# Patient Record
Sex: FEMALE | Race: WHITE | NOT HISPANIC OR LATINO | Employment: FULL TIME | URBAN - METROPOLITAN AREA
[De-identification: names, ages, dates, MRNs, and addresses within clinical notes are randomized per-mention and may not be internally consistent; named-entity substitution may affect disease eponyms.]

---

## 2017-02-01 ENCOUNTER — DOCUMENTATION ONLY (OUTPATIENT)
Dept: LAB | Facility: CLINIC | Age: 42
End: 2017-02-01

## 2017-02-01 ENCOUNTER — OFFICE VISIT (OUTPATIENT)
Dept: FAMILY MEDICINE | Facility: CLINIC | Age: 42
End: 2017-02-01
Payer: COMMERCIAL

## 2017-02-01 VITALS
OXYGEN SATURATION: 99 % | TEMPERATURE: 97.6 F | DIASTOLIC BLOOD PRESSURE: 71 MMHG | SYSTOLIC BLOOD PRESSURE: 113 MMHG | HEIGHT: 67 IN | HEART RATE: 60 BPM | WEIGHT: 147.3 LBS | BODY MASS INDEX: 23.12 KG/M2

## 2017-02-01 DIAGNOSIS — E10.9 TYPE 1 DIABETES MELLITUS WITHOUT COMPLICATION (H): ICD-10-CM

## 2017-02-01 DIAGNOSIS — Z12.39 BREAST CANCER SCREENING: Primary | ICD-10-CM

## 2017-02-01 DIAGNOSIS — E10.9 TYPE 1 DIABETES MELLITUS WITHOUT COMPLICATION (H): Primary | ICD-10-CM

## 2017-02-01 DIAGNOSIS — E78.5 HYPERLIPIDEMIA WITH TARGET LDL LESS THAN 100: ICD-10-CM

## 2017-02-01 LAB
BASOPHILS # BLD AUTO: 0.1 10E9/L (ref 0–0.2)
BASOPHILS NFR BLD AUTO: 2.3 %
DIFFERENTIAL METHOD BLD: ABNORMAL
EOSINOPHIL # BLD AUTO: 0.1 10E9/L (ref 0–0.7)
EOSINOPHIL NFR BLD AUTO: 3.1 %
ERYTHROCYTE [DISTWIDTH] IN BLOOD BY AUTOMATED COUNT: 12.4 % (ref 10–15)
HBA1C MFR BLD: 6.6 % (ref 4.3–6)
HCT VFR BLD AUTO: 39.4 % (ref 35–47)
HGB BLD-MCNC: 13 G/DL (ref 11.7–15.7)
LYMPHOCYTES # BLD AUTO: 1.4 10E9/L (ref 0.8–5.3)
LYMPHOCYTES NFR BLD AUTO: 35.1 %
MCH RBC QN AUTO: 31.4 PG (ref 26.5–33)
MCHC RBC AUTO-ENTMCNC: 33 G/DL (ref 31.5–36.5)
MCV RBC AUTO: 95 FL (ref 78–100)
MONOCYTES # BLD AUTO: 0.5 10E9/L (ref 0–1.3)
MONOCYTES NFR BLD AUTO: 11.5 %
NEUTROPHILS # BLD AUTO: 1.9 10E9/L (ref 1.6–8.3)
NEUTROPHILS NFR BLD AUTO: 48 %
PLATELET # BLD AUTO: 245 10E9/L (ref 150–450)
RBC # BLD AUTO: 4.14 10E12/L (ref 3.8–5.2)
WBC # BLD AUTO: 3.9 10E9/L (ref 4–11)

## 2017-02-01 PROCEDURE — 83516 IMMUNOASSAY NONANTIBODY: CPT | Mod: 91 | Performed by: PREVENTIVE MEDICINE

## 2017-02-01 PROCEDURE — 83036 HEMOGLOBIN GLYCOSYLATED A1C: CPT | Performed by: PREVENTIVE MEDICINE

## 2017-02-01 PROCEDURE — 80061 LIPID PANEL: CPT | Performed by: PREVENTIVE MEDICINE

## 2017-02-01 PROCEDURE — 36415 COLL VENOUS BLD VENIPUNCTURE: CPT | Performed by: PREVENTIVE MEDICINE

## 2017-02-01 PROCEDURE — 80050 GENERAL HEALTH PANEL: CPT | Performed by: PREVENTIVE MEDICINE

## 2017-02-01 PROCEDURE — 99395 PREV VISIT EST AGE 18-39: CPT | Performed by: PREVENTIVE MEDICINE

## 2017-02-01 NOTE — MR AVS SNAPSHOT
After Visit Summary   2/1/2017    Jenise Smith    MRN: [id number]           Patient Information     Date Of Birth          1975        Visit Information        Provider Department      2/1/2017 12:00 PM Hai Yeboah MD [facility]        Today's Diagnoses     Breast cancer screening    -  1        Follow-ups after your visit        Future tests that were ordered for you today     Open Future Orders        Priority Expected Expires Ordered    *MA Screening Digital Bilateral Routine  2/1/2018 2/1/2017    TSH with free T4 reflex Routine 2/10/2017 2/10/2017 2/1/2017    Lipid Profile with reflex to direct LDL Routine 2/10/2017 2/10/2017 2/1/2017    Comprehensive metabolic panel (BMP + Alb, Alk Phos, ALT, AST, Total. Bili, TP) Routine 2/10/2017 2/10/2017 2/1/2017    Albumin Random Urine Quantitative Routine 2/10/2017 2/10/2017 2/1/2017    CBC with platelets and differential Routine 2/10/2017 2/10/2017 2/1/2017    Hemoglobin A1c Routine 2/10/2017 2/10/2017 2/1/2017    Tissue transglutaminase clemente IgA and IgG Routine 2/10/2017 2/10/2017 2/1/2017            Who to contact     If you have questions or need follow up information about today's clinic visit or your schedule please contact [facility] directly at [phone].  Normal or non-critical lab and imaging results will be communicated to you by MyChart, letter or phone within 4 business days after the clinic has received the results. If you do not hear from us within 7 days, please contact the clinic through Interwisehart or phone. If you have a critical or abnormal lab result, we will notify you by phone as soon as possible.  Submit refill requests through [employer] or call your pharmacy and they will forward the refill request to us. Please allow 3 business days for your refill to be completed.          Additional Information About Your Visit        Interwisehart Information     [employer] gives you secure access to  "your electronic health record. If you see a primary care provider, you can also send messages to your care team and make appointments. If you have questions, please call your primary care clinic.  If you do not have a primary care provider, please call [phone] and they will assist you.        Care EveryWhere ID     This is your Care EveryWhere ID. This could be used by other organizations to access your [facility] medical records  [id number]        Your Vitals Were     Pulse Temperature Height BMI (Body Mass Index) Pulse Oximetry Last Period    60 97.6  F (36.4  C) (Oral) 5' 7\" (1.702 m) 23.07 kg/m2 99% 01/23/2017    Breastfeeding?                   No            Blood Pressure from Last 3 Encounters:   02/01/17 113/71   01/25/16 113/71   07/21/14 106/68    Weight from Last 3 Encounters:   02/01/17 147 lb 4.8 oz (66.815 kg)   01/25/16 141 lb (63.957 kg)   07/21/14 138 lb (62.596 kg)                 Today's Medication Changes          These changes are accurate as of: 2/1/17 12:01 PM.  If you have any questions, ask your nurse or doctor.               Stop taking these medicines if you haven't already. Please contact your care team if you have questions.     aluminum chloride 20 % external solution   Commonly known as:  DRYSOL   Stopped by:  Hai Yeboah MD           BIOTIN PO   Stopped by:  Hai Yeboah MD           CITRACAL OR   Stopped by:  Hai Yeboah MD           PRO-BIOTIC BLEND PO   Stopped by:  Hai Yeboah MD                    Primary Care Provider Office Phone # Fax #    Hai Yeboah [phone]       [facility] [address]  [facility] [address]        Thank you!     Thank you for choosing [facility]  for your care. Our goal is always to provide you with excellent care. Hearing back from our patients is one way we can continue to improve our " services. Please take a few minutes to complete the written survey that you may receive in the mail after your visit with us. Thank you!             Your Updated Medication List - Protect others around you: Learn how to safely use, store and throw away your medicines at www.disposemymeds.org.          This list is accurate as of: 2/1/17 12:01 PM.  Always use your most recent med list.                   Brand Name Dispense Instructions for use    ACE NOT PRESCRIBED (INTENTIONAL)     0 each    1 each daily ACE Inhibitor not prescribed due to Other: not needed       insulin lispro 100 UNIT/ML injection    HumaLOG     Inject Subcutaneous 3 times daily (before meals)       INSULIN PUMP - OUTPATIENT          MULTI FOR HER PO          sertraline 50 MG tablet    ZOLOFT    30 tablet    Take 1 tablet (50 mg) by mouth daily       STATIN NOT PRESCRIBED (INTENTIONAL)     0 each    Statin not prescribed intentionally due to does not meet [place] criteria       SYNTHROID PO      Take 25 mcg by mouth daily       VITAMIN D (CHOLECALCIFEROL) PO      Take 1,000 Units by mouth

## 2017-02-01 NOTE — PROGRESS NOTES
SUBJECTIVE:     CC: Jenise Smith is an 41 year old woman who presents for preventive health visit.     Physical  Annual:     Getting at least 3 servings of Calcium per day::  Yes    Bi-annual eye exam::  Yes    Dental care twice a year::  Yes    Sleep apnea or symptoms of sleep apnea::  None    Diet::  Diabetic    Frequency of exercise::  2-3 days/week    Duration of exercise::  45-60 minutes    Taking medications regularly::  No    Barriers to taking medications::  None    Additional concerns today::  No            Today's PHQ-2 Score:   PHQ-2 ( 1999 [employer]) 2/1/2017   Q1: Little interest or pleasure in doing things 0   Q2: Feeling down, depressed or hopeless 0   PHQ-2 Score 0   Little interest or pleasure in doing things -   Feeling down, depressed or hopeless -   PHQ-2 Score -       Abuse: Current or Past(Physical, Sexual or Emotional)- No  Do you feel safe in your environment - Yes    Social History   Substance Use Topics     Smoking status: Never Smoker      Smokeless tobacco: Never Used     Alcohol Use: 0.0 oz/week     0 Standard drinks or equivalent per week      Comment: 3 per week     The patient does not drink >3 drinks per day nor >7 drinks per week.    Recent Labs   Lab Test  01/21/16   0749  09/04/14   0921 07/01/13   CHOL  173  165  178   HDL  88  98  84   LDL  68  50  78   TRIG  86  85  80   CHOLHDLRATIO   --   1.7   --    NHDL  85   --    --        Reviewed orders with patient.  Reviewed health maintenance and updated orders accordingly - Yes    Mammo Decision Support:  Patient under age 50, mutual decision reflected in health maintenance.      Pertinent mammograms are reviewed under the imaging tab.  History of abnormal Pap smear: NO - age 30- 65 PAP every 3 years recommended  All Histories reviewed and updated in Epic.      ROS:  C: NEGATIVE for fever, chills, change in weight  I: NEGATIVE for worrisome rashes, moles or lesions  E: NEGATIVE for vision changes or irritation  ENT: NEGATIVE  "for ear, mouth and throat problems  R: NEGATIVE for significant cough or SOB  B: NEGATIVE for masses, tenderness or discharge  CV: NEGATIVE for chest pain, palpitations or peripheral edema  GI: NEGATIVE for nausea, abdominal pain, heartburn, or change in bowel habits  : NEGATIVE for unusual urinary or vaginal symptoms. Periods are regular.  M: NEGATIVE for significant arthralgias or myalgia  N: NEGATIVE for weakness, dizziness or paresthesias  P: NEGATIVE for changes in mood or affect    Problem list, Medication list, Allergies, and Medical/Social/Surgical histories reviewed in [facility] and updated as appropriate.  OBJECTIVE:     /71 mmHg  Pulse 60  Temp(Src) 97.6  F (36.4  C) (Oral)  Ht 5' 7\" (1.702 m)  Wt 147 lb 4.8 oz (66.815 kg)  BMI 23.07 kg/m2  SpO2 99%  LMP 01/23/2017  Breastfeeding? No  EXAM:  GENERAL: healthy, alert and no distress  EYES: Eyes grossly normal to inspection, PERRL and conjunctivae and sclerae normal  HENT: ear canals and TM's normal, nose and mouth without ulcers or lesions  NECK: no adenopathy, no asymmetry, masses, or scars and thyroid normal to palpation  RESP: lungs clear to auscultation - no rales, rhonchi or wheezes  BREAST: normal without masses, tenderness or nipple discharge and no palpable axillary masses or adenopathy  CV: regular rate and rhythm, normal S1 S2, no S3 or S4, no murmur, click or rub, no peripheral edema and peripheral pulses strong  ABDOMEN: soft, nontender, no hepatosplenomegaly, no masses and bowel sounds normal  MS: no gross musculoskeletal defects noted, no edema  SKIN: no suspicious lesions or rashes  NEURO: Normal strength and tone, mentation intact and speech normal  PSYCH: mentation appears normal, affect normal/bright    ASSESSMENT/PLAN:     1. Breast cancer screening  - Vitamin D Deficiency; Future    COUNSELING:  Reviewed preventive health counseling, as reflected in patient instructions       Regular exercise       Healthy diet/nutrition       " "Vision screening       Hearing screening       Contraception       Family planning       Folic Acid Counseling       Osteoporosis Prevention/Bone Health       Safe sex practices/STD prevention       Colon cancer screening       Consider Hep C screening for patients born between 1945 and        HIV screeninx in teen years, 1x in adult years, and at intervals if high risk         reports that she has never smoked. She has never used smokeless tobacco.    Estimated body mass index is 23.07 kg/(m^2) as calculated from the following:    Height as of this encounter: 5' 7\" (1.702 m).    Weight as of this encounter: 147 lb 4.8 oz (66.815 kg).       Counseling Resources:  ATP IV Guidelines  Pooled Cohorts Equation Calculator  Breast Cancer Risk Calculator  FRAX Risk Assessment  ICSI Preventive Guidelines  Dietary Guidelines for Americans, 2010  USDA's MyPlate  ASA Prophylaxis  Lung CA Screening    Hai Yeboah MD, MD   [facility]    "

## 2017-02-01 NOTE — NURSING NOTE
"Chief Complaint   Patient presents with     Physical       Initial /71 mmHg  Pulse 60  Temp(Src) 97.6  F (36.4  C) (Oral)  Ht 5' 7\" (1.702 m)  Wt 147 lb 4.8 oz (66.815 kg)  BMI 23.07 kg/m2  SpO2 99%  LMP 01/23/2017  Breastfeeding? No Estimated body mass index is 23.07 kg/(m^2) as calculated from the following:    Height as of this encounter: 5' 7\" (1.702 m).    Weight as of this encounter: 147 lb 4.8 oz (66.815 kg).  BP completed using cuff size: regular, right arm  Shantel Samaniego CMA    "

## 2017-02-01 NOTE — PROGRESS NOTES
This patient is coming in for pre physical labs THIS MORNING. Please review, associate diagnosis and sign the orders. Thanks!  -Lab Staff

## 2017-02-01 NOTE — PROGRESS NOTES
Patient was seen for lab only for pre physical labs. Blood was drawn and will be held, please place future orders for testing needed at this time. Thank you.

## 2017-02-02 ENCOUNTER — MYC MEDICAL ADVICE (OUTPATIENT)
Dept: FAMILY MEDICINE | Facility: CLINIC | Age: 42
End: 2017-02-02

## 2017-02-02 LAB
ALBUMIN SERPL-MCNC: 4.2 G/DL (ref 3.4–5)
ALP SERPL-CCNC: 48 U/L (ref 40–150)
ALT SERPL W P-5'-P-CCNC: 30 U/L (ref 0–50)
ANION GAP SERPL CALCULATED.3IONS-SCNC: 10 MMOL/L (ref 3–14)
AST SERPL W P-5'-P-CCNC: 18 U/L (ref 0–45)
BILIRUB SERPL-MCNC: 1.2 MG/DL (ref 0.2–1.3)
BUN SERPL-MCNC: 16 MG/DL (ref 7–30)
CALCIUM SERPL-MCNC: 8.9 MG/DL (ref 8.5–10.1)
CHLORIDE SERPL-SCNC: 103 MMOL/L (ref 94–109)
CHOLEST SERPL-MCNC: 172 MG/DL
CO2 SERPL-SCNC: 27 MMOL/L (ref 20–32)
CREAT SERPL-MCNC: 0.84 MG/DL (ref 0.52–1.04)
GFR SERPL CREATININE-BSD FRML MDRD: 76 ML/MIN/1.7M2
GLUCOSE SERPL-MCNC: 107 MG/DL (ref 70–99)
HDLC SERPL-MCNC: 84 MG/DL
LDLC SERPL CALC-MCNC: 75 MG/DL
NONHDLC SERPL-MCNC: 88 MG/DL
POTASSIUM SERPL-SCNC: 3.8 MMOL/L (ref 3.4–5.3)
PROT SERPL-MCNC: 7.4 G/DL (ref 6.8–8.8)
SODIUM SERPL-SCNC: 140 MMOL/L (ref 133–144)
TRIGL SERPL-MCNC: 63 MG/DL
TSH SERPL DL<=0.005 MIU/L-ACNC: 3.17 MU/L (ref 0.4–4)

## 2017-02-03 LAB
TTG IGA SER-ACNC: 1 U/ML
TTG IGG SER-ACNC: NORMAL U/ML

## 2017-02-22 ENCOUNTER — MYC MEDICAL ADVICE (OUTPATIENT)
Dept: FAMILY MEDICINE | Facility: CLINIC | Age: 42
End: 2017-02-22

## 2017-02-22 DIAGNOSIS — E55.9 VITAMIN D DEFICIENCY: Primary | ICD-10-CM

## 2017-02-24 ENCOUNTER — MYC MEDICAL ADVICE (OUTPATIENT)
Dept: FAMILY MEDICINE | Facility: CLINIC | Age: 42
End: 2017-02-24

## 2017-03-02 ENCOUNTER — TRANSFERRED RECORDS (OUTPATIENT)
Dept: HEALTH INFORMATION MANAGEMENT | Facility: CLINIC | Age: 42
End: 2017-03-02

## 2017-03-06 ENCOUNTER — OFFICE VISIT (OUTPATIENT)
Dept: SURGERY | Facility: CLINIC | Age: 42
End: 2017-03-06
Payer: COMMERCIAL

## 2017-03-06 VITALS
WEIGHT: 145 LBS | SYSTOLIC BLOOD PRESSURE: 130 MMHG | HEART RATE: 69 BPM | DIASTOLIC BLOOD PRESSURE: 82 MMHG | HEIGHT: 67 IN | BODY MASS INDEX: 22.76 KG/M2

## 2017-03-06 DIAGNOSIS — N63.20 LEFT BREAST LUMP: Primary | ICD-10-CM

## 2017-03-06 DIAGNOSIS — E55.9 VITAMIN D DEFICIENCY: ICD-10-CM

## 2017-03-06 LAB — DEPRECATED CALCIDIOL+CALCIFEROL SERPL-MC: 65 UG/L (ref 20–75)

## 2017-03-06 PROCEDURE — 82306 VITAMIN D 25 HYDROXY: CPT | Performed by: PREVENTIVE MEDICINE

## 2017-03-06 PROCEDURE — 36415 COLL VENOUS BLD VENIPUNCTURE: CPT | Performed by: PREVENTIVE MEDICINE

## 2017-03-06 PROCEDURE — 99243 OFF/OP CNSLTJ NEW/EST LOW 30: CPT | Performed by: SURGERY

## 2017-03-06 NOTE — PROGRESS NOTES
CHIEF COMPLAINT:  Chief Complaint   Patient presents with     Consult     Mass in left breast       HISTORY OF PRESENT ILLNESS:  Jenise Smith is a 42 year old female who is seen in consultation at the request of  Dr. Catrachito Yeboah for evaluation of a left breast lump and family history of breast cancer.  Jenise has been followed for a lump in her left breast for a couple of years.  She initially had mammograms and left breast ultrasound in  which showed only a couple of benign appearing cysts in the left upper outer breast.  Mammograms and ultrasound from 2016 were negative as well.  Jenise can still feel the lump but has noted no change.  She denies nipple discharge, skin change or other breast changes.  She has noted an occasional warm sensation in the left breast in the last few months.  Jenise has never had a breast biopsy or cyst aspiration.  Family history is positive for breast cancer in her paternal GM at age 70.  Her paternal uncle was diagnosed with colon cancer at age 55, but there is otherwise no FH for ovarian, uterine, prostate or pancreatic or other CA.    Jenise reached menarche at age 13.  She is  having had her pregnancy at age 29.  She nursed for about 1 month.  Jenise had a uterine ablation for very heavy periods, but still has regular menses.  She is on no BCP or other hormones.  She is a Type 1 diabetic and wears an insulin pump.    REVIEW OF SYSTEMS:  Constitutional:  Negative for chills, fatigue, fever and weight change.  Eyes:  Negative for blurred vision, eye pain and photophobia.  ENT:  Negative for hearing problems, ENT pain, congestion, rhinorrhea, epistaxis, hoarseness and dental problems.  Cardiovascular:  Negative for chest pain, palpitations, tachycardia, orthopnea and edema.  Respiratory:  Negative for cough, dyspnea and hemoptysis.  Gastrointestinal:  Negative for abdominal pain, heartburn, constipation, diarrhea and stool changes.  Musculoskeletal:   Negative for arthralgias, back pain and myalgias.  Integumentary/Breast:  See HPI.    Past Medical History   Diagnosis Date     Diabetes mellitus type 1 (H)      Dysmenorrhea      Excessive or frequent menstruation      Hypothyroid      Insulin pump in place      Lumbar back pain      Other and unspecified hyperlipidemia      PONV (postoperative nausea and vomiting)        Past Surgical History   Procedure Laterality Date     Laparoscopy       [place] teeth[       Leep tx, cervical       Dilation and curettage, hysteroscopy, ablate endometrium novasure, combined  1/23/2014     Procedure: COMBINED DILATION AND CURETTAGE, HYSTEROSCOPY, ABLATE ENDOMETRIUM NOVASURE;  HYSTEROSCOPY, DILATION, CURETTAGE, WITH NOVASURE ABLATION, MYOSURE MORCELLATOR;  Surgeon: Swetha Queen MD;  Location: [facility]     Operative hysteroscopy with morcellator  1/23/2014     Procedure: OPERATIVE HYSTEROSCOPY WITH MORCELLATOR;;  Surgeon: Swetha Queen MD;  Location: [facility]       Family History   Problem Relation Age of Onset     DIABETES Father      type 1     DIABETES Sister      type 1     Thyroid Disease Sister      DIABETES Mother      type 2     Breast Cancer Paternal Grandmother      Cancer - colorectal Paternal Uncle 60       Social History   Substance Use Topics     Smoking status: Never Smoker     Smokeless tobacco: Never Used     Alcohol use 0.0 oz/week     0 Standard drinks or equivalent per week      Comment: 3 per week       Patient Active Problem List   Diagnosis     Menorrhagia     Endometrial polyp     Hyperlipidemia with target LDL less than 100     Type 1 diabetes mellitus without complication (H)     Other specified hypothyroidism     Axillary hyperhidrosis     Allergies   Allergen Reactions     Latex      Facial redness     Current Outpatient Prescriptions   Medication Sig Dispense Refill     sertraline (ZOLOFT) 50 MG tablet Take 1 tablet (50 mg) by mouth daily 30 tablet 1     ACE/ARB NOT  "PRESCRIBED, INTENTIONAL, Please choose reason not prescribed, below       insulin lispro (HUMALOG) 100 UNIT/ML VIAL Inject Subcutaneous 3 times daily (before meals)       VITAMIN D, CHOLECALCIFEROL, PO Take 1,000 Units by mouth       Multiple Vitamins-Minerals (MULTI FOR HER PO)        STATIN NOT PRESCRIBED, INTENTIONAL, Statin not prescribed intentionally due to does not meet [place] criteria 0 each 0     ACE NOT PRESCRIBED, INTENTIONAL, 1 each daily ACE Inhibitor not prescribed due to Other: not needed 0 each 0     Levothyroxine Sodium (SYNTHROID PO) Take 25 mcg by mouth daily       INSULIN PUMP - OUTPATIENT        Vitals: /82  Pulse 69  Ht 5' 7\" (1.702 m)  Wt 145 lb (65.8 kg)  BMI 22.71 kg/m2  BMI= Body mass index is 22.71 kg/(m^2).    EXAM:  GENERAL: healthy, alert and no distress   BREAST:  The breasts appear symmetric with no overlying skin changes.  The nipples are normal bilaterally.  There is no dimpling or thickening of the skin.  No mass is appreciated in either breast.  The breast tissue is generally fairly soft with moderate increased density in the upper outer quadrants.  The upper outer quadrant on the left is a little more prominent and there are several smooth oval lumps within the dense breast tissue that feel like cysts.  The tissue in the upper outer quadrants is a little tender to palpation.  There is no axillary or supraclavicular lymphadenopathy.  A single small lymph node is palpated in each axilla.  CARDIOVASCULAR:  No edema or JVD.  RESPIRATORY: negative findings: no chest deformities noted, no chest wall tenderness, breathing is unlabored.  NECK: Neck supple. No adenopathy.   SKIN: No suspicious lesions or rashes  LYMPH: Normal cervical lymph nodes    ASSESSMENT:  Jenise Smith has no findings of concern on exam.  I did not appreciate a discrete lump in the left upper outer breast.  The \"lump\" is likely the prominent tail of the left breast which is very dense and " fibrocystic.  I see/feel no indication for surgical or needle biopsy at this time.  We reviewed risk and there is no evidence of increased risk in Jenise's personal or FH.  Her pat GM benign diagnosed with breast cancer at age 70 is likely random as there is no other pertinent FH.  Jenise is doing a good job with her lifestyle to improve her risk, exercising regularly, maintaining a normal weight, eating a low fat diet, minimizing her alcohol intake and not smoking.  I reviewed the data regarding exercise and breast cancer risk and encouraged her to stay active.  We also talked about screening.  I advised Jenise to continue with annual screening mammograms, preferably with tomosynthesis (3D) and annual clinical breast exams as part of her well woman exams.      PLAN:  Jenise will continue with routine annual screening including annual mammograms (preferably with tomosynthesis) and annual clinical breast exams.  She will follow up with me as needed.    Total time with patient visit: 45 minutes including discussions about the plan of care and care coordination with the patient.    Neli Egan MD    Please route or send letter to:  Primary Care Provider (PCP)

## 2017-03-06 NOTE — LETTER
2017      RE:  Jenise Smith-:  [birth year]    HISTORY OF PRESENT ILLNESS: Jenise Smith is a 42 year old female who is seen in consultation at the request of Dr. Catrachito Yeboah for evaluation of a left breast lump and family history of breast cancer.  Jenise has been followed for a lump in her left breast for a couple of years. She initially had mammograms and left breast ultrasound in  which showed only a couple of benign appearing cysts in the left upper outer breast. Mammograms and ultrasound from 2016 were negative as well. Jenise can still feel the lump but has noted no change. She denies nipple discharge, skin change or other breast changes. She has noted an occasional warm sensation in the left breast in the last few months. Jenise has never had a breast biopsy or cyst aspiration. Family history is positive for breast cancer in her paternal GM at age 70. Her paternal uncle was diagnosed with colon cancer at age 55, but there is otherwise no FH for ovarian, uterine, prostate or pancreatic or other CA.     Jenise reached menarche at age 13. She is  having had her pregnancy at age 29. She nursed for about 1 month. Jenise had a uterine ablation for very heavy periods, but still has regular menses. She is on no BCP or other hormones. She is a Type 1 diabetic and wears an insulin pump.     REVIEW OF SYSTEMS:  Constitutional: Negative for chills, fatigue, fever and weight change.  Eyes: Negative for blurred vision, eye pain and photophobia.  ENT: Negative for hearing problems, ENT pain, congestion, rhinorrhea, epistaxis, hoarseness and dental problems.  Cardiovascular: Negative for chest pain, palpitations, tachycardia, orthopnea and edema.  Respiratory: Negative for cough, dyspnea and hemoptysis.  Gastrointestinal: Negative for abdominal pain, heartburn, constipation, diarrhea and stool changes.  Musculoskeletal: Negative for arthralgias, back pain and  "myalgias.  Integumentary/Breast: See HPI.    Vitals: /82 Pulse 69 Ht 5' 7\" (1.702 m) Wt 145 lb (65.8 kg) BMI 22.71 kg/m2  BMI= Body mass index is 22.71 kg/(m^2).     EXAM:  GENERAL: healthy, alert and no distress   BREAST:  The breasts appear symmetric with no overlying skin changes. The nipples are normal bilaterally. There is no dimpling or thickening of the skin.  No mass is appreciated in either breast. The breast tissue is generally fairly soft with moderate increased density in the upper outer quadrants. The upper outer quadrant on the left is a little more prominent and there are several smooth oval lumps within the dense breast tissue that feel like cysts. The tissue in the upper outer quadrants is a little tender to palpation.  There is no axillary or supraclavicular lymphadenopathy. A single small lymph node is palpated in each axilla.  CARDIOVASCULAR: No edema or JVD.  RESPIRATORY: negative findings: no chest deformities noted, no chest wall tenderness, breathing is unlabored.  NECK: Neck supple. No adenopathy.   SKIN: No suspicious lesions or rashes  LYMPH: Normal cervical lymph nodes     ASSESSMENT:  Jenise Smiht has no findings of concern on exam. I did not appreciate a discrete lump in the left upper outer breast. The \"lump\" is likely the prominent tail of the left breast which is very dense and fibrocystic. I see/feel no indication for surgical or needle biopsy at this time. We reviewed risk and there is no evidence of increased risk in Jenise's personal or FH. Her pat GM benign diagnosed with breast cancer at age 72 is likely random as there is no other pertinent FH. Jenise is doing a good job with her lifestyle to improve her risk, exercising regularly, maintaining a normal weight, eating a low fat diet, minimizing her alcohol intake and not smoking. I reviewed the data regarding exercise and breast cancer risk and encouraged her to stay active.  We also talked about screening. I " advised Jenise to continue with annual screening mammograms, preferably with tomosynthesis (3D) and annual clinical breast exams as part of her well woman exams.      PLAN:  Jenise will continue with routine annual screening including annual mammograms (preferably with tomosynthesis) and annual clinical breast exams. She will follow up with me as needed.        Neli Egan MD

## 2017-03-06 NOTE — NURSING NOTE
Breast Patients    BREAST PATIENTS (ALL)    1-Do you have any of the following symptoms? Breast Pain and Lump(s) or Mass(es)  2-In which breast are you having the symptoms? left   3-Do you use hormones?  No  4-Have you had a Mammogram? Yes    5-Have you ever had a breast cyst drained? No  6-Have you ever had a breast biopsy? No  7-Have you ever had a Breast Cancer? No   8-Is there a history of Breast Cancer in your family? Yes   Relationship to you:    Grandmother  9-Have you ever had Ovarian Cancer? No  10-Is there a history of Ovarian Cancer in your family? No  11-Summarize your daily caffeine intake (i.e. coffee, tea, chocolate, soda etc.): coffee/chocolate    BREAST PATIENTS (FEMALE)    12-What age did your periods begin? 13  13-Date your last menstrual period began? 2/22/2017  14-Number of full-term pregnancies: 1  15-How many children do you have? 1  Ages 14  15-Your age when your first child was born? 29  16-Did you nurse your children? Yes  17-Are you pregnant now? No  18-Have you begun menopause? No  19-Have you had either ovary removed?No  20-Do you have breast implants? No   22-Are you on birth control? No  23-What is your marital status?   24-Do you exercise? 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

## 2017-03-06 NOTE — MR AVS SNAPSHOT
After Visit Summary   3/6/2017    Jenise Smith    MRN: [id number]           Patient Information     Date Of Birth          1975        Visit Information        Provider Department      3/6/2017 9:00 AM Neli Egan MD [facility]       Follow-ups after your visit        Your next 10 appointments already scheduled     Mar 06, 2017 10:45 AM CST   LAB with [facility]   [facility]    [address] [place] [address] [phone]           Patient must bring picture ID.  Patient should be prepared to give a urine specimen  Please do not eat 10-12 hours before your appointment if you are coming in fasting for labs on lipids, cholesterol, or glucose (sugar).  Pregnant women should follow their Care Team instructions. Water with medications is okay. Do not drink coffee or other fluids.   If you have concerns about taking  your medications, please ask at office or if scheduling via ScanCafe, send a message by clicking on Secure Messaging, Message Your Care Team.              Who to contact     If you have questions or need follow up information about today's clinic visit or your schedule please contact [facility] directly at [phone].  Normal or non-critical lab and imaging results will be communicated to you by MyChart, letter or phone within 4 business days after the clinic has received the results. If you do not hear from us within 7 days, please contact the clinic through [email or web address] or phone. If you have a critical or abnormal lab result, we will notify you by phone as soon as possible.  Submit refill requests through ScanCafe or call your pharmacy and they will forward the refill request to us. Please allow 3 business days for your refill to be completed.          Additional Information About Your Visit        MyChart Information   "   Go Vocab gives you secure access to your electronic health record. If you see a primary care provider, you can also send messages to your care team and make appointments. If you have questions, please call your primary care clinic.  If you do not have a primary care provider, please call [phone] and they will assist you.        Care EveryWhere ID     This is your Care EveryWhere ID. This could be used by other organizations to access your [place] medical records  [id number]        Your Vitals Were     Pulse Height BMI (Body Mass Index)             69 5' 7\" (1.702 m) 22.71 kg/m2          Blood Pressure from Last 3 Encounters:   03/06/17 130/82   02/01/17 113/71   01/25/16 113/71    Weight from Last 3 Encounters:   03/06/17 145 lb (65.8 kg)   02/01/17 147 lb 4.8 oz (66.8 kg)   01/25/16 141 lb (64 kg)              Today, you had the following     No orders found for display       Primary Care Provider Office Phone # Fax #    Valles Luis E Yeboah [phone]       [facility] [address]  [place] [address]        Thank you!     Thank you for choosing [facility]  for your care. Our goal is always to provide you with excellent care. Hearing back from our patients is one way we can continue to improve our services. Please take a few minutes to complete the written survey that you may receive in the mail after your visit with us. Thank you!             Your Updated Medication List - Protect others around you: Learn how to safely use, store and throw away your medicines at www.disposemymeds.org.          This list is accurate as of: 3/6/17  9:41 AM.  Always use your most recent med list.                   Brand Name Dispense Instructions for use    ACE NOT PRESCRIBED (INTENTIONAL)     0 each    1 each daily ACE Inhibitor not prescribed due to Other: not needed       ACE/ARB NOT PRESCRIBED (INTENTIONAL)      Please choose reason not " prescribed, below       insulin lispro 100 UNIT/ML injection    HumaLOG     Inject Subcutaneous 3 times daily (before meals)       INSULIN PUMP - OUTPATIENT          MULTI FOR HER PO          sertraline 50 MG tablet    ZOLOFT    30 tablet    Take 1 tablet (50 mg) by mouth daily       STATIN NOT PRESCRIBED (INTENTIONAL)     0 each    Statin not prescribed intentionally due to does not meet [place] criteria       SYNTHROID PO      Take 25 mcg by mouth daily       VITAMIN D (CHOLECALCIFEROL) PO      Take 1,000 Units by mouth

## 2017-03-07 ENCOUNTER — TRANSFERRED RECORDS (OUTPATIENT)
Dept: HEALTH INFORMATION MANAGEMENT | Facility: CLINIC | Age: 42
End: 2017-03-07

## 2017-03-15 ENCOUNTER — MYC MEDICAL ADVICE (OUTPATIENT)
Dept: FAMILY MEDICINE | Facility: CLINIC | Age: 42
End: 2017-03-15

## 2017-03-15 NOTE — TELEPHONE ENCOUNTER
Per second [employer] message, her most recent result 2016 was normal.  See other MyChart.  Virginia Wilkinson RN

## 2017-03-20 ENCOUNTER — TRANSFERRED RECORDS (OUTPATIENT)
Dept: HEALTH INFORMATION MANAGEMENT | Facility: CLINIC | Age: 42
End: 2017-03-20

## 2017-09-18 RX ORDER — CLINDAMYCIN PHOSPHATE 10 UG/ML
LOTION TOPICAL
Qty: 120 ML | OUTPATIENT
Start: 2017-09-18

## 2017-09-22 ENCOUNTER — TRANSFERRED RECORDS (OUTPATIENT)
Dept: HEALTH INFORMATION MANAGEMENT | Facility: CLINIC | Age: 42
End: 2017-09-22

## 2017-09-28 ENCOUNTER — TRANSFERRED RECORDS (OUTPATIENT)
Dept: HEALTH INFORMATION MANAGEMENT | Facility: CLINIC | Age: 42
End: 2017-09-28

## 2017-09-28 LAB — HBA1C MFR BLD: 8.9 % (ref 0–5.7)

## 2017-12-05 ENCOUNTER — TRANSFERRED RECORDS (OUTPATIENT)
Dept: HEALTH INFORMATION MANAGEMENT | Facility: CLINIC | Age: 42
End: 2017-12-05

## 2017-12-05 LAB
ALT SERPL-CCNC: 14 U/L (ref 6–29)
CHOLEST SERPL-MCNC: 190 MG/DL
CREAT SERPL-MCNC: 0.84 MG/DL (ref 0.5–1.1)
GFR SERPL CREATININE-BSD FRML MDRD: 87 ML/MIN/1.73M2
GLUCOSE SERPL-MCNC: 184 MG/DL (ref 65–99)
HBA1C MFR BLD: 6.5 % (ref 4–6)
HDLC SERPL-MCNC: 101 MG/DL
LDLC SERPL CALC-MCNC: 74 MG/DL
NONHDLC SERPL-MCNC: 89 MG/DL
POTASSIUM SERPL-SCNC: 4.3 MMOL/L (ref 3.5–5.3)
TRIGL SERPL-MCNC: 74 MG/DL
TSH SERPL-ACNC: 3.64 UIU/ML (ref 0.3–5)

## 2017-12-09 ENCOUNTER — TRANSFERRED RECORDS (OUTPATIENT)
Dept: HEALTH INFORMATION MANAGEMENT | Facility: CLINIC | Age: 42
End: 2017-12-09

## 2018-01-08 ENCOUNTER — TRANSFERRED RECORDS (OUTPATIENT)
Dept: HEALTH INFORMATION MANAGEMENT | Facility: CLINIC | Age: 43
End: 2018-01-08

## 2018-01-08 LAB — PAP SMEAR - HIM PATIENT REPORTED: NEGATIVE

## 2018-01-20 ENCOUNTER — HOSPITAL ENCOUNTER (EMERGENCY)
Facility: CLINIC | Age: 43
Discharge: HOME | End: 2018-01-20
Attending: NURSE PRACTITIONER | Admitting: NURSE PRACTITIONER
Payer: COMMERCIAL

## 2018-01-20 VITALS
WEIGHT: 144 LBS | BODY MASS INDEX: 22.6 KG/M2 | SYSTOLIC BLOOD PRESSURE: 128 MMHG | HEIGHT: 67 IN | DIASTOLIC BLOOD PRESSURE: 88 MMHG | OXYGEN SATURATION: 98 % | RESPIRATION RATE: 12 BRPM | TEMPERATURE: 98.7 F

## 2018-01-20 DIAGNOSIS — S61.214A LACERATION OF RIGHT RING FINGER WITHOUT FOREIGN BODY WITHOUT DAMAGE TO NAIL, INITIAL ENCOUNTER: ICD-10-CM

## 2018-01-20 DIAGNOSIS — M79.644 PAIN OF FINGER OF RIGHT HAND: ICD-10-CM

## 2018-01-20 PROCEDURE — 90715 TDAP VACCINE 7 YRS/> IM: CPT | Performed by: NURSE PRACTITIONER

## 2018-01-20 PROCEDURE — 12001 RPR S/N/AX/GEN/TRNK 2.5CM/<: CPT

## 2018-01-20 PROCEDURE — 90471 IMMUNIZATION ADMIN: CPT

## 2018-01-20 PROCEDURE — 99283 EMERGENCY DEPT VISIT LOW MDM: CPT | Mod: 25

## 2018-01-20 PROCEDURE — 25000128 H RX IP 250 OP 636: Performed by: NURSE PRACTITIONER

## 2018-01-20 RX ADMIN — CLOSTRIDIUM TETANI TOXOID ANTIGEN (FORMALDEHYDE INACTIVATED), CORYNEBACTERIUM DIPHTHERIAE TOXOID ANTIGEN (FORMALDEHYDE INACTIVATED), BORDETELLA PERTUSSIS TOXOID ANTIGEN (GLUTARALDEHYDE INACTIVATED), BORDETELLA PERTUSSIS FILAMENTOUS HEMAGGLUTININ ANTIGEN (FORMALDEHYDE INACTIVATED), BORDETELLA PERTUSSIS PERTACTIN ANTIGEN, AND BORDETELLA PERTUSSIS FIMBRIAE 2/3 ANTIGEN 0.5 ML: 5; 2; 2.5; 5; 3; 5 INJECTION, SUSPENSION INTRAMUSCULAR at 17:55

## 2018-01-20 ASSESSMENT — ENCOUNTER SYMPTOMS: WOUND: 1

## 2018-01-20 NOTE — ED AVS SNAPSHOT
Emergency Department    [address] [facility] [address]    Phone:  [phone]    Fax:  [phone]                                       Jenise Smith   MRN: [id number]    Department:   Emergency Department   Date of Visit:  1/20/2018           After Visit Summary Signature Page     I have received my discharge instructions, and my questions have been answered. I have discussed any challenges I see with this plan with the nurse or doctor.    ..........................................................................................................................................  Patient/Patient Representative Signature      ..........................................................................................................................................  Patient Representative Print Name and Relationship to Patient    ..................................................               ................................................  Date                                            Time    ..........................................................................................................................................  Reviewed by Signature/Title    ...................................................              ..............................................  Date                                                            Time

## 2018-01-20 NOTE — ED PROVIDER NOTES
"  History     Chief Complaint:  Finger laceration    HPI   Jenise Smith is a 42 year old female with a history of type I diabetes with an indwelling insulin pump who presents to the emergency department for evaluation of a finger laceration. The patient cut her finger with a mandolin, shaving the tip off. She states that the injury is \"quite painful.\" She denies an allergy to numbing medications.    Tdap: Unknown    Allergies:  Latex      Medications:    Zoloft  Insulin lispro  Levothyroxine     Past Medical History:    Diabetes mellitus type 1  Dysmenorrhea  Excessive or frequent menstruation  Hypothyroid   Insulin pump in place  Lumbar back pain  Other and unspecified hyperlipidemia    Postoperative nausea and vomiting   Axillary hyperhidrosis  Endometrial polyp     Past Surgical History:    Dilation and curettage, hysteroscopy, ablate endometrium novasure, combined  Laparoscopy   LEEP TX, cervical   Operative hysteroscopy with morcellator   [place] teeth removal     Family History:    Type 1 diabetes   Thyroid disease  Breast cancer     Social History:  The patient was accompanied to the ED by her spouse.  Smoking Status: Never  Smokeless Tobacco: Never  Alcohol Use: Positive   Marital Status:      Review of Systems   Skin: Positive for wound.     Physical Exam   Patient Vitals for the past 24 hrs:   BP Temp Temp src Heart Rate Resp SpO2 Height Weight   01/20/18 1726 128/88 98.7  F (37.1  C) Oral 80 12 98 % 1.702 m (5' 7\") 65.3 kg (144 lb)      Physical Exam  Physical Exam   Constitutional:  Sitting in bed comfortably, bandage over right ring finger.   Head: Atraumatic.  Head moves freely with normal range of motion.   Eyes: Conjunctivae pink. EOMs intact.   Neck: Normal range of motion.   Cardiovascular: Intact distal pulses: radial pulse 2+ on the right.  Pulmonary/Chest: No respiratory distress.   Musculoskeletal: Distal capillary refill and sensation intact. Tendon function intact.  Neurological: " Oriented to person, place, and time. No focal deficits.   Skin: There is a 1cm avulsion laceration noted to the distal tip of the right ring finger .  Surrounding skin is warm with no erythema or heat to touch.      Emergency Department Course     Procedures:    Narrative: Procedure: Laceration Repair        LACERATION:  A clean 1cm  avulsion laceration.      LOCATION:  Distal tip of right ring finger.       FUNCTION:  Distally sensation, circulation, motor and tendon function are intact.      ANESTHESIA:  Digital block using 1% plain lidocaine total of 6 mLs      PREPARATION:  Scrubbing with Normal Saline and Shur Clens      DEBRIDEMENT:  no debridement      CLOSURE:  Surgifoam was placed over the wound, and held on with a dressing.                                              Because of the nature of the wound, it could not be sutured shut.      Interventions:  1755 - Tdap 0.5mL IM    Emergency Department Course:  Nursing notes and vitals reviewed.    1740: I performed an exam of the patient as documented above.   1815: I performed the above repair.   1826: Patient rechecked and updated.      Findings and plan explained to the Patient and spouse. Patient discharged home with instructions regarding supportive care, medications, and reasons to return. The importance of close follow-up was reviewed.     Impression & Plan      Medical Decision Making:  Jenise Smith is a 42 year old female who presents for evaluation of a laceration to the distal tip of the right first finger. The wound was carefully evaluated and explored.  The laceration was closed with a dressing of surgifoam as noted above.  There is no evidence of muscular, tendon, or bony damage with this laceration.  No signs of foreign body.  Possible complications (infection, scarring) were reviewed with the patient. We discussed that she is at higher risk of infection given Type I DM, but given this fairly superficial wound, I feel that the risk of the  antibiotics are greater than any benefit. We discussed signs and symptoms of infection to return here for. Wound care discussed. Follow up with primary care as needed.     Diagnosis:    ICD-10-CM    1. Laceration of right ring finger without foreign body without damage to nail, initial encounter S61.214A    2. Pain of finger of right hand M79.644        Disposition:  Discharged to home.    Scribe Disclosure:  I, Caitlin Montoya, am serving as a scribe at 5:40 PM on 1/20/2018 to document services personally performed by Mari Vee NP based on my observations and the provider's statements to me.   1/20/2018    EMERGENCY DEPARTMENT       Mari Vee APRN CNP  01/20/18 2056

## 2018-01-20 NOTE — ED AVS SNAPSHOT
Emergency Department    [address] [facility] [address]    Phone:  [phone]    Fax:  [phone]                                       Jenise Smith   MRN: [id number]    Department:   Emergency Department   Date of Visit:  1/20/2018           Patient Information     Date Of Birth          1975        Your diagnoses for this visit were:     Laceration of right ring finger without foreign body without damage to nail, initial encounter     Pain of finger of right hand        You were seen by Mari Vee APRN CNP.      Follow-up Information     Follow up with Hai Yeboah MD In 3 days.    Specialty:  Internal Medicine    Why:  As needed for wound check    Contact information:    [address] [facility] [address]  [facility] [address]  [phone]          Discharge Instructions       Keep dressing on for 24 hours. Then remove, sometimes you have to moisten the surgi-foam to remove it. Then keep clean and covered with a bandaid. I would also keep a thin layer of vaseline to the area to prevent scabbing.     Return for signs or symptoms of infection such as: fever, increased redness, heat to touch, increased pain or pus-like drainage.         Future Appointments        Provider Department Dept Phone Center    3/9/2018 8:00 AM Lyndon Gaming MD [facility] [phone]       24 Hour Appointment Hotline       To make an appointment at any [facility], call [phone] ([phone]). If you don't have a family doctor or clinic, we will help you find one. [facility] are conveniently located to serve the needs of you and your family.             Review of your medicines      Our records show that you are taking the medicines listed below. If these are incorrect, please call your family doctor or clinic.        Dose / Directions Last dose taken    ACE NOT PRESCRIBED (INTENTIONAL)   Dose:  1 each   Quantity:  0 each        1 each daily  ACE Inhibitor not prescribed due to Other: not needed   Refills:  0        ACE/ARB/ARNI NOT PRESCRIBED (INTENTIONAL)        Please choose reason not prescribed, below   Refills:  0        insulin lispro 100 UNIT/ML injection   Commonly known as:  HumaLOG        Inject Subcutaneous 3 times daily (before meals)   Refills:  0        INSULIN PUMP - OUTPATIENT        Refills:  0        MULTI FOR HER PO        Refills:  0        sertraline 50 MG tablet   Commonly known as:  ZOLOFT   Dose:  50 mg   Quantity:  30 tablet        Take 1 tablet (50 mg) by mouth daily   Refills:  1        STATIN NOT PRESCRIBED (INTENTIONAL)   Quantity:  0 each        Statin not prescribed intentionally due to does not meet [place] criteria   Refills:  0        SYNTHROID PO   Dose:  25 mcg        Take 25 mcg by mouth daily   Refills:  0        VITAMIN D (CHOLECALCIFEROL) PO   Dose:  1000 Units        Take 1,000 Units by mouth   Refills:  0                Orders Needing Specimen Collection     None      Pending Results     No orders found from 1/18/2018 to 1/21/2018.            Pending Culture Results     No orders found from 1/18/2018 to 1/21/2018.            Pending Results Instructions     If you had any lab results that were not finalized at the time of your Discharge, you can call the ED Lab Result RN at [phone]. You will be contacted by this team for any positive Lab results or changes in treatment. The nurses are available 7 days a week from 10A to 6:30P.  You can leave a message 24 hours per day and they will return your call.        Test Results From Your Hospital Stay               Clinical Quality Measure: Blood Pressure Screening     Your blood pressure was checked while you were in the emergency department today. The last reading we obtained was  BP: 128/88 . Please read the guidelines below about what these numbers mean and what you should do about them.  If your systolic blood pressure (the top number) is less than 120 and  your diastolic blood pressure (the bottom number) is less than 80, then your blood pressure is normal. There is nothing more that you need to do about it.  If your systolic blood pressure (the top number) is 120-139 or your diastolic blood pressure (the bottom number) is 80-89, your blood pressure may be higher than it should be. You should have your blood pressure rechecked within a year by a primary care provider.  If your systolic blood pressure (the top number) is 140 or greater or your diastolic blood pressure (the bottom number) is 90 or greater, you may have high blood pressure. High blood pressure is treatable, but if left untreated over time it can put you at risk for heart attack, stroke, or kidney failure. You should have your blood pressure rechecked by a primary care provider within the next 4 weeks.  If your provider in the emergency department today gave you specific instructions to follow-up with your doctor or provider even sooner than that, you should follow that instruction and not wait for up to 4 weeks for your follow-up visit.        Thank you for choosing [facility]       Thank you for choosing [facility] for your care. Our goal is always to provide you with excellent care. Hearing back from our patients is one way we can continue to improve our services. Please take a few minutes to complete the written survey that you may receive in the mail after you visit with us. Thank you!        [employer] Information     [employer] gives you secure access to your electronic health record. If you see a primary care provider, you can also send messages to your care team and make appointments. If you have questions, please call your primary care clinic.  If you do not have a primary care provider, please call [phone] and they will assist you.        Care EveryWhere ID     This is your Care EveryWhere ID. This could be used by other organizations to access your [facility] medical records  [id number]         Equal Access to Services     ESTUARDO KASPER : Manjeet Persaud, andrew morris, ortega ellis. So [facility] [phone].    ATENCIÓN: Si habla español, tiene a anna disposición servicios gratuitos de asistencia lingüística. Llame al [phone].    We comply with applicable federal civil rights laws and [place] laws. We do not discriminate on the basis of race, color, national origin, age, disability, sex, sexual orientation, or gender identity.            After Visit Summary       This is your record. Keep this with you and show to your community pharmacist(s) and doctor(s) at your next visit.

## 2018-01-21 NOTE — DISCHARGE INSTRUCTIONS
Keep dressing on for 24 hours. Then remove, sometimes you have to moisten the surgi-foam to remove it. Then keep clean and covered with a bandaid. I would also keep a thin layer of vaseline to the area to prevent scabbing.     Return for signs or symptoms of infection such as: fever, increased redness, heat to touch, increased pain or pus-like drainage.

## 2018-03-09 ENCOUNTER — OFFICE VISIT (OUTPATIENT)
Dept: ENDOCRINOLOGY | Facility: CLINIC | Age: 43
End: 2018-03-09
Payer: COMMERCIAL

## 2018-03-09 ENCOUNTER — TELEPHONE (OUTPATIENT)
Dept: ENDOCRINOLOGY | Facility: CLINIC | Age: 43
End: 2018-03-09

## 2018-03-09 VITALS
DIASTOLIC BLOOD PRESSURE: 65 MMHG | WEIGHT: 156 LBS | BODY MASS INDEX: 23.64 KG/M2 | HEIGHT: 68 IN | SYSTOLIC BLOOD PRESSURE: 114 MMHG | HEART RATE: 79 BPM

## 2018-03-09 DIAGNOSIS — K30 INDIGESTION: ICD-10-CM

## 2018-03-09 DIAGNOSIS — E10.9 TYPE 1 DIABETES MELLITUS WITHOUT COMPLICATION (H): ICD-10-CM

## 2018-03-09 DIAGNOSIS — Z96.41 INSULIN PUMP STATUS: ICD-10-CM

## 2018-03-09 DIAGNOSIS — E06.3 HYPOTHYROIDISM DUE TO HASHIMOTO'S THYROIDITIS: ICD-10-CM

## 2018-03-09 DIAGNOSIS — E10.9 TYPE 1 DIABETES MELLITUS WITHOUT COMPLICATION (H): Primary | ICD-10-CM

## 2018-03-09 PROBLEM — F41.9 ANXIETY: Status: ACTIVE | Noted: 2017-09-28

## 2018-03-09 LAB
CREAT UR-MCNC: 124 MG/DL
HBA1C MFR BLD: 6.3 % (ref 4.3–6)
MICROALBUMIN UR-MCNC: 6 MG/L
MICROALBUMIN/CREAT UR: 4.51 MG/G CR (ref 0–25)
TSH SERPL DL<=0.005 MIU/L-ACNC: 1.75 MU/L (ref 0.4–4)

## 2018-03-09 PROCEDURE — 83516 IMMUNOASSAY NONANTIBODY: CPT | Mod: 59 | Performed by: INTERNAL MEDICINE

## 2018-03-09 PROCEDURE — 99214 OFFICE O/P EST MOD 30 MIN: CPT | Performed by: INTERNAL MEDICINE

## 2018-03-09 PROCEDURE — 84443 ASSAY THYROID STIM HORMONE: CPT | Performed by: INTERNAL MEDICINE

## 2018-03-09 PROCEDURE — 36415 COLL VENOUS BLD VENIPUNCTURE: CPT | Performed by: INTERNAL MEDICINE

## 2018-03-09 PROCEDURE — 83516 IMMUNOASSAY NONANTIBODY: CPT | Performed by: INTERNAL MEDICINE

## 2018-03-09 PROCEDURE — 82043 UR ALBUMIN QUANTITATIVE: CPT | Performed by: INTERNAL MEDICINE

## 2018-03-09 PROCEDURE — 83036 HEMOGLOBIN GLYCOSYLATED A1C: CPT | Performed by: INTERNAL MEDICINE

## 2018-03-09 RX ORDER — LANCING DEVICE/LANCETS
KIT MISCELLANEOUS
COMMUNITY
Start: 2017-09-18

## 2018-03-09 NOTE — MR AVS SNAPSHOT
After Visit Summary   3/9/2018    Jenise Smith    MRN: [id number]           Patient Information     Date Of Birth          1975        Visit Information        Provider Department      3/9/2018 8:00 AM Lyndon Gaming MD [facility]        Today's Diagnoses     Type 1 diabetes mellitus without complication (H)    -  1    Insulin pump status        Hypothyroidism due to Hashimoto's thyroiditis        Indigestion          Care Instructions    Try Fiasp insulin (1-mo, off label) in the insulin pump with discount card, new vial Rx to [facility]- Satish[facility]onda  Also needs Rx's to [facility] (3-mo amount) for levothyroxine, Contour Next TS's soon  Check lab tests including Celiac antibody panel  Use Terrell ROBAUTO for easier messaging           Follow-ups after your visit        Who to contact     If you have questions or need follow up information about today's clinic visit or your schedule please contact [facility] directly at [phone].  Normal or non-critical lab and imaging results will be communicated to you by x[employer] Technologyhart, letter or phone within 4 business days after the clinic has received the results. If you do not hear from us within 7 days, please contact the clinic through ROBAUTO or phone. If you have a critical or abnormal lab result, we will notify you by phone as soon as possible.  Submit refill requests through ROBAUTO or call your pharmacy and they will forward the refill request to us. Please allow 3 business days for your refill to be completed.          Additional Information About Your Visit        xG Technologyhart Information     ROBAUTO gives you secure access to your electronic health record. If you see a primary care provider, you can also send messages to your care team and make appointments. If you have questions, please call your primary care clinic.  If you do not have a primary care provider, please call [phone] and they will assist you.       "  Care EveryWhere ID     This is your Care EveryWhere ID. This could be used by other organizations to access your [place] medical records  [id number]        Your Vitals Were     Pulse Height BMI (Body Mass Index)             79 1.727 m (5' 8\") 23.72 kg/m2          Blood Pressure from Last 3 Encounters:   03/09/18 114/65   01/20/18 128/88   03/06/17 130/82    Weight from Last 3 Encounters:   03/09/18 70.8 kg (156 lb)   01/20/18 65.3 kg (144 lb)   03/06/17 65.8 kg (145 lb)              We Performed the Following     Albumin Random Urine Quantitative with Creat Ratio     Hemoglobin A1c     Tissue transglutaminase clemente IgA and IgG     TSH        Primary Care Provider Office Phone # Fax #    Amanda Yeboah [phone] [address] [address]        Equal Access to Services     [facility] : Hadii aad ku hadasho Soomaali, waaxda luqadaha, qaybta kaalmada adeegyada, waxay karlenein haytenan hernan salvador . So [facility] [phone].    ATENCIÓN: Si habla español, tiene a anna disposición servicios gratuitos de asistencia lingüística. Jackie al [phone].    We comply with applicable federal civil rights laws and [place] laws. We do not discriminate on the basis of race, color, national origin, age, disability, sex, sexual orientation, or gender identity.            Thank you!     Thank you for choosing [facility]  for your care. Our goal is always to provide you with excellent care. Hearing back from our patients is one way we can continue to improve our services. Please take a few minutes to complete the written survey that you may receive in the mail after your visit with us. Thank you!             Your Updated Medication List - Protect others around you: Learn how to safely use, store and throw away your medicines at www.disposemymeds.org.          This list is accurate as of 3/9/18  8:35 AM.  Always use your most recent med list.                   Brand Name " Dispense Instructions for use Diagnosis    FABRICIO CONTOUR NEXT test strip   Generic drug:  blood glucose monitoring      1 Dose 6 times daily. Test 6-7 times daily        blood glucose lancing device      1 Dose once daily.        insulin lispro 100 UNIT/ML injection    HumaLOG     Inject Subcutaneous 3 times daily (before meals)        INSULIN PUMP - OUTPATIENT           MULTI FOR HER PO           PROZAC PO      Take 20 mg by mouth daily        SYNTHROID PO      Take 25 mcg by mouth daily        VITAMIN D (CHOLECALCIFEROL) PO      Take 1,000 Units by mouth

## 2018-03-09 NOTE — TELEPHONE ENCOUNTER
I contacted [facility], the pharmacy benefit manager for the patient's insurance, they tell me that the drug is excluded from coverage. I attempted PA and gave justification for the Fiasp that it can provide much better blood glucose control and with more efficient insulin delivery than any of the formulary alternatives and also listed several of the formulary agents that the patient has tried and failed.    PA came back immediately denied. Request Reference Number: [id number]. FIASP /ML is denied due to Plan Exclusion.    I already got the fax on this denial and it does not give an option to appeal this decision.  I will route to Dr Gaming to see what he would like to do.    Bart Canela, [facility]

## 2018-03-09 NOTE — PATIENT INSTRUCTIONS
Try Fiasp insulin (1-mo, off label) in the insulin pump with discount card, new vial Rx to [facility]- Vonda Covarrubias  Also needs Rx's to [facility] (3-mo amount) for levothyroxine, Contour Next TS's soon  Check lab tests including Celiac antibody panel  Use [employer] for easier messaging

## 2018-03-09 NOTE — NURSING NOTE
"Chief Complaint   Patient presents with     Diabetes       Initial /65 (BP Location: Right arm, Cuff Size: Adult Regular)  Pulse 79  Ht 5' 8\" (1.727 m)  Wt 156 lb (70.8 kg)  BMI 23.72 kg/m2 Estimated body mass index is 23.72 kg/(m^2) as calculated from the following:    Height as of this encounter: 5' 8\" (1.727 m).    Weight as of this encounter: 156 lb (70.8 kg).  Medication Reconciliation: complete         Jody Philippe      "

## 2018-03-09 NOTE — LETTER
3/9/2018         RE: Jenise Smith  [address] [place] [address]        Dear Colleague,    Thank you for referring your patient, Jenise Smith, to the [facility]. Please see a copy of my visit note below.    Name: Jenise Smith is a 43 year old woman, self-referred for evaluation of diabetes mellitus.  Previously seen by me at my former clinic ([facility]), here to establish care with [facility].    HPI:  Recent issues:  Here for f/u evaluation  Using Medtronic 670G system, likes it but more alarms noted        Diabetes:  Previous diagnosis of IFG with high GAD65 Ab level  6/2011. Developed acute hyperglycemia and diagnosis of T1DM  Treatment with insulin medication, MDI plan  2/2013. Changed to OmniPod pump with Novolog insulin  Used DexcomG5 CGMS system  5/2017. Changed to Medtronic 630G pump  9/2017. Upgraded to Medtronic 670G pump and Guardian3 sensor   Humalog in pump  Has reduced hypoglycemia awareness   Has [facility]izabeth lab ([facility]), trained to recognize hypoglycemia smell/symptoms  Using Contour Next Link BG meter, tests..  Previous [facility] labs include:  Lab Results   Component Value Date    A1C 6.3 (H) 03/09/2018     02/01/2017    POTASSIUM 4.3 12/05/2017    CHLORIDE 103 02/01/2017    CO2 27 02/01/2017    ANIONGAP 10 02/01/2017     (A) 12/05/2017    BUN 16 02/01/2017    CR 0.84 12/05/2017    GFRESTIMATED 87 12/05/2017    GFRESTBLACK 101 12/05/2017    VIJI 8.9 02/01/2017    CHOL 190 12/05/2017    TRIG 74 12/05/2017     12/05/2017    LDL 74 12/05/2017    NHDL 89 12/05/2017    UCRR 124 03/09/2018    MICROL 6 03/09/2018    UMALCR 4.51 03/09/2018     Exercises with classes at health club- yoga, strength   Tried Temp Target with exercise, noticed higher glucose levels so not used  DM Complications: none known  Previous Lasik surgery at [facility] 2015  Last eye exam ~2016, no   reported    Thyroid:  2011. History of hypothyroidism  Takes levothyroxine medication  Current dose:  Levothyroxine 0.025 mg QAM    , lives in [place],  Conrad, daughter Myra and stepchildren iTsha Davalos  Works for [employer] as   Sees Dr. Amanda Yeboah/[facility] for general medicine evaluations.    PMH/PSH:  Past Medical History:   Diagnosis Date     Diabetes mellitus type 1 (H)      Dysmenorrhea      Excessive or frequent menstruation      Hypothyroid      Insulin pump in place      Lumbar back pain      Other and unspecified hyperlipidemia      PONV (postoperative nausea and vomiting)      Past Surgical History:   Procedure Laterality Date     DILATION AND CURETTAGE, HYSTEROSCOPY, ABLATE ENDOMETRIUM NOVASURE, COMBINED  1/23/2014    Procedure: COMBINED DILATION AND CURETTAGE, HYSTEROSCOPY, ABLATE ENDOMETRIUM NOVASURE;  HYSTEROSCOPY, DILATION, CURETTAGE, WITH NOVASURE ABLATION, MYOSURE MORCELLATOR;  Surgeon: Swetha Queen MD;  Location: [facility]     LAPAROSCOPY       LEEP TX, CERVICAL       OPERATIVE HYSTEROSCOPY WITH MORCELLATOR  1/23/2014    Procedure: OPERATIVE HYSTEROSCOPY WITH MORCELLATOR;;  Surgeon: Swetha Queen MD;  Location: [facility]     wisdom teeth[         Family Hx:  Family History   Problem Relation Age of Onset     DIABETES Father      type 1     DIABETES Sister      type 1     Thyroid Disease Sister      DIABETES Mother      type 2     Breast Cancer Paternal Grandmother      Cancer - colorectal Paternal Uncle 60         Social Hx:  Social History     Social History     Marital status: Single     Spouse name: N/A     Number of children: N/A     Years of education: N/A     Occupational History     Not on file.     Social History Main Topics     Smoking status: Never Smoker     Smokeless tobacco: Never Used     Alcohol use 0.0 oz/week     0 Standard drinks or equivalent per week      Comment: 3 per week     Drug use: No     Sexual activity:  "Not Currently     Other Topics Concern     Not on file     Social History Narrative     in May 2014    2 step children and one biol dtr age 10.    Works for [employer]--works from home          MEDICATIONS:  has a current medication list which includes the following prescription(s): blood glucose monitoring, blood glucose, fluoxetine hcl, insulin lispro, cholecalciferol, multiple vitamins-minerals, levothyroxine sodium, insulin aspart, and insulin aspart.    ROS:     ROS: 10 point ROS neg other than the symptoms noted above in the HPI.    GENERAL: no weight changes, fatigue, fevers, chills, night sweats.   HEENT: no dysphagia, odonophagia, diplopia, neck pain  THYROID:  no apparent hyper or hypothyroid symptoms  CV: no chest pain, pressure, palpitations  LUNGS: no SOB, CLIFFORD, cough, wheezing   ABDOMEN: some postmeal indigestion and mild nausea; denies diarrhea, constipation, abdominal pain  EXTREMITIES: no rashes, ulcers, edema  NEUROLOGY: no headaches, denies changes in vision, tingling, extremitiy numbness   MSK: no muscle aches or pains, weakness  SKIN: no rashes or lesions  : regular menstrual cycles?  PSYCH:  stable mood, no significant anxiety or depression  ENDOCRINE: no heat or cold intolerance    Physical Exam   VS: /65 (BP Location: Right arm, Cuff Size: Adult Regular)  Pulse 79  Ht 1.727 m (5' 8\")  Wt 70.8 kg (156 lb)  BMI 23.72 kg/m2  GENERAL: AXOX3, NAD, well dressed, answering questions appropriately, appears stated age.  THYROID:  normal gland, no apparent nodules or goiter  HEENT: neck non-tender, no exopthalmous, no proptosis, EOMI  CV: RRR, no rubs, gallops, no murmurs  LUNGS: CTAB, no wheezes, rales, or ronchi  ABDOMEN: soft, nontender, nondistended, no organomegaly  EXTREMITIES: no edema, +pedal pulses, no lesions  NEUROLOGY: CN grossly intact, no tremors  MSK: grossly intact  SKIN: no rashes, no lesions    LABS:    All pertinent notes, labs, and images personally " reviewed by me.     A/P:  Encounter Diagnoses   Name Primary?     Type 1 diabetes mellitus without complication (H) Yes     Insulin pump status      Hypothyroidism due to Hashimoto's thyroiditis      Indigestion      Comments:  I am pleased patient feeling well overall.  Recent glucose control excellent.    Plan:  Reviewed the overall diabetes management and insulin pump use.  Discussed optimal BG testing to assess glucose trends.  We reviewed insulin pump settings, basal rate and bolus dosing  Use of automated pump bolus dosing for meal/snack carb & correction dosing  Reviewed the [employer] Carelink report data, in detail    Recommend:  Continue use of the Medtronic 670G pump and Guardian3 CGMS sensor.    Patient Instructions   Try Fiasp insulin (1-mo, off label) in the insulin pump with discount card, [facility]  Also needs Rx's to [facility] (3-mo amount) for levothyroxine, Contour Next TS's soon  Check lab tests including Celiac antibody panel  Use wripl for easier messaging     Use Temp Target pump setting for aerobic exercise.  Arrange annual dilated eye exam, fasting lipid panel testing.    Addressed patient's questions today.      Labs ordered today:   Orders Placed This Encounter   Procedures     Hemoglobin A1c     Albumin Random Urine Quantitative with Creat Ratio     TSH     Tissue transglutaminase clemente IgA and IgG     Radiology/Consults ordered today: None    More than 50% of the time spent with Ms. Smith on counseling / coordinating her care.  Total appointment time was 30 minutes.    Follow-up:  3 mo    Lyndon Gaming MD  Endocrinology  [facility]        Again, thank you for allowing me to participate in the care of your patient.        Sincerely,        Lyndon Gaming MD

## 2018-03-09 NOTE — PROGRESS NOTES
Name: Jenise Smith is a 43 year old woman, self-referred for evaluation of diabetes mellitus.  Previously seen by me at my former clinic ([facility]), here to establish care with [facility].    HPI:  Recent issues:  Here for f/u evaluation  Using Medtronic 670G system, likes it but more alarms noted        Diabetes:  Previous diagnosis of IFG with high GAD65 Ab level  6/2011. Developed acute hyperglycemia and diagnosis of T1DM  Treatment with insulin medication, MDI plan  2/2013. Changed to OmniPod pump with Novolog insulin  Used DexcomG5 CGMS system  5/2017. Changed to Medtronic 630G pump  9/2017. Upgraded to Medtronic 670G pump and Guardian3 sensor   Humalog in pump  Has reduced hypoglycemia awareness   Has elizabeth Escudero), trained to recognize hypoglycemia smell/symptoms  Using Contour Next Link BG meter, variable testing, tests 6x/day  Previous [facility] labs include:  Lab Results   Component Value Date    A1C 6.3 (H) 03/09/2018     02/01/2017    POTASSIUM 4.3 12/05/2017    CHLORIDE 103 02/01/2017    CO2 27 02/01/2017    ANIONGAP 10 02/01/2017     (A) 12/05/2017    BUN 16 02/01/2017    CR 0.84 12/05/2017    GFRESTIMATED 87 12/05/2017    GFRESTBLACK 101 12/05/2017    VIJI 8.9 02/01/2017    CHOL 190 12/05/2017    TRIG 74 12/05/2017     12/05/2017    LDL 74 12/05/2017    NHDL 89 12/05/2017    UCRR 124 03/09/2018    MICROL 6 03/09/2018    UMALCR 4.51 03/09/2018     Exercises with classes at health club- yoga, strength   Tried Temp Target with exercise, noticed higher glucose levels so not used  DM Complications: none known  Previous Lasik surgery at [facility] 2015  Last eye exam ~2016, no DR reported    Thyroid:  2011. History of hypothyroidism  Takes levothyroxine medication  Current dose:  Levothyroxine 0.025 mg QAM    , lives in [place],  Conrad, daughter Myra and stepchildren Tisha Davalos  Works for [employer] as   Sees Dr. Amanda Yeboah/[facility] for general medicine evaluations.    PMH/PSH:  Past Medical History:   Diagnosis Date     Diabetes mellitus type 1 (H)      Dysmenorrhea      Excessive or frequent menstruation      Hypothyroid      Insulin pump in place      Lumbar back pain      Other and unspecified hyperlipidemia      PONV (postoperative nausea and vomiting)      Past Surgical History:   Procedure Laterality Date     DILATION AND CURETTAGE, HYSTEROSCOPY, ABLATE ENDOMETRIUM NOVASURE, COMBINED  1/23/2014    Procedure: COMBINED DILATION AND CURETTAGE, HYSTEROSCOPY, ABLATE ENDOMETRIUM NOVASURE;  HYSTEROSCOPY, DILATION, CURETTAGE, WITH NOVASURE ABLATION, MYOSURE MORCELLATOR;  Surgeon: Swetha Queen MD;  Location: [facility]     LAPAROSCOPY       LEEP TX, CERVICAL       OPERATIVE HYSTEROSCOPY WITH MORCELLATOR  1/23/2014    Procedure: OPERATIVE HYSTEROSCOPY WITH MORCELLATOR;;  Surgeon: Swetha Queen MD;  Location: [facility]     wisdom teeth[         Family Hx:  Family History   Problem Relation Age of Onset     DIABETES Father      type 1     DIABETES Sister      type 1     Thyroid Disease Sister      DIABETES Mother      type 2     Breast Cancer Paternal Grandmother      Cancer - colorectal Paternal Uncle 60         Social Hx:  Social History     Social History     Marital status: Single     Spouse name: N/A     Number of children: N/A     Years of education: N/A     Occupational History     Not on file.     Social History Main Topics     Smoking status: Never Smoker     Smokeless tobacco: Never Used     Alcohol use 0.0 oz/week     0 Standard drinks or equivalent per week      Comment: 3 per week     Drug use: No     Sexual activity: Not Currently     Other Topics Concern     Not on file     Social History Narrative     in May 2014    2 step children and one biol dtr age 10.    Works for [employer]--works from home          MEDICATIONS:  has a  "current medication list which includes the following prescription(s): blood glucose monitoring, blood glucose, fluoxetine hcl, insulin lispro, cholecalciferol, multiple vitamins-minerals, levothyroxine sodium, insulin aspart, and insulin aspart.    ROS:     ROS: 10 point ROS neg other than the symptoms noted above in the HPI.    GENERAL: no weight changes, fatigue, fevers, chills, night sweats.   HEENT: no dysphagia, odonophagia, diplopia, neck pain  THYROID:  no apparent hyper or hypothyroid symptoms  CV: no chest pain, pressure, palpitations  LUNGS: no SOB, CLIFFORD, cough, wheezing   ABDOMEN: some postmeal indigestion and mild nausea; denies diarrhea, constipation, abdominal pain  EXTREMITIES: no rashes, ulcers, edema  NEUROLOGY: no headaches, denies changes in vision, tingling, extremitiy numbness   MSK: no muscle aches or pains, weakness  SKIN: no rashes or lesions  : regular menstrual cycles?  PSYCH:  stable mood, no significant anxiety or depression  ENDOCRINE: no heat or cold intolerance    Physical Exam   VS: /65 (BP Location: Right arm, Cuff Size: Adult Regular)  Pulse 79  Ht 1.727 m (5' 8\")  Wt 70.8 kg (156 lb)  BMI 23.72 kg/m2  GENERAL: AXOX3, NAD, well dressed, answering questions appropriately, appears stated age.  THYROID:  normal gland, no apparent nodules or goiter  HEENT: neck non-tender, no exopthalmous, no proptosis, EOMI  CV: RRR, no rubs, gallops, no murmurs  LUNGS: CTAB, no wheezes, rales, or ronchi  ABDOMEN: soft, nontender, nondistended, no organomegaly  EXTREMITIES: no edema, +pedal pulses, no lesions  NEUROLOGY: CN grossly intact, no tremors  MSK: grossly intact  SKIN: no rashes, no lesions    LABS:    All pertinent notes, labs, and images personally reviewed by me.     A/P:  Encounter Diagnoses   Name Primary?     Type 1 diabetes mellitus without complication (H) Yes     Insulin pump status      Hypothyroidism due to Hashimoto's thyroiditis      Indigestion      Comments:  I am " pleased patient feeling well overall.  Recent glucose control excellent.    Plan:  Reviewed the overall diabetes management and insulin pump use.  Discussed optimal BG testing to assess glucose trends.  We reviewed insulin pump settings, basal rate and bolus dosing  Use of automated pump bolus dosing for meal/snack carb & correction dosing  Reviewed the Medtronic Carelink report data, in detail    Recommend:  Continue use of the Medtronic 670G pump and Guardian3 CGMS sensor.    Patient Instructions   Try Fiasp insulin (1-mo, off label) in the insulin pump with discount card, [facility]  Also needs Rx's to [facility] (3-mo amount) for levothyroxine, Contour Next TS's soon  Check lab tests including Celiac antibody panel  Use [employer] for easier messaging     Use Temp Target pump setting for aerobic exercise.  Arrange annual dilated eye exam, fasting lipid panel testing.    Addressed patient's questions today.      Labs ordered today:   Orders Placed This Encounter   Procedures     Hemoglobin A1c     Albumin Random Urine Quantitative with Creat Ratio     TSH     Tissue transglutaminase clemente IgA and IgG     Radiology/Consults ordered today: None    More than 50% of the time spent with Ms. Smith on counseling / coordinating her care.  Total appointment time was 30 minutes.    Follow-up:  3 mo    Lyndon Gaming MD  Endocrinology  [facility]

## 2018-03-09 NOTE — TELEPHONE ENCOUNTER
Reason for Call: prescription    Detailed comments: pt states she was prescribed FIASP Insulin and had a coupon for it but the pharmacy won't accept the coupon so she was wondering if Dr. Gaming could fill out a Prior Authorization for this.     Phone Number Patient can be reached at: Home number on file [phone] (home)    Best Time: any    Can we leave a detailed message on this number? YES    Call taken on 3/9/2018 at 2:07 PM by Osman Cramer

## 2018-03-10 LAB
TTG IGA SER-ACNC: <1 U/ML
TTG IGG SER-ACNC: <1 U/ML

## 2018-03-11 NOTE — TELEPHONE ENCOUNTER
Message noted, appreciated.  As with the case of many of the new diabetes medications, Fiasp has a (significant) discount card that reduces the cost of the medication for the patient.  I had given her one of these cards at her appt.  I recommend calling/messaging this nice patient, ask her to try the discount card at her pharmacy ([facility]) where the Fiasp Rx was sent, and have her message us if further problems (... Then we could somehow try an appeal letter).  Thanks.  TL

## 2018-03-12 NOTE — TELEPHONE ENCOUNTER
Messages noted.  Her conclusions are not correct... The discount card works for most commercial insurances (including [employer]) even if the (Fiasp) medication not on formulary... This is the purpose of using the discount card.  It should lower the copay from Tier 3 to Tier 2.  She must activate the card, and then should show it to the pharmacist.  I do not believe an appeal is needed (or appropriate, when we use this generous discount card).  Thanks.  TL

## 2018-03-12 NOTE — TELEPHONE ENCOUNTER
"Ok so Jenise responded quickly through vzaart:    \"Hi Bart,     The discount card only works if my insurance covers Fiasp so I am unable to use the discount card.  I requested that a Prior Auth be submitted for me.  Do you know if the PA was submitted on Friday?     Thanks!   Jenise \"    So it sounds like we will need to do an appeal then. I checked with her pharmacy benefit manager, [employer]. They tell me that for her particular plan, [facility] will handle the appeal directly.    1) If you write up a letter of medical necessity for why she would need the Fiasp over any other short-acting insulin, I can then fax this to [facility].    2) Would you consider this a standard appeal or an urgent appeal? I ask b/c there are two different fax numbers for appeals, one for urgent requests and one for standard requests.    Thanks,    Bart Canela, CMA   "

## 2018-03-12 NOTE — TELEPHONE ENCOUNTER
Ok, I sent a [employer] message to the patient.    Bart Canela, [facility]

## 2018-03-13 NOTE — TELEPHONE ENCOUNTER
Sent patient another [employer] message with Dr. Gaming's message below re: activating the discount card

## 2018-03-13 NOTE — TELEPHONE ENCOUNTER
Dr. Gaming. Patient replied to [employer] message. See pasted below      Jenise Smith   to Raad Canela, [facility]           2:45 PM   Hello,     I did receive a discount card and activated it but I can't use it as it only works if my insurance covers Fiasp so I am unable to use the discount card and was denied the savings.     I requested that a Prior Auth be submitted for me but heard it was denied.  My insurance said that I would need a Prior Auth that stated the new insulin will be better treatment than the formulary that is covered.  Is that possible?

## 2018-03-21 ENCOUNTER — TELEPHONE (OUTPATIENT)
Dept: ENDOCRINOLOGY | Facility: CLINIC | Age: 43
End: 2018-03-21

## 2018-03-21 ENCOUNTER — MYC MEDICAL ADVICE (OUTPATIENT)
Dept: ENDOCRINOLOGY | Facility: CLINIC | Age: 43
End: 2018-03-21

## 2018-03-21 DIAGNOSIS — E10.9 TYPE 1 DIABETES MELLITUS WITHOUT COMPLICATION (H): Primary | ICD-10-CM

## 2018-03-21 RX ORDER — INSULIN GLARGINE 100 [IU]/ML
INJECTION, SOLUTION SUBCUTANEOUS
Qty: 15 ML | Refills: 1 | Status: SHIPPED | OUTPATIENT
Start: 2018-03-21 | End: 2018-08-22

## 2018-03-21 NOTE — TELEPHONE ENCOUNTER
Reason for Call:  Other     Detailed comments: PT has an insulin pump that needs to be replaced and she needs to get insulin pens as a back up    Phone Number Patient can be reached at: Home number on file [phone] (home)    Best Time: any    Can we leave a detailed message on this number? YES    Call taken on 3/21/2018 at 7:35 AM by Martina Navarro

## 2018-03-22 NOTE — TELEPHONE ENCOUNTER
Messages noted, appreciate assistance.  I have also spoken with patient today and explained the unusual non-formulary/not covered situation with this insulin Rx.  I will also see if the Fiasp  rep has any insight on the PA process or appeal process.      TAMIKO Gaming MD  [facility]

## 2018-03-22 NOTE — TELEPHONE ENCOUNTER
Message noted.  See separate patient message reply, done 1-2 hrs ago.    TAMIKO Gaming MD  [facility]

## 2018-03-29 ENCOUNTER — TRANSFERRED RECORDS (OUTPATIENT)
Dept: HEALTH INFORMATION MANAGEMENT | Facility: CLINIC | Age: 43
End: 2018-03-29

## 2018-04-03 ENCOUNTER — MYC MEDICAL ADVICE (OUTPATIENT)
Dept: ENDOCRINOLOGY | Facility: CLINIC | Age: 43
End: 2018-04-03

## 2018-04-09 ENCOUNTER — TELEPHONE (OUTPATIENT)
Dept: FAMILY MEDICINE | Facility: CLINIC | Age: 43
End: 2018-04-09

## 2018-04-09 ENCOUNTER — MYC MEDICAL ADVICE (OUTPATIENT)
Dept: ENDOCRINOLOGY | Facility: CLINIC | Age: 43
End: 2018-04-09

## 2018-04-09 DIAGNOSIS — E10.9 TYPE 1 DIABETES MELLITUS WITHOUT COMPLICATION (H): ICD-10-CM

## 2018-04-09 NOTE — LETTER
2018      Appeals Department  [phone]    To whom it may concern:    I am writing on behalf of Mrs Jenise Smith ( 79).  She has type 1 diabetes mellitus and uses insulin medication for treatment.    She has tried Humalog and Novolog insulin medications previously.  I have advised changing to the new Fiasp U100 insulin.  Fiasp has a more rapid onset of action when compared to aspart (Novolog) insulin, and would therefore be expected to have a more rapid onset of action compared to Humalog insulin.  In clinical studies, Fiasp was more effective to improve [id number] levels (Jovan et al, Diabetes Care 2017, Mar, [id number]).    Since Fiasp insulin has a significant clinical rationale suggesting improved glycemic control and outcomes, I feel it is medically necessary and should be covered by her insurance plan.  Please contact me if questions.    Sincerely,        Lyndon Gaming MD, MS, FACE  Endocrinology  [facility]/[place]

## 2018-04-09 NOTE — TELEPHONE ENCOUNTER
Prior Authorization Retail Medication Request    Medication/Dose: blood glucose monitoring (FABRICIO CONTOUR NEXT) test strip  ICD code (if different than what is on RX):    Previously Tried and Failed:    Rationale:  RENEWAL    Insurance Name:  [employer]  Insurance ID:  [id number]       Pharmacy Information (if different than what is on RX)  Name:    Phone:

## 2018-04-11 NOTE — TELEPHONE ENCOUNTER
Central Prior Authorization Team   Phone: [phone]    Patient is on 670g medtronic pump and needs these specific test strips.

## 2018-04-11 NOTE — TELEPHONE ENCOUNTER
Pt calling back stated she requested the insulin aspart (FIASP) 100 UNITS/ML injection  Go through the appeal process and pt has not heard anything. Pt did speak with PA department  But they stated the clinic is handeling the prior Auth   Pt would like update   Ph. [phone] VM is okay

## 2018-04-12 NOTE — TELEPHONE ENCOUNTER
"Please create a current script in patients chart. Only script states \"patient reported\". PA team will need a current script in order to do PA. Thank you!  "

## 2018-04-12 NOTE — TELEPHONE ENCOUNTER
This message also noted.  This fast-acting insulin aspart (FIASP) has faster onset- of action than Novolog or Humalog.  Let me know if I need to write an Appeal letter.  Routed to SHEILA edge, thanks.      TAMIKO Gaming MD  [facility]/[place]

## 2018-04-12 NOTE — TELEPHONE ENCOUNTER
Message noted, but confusing.  I have submitted a Fiasp (new rapid acting Novolog insulin) Rx through [facility] on 3/9/18.  This is a new non-formulary insulin that should be cheaper for patient when using a NovoNodisk discount card.  Let me know if you need more information/assistance from me, thanks.    TAMIKO Gaming MD  [facility]

## 2018-04-13 ENCOUNTER — MYC MEDICAL ADVICE (OUTPATIENT)
Dept: ENDOCRINOLOGY | Facility: CLINIC | Age: 43
End: 2018-04-13

## 2018-04-16 ENCOUNTER — MYC MEDICAL ADVICE (OUTPATIENT)
Dept: SURGERY | Facility: CLINIC | Age: 43
End: 2018-04-16

## 2018-04-16 NOTE — TELEPHONE ENCOUNTER
I spoke with patient who is calling on behalf of her daughter and asking if our surgeons do removal of breast tissue for a sexual identity change.  I checked with Ju at [facility], who informed me that they do not remove breast tissue for a sexual identity change.  I informed patient she might want to check a plastic surgeons office for this.  Patient verbalized understanding.  Chary Mayo, ALEN, RN

## 2018-04-16 NOTE — TELEPHONE ENCOUNTER
From: Jenise Smith  To: Neli Egan MD  Sent: 4/16/2018 11:51 AM CDT  Subject: Do I need an appointment?    Hello, I have a question for Dr. Egan's nurse. Could someone call me at [phone]?    Thank you!  Jenise

## 2018-04-17 NOTE — TELEPHONE ENCOUNTER
Spoke with patient and gave update on PA process      Dr Gaming. Patient still needing letter of medical necessity for PA appeal for Fiasp.

## 2018-04-17 NOTE — TELEPHONE ENCOUNTER
Messages noted.  Sorry for my delay... I have been unusually busy.    1) Marivel Contour Next test strips.  She uses the Medtronic 670G pump and needs these test strips which work with her Marivel Contour Next meter.  This meter send the glucose signal to the pump by radiofrequency, and the strips are medically-necessary for her (and others using the Medtronic pump).  Please send the PA request, if necessary.    2) Fiasp vial insulin.  I have completed the PA appeal letter (in EPIC), paper copy left for the medical assistant (NB).    Let me know if anything else needed.    TAMIKO Gaming MD  [facility]

## 2018-04-17 NOTE — TELEPHONE ENCOUNTER
Central Prior Authorization Team   Phone: [phone]      Patient called concerned about the test strips being covered and wanted to know the status of all this.Usually in situations where the patient is using a pump these strips are covered. I was going to try and call [employer] for her and see if I could do anything to help. I couldn't find the information needed for a PA because there is no prescription on file in the patients chart. I also called the pharmacy to see if I could get the information that way and they do not have a prescription for the test strips either. Can someone please follow up with the patient or update the chart and send us the encounter?

## 2018-04-18 NOTE — TELEPHONE ENCOUNTER
Central PA team. Please refer to Appeal Letter under Letter's tab. Please submit Appeal ASAP    Thank you

## 2018-04-18 NOTE — TELEPHONE ENCOUNTER
PA Initiation    Medication: FABRICIO CONTOUR NEXT test strip - INITIATED  Insurance Company: [employer] ([facility]) - Phone [phone] Fax [phone]  Pharmacy Filling the Rx: [facility] [id number] [place] [facility] [place] - [address]  Filling Pharmacy Phone: [phone]  Filling Pharmacy Fax:    Start Date: 4/18/2018

## 2018-04-21 NOTE — TELEPHONE ENCOUNTER
Prior Authorization Approval    Authorization Effective Date: 04/21/2018    Authorization Expiration Date: 04/21/2019   Medication: FABRICIO CONTOUR NEXT test strip - APPROVED  Approved Dose/Quantity: 200 for 28 days - (PA approval for up to 600 strips per 30 days)  Reference #: [id number]   Insurance Company: [employer] ([facility]) - Phone [phone] Fax [phone]  Expected CoPay:       CoPay Card Available:      Foundation Assistance Needed:    Which Pharmacy is filling the prescription (Not needed for infusion/clinic administered): [facility] [id number] [place] [facility] [address] [place] [address]  Pharmacy Notified: Yes  Patient Notified: Yes

## 2018-04-25 ENCOUNTER — MYC MEDICAL ADVICE (OUTPATIENT)
Dept: ENDOCRINOLOGY | Facility: CLINIC | Age: 43
End: 2018-04-25

## 2018-04-26 NOTE — TELEPHONE ENCOUNTER
"Message noted.  I was under the impression that the Fiasp appeal letter was faxed last week.  A copy can be found in the \"letters\" section of her EPIC chart.  Will relay this important message to the PA-Pool to investigate and resend this appeal letter ASAP.  Thanks!    TAMIKO Gaming MD  [facility]      "

## 2018-04-26 NOTE — TELEPHONE ENCOUNTER
I faxed the appeal letter in Epic to the appeals fax [phone], because the number that she gave us below is not an active fax number.    Bart Canela, CMA

## 2018-06-13 ENCOUNTER — MYC MEDICAL ADVICE (OUTPATIENT)
Dept: ENDOCRINOLOGY | Facility: CLINIC | Age: 43
End: 2018-06-13

## 2018-06-14 NOTE — TELEPHONE ENCOUNTER
Message noted.  Will ask MA to download her Medtronic pump information, print report and place on my desk for my review... And let patient know I am away from office but will look at data ASAP.  Thanks.    TAMIKO Gaming MD  [facility]

## 2018-06-15 NOTE — TELEPHONE ENCOUNTER
Called [employer], I will refax the form we have faxed over on 5/3/2018. Sensors are need. Got the ok from [employer]  to check sensor box and refax.      Jody Philippe

## 2018-06-15 NOTE — TELEPHONE ENCOUNTER
Bartolome from [facility] is calling to ask about an Rx sent over for request.  It it for pump supplies and diabetes supplies.  Please return call to Bartolome [phone] opt 2.  He is refaxing order request today.

## 2018-06-18 ENCOUNTER — MYC MEDICAL ADVICE (OUTPATIENT)
Dept: FAMILY MEDICINE | Facility: CLINIC | Age: 43
End: 2018-06-18

## 2018-06-18 DIAGNOSIS — E10.9 TYPE 1 DIABETES MELLITUS WITHOUT COMPLICATION (H): Primary | ICD-10-CM

## 2018-06-18 DIAGNOSIS — Z96.41 INSULIN PUMP STATUS: ICD-10-CM

## 2018-06-27 ENCOUNTER — ALLIED HEALTH/NURSE VISIT (OUTPATIENT)
Dept: EDUCATION SERVICES | Facility: CLINIC | Age: 43
End: 2018-06-27
Payer: COMMERCIAL

## 2018-06-27 DIAGNOSIS — E10.9 TYPE 1 DIABETES MELLITUS WITHOUT COMPLICATION (H): Primary | ICD-10-CM

## 2018-06-27 PROCEDURE — G0108 DIAB MANAGE TRN  PER INDIV: HCPCS

## 2018-06-27 NOTE — Clinical Note
FYI, no pump changes made today (she is in target range 85% of the time!!). Focused on use of temp target and exercise guidelines as well as weight loss/healthy eating.   Chapis Posada RD LD CDE

## 2018-06-27 NOTE — MR AVS SNAPSHOT
After Visit Summary   6/27/2018    Jenise Smith    MRN: [id number]           Patient Information     Date Of Birth          1975        Visit Information        Provider Department      6/27/2018 9:00 AM [facility] DIABETES ED RESOURCE [facility] [place]        Today's Diagnoses     Type 1 diabetes mellitus without complication (H)    -  1      Care Instructions    My Diabetes Care Goals:    If exercising later in the day, run temp target overnight.     Could try exercising earlier in the day (in the morning) to avoid needing a snack before exercising.     Try to aim for 3 servings of calcium per day.     Follow up:  Call ([phone]), e-mail [email or web address]), or send [email or web address] message with questions, concerns or if follow-up is needed.     Bring blood glucose meter and logbook with you to all doctor and follow-up appointments.     [facility] for the [facility]:  For Your Diabetes Education and Nutrition Appointments Call:  [phone]   For Diabetes Education or Nutrition Related Questions:   Phone: [phone]  E-mail: [email or web address]  Fax: [phone]   If you need a medication refill please contact your pharmacy. Please allow 3 business days for your refills to be completed.    Instructions for emailing the Diabetes Educators    If you need to communicate a non-urgent message to a Diabetes Educator via email, please send to [email or web address].    Please follow the following email guidelines:    Subject line: Secure: your clinic name (example: Secure: [facility])  In the email please include: First name, middle initial, last name and date of birth.    We will be in touch with you within one (1) business day.             Follow-ups after your visit        Your next 10 appointments already scheduled     Aug 17, 2018  3:30 PM CDT   Return Visit with Lyndon Gaming MD   [facility] ([facility])    [address]  [place] [address] [phone]              Who to contact     If you have questions or need follow up information about today's clinic visit or your schedule please contact [facility] directly at [phone].  Normal or non-critical lab and imaging results will be communicated to you by MyChart, letter or phone within 4 business days after the clinic has received the results. If you do not hear from us within 7 days, please contact the clinic through MyChart or phone. If you have a critical or abnormal lab result, we will notify you by phone as soon as possible.  Submit refill requests through [facility] or call your pharmacy and they will forward the refill request to us. Please allow 3 business days for your refill to be completed.          Additional Information About Your Visit        MyChart Information     Virtual [facility] gives you secure access to your electronic health record. If you see a primary care provider, you can also send messages to your care team and make appointments. If you have questions, please call your primary care clinic.  If you do not have a primary care provider, please call [phone] and they will assist you.        Care EveryWhere ID     This is your Care EveryWhere ID. This could be used by other organizations to access your [facility] medical records  [id number]         Blood Pressure from Last 3 Encounters:   03/09/18 114/65   01/20/18 128/88   03/06/17 130/82    Weight from Last 3 Encounters:   03/09/18 70.8 kg (156 lb)   01/20/18 65.3 kg (144 lb)   03/06/17 65.8 kg (145 lb)              Today, you had the following     No orders found for display       Primary Care Provider Office Phone # Fax #    Amanda Yeboah [phone] [address]  [facility] [address]        Equal Access to Services     ESTUARDO KASPER AH: Manjeet Persaud, wafuadda arturo, qaybta dexter bucio,  ortega ruiz caitlin mcleod'aan ah. So [facility] [phone].    ATENCIÓN: Si habla saumya, tiene a anna disposición servicios gratuitos de asistencia lingüística. Jackie al [phone].    We comply with applicable federal civil rights laws and [place] laws. We do not discriminate on the basis of race, color, national origin, age, disability, sex, sexual orientation, or gender identity.            Thank you!     Thank you for choosing [facility] [place]  for your care. Our goal is always to provide you with excellent care. Hearing back from our patients is one way we can continue to improve our services. Please take a few minutes to complete the written survey that you may receive in the mail after your visit with us. Thank you!             Your Updated Medication List - Protect others around you: Learn how to safely use, store and throw away your medicines at www.disposemymeds.org.          This list is accurate as of 6/27/18 10:09 AM.  Always use your most recent med list.                   Brand Name Dispense Instructions for use Diagnosis    BASAGLAR 100 UNIT/ML injection     15 mL    Inject 10-14 units sq daily, as directed    Type 1 diabetes mellitus without complication (H)       FABRICIO CONTOUR NEXT test strip   Generic drug:  blood glucose monitoring      1 Dose 6 times daily. Test 6-7 times daily        blood glucose lancing device      1 Dose once daily.        * insulin lispro 100 UNIT/ML injection    HumaLOG     Inject Subcutaneous 3 times daily (before meals)        * insulin lispro 100 UNIT/ML injection    HumaLOG KWIKpen    15 mL    Inject 3-6 units sq at mealtime, as directed    Type 1 diabetes mellitus without complication (H)       * INSULIN PUMP - OUTPATIENT           * insulin aspart 100 UNITS/ML injection    FIASP    10 mL    Dose 3-7 units sq at mealtime as directed, E10.9    Type 1 diabetes mellitus without complication (H)       MULTI FOR HER PO           PROZAC PO       Take 20 mg by mouth daily        SYNTHROID PO      Take 25 mcg by mouth daily        VITAMIN D (CHOLECALCIFEROL) PO      Take 1,000 Units by mouth        * Notice:  This list has 4 medication(s) that are the same as other medications prescribed for you. Read the directions carefully, and ask your doctor or other care provider to review them with you.

## 2018-06-27 NOTE — PATIENT INSTRUCTIONS
My Diabetes Care Goals:    If exercising later in the day, run temp target overnight.     Could try exercising earlier in the day (in the morning) to avoid needing a snack before exercising.     Try to aim for 3 servings of calcium per day.     Follow up:  Call ([phone]), e-mail [email or web address]), or send [email or web address] message with questions, concerns or if follow-up is needed.     Bring blood glucose meter and logbook with you to all doctor and follow-up appointments.     [facility] for the [facility]:  For Your Diabetes Education and Nutrition Appointments Call:  [phone]   For Diabetes Education or Nutrition Related Questions:   Phone: [phone]  E-mail: [email or web address]  Fax: [phone]   If you need a medication refill please contact your pharmacy. Please allow 3 business days for your refills to be completed.    Instructions for emailing the Diabetes Educators    If you need to communicate a non-urgent message to a Diabetes Educator via email, please send to [email or web address].    Please follow the following email guidelines:    Subject line: Secure: your clinic name (example: Secure: [facility])  In the email please include: First name, middle initial, last name and date of birth.    We will be in touch with you within one (1) business day.

## 2018-06-27 NOTE — PROGRESS NOTES
"Diabetes Self Management Training: Individual Review Visit    Jenise Smith presents today for insulin pump review related to Type 1 diabetes.    She is accompanied by self    Patient's diabetes management related comments/concerns: \"I feel like it's a little high.\" Concerned about weight gain.    Patient's emotional response to diabetes: expresses readiness to learn and concern for health and well-being    Patient would like this visit to be focused around the following diabetes-related behaviors and goals: Healthy Eating    ASSESSMENT:  Patient Problem List and Family Medical History reviewed for relevant medical history, current medical status, and diabetes risk factors.    Uses temp target when exercising.    Current Diabetes Management per Patient:  Insulin Pump Type: Medtronic Minimed 670G System    BG meter: Contour Next USB Link (with Medtronic Pump) meter    Taking other diabetes medications? no      Blood Glucose Results and Insulin Use:           Current Pump Settings: See Insulin Pump - Outpatient in Medication List for complete list of settings.       BG values are: in goal    Patient's most recent   Lab Results   Component Value Date    A1C 6.3 03/09/2018    is meeting goal of <7.0    Nutrition:  Patient eats 3 meals per day    Breakfast - eggs, black coffee, cheese, 1 oz almonds  Lunch - salad with tomatoes, olive oil, vinegar, goat cheese   Dinner - glass of red wine, burger with bun, handful of chips   Snacks - cupcake on occasion (grad party recently)    Beverages: water, latte with almond milk, red wine on occasion, black coffee    Cultural/Yazdanism diet restrictions: No     Biggest Challenge to Healthy Eating: none    Physical Activity:    Either walks or goes to the gym daily.     Diabetes Risk Factors:  hyperlipidemia    Relevant co-morbidities and related health problems:  Significant for:  dyslipidemia    Diabetes Complications:  Not discussed today.    Recent health service and " "resource utilization related to diabetes (hyperglycemia, hypoglycemia, etc):   None    Vitals:  There were no vitals taken for this visit.  Estimated body mass index is 23.72 kg/(m^2) as calculated from the following:    Height as of 3/9/18: 1.727 m (5' 8\").    Weight as of 3/9/18: 70.8 kg (156 lb).     Last 3 BP:   BP Readings from Last 3 Encounters:   03/09/18 114/65   01/20/18 128/88   03/06/17 130/82       History   Smoking Status     Never Smoker   Smokeless Tobacco     Never Used       Labs:  Lab Results   Component Value Date    A1C 6.3 03/09/2018     Lab Results   Component Value Date     12/05/2017     Lab Results   Component Value Date    LDL 74 12/05/2017     HDL Cholesterol   Date Value Ref Range Status   12/05/2017 101 >50 mg/dL Final   ]  GFR Estimate   Date Value Ref Range Status   12/05/2017 87 >or=60 mL/min/1.73m2 Final     GFR Estimate If Black   Date Value Ref Range Status   12/05/2017 101 >or=60 mL/min/1.73m2 Final     Lab Results   Component Value Date    CR 0.84 12/05/2017     No results found for: MICROALBUMIN    Socio/Economic/Cultural Considerations:    Support system: family and spouse/significant other    Cultural Influences/Ethnic Background:  American    Health Literacy/Numeracy:  \"With diabetes, it's helpful to use forms and log books to write down blood sugars and what you're eating at times to help understand how foods affect your blood sugars. With this in mind how confident are you at filling out medical forms, such as these, by yourself?  Not Assessed    Health Beliefs and Attitudes:   Patient Activation Measure Survey Score:  JOJO Score (Last Two) 7/21/2014   JOJO Raw Score 52   Activation Score 100   JOJO Level 4       Stage of Change: ACTION (Actively working towards change)      Diabetes knowledge and skills assessment:   Patient is knowledgeable in diabetes management concepts related to: Healthy Eating, Being Active, Monitoring, Taking Medication, Problem Solving, " Reducing Risks and Healthy Coping  Patient needs further education on the following diabetes management concepts: Healthy Eating  Barriers to Learning Assessment: No Barriers identified  Based on learning assessment above, most appropriate setting for further diabetes education would be: Individual setting.    INTERVENTION:   Time in Auto Mode: 93% (goal of >80%)  Time in Manual Mode: 7%  Sensor worn: 93%  calibrating when glucose stable with 1 or no arrows? Yes    Calibrating 3.8 times per day.  Evaluate Auto mode exits?  yes    Post meal Excursions:  Is patient having post meal excursions? Occasionally, usually related to eating out or grazing at a grad party recently  - if high then decrease carbohydrate ratio  - if low then increase carbohydrate ratio  Any additional concerns related to post meal? no    Correction:  Is patient taking a correction when prompted by the pump?Yes    Is post correction achieving goal at 2-3 hours? Yes   - if high then decrease the active insulin time by 15 minutes  - if low then increase the active insulin by 15 minutes  Any additional concerns related to correction? no    Temp Target:  Using temp Target of 150mg/dl for activity/exercise? Yes    - using 1-2 hours pre exercise? No   - using 1-2 hours post exercise? No    - consuming carbohydrates before or during activity? Yes   - suspending pump during exercise? No   Any additional concerns related to activity? Is often needing a snack before exercise when done later in the day and then dips down overnight with her glucose    Education provided today on:  AADE Self-Care Behaviors:  Healthy Eating: weight reduction and portion control. Discussed different erinn options for tracking intakes/calorie consumption. Discussed that given how many calories she is eating as a snack before exercise and possibly to recover from lows, she is likely not burning many extra calories during the day. She was wondering how many calories to aim for.  Educated on aiming for about 4883-7782 kcal per day to help facilitate weight loss for her (REE with mifflin is 1412 kcal/d + activity factor of 1.375 or 1.55 = 0532-7535 kcal (- 500 for weight loss) = 7740-9361 kcal/d for weight loss).     Education specific to insulin pump provided today on:   importance of bolusing before meals (10-20 min beforehand), importance of counting carbohydrates accurately, treating hypoglycemia with less carbs in auto mode, exercise recommendations, using temp target with exercise and starting 1-2 hours before hand and/or letting run 1-2 hours afterward, and weight management  Praised her on changing her infusion set q3d. Reviewed her pump report and glucose trends with her. Praised her on her time in range with auto mode (>70%).  She feels she is often high in the morning and appears to have some brittani phenomenon happening. Educated that best practice is checking her BG in the am and taking a correction if offered. Also, as she often eats a snack before her exercise later in the day, discussed option of working out in the morning when her glucose is already higher to avoid needing extra calories hopefully since she would like to lose a little weight. Educated that if she exercises in the evening that she could run a temp target overnight to help prevent overnight lows.     Opportunities for ongoing education and support in diabetes-self management were discussed.    Pt verbalized understanding of concepts discussed and recommendations provided today.       Education Materials Provided:  No new materials provided today    PLAN:  See Patient Instructions for co-developed, patient-stated behavior change goals.  No pump changes made today.   Run temp target overnight if exercising later in the day.   Try exercising earlier in the day when BG higher to avoid needing extra calories/snack prior to exercise.   Work on getting 3 servings of calcium per day.   Consume protein with meals/snacks.    AVS printed and provided to patient today.  Written instructions for pump settings given to patient.     FOLLOW-UP:  Follow-up as needed.   Chart routed to referring provider.    Ongoing plan for education and support: Follow-up with primary care provider    IJEOMA Larson CDE    Time Spent: 60 minutes  Encounter Type: Individual    Any diabetes medication dose changes were made via the CDE Protocol and Collaborative Practice Agreement with the patient's endocrinology provider. A copy of this encounter was shared with the provider.

## 2018-07-03 ENCOUNTER — MYC MEDICAL ADVICE (OUTPATIENT)
Dept: ENDOCRINOLOGY | Facility: CLINIC | Age: 43
End: 2018-07-03

## 2018-07-03 NOTE — TELEPHONE ENCOUNTER
Message noted.  I called patient and discussed the Fiasp insulin medication options.  She says her insurance will not cover this medication despite our Rx and appeal.  We reviewed options for trying this insulin medication.    TAMIKO Gaming MD  Endocrinology   [facility]

## 2018-07-24 ENCOUNTER — MEDICAL CORRESPONDENCE (OUTPATIENT)
Dept: HEALTH INFORMATION MANAGEMENT | Facility: CLINIC | Age: 43
End: 2018-07-24

## 2018-07-26 ENCOUNTER — MYC MEDICAL ADVICE (OUTPATIENT)
Dept: ENDOCRINOLOGY | Facility: CLINIC | Age: 43
End: 2018-07-26

## 2018-08-03 ENCOUNTER — TRANSFERRED RECORDS (OUTPATIENT)
Dept: HEALTH INFORMATION MANAGEMENT | Facility: CLINIC | Age: 43
End: 2018-08-03

## 2018-08-03 LAB — TSH SERPL-ACNC: 2.56 UIU/ML (ref 0.45–4.5)

## 2018-08-09 ENCOUNTER — MYC MEDICAL ADVICE (OUTPATIENT)
Dept: ENDOCRINOLOGY | Facility: CLINIC | Age: 43
End: 2018-08-09

## 2018-08-16 ENCOUNTER — OFFICE VISIT (OUTPATIENT)
Dept: ENDOCRINOLOGY | Facility: CLINIC | Age: 43
End: 2018-08-16
Payer: COMMERCIAL

## 2018-08-16 ENCOUNTER — MYC MEDICAL ADVICE (OUTPATIENT)
Dept: ENDOCRINOLOGY | Facility: CLINIC | Age: 43
End: 2018-08-16

## 2018-08-16 VITALS
DIASTOLIC BLOOD PRESSURE: 64 MMHG | HEIGHT: 68 IN | HEART RATE: 74 BPM | BODY MASS INDEX: 23.04 KG/M2 | SYSTOLIC BLOOD PRESSURE: 103 MMHG | WEIGHT: 152 LBS

## 2018-08-16 DIAGNOSIS — E10.9 TYPE 1 DIABETES MELLITUS WITHOUT COMPLICATION (H): Primary | ICD-10-CM

## 2018-08-16 PROCEDURE — 99214 OFFICE O/P EST MOD 30 MIN: CPT | Performed by: INTERNAL MEDICINE

## 2018-08-16 NOTE — PROGRESS NOTES
Name: Jenise Smith      HPI:  Recent issues:  Patient concerns for frequent pump alarms, especially at night  Had noticed higher morning glucose trends, decided to stop pump use and switch to MDI 8/6/18  Feeling well overall.          Diabetes:  Previous diagnosis of IFG with high GAD65 Ab level  6/2011. Developed acute hyperglycemia and diagnosis of T1DM  Treatment with insulin medication, MDI plan  2/2013. Changed to OmniPod pump with Novolog insulin  Used DexcomG5 CGMS system  5/2017. Changed to Medtronic 630G pump  9/2017. Upgraded to Medtronic 670G pump and Guardian3 sensor, Humalog insulin  8/2018. Discontinued Medtronic pump and sensor, changed to MDI plan   Had taken Basaglar 17U at bedtime and mealtime Humalog  Current DM meds:   Basaglar 16U subcutaneous at bedtime   Humalog pen 1:7 at mealtime, ISF 60, goal target  mg/dl    Has reduced hypoglycemia awareness   Has chocolate lab ([facility]), trained to recognize hypoglycemia smell/symptoms  Using Contour Next Link BG meter, variable testing, tests 6x/day  Previous [facility] labs include:  Lab Results   Component Value Date    A1C 6.3 (H) 03/09/2018     02/01/2017    POTASSIUM 4.3 12/05/2017    CHLORIDE 103 02/01/2017    CO2 27 02/01/2017    ANIONGAP 10 02/01/2017     (A) 12/05/2017    BUN 16 02/01/2017    CR 0.84 12/05/2017    GFRESTIMATED 87 12/05/2017    GFRESTBLACK 101 12/05/2017    VIJI 8.9 02/01/2017    CHOL 190 12/05/2017    TRIG 74 12/05/2017     12/05/2017    LDL 74 12/05/2017    NHDL 89 12/05/2017    UCRR 124 03/09/2018    MICROL 6 03/09/2018    UMALCR 4.51 03/09/2018     Exercises with classes at health club- yoga, strength   Tried Temp Target with exercise, noticed higher glucose levels so not used  DM Complications: none known  Previous Lasik surgery at [facility] 2015  Last eye exam ~2016, no DR reported        Thyroid:  2011. History of hypothyroidism  Takes levothyroxine medication  Current dose:  Levothyroxine  0.025 mg QAM    , lives in [place],  [place], daughter Myra and stepchildren Tisha Davalos  Works for [employer] as   Sees Dr. Amanda Yeboah/[facility] for general medicine evaluations.    PMH/PSH:  Past Medical History:   Diagnosis Date     Diabetes mellitus type 1 (H)      Dysmenorrhea      Excessive or frequent menstruation      Hypothyroid      Insulin pump in place      Lumbar back pain      Other and unspecified hyperlipidemia      PONV (postoperative nausea and vomiting)      Past Surgical History:   Procedure Laterality Date     DILATION AND CURETTAGE, HYSTEROSCOPY, ABLATE ENDOMETRIUM NOVASURE, COMBINED  1/23/2014    Procedure: COMBINED DILATION AND CURETTAGE, HYSTEROSCOPY, ABLATE ENDOMETRIUM NOVASURE;  HYSTEROSCOPY, DILATION, CURETTAGE, WITH NOVASURE ABLATION, MYOSURE MORCELLATOR;  Surgeon: Swetha Queen MD;  Location: [facility]     LAPAROSCOPY       LEEP TX, CERVICAL       OPERATIVE HYSTEROSCOPY WITH MORCELLATOR  1/23/2014    Procedure: OPERATIVE HYSTEROSCOPY WITH MORCELLATOR;;  Surgeon: Swetha Queen MD;  Location: [facility]     wisdom teeth[         Family Hx:  Family History   Problem Relation Age of Onset     Diabetes Father      type 1     Diabetes Sister      type 1     Thyroid Disease Sister      Diabetes Mother      type 2     Breast Cancer Paternal Grandmother      Cancer - colorectal Paternal Uncle 60         Social Hx:  Social History     Social History     Marital status: Single     Spouse name: N/A     Number of children: N/A     Years of education: N/A     Occupational History     Not on file.     Social History Main Topics     Smoking status: Never Smoker     Smokeless tobacco: Never Used     Alcohol use 0.0 oz/week     0 Standard drinks or equivalent per week      Comment: 3 per week     Drug use: No     Sexual activity: Not Currently     Other Topics Concern     Not on file     Social History Narrative     in May 2014    2 step  "children and one biol dtr age 10.    Works for [employer]--works from home          MEDICATIONS:  has a current medication list which includes the following prescription(s): basaglar, blood glucose, blood glucose monitoring, fluoxetine hcl, insulin lispro, levothyroxine sodium, multiple vitamins-minerals, and cholecalciferol.    ROS:     ROS: 10 point ROS neg other than the symptoms noted above in the HPI.    GENERAL: energy good, no weight changes, denies fevers, chills, night sweats.   HEENT: no dysphagia, odonophagia, diplopia, neck pain  THYROID:  no apparent hyper or hypothyroid symptoms  CV: no chest pain, pressure, palpitations  LUNGS: no SOB, CLIFFORD, cough, wheezing   ABDOMEN: some postmeal indigestion and mild nausea; denies diarrhea, constipation, abdominal pain  EXTREMITIES: no rashes, ulcers, edema  NEUROLOGY: no headaches, denies changes in vision, tingling, extremitiy numbness   MSK: no muscle aches or pains, weakness  SKIN: no rashes or lesions  : regular menstrual cycles?  PSYCH:  stable mood, no significant anxiety or depression  ENDOCRINE: no heat or cold intolerance    Physical Exam   VS: /64  Pulse 74  Ht 5' 8\" (1.727 m)  Wt 152 lb (68.9 kg)  BMI 23.11 kg/m2  GENERAL: AXOX3, NAD, well dressed, answering questions appropriately, appears stated age.  THYROID:  normal gland, no apparent nodules or goiter  ABDOMEN: normal size  EXTREMITIES: no edema, no lesions  NEUROLOGY: CN grossly intact, no tremors  MSK: grossly intact  SKIN: no rashes, no lesions    LABS:    All pertinent notes, labs, and images personally reviewed by me.     A/P:  Encounter Diagnosis   Name Primary?     Type 1 diabetes mellitus without complication (H) Yes     Comments:  Reviewed health history and complicated diabetes issues.  Recent BG trends good overall.    Plan:  Discussed general issues with the diabetes diagnosis and management  We discussed the hgbA1c test which reflects previous overall glucose levels " or control  Discussed the importance of blood glucose (BG) testing to assess glucose trends  Provided general overview of the multiple daily injection (MDI) plan using rapid acting mealtime and longacting insulin medications    Recommend:  Agree with switch to MDI plan  Continue the current Novolog and Basaglar med use, insulin doses  Check on formulary option with Tresiba, change from Basaglar to Tresiba 16U every day  Check BG levels premeal and bedtime  Agree with plan for DexcomG6 purchase, use  Try Accucheck Guide BG meter with the insulin dose calculator   Gave sample meter today   Contact our office if needing Accucheck test strips Rx  Request copy of recent labs from [facility] sent here  Advise having fasting lipid panel testing and dilated eye examination, at least annually    Addressed patient questions today    Labs ordered today:   No orders of the defined types were placed in this encounter.    Radiology/Consults ordered today: None    More than 50% of the time spent with Ms. Smith on counseling / coordinating her care.  Total appointment time was 30 minutes.    Follow-up:  4-6 weeks    Lyndon Gaming MD  Endocrinology  [facility]

## 2018-08-16 NOTE — MR AVS SNAPSHOT
After Visit Summary   8/16/2018    Jenise Smith    MRN: [id number]           Patient Information     Date Of Birth          1975        Visit Information        Provider Department      8/16/2018 3:30 PM Lyndon Gaming MD [facility]        Today's Diagnoses     Type 1 diabetes mellitus without complication (H)    -  1       Follow-ups after your visit        Follow-up notes from your care team     Return in about 5 weeks (around 9/20/2018).      Your next 10 appointments already scheduled     Sep 10, 2018  4:00 PM CDT   [facility] Visit with Lyndon Gaming MD   [facility]    [address] [place] [id number] [phone]              Who to contact     If you have questions or need follow up information about today's clinic visit or your schedule please contact [facility] directly at [phone].  Normal or non-critical lab and imaging results will be communicated to you by MyChart, letter or phone within 4 business days after the clinic has received the results. If you do not hear from us within 7 days, please contact the clinic through M/A-COMhart or phone. If you have a critical or abnormal lab result, we will notify you by phone as soon as possible.  Submit refill requests through Altobeam or call your pharmacy and they will forward the refill request to us. Please allow 3 business days for your refill to be completed.          Additional Information About Your Visit        MyChart Information     Altobeam gives you secure access to your electronic health record. If you see a primary care provider, you can also send messages to your care team and make appointments. If you have questions, please call your primary care clinic.  If you do not have a primary care provider, please call [phone] and they will assist you.        Care EveryWhere ID     This is your Care EveryWhere ID. This could be used by  "other organizations to access your [place] medical records  [id number]        Your Vitals Were     Pulse Height BMI (Body Mass Index)             74 5' 8\" (1.727 m) 23.11 kg/m2          Blood Pressure from Last 3 Encounters:   08/16/18 103/64   03/09/18 114/65   01/20/18 128/88    Weight from Last 3 Encounters:   08/16/18 152 lb (68.9 kg)   03/09/18 156 lb (70.8 kg)   01/20/18 144 lb (65.3 kg)              Today, you had the following     No orders found for display         Today's Medication Changes          These changes are accurate as of 8/16/18 11:59 PM.  If you have any questions, ask your nurse or doctor.               Start taking these medicines.        Dose/Directions    insulin degludec 100 UNIT/ML pen   Commonly known as:  TRESIBA   Used for:  Type 1 diabetes mellitus without complication (H)   Started by:  Lyndon Gaming MD        Dose:  16 Units   Inject 16 Units Subcutaneous daily   Quantity:  15 mL   Refills:  3       insulin pen needle 32G X 4 MM   Commonly known as:  BD RYAN U/F   Used for:  Type 1 diabetes mellitus without complication (H)   Started by:  Lyndon Gaming MD        Use 4 pen needles daily or as directed.   Quantity:  400 each   Refills:  3         These medicines have changed or have updated prescriptions.        Dose/Directions    BASAGLAR 100 UNIT/ML injection   This may have changed:    - how much to take  - when to take this  - additional instructions   Used for:  Type 1 diabetes mellitus without complication (H)        Inject 10-14 units sq daily, as directed   Quantity:  15 mL   Refills:  1            Where to get your medicines      These medications were sent to [facility] - [address], [facility] [address]     Phone:  [phone]     insulin degludec 100 UNIT/ML pen    insulin pen needle 32G X 4 MM                Primary Care Provider Office Phone # Fax #    Amanda Yeboah [phone] " [phone]       [address] [address]        Equal Access to Services     ESTUARDO KASPER : Hadii aad ku hadjeffo Soroseali, waaxda luqadaha, qaybta kaalmada adeneda, ortega karlenein hayaaroxy millereileen beltran madeleine little. So [facility] [phone].    ATENCIÓN: Si habla español, tiene a anna disposición servicios gratuitos de asistencia lingüística. Llame al [phone].    We comply with applicable federal civil rights laws and [place] laws. We do not discriminate on the basis of race, color, national origin, age, disability, sex, sexual orientation, or gender identity.            Thank you!     Thank you for choosing [facility]  for your care. Our goal is always to provide you with excellent care. Hearing back from our patients is one way we can continue to improve our services. Please take a few minutes to complete the written survey that you may receive in the mail after your visit with us. Thank you!             Your Updated Medication List - Protect others around you: Learn how to safely use, store and throw away your medicines at www.disposemymeds.org.          This list is accurate as of 8/16/18 11:59 PM.  Always use your most recent med list.                   Brand Name Dispense Instructions for use Diagnosis    BASAGLAR 100 UNIT/ML injection     15 mL    Inject 10-14 units sq daily, as directed    Type 1 diabetes mellitus without complication (H)       FABRICIO CONTOUR NEXT test strip   Generic drug:  blood glucose monitoring      1 Dose 6 times daily. Test 6-7 times daily        blood glucose lancing device      1 Dose once daily.        insulin degludec 100 UNIT/ML pen    TRESIBA    15 mL    Inject 16 Units Subcutaneous daily    Type 1 diabetes mellitus without complication (H)       insulin lispro 100 UNIT/ML injection    HumaLOG KWIKpen    15 mL    Inject 3-6 units sq at mealtime, as directed    Type 1 diabetes mellitus without complication (H)       insulin pen needle 32G X 4 MM    BD  RYAN U/F    400 each    Use 4 pen needles daily or as directed.    Type 1 diabetes mellitus without complication (H)       MULTI FOR HER PO           PROZAC PO      Take 20 mg by mouth daily        SYNTHROID PO      Take 25 mcg by mouth daily        VITAMIN D (CHOLECALCIFEROL) PO      Take 1,000 Units by mouth

## 2018-08-17 ENCOUNTER — MYC MEDICAL ADVICE (OUTPATIENT)
Dept: ENDOCRINOLOGY | Facility: CLINIC | Age: 43
End: 2018-08-17

## 2018-08-20 NOTE — TELEPHONE ENCOUNTER
Dr. Gaming,    Please see [employer] message below and advise.    Thank you,  Brett JAMES RN

## 2018-08-20 NOTE — TELEPHONE ENCOUNTER
Dr. Gaming,    It doesn't look like Luxura was ordered.   Please see [employer] message below and advise.    Thank you,  Brett JAMES RN

## 2018-08-23 DIAGNOSIS — E10.9 TYPE 1 DIABETES MELLITUS WITHOUT COMPLICATION (H): Primary | ICD-10-CM

## 2018-09-10 ENCOUNTER — OFFICE VISIT (OUTPATIENT)
Dept: ENDOCRINOLOGY | Facility: CLINIC | Age: 43
End: 2018-09-10
Payer: COMMERCIAL

## 2018-09-10 VITALS — DIASTOLIC BLOOD PRESSURE: 65 MMHG | SYSTOLIC BLOOD PRESSURE: 109 MMHG | HEART RATE: 63 BPM

## 2018-09-10 DIAGNOSIS — E10.9 TYPE 1 DIABETES MELLITUS WITHOUT COMPLICATION (H): Primary | ICD-10-CM

## 2018-09-10 DIAGNOSIS — E03.8 OTHER SPECIFIED HYPOTHYROIDISM: ICD-10-CM

## 2018-09-10 PROCEDURE — 99214 OFFICE O/P EST MOD 30 MIN: CPT | Performed by: INTERNAL MEDICINE

## 2018-09-10 NOTE — MR AVS SNAPSHOT
After Visit Summary   9/10/2018    Jenise Smith    MRN: [id number]           Patient Information     Date Of Birth          1975        Visit Information        Provider Department      9/10/2018 4:00 PM Lyndon Gaming MD [facility]        Today's Diagnoses     Type 1 diabetes mellitus without complication (H)    -  1    Other specified hypothyroidism           Follow-ups after your visit        Follow-up notes from your care team     Return in about 3 months (around 12/10/2018).      Who to contact     If you have questions or need follow up information about today's clinic visit or your schedule please contact [facility] directly at [phone].  Normal or non-critical lab and imaging results will be communicated to you by MyChart, letter or phone within 4 business days after the clinic has received the results. If you do not hear from us within 7 days, please contact the clinic through Optimenga777hart or phone. If you have a critical or abnormal lab result, we will notify you by phone as soon as possible.  Submit refill requests through Droplr or call your pharmacy and they will forward the refill request to us. Please allow 3 business days for your refill to be completed.          Additional Information About Your Visit        MyChart Information     Droplr gives you secure access to your electronic health record. If you see a primary care provider, you can also send messages to your care team and make appointments. If you have questions, please call your primary care clinic.  If you do not have a primary care provider, please call [phone] and they will assist you.        Care EveryWhere ID     This is your Care EveryWhere ID. This could be used by other organizations to access your [place] medical records  [id number]        Your Vitals Were     Pulse Breastfeeding?                63 No           Blood Pressure from Last 3 Encounters:   09/10/18 109/65    08/16/18 103/64   03/09/18 114/65    Weight from Last 3 Encounters:   08/16/18 68.9 kg (152 lb)   03/09/18 70.8 kg (156 lb)   01/20/18 65.3 kg (144 lb)              Today, you had the following     No orders found for display         Today's Medication Changes          These changes are accurate as of 9/10/18 10:52 PM.  If you have any questions, ask your nurse or doctor.               Stop taking these medicines if you haven't already. Please contact your care team if you have questions.     insulin degludec 100 UNIT/ML pen   Commonly known as:  TRESIBA   Stopped by:  Lyndon Gaming MD                    Primary Care Provider Office Phone # Fax #    Amanda Yeboah [phone] [address] [address]        Equal Access to Services     [facility]: Hadii aad finn hadasho Soomaali, waaxda luqadaha, qaybta kaalmada adeegyada, ortega ruiz haycj salvador . So [facility] [phone].    ATENCIÓN: Si habla español, tiene a anna disposición servicios gratuitos de asistencia lingüística. Llame al [phone].    We comply with applicable federal civil rights laws and [place] laws. We do not discriminate on the basis of race, color, national origin, age, disability, sex, sexual orientation, or gender identity.            Thank you!     Thank you for choosing [facility]  for your care. Our goal is always to provide you with excellent care. Hearing back from our patients is one way we can continue to improve our services. Please take a few minutes to complete the written survey that you may receive in the mail after your visit with us. Thank you!             Your Updated Medication List - Protect others around you: Learn how to safely use, store and throw away your medicines at www.disposemymeds.org.          This list is accurate as of 9/10/18 10:52 PM.  Always use your most recent med list.                   Brand Name Dispense Instructions for use  Diagnosis    BASAGLAR 100 UNIT/ML injection     15 mL    Inject 16 Units Subcutaneous At Bedtime    Type 1 diabetes mellitus without complication (H)       * FABRICIO CONTOUR NEXT test strip   Generic drug:  blood glucose monitoring      1 Dose 6 times daily. Test 6-7 times daily        * blood glucose monitoring test strip    no brand specified    550 strip    Use to test blood sugar 6 times daily or as directed.    Type 1 diabetes mellitus without complication (H)       blood glucose lancing device      1 Dose once daily.        * insulin lispro 100 UNIT/ML injection    HumaLOG KWIKpen    15 mL    Inject 3-6 units sq at mealtime, as directed    Type 1 diabetes mellitus without complication (H)       * insulin lispro 100 UNIT/ML Cartridge    HUMALOG    30 mL    Inject 4-8 units subcutaneous at mealtime, total daily dose approx 25 units, E10.9    Type 1 diabetes mellitus without complication (H)       insulin pen needle 32G X 4 MM    BD RYAN U/F    400 each    Use 4 pen needles daily or as directed.    Type 1 diabetes mellitus without complication (H)       MULTI FOR HER PO           SYNTHROID 25 MCG tablet   Generic drug:  levothyroxine     90 tablet    Take 1 tablet (25 mcg) by mouth daily    Hypothyroidism due to Hashimoto's thyroiditis       VITAMIN D (CHOLECALCIFEROL) PO      Take 1,000 Units by mouth        * Notice:  This list has 4 medication(s) that are the same as other medications prescribed for you. Read the directions carefully, and ask your doctor or other care provider to review them with you.

## 2018-09-10 NOTE — PROGRESS NOTES
Name: Jenise Smith    HPI:  Recent issues:  Here for f/u diabetes evaluation.  Feeling well overall, no new health issues reported.  Vacationing to [facility] in early- mid 10/2018          Diabetes:  Previous diagnosis of IFG with high GAD65 Ab level  6/2011. Developed acute hyperglycemia and diagnosis of T1DM  Treatment with insulin medication, MDI plan  2/2013. Changed to OmniPod pump with Novolog insulin  Used DexcomG5 CGMS system  5/2017. Changed to Medtronic 630G pump  9/2017. Upgraded to Medtronic 670G pump and Guardian3 sensor, Humalog insulin  8/2018. Discontinued Medtronic pump and sensor, changed to MDI plan   Had taken Basaglar 17U at bedtime and mealtime Humalog  Current DM meds:   Basaglar 13U subcutaneous at bedtime   Humalog Luxura pen 1:7 at mealtime, ISF 60, goal target  mg/dl    Has reduced hypoglycemia awareness   Has chocolate lab ([facility]), trained to recognize hypoglycemia smell/symptoms  Using Contour Next Link BG meter, variable testing, tests 6x/day  Previous [facility] labs include:  Lab Results   Component Value Date    A1C 6.3 (H) 03/09/2018     02/01/2017    POTASSIUM 4.3 12/05/2017    CHLORIDE 103 02/01/2017    CO2 27 02/01/2017    ANIONGAP 10 02/01/2017     (A) 12/05/2017    BUN 16 02/01/2017    CR 0.84 12/05/2017    GFRESTIMATED 87 12/05/2017    GFRESTBLACK 101 12/05/2017    VIJI 8.9 02/01/2017    CHOL 190 12/05/2017    TRIG 74 12/05/2017     12/05/2017    LDL 74 12/05/2017    NHDL 89 12/05/2017    UCRR 124 03/09/2018    MICROL 6 03/09/2018    UMALCR 4.51 03/09/2018     Exercises with classes at health club- yoga, strength   Tried Temp Target with exercise, noticed higher glucose levels so not used  DM Complications: none known  Previous Lasik surgery at [facility] 2015  Last eye exam ~2016, no DR reported        Thyroid:  2011. History of hypothyroidism  Takes levothyroxine medication  Lab Results   Component Value Date    TSH 1.75 03/09/2018      Current dose:  Levothyroxine 0.025 mg QAM      , lives in [place],  [place], daughter Myra and stepchildren Tisha Davalos  Works for [employer] as   Sees Dr. Amanda Yeboah/[facility] for general medicine evaluations.    PMH/PSH:  Past Medical History:   Diagnosis Date     Diabetes mellitus type 1 (H)      Dysmenorrhea      Excessive or frequent menstruation      Hypothyroid      Insulin pump in place      Lumbar back pain      Other and unspecified hyperlipidemia      PONV (postoperative nausea and vomiting)      Past Surgical History:   Procedure Laterality Date     DILATION AND CURETTAGE, HYSTEROSCOPY, ABLATE ENDOMETRIUM NOVASURE, COMBINED  1/23/2014    Procedure: COMBINED DILATION AND CURETTAGE, HYSTEROSCOPY, ABLATE ENDOMETRIUM NOVASURE;  HYSTEROSCOPY, DILATION, CURETTAGE, WITH NOVASURE ABLATION, MYOSURE MORCELLATOR;  Surgeon: Swetha Queen MD;  Location: [facility]     LAPAROSCOPY       LEEP TX, CERVICAL       OPERATIVE HYSTEROSCOPY WITH MORCELLATOR  1/23/2014    Procedure: OPERATIVE HYSTEROSCOPY WITH MORCELLATOR;;  Surgeon: Swetha Queen MD;  Location: [facility]     wisdom teeth[         Family Hx:  Family History   Problem Relation Age of Onset     Diabetes Father      type 1     Diabetes Sister      type 1     Thyroid Disease Sister      Diabetes Mother      type 2     Breast Cancer Paternal Grandmother      Cancer - colorectal Paternal Uncle 60         Social Hx:  Social History     Social History     Marital status:      Spouse name: N/A     Number of children: N/A     Years of education: N/A     Occupational History     Not on file.     Social History Main Topics     Smoking status: Never Smoker     Smokeless tobacco: Never Used     Alcohol use 0.0 oz/week     0 Standard drinks or equivalent per week      Comment: 3 per week     Drug use: No     Sexual activity: Not Currently     Other Topics Concern     Not on file     Social History  Narrative     in May 2014    2 step children and one biol dtr age 10.    Works for [employer]--works from home          MEDICATIONS:  has a current medication list which includes the following prescription(s): basaglar, blood glucose, blood glucose monitoring, blood glucose monitoring, insulin lispro, insulin lispro, insulin pen needle, multiple vitamins-minerals, synthroid, and cholecalciferol.    ROS:     ROS: 10 point ROS neg other than the symptoms noted above in the HPI.    GENERAL: energy good, no weight changes, denies fevers, chills, night sweats.   HEENT: no dysphagia, odonophagia, diplopia, neck pain  THYROID:  no apparent hyper or hypothyroid symptoms  CV: no chest pain, pressure, palpitations  LUNGS: no SOB, CLIFFORD, cough, wheezing   ABDOMEN: some postmeal indigestion and mild nausea; denies diarrhea, constipation, abdominal pain  EXTREMITIES: no rashes, ulcers, edema  NEUROLOGY: no headaches, denies changes in vision, tingling, extremitiy numbness   MSK: no muscle aches or pains, weakness  SKIN: no rashes or lesions  : regular menstrual cycles?  PSYCH:  stable mood, no significant anxiety or depression  ENDOCRINE: no heat or cold intolerance    Physical Exam   VS: /65 (BP Location: Right arm, Cuff Size: Adult Regular)  Pulse 63  Breastfeeding? No  GENERAL: AXOX3, NAD, well dressed, answering questions appropriately, appears stated age.  THYROID:  normal gland, no apparent nodules or goiter  ABDOMEN: slender, normal size  EXTREMITIES: no edema, no lesions  NEUROLOGY: CN grossly intact, no tremors  MSK: grossly intact  SKIN: no rashes, no lesions    LABS:    All pertinent notes, labs, and images personally reviewed by me.     A/P:  Encounter Diagnoses   Name Primary?     Type 1 diabetes mellitus without complication (H) Yes     Other specified hypothyroidism      Comments:  Reviewed health history and complicated diabetes issues.  Recent BG trends good overall.    Plan:  Discussed  "general issues with the diabetes diagnosis and management  We discussed the hgbA1c test which reflects previous overall glucose levels or control  Discussed the importance of blood glucose (BG) testing to assess glucose trends  Provided general overview of the multiple daily injection (MDI) plan using rapid acting mealtime and longacting insulin medications  Reviewed ideal plans for basal insulin dose adjustments    Recommend:  Continue the current Novolog and (lower dose) Basaglar med use  Check BG levels premeal and bedtime  No lab tests ordered today  Monitor for symptom changes  Agree with plan for DexcomG6 use  Try Accucheck Guide BG meter with the insulin dose calculator vs current BG meter and smartphone insulin dose calculator erinn use  Would be helpful to review labs done at [facility], if available  Needs diabetes \"travel letter\" for her [place] trip, will send to her  Advise having fasting lipid panel testing and dilated eye examination, at least annually    Continue current low dose levothyroxine medication use  Addressed patient questions today    Labs ordered today:   No orders of the defined types were placed in this encounter.    Radiology/Consults ordered today: None    More than 50% of the time spent with Ms. Smith on counseling / coordinating her care.  Total appointment time was 30 minutes.    Follow-up:  3 mo.    Lyndon Gaming MD  Endocrinology  [facility]      "

## 2018-09-24 ENCOUNTER — TRANSFERRED RECORDS (OUTPATIENT)
Dept: HEALTH INFORMATION MANAGEMENT | Facility: CLINIC | Age: 43
End: 2018-09-24

## 2018-09-25 ENCOUNTER — TRANSFERRED RECORDS (OUTPATIENT)
Dept: HEALTH INFORMATION MANAGEMENT | Facility: CLINIC | Age: 43
End: 2018-09-25

## 2018-09-27 ENCOUNTER — TRANSFERRED RECORDS (OUTPATIENT)
Dept: HEALTH INFORMATION MANAGEMENT | Facility: CLINIC | Age: 43
End: 2018-09-27

## 2018-10-04 ENCOUNTER — OFFICE VISIT (OUTPATIENT)
Dept: SURGERY | Facility: CLINIC | Age: 43
End: 2018-10-04
Payer: COMMERCIAL

## 2018-10-04 VITALS
SYSTOLIC BLOOD PRESSURE: 104 MMHG | HEART RATE: 68 BPM | HEIGHT: 68 IN | WEIGHT: 150 LBS | BODY MASS INDEX: 22.73 KG/M2 | DIASTOLIC BLOOD PRESSURE: 60 MMHG

## 2018-10-04 DIAGNOSIS — N63.0 LUMP OR MASS IN BREAST: ICD-10-CM

## 2018-10-04 DIAGNOSIS — N64.4 MASTALGIA IN FEMALE: ICD-10-CM

## 2018-10-04 PROCEDURE — 99243 OFF/OP CNSLTJ NEW/EST LOW 30: CPT | Performed by: SURGERY

## 2018-10-04 NOTE — NURSING NOTE
Breast Patients    BREAST PATIENTS (ALL)    1-Do you have any of the following symptoms? Breast Pain and Lump(s) or Mass(es)  2-In which breast are you having the symptoms? left  3-Do you use hormones?  No  4-Have you had a Mammogram? Yes  Where: [facility]  Date: 9/24/18  5-Have you ever had a breast cyst drained? No  6-Have you ever had a breast biopsy? No  7-Have you ever had a Breast Cancer? No   8-Is there a history of Breast Cancer in your family? Yes   Relationship to you:    Grandmother  9-Have you ever had Ovarian Cancer? No  10-Is there a history of Ovarian Cancer in your family? No  11-Summarize your caffeine intake (i.e. coffee, tea, chocolate, soda etc.): Coffee    BREAST PATIENTS (FEMALE)    12-What age did your periods begin? 14  13-Date your last menstrual period began? 9/11/18  14-Number of full-term pregnancies: 1  15-Your age when your first child was born? 89  16-Did you nurse your children? No  17-Are you pregnant now? No  18-Have you begun menopause? No  19-Have you had either ovary removed?No  20-Do you have breast implants? No     Brittany Howard MA

## 2018-10-04 NOTE — MR AVS SNAPSHOT
After Visit Summary   10/4/2018    Jenise Smith    MRN: [id number]           Patient Information     Date Of Birth          1975        Visit Information        Provider Department      10/4/2018 2:00 PM Salvatore Rock MD [facility]       Follow-ups after your visit        Your next 10 appointments already scheduled     Dec 10, 2018  2:30 PM CST   Return Visit with Lyndon Gaming MD   [facility]    [address] [place] [id number] [phone]              Who to contact     If you have questions or need follow up information about today's clinic visit or your schedule please contact [facility] directly at [phone].  Normal or non-critical lab and imaging results will be communicated to you by MyChart, letter or phone within 4 business days after the clinic has received the results. If you do not hear from us within 7 days, please contact the clinic through [employer] or phone. If you have a critical or abnormal lab result, we will notify you by phone as soon as possible.  Submit refill requests through [employer] or call your pharmacy and they will forward the refill request to us. Please allow 3 business days for your refill to be completed.          Additional Information About Your Visit        MyChart Information     [employer] gives you secure access to your electronic health record. If you see a primary care provider, you can also send messages to your care team and make appointments. If you have questions, please call your primary care clinic.  If you do not have a primary care provider, please call [phone] and they will assist you.        Care EveryWhere ID     This is your Care EveryWhere ID. This could be used by other organizations to access your [place] medical records  [id number]        Your Vitals Were     Pulse Height BMI  "(Body Mass Index)             68 5' 8\" (1.727 m) 22.81 kg/m2          Blood Pressure from Last 3 Encounters:   10/04/18 104/60   09/10/18 109/65   08/16/18 103/64    Weight from Last 3 Encounters:   10/04/18 150 lb (68 kg)   08/16/18 152 lb (68.9 kg)   03/09/18 156 lb (70.8 kg)              Today, you had the following     No orders found for display       Primary Care Provider Office Phone # Fax #    Amanda Yeboah [phone] [address] [address]        Equal Access to Services     [facility] : Hadii zina salazar Solinus, wafuadda arturo, qaybta kaalmada adeneda, ortega salvador . So [facility] [phone].    ATENCIÓN: Si habla español, tiene a anna disposición servicios gratuitos de asistencia lingüística. Llame al [phone].    We comply with applicable federal civil rights laws and [place] laws. We do not discriminate on the basis of race, color, national origin, age, disability, sex, sexual orientation, or gender identity.            Thank you!     Thank you for choosing [facility]  for your care. Our goal is always to provide you with excellent care. Hearing back from our patients is one way we can continue to improve our services. Please take a few minutes to complete the written survey that you may receive in the mail after your visit with us. Thank you!             Your Updated Medication List - Protect others around you: Learn how to safely use, store and throw away your medicines at www.disposemymeds.org.          This list is accurate as of 10/4/18  2:21 PM.  Always use your most recent med list.                   Brand Name Dispense Instructions for use Diagnosis    BASAGLAR 100 UNIT/ML injection     15 mL    Inject 16 Units Subcutaneous At Bedtime    Type 1 diabetes mellitus without complication (H)       * FABRICIO CONTOUR NEXT test strip   Generic drug:  blood glucose monitoring      1 Dose 6 times daily. Test " 6-7 times daily        * blood glucose monitoring test strip    no brand specified    550 strip    Use to test blood sugar 6 times daily or as directed.    Type 1 diabetes mellitus without complication (H)       blood glucose lancing device      1 Dose once daily.        * insulin lispro 100 UNIT/ML injection    HumaLOG KWIKpen    15 mL    Inject 3-6 units sq at mealtime, as directed    Type 1 diabetes mellitus without complication (H)       * insulin lispro 100 UNIT/ML Cartridge    HUMALOG    30 mL    Inject 4-8 units subcutaneous at mealtime, total daily dose approx 25 units, E10.9    Type 1 diabetes mellitus without complication (H)       insulin pen needle 32G X 4 MM    BD RYAN U/F    400 each    Use 4 pen needles daily or as directed.    Type 1 diabetes mellitus without complication (H)       MULTI FOR HER PO           SYNTHROID 25 MCG tablet   Generic drug:  levothyroxine     90 tablet    Take 1 tablet (25 mcg) by mouth daily    Hypothyroidism due to Hashimoto's thyroiditis       VITAMIN D (CHOLECALCIFEROL) PO      Take 1,000 Units by mouth        * Notice:  This list has 4 medication(s) that are the same as other medications prescribed for you. Read the directions carefully, and ask your doctor or other care provider to review them with you.

## 2018-10-04 NOTE — LETTER
2018    Re: Jenise Smith - 1975    Chief Complaint: Bilateral breast pain, left upper outer breast mass     Pt was seen in consultation from Amanda Yeboah     History of Present Illness:  Jenise Smith is a 43 year old female who presented with a tender mass in the axillary tail of her left breast.  Patient is a healthy 43-year-old female who has been noted to have very dense breasts on screening imaging.  She has been having pain in both breasts but worse in the upper outer quadrant of the left breast, for some time.  Pain gets worse with her menstrual cycle.  Imaging was recently done including 3D mammography and diagnostic ultrasound of the left breast.  Ultrasound revealed dense breast tissue with multiple small cysts, no discrete mass.  She has no personal history of breast issues.  Family history of breast cancer in her grandmother.  She has type 1 diabetes and hypothyroidism.  She is here to discuss her breast findings.     PMH:  Jenise Smith  has a past medical history of Diabetes mellitus type 1 (H); Dysmenorrhea; Excessive or frequent menstruation; Hypothyroid; Insulin pump in place; Lumbar back pain; Other and unspecified hyperlipidemia; and PONV (postoperative nausea and vomiting).  PSH:  Jenise Smith  has a past surgical history that includes laparoscopy; wisdom teeth[; leep tx, cervical; Dilation and curettage, hysteroscopy, ablate endometrium novasure, combined (2014); and Operative hysteroscopy with morcellator (2014).     Home medications and allergies reviewed.     Social History:  Jenise Smith  reports that she has never smoked. She has never used smokeless tobacco. She reports that she drinks alcohol. She reports that she does not use illicit drugs.  Family History:  Jenise Smith family history includes Breast Cancer in her paternal grandmother; Cancer - colorectal (age of onset: 60) in her paternal uncle; Diabetes in her father,  "mother, and sister; Thyroid Disease in her sister.     ROS:  The 10 point Review of Systems is negative other than noted in the HPI.  No fever or chills.  No recent weight loss or weight gain.  No nipple discharge.  No skin changes in either breast.     Physical Exam:  Blood pressure 104/60, pulse 68, height 5' 8\" (1.727 m), weight 150 lb (68 kg), not currently breastfeeding.  150 lbs 0 oz  Thin healthy female in no distress.  Patient has a pleasant affect and communicates well.   Pupils equal round and reactive to light.   No cervical lymphadenopathy or thyromegaly.   Lung fields clear, breathing comfortably.   Heart normal sinus rhythm.  No murmurs rubs or gallops.  Bilateral breast exam performed.  Very dense lumpy bumpy breasts bilaterally.  Moderately tender to palpation.  No skin changes or nipple discharge.  No axillary lymphadenopathy on either side.  Area of particular fullness in the left axillary tail.  This was easily mobile and not tethered to the underlying chest wall or skin.  Skin warm, dry.  No obvious rashes or lesions.  All new lab and imaging data was reviewed.  This includes outside clinic notes, imaging studies, and labs.    Assessment/plan: Pleasant 43-year-old female with cyclic mastalgia and increased fullness in the left upper outer breast.  This does not appear suspicious for malignancy.  Patient likely has a diagnosis of fibrocystic breast disease, which is clinically apparent throughout both breasts.  She has been having increasing pain, primarily with her menstrual cycle.  Does not take any hormonal birth control.  Has also been having difficulty controlling her blood sugars around her hormonal cycle.  I do not think she would benefit from surgical excision of the left upper outer breast fullness.  I feel this would put her at risk for breast edema by interrupting the normal lymphatic channels.  I am also skeptical as to whether or not this would improve her pain.  I will talk to the " patient's OB/GYN to discuss whether she would be a candidate for hormone suppression.  This might improve both her breast pain and her challenges with maintaining her blood sugars.  Surgical co-morbities include insulin-dependent diabetes, hypothyroidism.     Evens Rock M.D.  [facility]  [phone]

## 2018-10-05 ENCOUNTER — MYC MEDICAL ADVICE (OUTPATIENT)
Dept: ENDOCRINOLOGY | Facility: CLINIC | Age: 43
End: 2018-10-05

## 2018-10-05 DIAGNOSIS — E10.9 TYPE 1 DIABETES MELLITUS WITHOUT COMPLICATION (H): ICD-10-CM

## 2018-10-05 PROBLEM — N63.0 LUMP OR MASS IN BREAST: Status: ACTIVE | Noted: 2018-10-05

## 2018-10-05 PROBLEM — N64.4 MASTALGIA IN FEMALE: Status: ACTIVE | Noted: 2018-10-05

## 2018-10-05 NOTE — TELEPHONE ENCOUNTER
Nelli-    Can you help with this one? Please see [place] [employer] Message below.     Thanks!    Yaima HOBBS RN

## 2018-10-05 NOTE — PROGRESS NOTES
Surgery Consultation, [facility] SHEILA Rock MD    Jenise Smith MRN# [id number]   YOB: 1975 Age: 43 year old     PCP:  Amanda Yeboah [phone]    Chief Complaint: Bilateral breast pain, left upper outer breast mass    Pt was seen in consultation from Amanda Yeboah.    History of Present Illness:  Jenise Smith is a 43 year old female who presented with a tender mass in the axillary tail of her left breast.  Patient is a healthy 43-year-old female who has been noted to have very dense breasts on screening imaging.  She has been having pain in both breasts but worse in the upper outer quadrant of the left breast, for some time.  Pain gets worse with her menstrual cycle.  Imaging was recently done including 3D mammography and diagnostic ultrasound of the left breast.  Ultrasound revealed dense breast tissue with multiple small cysts, no discrete mass.  She has no personal history of breast issues.  Family history of breast cancer in her grandmother.  She has type 1 diabetes and hypothyroidism.  She is here to discuss her breast findings.    PMH:  Jenise Smith  has a past medical history of Diabetes mellitus type 1 (H); Dysmenorrhea; Excessive or frequent menstruation; Hypothyroid; Insulin pump in place; Lumbar back pain; Other and unspecified hyperlipidemia; and PONV (postoperative nausea and vomiting).  PSH:  Jenise Smith  has a past surgical history that includes laparoscopy; wisdom teeth[; leep tx, cervical; Dilation and curettage, hysteroscopy, ablate endometrium novasure, combined (1/23/2014); and Operative hysteroscopy with morcellator (1/23/2014).    Home medications and allergies reviewed.    Social History:  Jenise Smith  reports that she has never smoked. She has never used smokeless tobacco. She reports that she drinks alcohol. She reports that she does not use illicit drugs.  Family History:  Jenise Smith family history  "includes Breast Cancer in her paternal grandmother; Cancer - colorectal (age of onset: 60) in her paternal uncle; Diabetes in her father, mother, and sister; Thyroid Disease in her sister.    ROS:  The 10 point Review of Systems is negative other than noted in the HPI.  No fever or chills.  No recent weight loss or weight gain.  No nipple discharge.  No skin changes in either breast.    Physical Exam:  Blood pressure 104/60, pulse 68, height 5' 8\" (1.727 m), weight 150 lb (68 kg), not currently breastfeeding.  150 lbs 0 oz  Thin healthy female in no distress.  Patient has a pleasant affect and communicates well.   Pupils equal round and reactive to light.   No cervical lymphadenopathy or thyromegaly.   Lung fields clear, breathing comfortably.   Heart normal sinus rhythm.  No murmurs rubs or gallops.  Bilateral breast exam performed.  Very dense lumpy bumpy breasts bilaterally.  Moderately tender to palpation.  No skin changes or nipple discharge.  No axillary lymphadenopathy on either side.  Area of particular fullness in the left axillary tail.  This was easily mobile and not tethered to the underlying chest wall or skin.  Skin warm, dry.  No obvious rashes or lesions.    All new lab and imaging data was reviewed.  This includes outside clinic notes, imaging studies, and labs.     Assessment/plan: Pleasant 43-year-old female with cyclic mastalgia and increased fullness in the left upper outer breast.  This does not appear suspicious for malignancy.  Patient likely has a diagnosis of fibrocystic breast disease, which is clinically apparent throughout both breasts.  She has been having increasing pain, primarily with her menstrual cycle.  Does not take any hormonal birth control.  Has also been having difficulty controlling her blood sugars around her hormonal cycle.  I do not think she would benefit from surgical excision of the left upper outer breast fullness.  I feel this would put her at risk for breast edema " by interrupting the normal lymphatic channels.  I am also skeptical as to whether or not this would improve her pain.  I will talk to the patient's OB/GYN to discuss whether she would be a candidate for hormone suppression.  This might improve both her breast pain and her challenges with maintaining her blood sugars.  Surgical co-morbities include insulin-dependent diabetes, hypothyroidism.    Evens Rock M.D.  [facility]  [phone]    Please route or send letter to:  Primary Care Provider (PCP) and Referring Provider

## 2018-10-05 NOTE — TELEPHONE ENCOUNTER
It looks like she's been working with diabetes education, will defer to them.    Nelli Rowland, PharmD, [facility]  Medication Therapy Management Provider  Pager: [phone]

## 2018-10-06 ENCOUNTER — MYC MEDICAL ADVICE (OUTPATIENT)
Dept: ENDOCRINOLOGY | Facility: CLINIC | Age: 43
End: 2018-10-06

## 2018-10-08 NOTE — TELEPHONE ENCOUNTER
Per [facility] travel guidelines:  -On Day of flight decrease PM Basaglar dose by 25% (0-0-0-12) and take at usual time.    -Once destination is reached set watch to new time zone.  -Resume taking insulin at usual time in new time zone (10pm)    For return travel:  -On flight day keep watch set to time of departure city  -When it is time to take Basaglar dose per departure city time take usual dose (Basaglar 0-0-0-16)  -6 hours after Basaglar injection take an additional injection of Basaglar 4 units (25%)  -Upon arrival home, reset watch, resume taking Basaglar 0-0-0-16 at 10pm as usual.    For both situations no changes to Humalog dosing.    Lilia Brand MS, RD, LD, CDE

## 2018-10-09 ENCOUNTER — MYC MEDICAL ADVICE (OUTPATIENT)
Dept: ENDOCRINOLOGY | Facility: CLINIC | Age: 43
End: 2018-10-09

## 2018-10-09 DIAGNOSIS — E10.9 TYPE 1 DIABETES MELLITUS WITHOUT COMPLICATION (H): Primary | ICD-10-CM

## 2018-10-09 NOTE — TELEPHONE ENCOUNTER
Message noted.  I called patient this morning and addressed her questions in detail.  All requested Rx's sent to her [facility] pharmacy.    TAMIKO Gaming MD  Endocrinology   [facility]

## 2018-11-01 ENCOUNTER — MYC MEDICAL ADVICE (OUTPATIENT)
Dept: ENDOCRINOLOGY | Facility: CLINIC | Age: 43
End: 2018-11-01

## 2018-11-06 ENCOUNTER — OFFICE VISIT (OUTPATIENT)
Dept: ENDOCRINOLOGY | Facility: CLINIC | Age: 43
End: 2018-11-06
Payer: COMMERCIAL

## 2018-11-06 ENCOUNTER — MEDICAL CORRESPONDENCE (OUTPATIENT)
Dept: HEALTH INFORMATION MANAGEMENT | Facility: CLINIC | Age: 43
End: 2018-11-06

## 2018-11-06 VITALS
HEART RATE: 66 BPM | WEIGHT: 155.5 LBS | BODY MASS INDEX: 23.57 KG/M2 | SYSTOLIC BLOOD PRESSURE: 110 MMHG | DIASTOLIC BLOOD PRESSURE: 67 MMHG | HEIGHT: 68 IN

## 2018-11-06 DIAGNOSIS — E10.9 TYPE 1 DIABETES MELLITUS WITHOUT COMPLICATION (H): Primary | ICD-10-CM

## 2018-11-06 DIAGNOSIS — E03.8 OTHER SPECIFIED HYPOTHYROIDISM: ICD-10-CM

## 2018-11-06 LAB — HBA1C MFR BLD: 6.8 % (ref 0–5.6)

## 2018-11-06 PROCEDURE — 83036 HEMOGLOBIN GLYCOSYLATED A1C: CPT | Performed by: INTERNAL MEDICINE

## 2018-11-06 PROCEDURE — 95251 CONT GLUC MNTR ANALYSIS I&R: CPT | Performed by: INTERNAL MEDICINE

## 2018-11-06 PROCEDURE — 99214 OFFICE O/P EST MOD 30 MIN: CPT | Performed by: INTERNAL MEDICINE

## 2018-11-06 PROCEDURE — 36415 COLL VENOUS BLD VENIPUNCTURE: CPT | Performed by: INTERNAL MEDICINE

## 2018-11-06 PROCEDURE — 84439 ASSAY OF FREE THYROXINE: CPT | Performed by: INTERNAL MEDICINE

## 2018-11-06 PROCEDURE — 80048 BASIC METABOLIC PNL TOTAL CA: CPT | Performed by: INTERNAL MEDICINE

## 2018-11-06 PROCEDURE — 84443 ASSAY THYROID STIM HORMONE: CPT | Performed by: INTERNAL MEDICINE

## 2018-11-06 RX ORDER — LEVOTHYROXINE SODIUM 50 UG/1
50 TABLET ORAL DAILY
Refills: 0 | COMMUNITY
Start: 2018-09-25 | End: 2018-11-15

## 2018-11-06 NOTE — LETTER
11/6/2018         RE: Jenise Smith  [address] [facility] [place] [address]        Dear Colleague,    Thank you for referring your patient, Jenise Smith, to the [facility]. Please see a copy of my visit note below.    Name: Jenise Smith    HPI:  Recent issues:  Here for f/u diabetes evaluation.  Feeling well overall, but noticing higher glucose levels week before menstrual cycles, also wt gain trends.  Discussed idea of taking OCP medication (continuous dosing) with Dr. Ashford          Diabetes:  Previous diagnosis of IFG with high GAD65 Ab level  6/2011. Developed acute hyperglycemia and diagnosis of T1DM  Treatment with insulin medication, MDI plan  2/2013. Changed to OmniPod pump with Novolog insulin  Used DexcomG5 CGMS system  5/2017. Changed to Medtronic 630G pump  9/2017. Upgraded to Medtronic 670G pump and Guardian3 sensor, Humalog insulin  8/2018. Discontinued Medtronic pump and sensor, changed to MDI plan   Had taken Basaglar 17U at bedtime and mealtime Humalog  Current DM meds:   Basaglar 13U subcutaneous at bedtime   Humalog Luxura pen 1:7 at mealtime, ISF 60, goal target  mg/dl    Has reduced hypoglycemia awareness   Has chocolate lab (Juliet), trained to recognize hypoglycemia smell/symptoms  Using Contour Next Link BG meter, variable testing, tests 6x/day  Previous [facility] labs include:  Lab Results   Component Value Date    A1C 6.8 (H) 11/06/2018     11/06/2018    POTASSIUM 4.1 11/06/2018    CHLORIDE 103 11/06/2018    CO2 25 11/06/2018    ANIONGAP 9 11/06/2018     (H) 11/06/2018    BUN 14 11/06/2018    CR 0.86 11/06/2018    GFRESTIMATED 72 11/06/2018    GFRESTBLACK 88 11/06/2018    VIJI 9.1 11/06/2018    CHOL 190 12/05/2017    TRIG 74 12/05/2017     12/05/2017    LDL 74 12/05/2017    NHDL 89 12/05/2017    UCRR 124 03/09/2018    MICROL 6 03/09/2018    UMALCR 4.51 03/09/2018     Exercises with classes at health club- yoga, strength   Tried Temp Target  with exercise, noticed higher glucose levels so not used  DM Complications: none known  Previous Lasik surgery at [facility] 2015  Last eye exam ~2016, no DR reported        Thyroid:  2011. History of hypothyroidism  Takes levothyroxine medication  Lab Results   Component Value Date    TSH 1.68 11/06/2018    T4 0.98 11/06/2018     Eary 10/2018. Dose incease to levothyroxine 0.05 mg daily per GYN physician  Current dose:  Levothyroxine 0.025 mg 2-tabs QAM      , lives in [place],  [place], daughter Myra and stepchildren Tisha Davalos  Works for [employer] as   Sees Dr. Amanda Yeboah/[facility] for general medicine evaluations.  Also sees Dr. Ashford[facility]    PMH/PSH:  Past Medical History:   Diagnosis Date     Diabetes mellitus type 1 (H)      Dysmenorrhea      Excessive or frequent menstruation      Hypothyroid      Insulin pump in place      Lumbar back pain      Other and unspecified hyperlipidemia      PONV (postoperative nausea and vomiting)      Past Surgical History:   Procedure Laterality Date     DILATION AND CURETTAGE, HYSTEROSCOPY, ABLATE ENDOMETRIUM NOVASURE, COMBINED  1/23/2014    Procedure: COMBINED DILATION AND CURETTAGE, HYSTEROSCOPY, ABLATE ENDOMETRIUM NOVASURE;  HYSTEROSCOPY, DILATION, CURETTAGE, WITH NOVASURE ABLATION, MYOSURE MORCELLATOR;  Surgeon: Swetha Queen MD;  Location: [facility]     LAPAROSCOPY       LEEP TX, CERVICAL       OPERATIVE HYSTEROSCOPY WITH MORCELLATOR  1/23/2014    Procedure: OPERATIVE HYSTEROSCOPY WITH MORCELLATOR;;  Surgeon: Swetha Queen MD;  Location: [facility]     wisdom teeth[         Family Hx:  Family History   Problem Relation Age of Onset     Diabetes Father      type 1     Diabetes Sister      type 1     Thyroid Disease Sister      Diabetes Mother      type 2     Breast Cancer Paternal Grandmother      Cancer - colorectal Paternal Uncle 60         Social Hx:  Social History     Social History      "Marital status:      Spouse name: N/A     Number of children: N/A     Years of education: N/A     Occupational History     Not on file.     Social History Main Topics     Smoking status: Never Smoker     Smokeless tobacco: Never Used     Alcohol use 0.0 oz/week     0 Standard drinks or equivalent per week      Comment: 3 per week     Drug use: No     Sexual activity: Not Currently     Other Topics Concern     Not on file     Social History Narrative     in May 2014    2 step children and one biol dtr age 10.    Works for [employer]--works from home          MEDICATIONS:  has a current medication list which includes the following prescription(s): basaglar, insulin lispro, insulin lispro, levothyroxine, multiple vitamins-minerals, cholecalciferol, blood glucose, blood glucose monitoring, blood glucose monitoring, insulin pen needle, insulin pen needle, and synthroid.    ROS:     ROS: 10 point ROS neg other than the symptoms noted above in the HPI.    GENERAL: energy good, wt gain 5-10#;denies fevers, chills, night sweats.   HEENT: no dysphagia, odonophagia, diplopia, neck pain  THYROID:  no apparent hyper or hypothyroid symptoms  CV: no chest pain, pressure, palpitations  LUNGS: no SOB, CLIFFORD, cough, wheezing   ABDOMEN: some postmeal indigestion and mild nausea; denies diarrhea, constipation, abdominal pain  EXTREMITIES: no rashes, ulcers, edema  NEUROLOGY: no headaches, denies changes in vision, tingling, extremitiy numbness   MSK: no muscle aches or pains, weakness  SKIN: no rashes or lesions  : regular menstrual cycles?  PSYCH:  stable mood, no significant anxiety or depression  ENDOCRINE: no heat or cold intolerance    Physical Exam   VS: /67  Pulse 66  Ht 5' 8\" (1.727 m)  Wt 155 lb 8 oz (70.5 kg)  BMI 23.64 kg/m2  GENERAL: AXOX3, NAD, well dressed, answering questions appropriately, appears stated age.  THYROID:  normal gland, no apparent nodules or goiter  ABDOMEN: normal " "size  EXTREMITIES: no edema, no lesions  NEUROLOGY: CN grossly intact, no tremors  MSK: grossly intact  SKIN: no rashes, no lesions    LABS:    All pertinent notes, labs, and images personally reviewed by me.     A/P:  Encounter Diagnoses   Name Primary?     Type 1 diabetes mellitus without complication (H) Yes     Other specified hypothyroidism      Comments:  Reviewed health history and complicated diabetes issues.  Recent BG trends higher, likely related to the progesterone surge of premenstrual cycle  I am surprised her OBGYN changed the thyroid med dose without communicating with me    Plan:  Discussed general issues with the diabetes diagnosis and management  We discussed the hgbA1c test which reflects previous overall glucose levels or control  Discussed the importance of blood glucose (BG) testing to assess glucose trends  Provided general overview of the multiple daily injection (MDI) plan using rapid acting mealtime and longacting insulin medications  Reviewed recent Dexcom CGMS glucose sensor report, in detail    Recommend:  Continue the current Novolog and Basaglar med use   May need higher Basaglar dose week prior to menstrual cycle  Test BG levels premeal and bedtime  Check labs today  Monitor for symptom changes  Agree with plan for DexcomG6 use  Discussed the new diabetes insulin pen device by [employer] called the \"inpen\" (see [employer]/healthcare-providers),    a reusable insulin pen that uses BlueTooth technology to connect to a smartphone erinn, transfering the insulin pen dose information to the erinn.     Phone erinn then helps with the insulin dose calculation and also integrates the data to your Dexcom report.   Would not restart the LoEstrin OCP med at this time  Will update her Glucagon kit Rx to pharmacy  Advise having fasting lipid panel testing and dilated eye examination, at least annually    Discussed the wt gain 10#/10 mo, possible association of under-treated " hypothyroidism  Continue current levothyroxine 0.05 mg daily dose, but important to recheck thyroid tests   Addressed patient questions today    Labs ordered today:   Orders Placed This Encounter   Procedures     GLUCOSE MONITOR, 72 HOUR, PHYS INTERP     Basic metabolic panel     TSH     T4 free     Hemoglobin A1c     Radiology/Consults ordered today: None    More than 50% of the time spent with Ms. Smith on counseling / coordinating her care.  Total appointment time was 30 minutes.    Follow-up:  3 mo.    Lyndon Gaming MD  Endocrinology  [facility]        Again, thank you for allowing me to participate in the care of your patient.        Sincerely,        Lyndon Gaming MD

## 2018-11-06 NOTE — PROGRESS NOTES
Name: Jenise Smith    HPI:  Recent issues:  Here for f/u diabetes evaluation.  Feeling well overall, but noticing higher glucose levels week before menstrual cycles, also wt gain trends.  Discussed idea of taking OCP medication (continuous dosing) with Dr. Ashford          Diabetes:  Previous diagnosis of IFG with high GAD65 Ab level  6/2011. Developed acute hyperglycemia and diagnosis of T1DM  Treatment with insulin medication, MDI plan  2/2013. Changed to OmniPod pump with Novolog insulin  Used DexcomG5 CGMS system  5/2017. Changed to Medtronic 630G pump  9/2017. Upgraded to Medtronic 670G pump and Guardian3 sensor, Humalog insulin  8/2018. Discontinued Medtronic pump and sensor, changed to MDI plan   Had taken Basaglar 17U at bedtime and mealtime Humalog  Current DM meds:   Basaglar 13U subcutaneous at bedtime   Humalog Luxura pen 1:7 at mealtime, ISF 60, goal target  mg/dl    Has reduced hypoglycemia awareness   Has chocolate lab (Juliet), trained to recognize hypoglycemia smell/symptoms  Using Contour Next Link BG meter, variable testing, tests 6x/day  Previous [facility] labs include:  Lab Results   Component Value Date    A1C 6.8 (H) 11/06/2018     11/06/2018    POTASSIUM 4.1 11/06/2018    CHLORIDE 103 11/06/2018    CO2 25 11/06/2018    ANIONGAP 9 11/06/2018     (H) 11/06/2018    BUN 14 11/06/2018    CR 0.86 11/06/2018    GFRESTIMATED 72 11/06/2018    GFRESTBLACK 88 11/06/2018    VIJI 9.1 11/06/2018    CHOL 190 12/05/2017    TRIG 74 12/05/2017     12/05/2017    LDL 74 12/05/2017    NHDL 89 12/05/2017    UCRR 124 03/09/2018    MICROL 6 03/09/2018    UMALCR 4.51 03/09/2018     Exercises with classes at health club- yoga, strength   Tried Temp Target with exercise, noticed higher glucose levels so not used  DM Complications: none known  Previous Lasik surgery at [facility] 2015  Last eye exam ~2016, no DR reported        Thyroid:  2011. History of hypothyroidism  Takes  levothyroxine medication  Lab Results   Component Value Date    TSH 1.68 11/06/2018    T4 0.98 11/06/2018     Eary 10/2018. Dose incease to levothyroxine 0.05 mg daily per GYN physician  Current dose:  Levothyroxine 0.025 mg 2-tabs QAM      , lives in [place],  [place], daughter Myra and stepchildren Tisha Davalos  Works for [employer] as   Sees Dr. Amanda Yeboah/[facility] for general medicine evaluations.  Also sees Dr. Ashford[facility]    PMH/PSH:  Past Medical History:   Diagnosis Date     Diabetes mellitus type 1 (H)      Dysmenorrhea      Excessive or frequent menstruation      Hypothyroid      Insulin pump in place      Lumbar back pain      Other and unspecified hyperlipidemia      PONV (postoperative nausea and vomiting)      Past Surgical History:   Procedure Laterality Date     DILATION AND CURETTAGE, HYSTEROSCOPY, ABLATE ENDOMETRIUM NOVASURE, COMBINED  1/23/2014    Procedure: COMBINED DILATION AND CURETTAGE, HYSTEROSCOPY, ABLATE ENDOMETRIUM NOVASURE;  HYSTEROSCOPY, DILATION, CURETTAGE, WITH NOVASURE ABLATION, MYOSURE MORCELLATOR;  Surgeon: Swetha Queen MD;  Location: [facility]     LAPAROSCOPY       LEEP TX, CERVICAL       OPERATIVE HYSTEROSCOPY WITH MORCELLATOR  1/23/2014    Procedure: OPERATIVE HYSTEROSCOPY WITH MORCELLATOR;;  Surgeon: Swetha Queen MD;  Location: [facility]     wisdom teeth[         Family Hx:  Family History   Problem Relation Age of Onset     Diabetes Father      type 1     Diabetes Sister      type 1     Thyroid Disease Sister      Diabetes Mother      type 2     Breast Cancer Paternal Grandmother      Cancer - colorectal Paternal Uncle 60         Social Hx:  Social History     Social History     Marital status:      Spouse name: N/A     Number of children: N/A     Years of education: N/A     Occupational History     Not on file.     Social History Main Topics     Smoking status: Never Smoker     Smokeless tobacco:  "Never Used     Alcohol use 0.0 oz/week     0 Standard drinks or equivalent per week      Comment: 3 per week     Drug use: No     Sexual activity: Not Currently     Other Topics Concern     Not on file     Social History Narrative     in May 2014    2 step children and one biol dtr age 10.    Works for [employer]--works from home          MEDICATIONS:  has a current medication list which includes the following prescription(s): basaglar, insulin lispro, insulin lispro, levothyroxine, multiple vitamins-minerals, cholecalciferol, blood glucose, blood glucose monitoring, blood glucose monitoring, insulin pen needle, insulin pen needle, and synthroid.    ROS:     ROS: 10 point ROS neg other than the symptoms noted above in the HPI.    GENERAL: energy good, wt gain 5-10#;denies fevers, chills, night sweats.   HEENT: no dysphagia, odonophagia, diplopia, neck pain  THYROID:  no apparent hyper or hypothyroid symptoms  CV: no chest pain, pressure, palpitations  LUNGS: no SOB, CLIFFORD, cough, wheezing   ABDOMEN: some postmeal indigestion and mild nausea; denies diarrhea, constipation, abdominal pain  EXTREMITIES: no rashes, ulcers, edema  NEUROLOGY: no headaches, denies changes in vision, tingling, extremitiy numbness   MSK: no muscle aches or pains, weakness  SKIN: no rashes or lesions  : regular menstrual cycles?  PSYCH:  stable mood, no significant anxiety or depression  ENDOCRINE: no heat or cold intolerance    Physical Exam   VS: /67  Pulse 66  Ht 5' 8\" (1.727 m)  Wt 155 lb 8 oz (70.5 kg)  BMI 23.64 kg/m2  GENERAL: AXOX3, NAD, well dressed, answering questions appropriately, appears stated age.  THYROID:  normal gland, no apparent nodules or goiter  ABDOMEN: normal size  EXTREMITIES: no edema, no lesions  NEUROLOGY: CN grossly intact, no tremors  MSK: grossly intact  SKIN: no rashes, no lesions    LABS:    All pertinent notes, labs, and images personally reviewed by me.     A/P:  Encounter " "Diagnoses   Name Primary?     Type 1 diabetes mellitus without complication (H) Yes     Other specified hypothyroidism      Comments:  Reviewed health history and complicated diabetes issues.  Recent BG trends higher, likely related to the progesterone surge of premenstrual cycle  I am surprised her OBGYN changed the thyroid med dose without communicating with me    Plan:  Discussed general issues with the diabetes diagnosis and management  We discussed the hgbA1c test which reflects previous overall glucose levels or control  Discussed the importance of blood glucose (BG) testing to assess glucose trends  Provided general overview of the multiple daily injection (MDI) plan using rapid acting mealtime and longacting insulin medications  Reviewed recent Dexcom CGMS glucose sensor report, in detail    Recommend:  Continue the current Novolog and Basaglar med use   May need higher Basaglar dose week prior to menstrual cycle  Test BG levels premeal and bedtime  Check labs today  Monitor for symptom changes  Agree with plan for DexcomG6 use  Discussed the new diabetes insulin pen device by [employer] called the \"inpen\" (see [employer]/healthcare-providers),    a reusable insulin pen that uses BlueTooth technology to connect to a smartphone erinn, transfering the insulin pen dose information to the erinn.     Phone erinn then helps with the insulin dose calculation and also integrates the data to your Dexcom report.   Would not restart the LoEstrin OCP med at this time  Will update her Glucagon kit Rx to pharmacy  Advise having fasting lipid panel testing and dilated eye examination, at least annually    Discussed the wt gain 10#/10 mo, possible association of under-treated hypothyroidism  Continue current levothyroxine 0.05 mg daily dose, but important to recheck thyroid tests   Addressed patient questions today    Labs ordered today:   Orders Placed This Encounter   Procedures     GLUCOSE MONITOR, 72 " HOUR, PHYS INTERP     Basic metabolic panel     TSH     T4 free     Hemoglobin A1c     Radiology/Consults ordered today: None    More than 50% of the time spent with Ms. Smith on counseling / coordinating her care.  Total appointment time was 30 minutes.    Follow-up:  3 mo.    Lyndon Gaming MD  Endocrinology  [facility]

## 2018-11-06 NOTE — MR AVS SNAPSHOT
"              After Visit Summary   11/6/2018    Jenise Smith    MRN: [id number]           Patient Information     Date Of Birth          1975        Visit Information        Provider Department      11/6/2018 3:30 PM Lyndon Gaming MD [facility]        Today's Diagnoses     Type 1 diabetes mellitus without complication (H)    -  1    Other specified hypothyroidism           Follow-ups after your visit        Follow-up notes from your care team     Return in about 3 months (around 2/6/2019).      Who to contact     If you have questions or need follow up information about today's clinic visit or your schedule please contact [facility] directly at [phone].  Normal or non-critical lab and imaging results will be communicated to you by MyChart, letter or phone within 4 business days after the clinic has received the results. If you do not hear from us within 7 days, please contact the clinic through [facility] or phone. If you have a critical or abnormal lab result, we will notify you by phone as soon as possible.  Submit refill requests through [employer] or call your pharmacy and they will forward the refill request to us. Please allow 3 business days for your refill to be completed.          Additional Information About Your Visit        MyChart Information     [employer] gives you secure access to your electronic health record. If you see a primary care provider, you can also send messages to your care team and make appointments. If you have questions, please call your primary care clinic.  If you do not have a primary care provider, please call [phone] and they will assist you.        Care EveryWhere ID     This is your Care EveryWhere ID. This could be used by other organizations to access your Dexter medical records  [id number]        Your Vitals Were     Pulse Height BMI (Body Mass Index)             66 5' 8\" (1.727 m) 23.64 kg/m2          Blood Pressure from Last 3 " Encounters:   11/06/18 110/67   10/04/18 104/60   09/10/18 109/65    Weight from Last 3 Encounters:   11/06/18 155 lb 8 oz (70.5 kg)   10/04/18 150 lb (68 kg)   08/16/18 152 lb (68.9 kg)              We Performed the Following     Basic metabolic panel     GLUCOSE MONITOR, 72 HOUR, PHYS INTERP     Hemoglobin A1c     T4 free     TSH        Primary Care Provider Office Phone # Fax #    Amanda ERIKA Yeboah [phone] [address] [address]        Equal Access to Services     [facility]: Hadii aad ku hadasho Soomaali, waaxda luqadaha, qaybta kaalmada adeeileenyasugey, ortega salvador . So [facility] [phone].    ATENCIÓN: Si habla español, tiene a anna disposición servicios gratuitos de asistencia lingüística. Llame al [phone].    We comply with applicable federal civil rights laws and [place] laws. We do not discriminate on the basis of race, color, national origin, age, disability, sex, sexual orientation, or gender identity.            Thank you!     Thank you for choosing [facility]  for your care. Our goal is always to provide you with excellent care. Hearing back from our patients is one way we can continue to improve our services. Please take a few minutes to complete the written survey that you may receive in the mail after your visit with us. Thank you!             Your Updated Medication List - Protect others around you: Learn how to safely use, store and throw away your medicines at www.disposemymeds.org.          This list is accurate as of 11/6/18 11:59 PM.  Always use your most recent med list.                   Brand Name Dispense Instructions for use Diagnosis    BASAGLAR 100 UNIT/ML injection     15 mL    Inject 16 Units Subcutaneous At Bedtime    Type 1 diabetes mellitus without complication (H)       * FABRICIO CONTOUR NEXT test strip   Generic drug:  blood glucose monitoring      1 Dose 6 times daily. Test 6-7 times daily         * blood glucose monitoring test strip    no brand specified    550 strip    Use to test blood sugar 6 times daily or as directed.    Type 1 diabetes mellitus without complication (H)       blood glucose lancing device      1 Dose once daily.        * insulin lispro 100 UNIT/ML Cartridge    HUMALOG    30 mL    Inject 4-8 units subcutaneous at mealtime, total daily dose approx 25 units, E10.9    Type 1 diabetes mellitus without complication (H)       * insulin lispro 100 UNIT/ML injection    HumaLOG KWIKpen    15 mL    Inject 4-8 units subcutaneous at mealtime, total daily dose approx 25 units, E10.9    Type 1 diabetes mellitus without complication (H)       * insulin pen needle 32G X 4 MM    BD RYAN U/F    400 each    Use 4 pen needles daily or as directed.    Type 1 diabetes mellitus without complication (H)       * insulin pen needle 32G X 4 MM    BD RYAN U/F    650 each    Use 7 pen needles daily or as directed.    Type 1 diabetes mellitus without complication (H)       MULTI FOR HER PO           * SYNTHROID 25 MCG tablet   Generic drug:  levothyroxine     90 tablet    Take 1 tablet (25 mcg) by mouth daily    Hypothyroidism due to Hashimoto's thyroiditis       * levothyroxine 50 MCG tablet    SYNTHROID/LEVOTHROID     Take 50 mcg by mouth daily        VITAMIN D (CHOLECALCIFEROL) PO      Take 1,000 Units by mouth        * Notice:  This list has 8 medication(s) that are the same as other medications prescribed for you. Read the directions carefully, and ask your doctor or other care provider to review them with you.

## 2018-11-07 LAB
ANION GAP SERPL CALCULATED.3IONS-SCNC: 9 MMOL/L (ref 3–14)
BUN SERPL-MCNC: 14 MG/DL (ref 7–30)
CALCIUM SERPL-MCNC: 9.1 MG/DL (ref 8.5–10.1)
CHLORIDE SERPL-SCNC: 103 MMOL/L (ref 94–109)
CO2 SERPL-SCNC: 25 MMOL/L (ref 20–32)
CREAT SERPL-MCNC: 0.86 MG/DL (ref 0.52–1.04)
GFR SERPL CREATININE-BSD FRML MDRD: 72 ML/MIN/1.7M2
GLUCOSE SERPL-MCNC: 186 MG/DL (ref 70–99)
POTASSIUM SERPL-SCNC: 4.1 MMOL/L (ref 3.4–5.3)
SODIUM SERPL-SCNC: 137 MMOL/L (ref 133–144)
T4 FREE SERPL-MCNC: 0.98 NG/DL (ref 0.76–1.46)
TSH SERPL DL<=0.005 MIU/L-ACNC: 1.68 MU/L (ref 0.4–4)

## 2018-11-08 DIAGNOSIS — E10.9 TYPE 1 DIABETES MELLITUS WITHOUT COMPLICATION (H): Primary | ICD-10-CM

## 2018-11-11 ENCOUNTER — MYC MEDICAL ADVICE (OUTPATIENT)
Dept: ENDOCRINOLOGY | Facility: CLINIC | Age: 43
End: 2018-11-11

## 2018-11-11 DIAGNOSIS — E03.9 HYPOTHYROIDISM, UNSPECIFIED TYPE: Primary | ICD-10-CM

## 2018-11-15 RX ORDER — LEVOTHYROXINE SODIUM 50 UG/1
50 TABLET ORAL DAILY
Qty: 90 TABLET | Refills: 3 | Status: SHIPPED | OUTPATIENT
Start: 2018-11-15 | End: 2019-02-15

## 2018-11-23 ENCOUNTER — MYC MEDICAL ADVICE (OUTPATIENT)
Dept: ENDOCRINOLOGY | Facility: CLINIC | Age: 43
End: 2018-11-23

## 2018-11-29 ENCOUNTER — MYC MEDICAL ADVICE (OUTPATIENT)
Dept: ENDOCRINOLOGY | Facility: CLINIC | Age: 43
End: 2018-11-29

## 2018-12-02 ENCOUNTER — HEALTH MAINTENANCE LETTER (OUTPATIENT)
Age: 43
End: 2018-12-02

## 2018-12-03 NOTE — TELEPHONE ENCOUNTER
Messages noted.  I have sent the InPen (paper) Rx to the  [facility] today, and I have messaged her about this separately.  We will offer patient opportunity to see one of our Diabetes Educators if assistance needed with this new pen device.    TAMIKO Gaming MD  Endocrinology   [facility]

## 2018-12-20 ENCOUNTER — MYC MEDICAL ADVICE (OUTPATIENT)
Dept: ENDOCRINOLOGY | Facility: CLINIC | Age: 43
End: 2018-12-20

## 2018-12-21 ENCOUNTER — MEDICAL CORRESPONDENCE (OUTPATIENT)
Dept: HEALTH INFORMATION MANAGEMENT | Facility: CLINIC | Age: 43
End: 2018-12-21

## 2019-01-02 ENCOUNTER — MYC MEDICAL ADVICE (OUTPATIENT)
Dept: ENDOCRINOLOGY | Facility: CLINIC | Age: 44
End: 2019-01-02

## 2019-01-04 ENCOUNTER — TRANSFERRED RECORDS (OUTPATIENT)
Dept: HEALTH INFORMATION MANAGEMENT | Facility: CLINIC | Age: 44
End: 2019-01-04

## 2019-01-14 ENCOUNTER — MYC MEDICAL ADVICE (OUTPATIENT)
Dept: ENDOCRINOLOGY | Facility: CLINIC | Age: 44
End: 2019-01-14

## 2019-01-14 DIAGNOSIS — E10.9 TYPE 1 DIABETES MELLITUS WITHOUT COMPLICATION (H): ICD-10-CM

## 2019-01-15 ENCOUNTER — OFFICE VISIT (OUTPATIENT)
Dept: FAMILY MEDICINE | Facility: CLINIC | Age: 44
End: 2019-01-15
Payer: COMMERCIAL

## 2019-01-15 VITALS
WEIGHT: 156.5 LBS | HEART RATE: 63 BPM | OXYGEN SATURATION: 97 % | SYSTOLIC BLOOD PRESSURE: 105 MMHG | DIASTOLIC BLOOD PRESSURE: 65 MMHG | HEIGHT: 68 IN | BODY MASS INDEX: 23.72 KG/M2 | TEMPERATURE: 97.4 F

## 2019-01-15 DIAGNOSIS — Z11.4 SCREENING FOR HIV (HUMAN IMMUNODEFICIENCY VIRUS): ICD-10-CM

## 2019-01-15 DIAGNOSIS — E10.9 TYPE 1 DIABETES MELLITUS WITHOUT COMPLICATION (H): ICD-10-CM

## 2019-01-15 DIAGNOSIS — E03.9 HYPOTHYROIDISM, UNSPECIFIED TYPE: ICD-10-CM

## 2019-01-15 DIAGNOSIS — Z13.89 SCREENING FOR DIABETIC PERIPHERAL NEUROPATHY: ICD-10-CM

## 2019-01-15 DIAGNOSIS — Z00.00 ROUTINE GENERAL MEDICAL EXAMINATION AT A HEALTH CARE FACILITY: Primary | ICD-10-CM

## 2019-01-15 PROCEDURE — 99207 C FOOT EXAM  NO CHARGE: CPT | Mod: 25 | Performed by: PHYSICIAN ASSISTANT

## 2019-01-15 PROCEDURE — 99396 PREV VISIT EST AGE 40-64: CPT | Performed by: PHYSICIAN ASSISTANT

## 2019-01-15 ASSESSMENT — MIFFLIN-ST. JEOR: SCORE: 1415.44

## 2019-01-15 NOTE — PROGRESS NOTES
SUBJECTIVE:   CC: Jenise Smith is an 43 year old woman who presents for preventive health visit.     Pt works from home for [employer] and has biometric screening form to be completed. She is not fasting today so will return for labs.    [facility]  She has periods regularly and these are getting heavier  Has hx of ablation  Mammogram done in Sept and has fibrocystic breasts    Pt has Type 2 DM since 2010 and followed by Dr. Vance  Eye exam is up to date.  Back on Omnipump for the past month and using Dexcom   Pt notes she is gaining weight without changes in her diet/exercise but on chart review wt same as last year.  TSH in November normal  She is going to [employer] for cardio x 30min and 30 of lifting or a class about 3-4 times per week  The synthroid dose was increased to 50mcg in Oct by gyn due to weight gain    TSH   Date Value Ref Range Status   11/06/2018 1.68 0.40 - 4.00 mU/L Final         Lab Results   Component Value Date    A1C 6.8 11/06/2018    A1C 6.3 03/09/2018    A1C 6.5 12/05/2017    A1C 6.6 02/01/2017    A1C 6.7 11/10/2016           Healthy Habits:    Do you get at least three servings of calcium containing foods daily (dairy, green leafy vegetables, etc.)? yes    Amount of exercise or daily activities, outside of work: 4-5 day(s) per week    Problems taking medications regularly No    Medication side effects: No    Have you had an eye exam in the past two years? yes    Do you see a dentist twice per year? yes    Do you have sleep apnea, excessive snoring or daytime drowsiness?no    Today's PHQ-2 Score:   PHQ-2 ( 1999 [employer]) 1/15/2019 2/1/2017   Q1: Little interest or pleasure in doing things 0 0   Q2: Feeling down, depressed or hopeless 0 0   PHQ-2 Score 0 0   Q1: Little interest or pleasure in doing things - -   Q2: Feeling down, depressed or hopeless - -   PHQ-2 Score - -       Abuse: Current or Past(Physical, Sexual or Emotional)- No  Do  you feel safe in your environment? Yes    Social History     Tobacco Use     Smoking status: Never Smoker     Smokeless tobacco: Never Used   Substance Use Topics     Alcohol use: Yes     Alcohol/week: 0.0 oz     Comment: 3 per week     If you drink alcohol do you typically have >3 drinks per day or >7 drinks per week? No                     Reviewed orders with patient.  Reviewed health maintenance and updated orders accordingly - Yes      Pertinent mammograms are reviewed under the imaging tab.  History of abnormal Pap smear: NO - age 30- 65 PAP every 3 years recommended     Reviewed and updated as needed this visit by clinical staff  Tobacco  Allergies  Meds  Med Hx  Surg Hx  Fam Hx  Soc Hx        Reviewed and updated as needed this visit by Provider  Med Hx  Surg Hx  Fam Hx        Past Medical History:   Diagnosis Date     Diabetes mellitus type 1 (H)      Dysmenorrhea      Excessive or frequent menstruation      Hypothyroid      Insulin pump in place      Lumbar back pain      Other and unspecified hyperlipidemia      PONV (postoperative nausea and vomiting)      Past Surgical History:   Procedure Laterality Date     DILATION AND CURETTAGE, HYSTEROSCOPY, ABLATE ENDOMETRIUM NOVASURE, COMBINED  1/23/2014    Procedure: COMBINED DILATION AND CURETTAGE, HYSTEROSCOPY, ABLATE ENDOMETRIUM NOVASURE;  HYSTEROSCOPY, DILATION, CURETTAGE, WITH NOVASURE ABLATION, MYOSURE MORCELLATOR;  Surgeon: Swetha Queen MD;  Location: [facility]     LAPAROSCOPY       LEEP TX, CERVICAL       OPERATIVE HYSTEROSCOPY WITH MORCELLATOR  1/23/2014    Procedure: OPERATIVE HYSTEROSCOPY WITH MORCELLATOR;;  Surgeon: Swetha Queen MD;  Location: [facility]     wisdom teeth[       Current Outpatient Medications   Medication Sig Dispense Refill     blood glucose (ACCU-CHEK FASTCLIX) lancing device 1 Dose once daily.       blood glucose monitoring (FABRICIO CONTOUR NEXT) test strip 1 Dose 6 times daily. Test 6-7 times  daily       blood glucose monitoring (NO BRAND SPECIFIED) test strip Use to test blood sugar 6 times daily or as directed. 550 strip 3     insulin lispro (HUMALOG VIAL) 100 UNIT/ML vial Inject 100 Units Subcutaneous 3 times daily (before meals)       insulin lispro (HUMALOG) 100 UNIT/ML Cartridge Inject 4-8 units subcutaneous at mealtime, total daily dose approx 25 units, E10.9 30 mL 3     insulin pen needle (BD RYAN U/F) 32G X 4 MM Use 7 pen needles daily or as directed. 650 each 3     insulin pen needle (BD RYAN U/F) 32G X 4 MM Use 4 pen needles daily or as directed. 400 each 3     levothyroxine (SYNTHROID/LEVOTHROID) 50 MCG tablet Take 1 tablet (50 mcg) by mouth daily 90 tablet 3     Multiple Vitamins-Minerals (MULTI FOR HER PO)        VITAMIN D, CHOLECALCIFEROL, PO Take 1,000 Units by mouth       glucagon (GLUCAGON EMERGENCY) 1 MG kit Inject 1 mg into the muscle once for 1 dose 1 mg 2     insulin lispro (HUMALOG KWIKPEN) 100 UNIT/ML injection Inject 4-8 units subcutaneous at mealtime, total daily dose approx 25 units, E10.9 (Patient not taking: Reported on 1/15/2019) 15 mL 3         ROS:  CONSTITUTIONAL: NEGATIVE for fever, chills, change in weight  INTEGUMENTARU/SKIN: NEGATIVE for worrisome rashes, moles or lesions  EYES: NEGATIVE for vision changes or irritation  ENT: NEGATIVE for ear, mouth and throat problems  RESP: NEGATIVE for significant cough or SOB  BREAST: NEGATIVE for masses, tenderness or discharge  CV: NEGATIVE for chest pain, palpitations or peripheral edema  GI: NEGATIVE for nausea, abdominal pain, heartburn, or change in bowel habits  : NEGATIVE for unusual urinary or vaginal symptoms. Periods are regular.  MUSCULOSKELETAL: NEGATIVE for significant arthralgias or myalgia  NEURO: NEGATIVE for weakness, dizziness or paresthesias  PSYCHIATRIC: NEGATIVE for changes in mood or affect    OBJECTIVE:   /65 (BP Location: Right arm, Cuff Size: Adult Regular)   Pulse 63   Temp 97.4  F (36.3  C)  "(Oral)   Ht 1.715 m (5' 7.5\")   Wt 71 kg (156 lb 8 oz)   LMP 01/07/2019 (Exact Date)   SpO2 97%   BMI 24.15 kg/m    EXAM:  GENERAL: healthy, alert and no distress  EYES: Eyes grossly normal to inspection, PERRL and conjunctivae and sclerae normal  HENT: ear canals and TM's normal, nose and mouth without ulcers or lesions  NECK: no adenopathy, no asymmetry, masses, or scars and thyroid normal to palpation  RESP: lungs clear to auscultation - no rales, rhonchi or wheezes  BREAST: per gyn  CV: regular rate and rhythm, normal S1 S2, no S3 or S4, no murmur, click or rub, no peripheral edema and peripheral pulses strong  ABDOMEN: soft, nontender, no hepatosplenomegaly, no masses and bowel sounds normal  : per gyn   MS: no gross musculoskeletal defects noted, no edema  Normal foot exam: distal pulses intact, no edema or ulcerations.  SKIN: no suspicious lesions or rashes  NEURO: Normal strength and tone, mentation intact and speech normal  PSYCH: mentation appears normal, affect normal/bright        ASSESSMENT/PLAN:   Assessment and Plan:     (Z00.00) Routine general medical examination at a health care facility  (primary encounter diagnosis)  Comment:   Plan: pt to return for fasting labs.  Immun up to date. Mammogram up to date. Has appt with gyn next week for pelvic/pap    (E10.9) Type 1 diabetes mellitus without complication (H)  Comment:   Plan: Lipid panel reflex to direct LDL Fasting            (Z13.89) Screening for diabetic peripheral neuropathy  Comment:   Plan: FOOT EXAM  NO CHARGE [25799.114], Glucose            (E03.9) Hypothyroidism, unspecified type  Comment: gyn increased synthroid dose in Oct due to wt gain  Plan: TSH with free T4 reflex            (Z11.4) Screening for HIV (human immunodeficiency virus)  Comment:   Plan: HIV Screening              COUNSELING:   Reviewed preventive health counseling, as reflected in patient instructions    BP Readings from Last 1 Encounters:   01/15/19 105/65 " "    Estimated body mass index is 24.15 kg/m  as calculated from the following:    Height as of this encounter: 1.715 m (5' 7.5\").    Weight as of this encounter: 71 kg (156 lb 8 oz).           reports that  has never smoked. she has never used smokeless tobacco.      Counseling Resources:  ATP IV Guidelines  Pooled Cohorts Equation Calculator  Breast Cancer Risk Calculator  FRAX Risk Assessment  ICSI Preventive Guidelines  Dietary Guidelines for Americans, 2010  USDA's MyPlate  ASA Prophylaxis  Lung CA Screening    Beba Dailey PA-C  [facility]  "

## 2019-01-18 ENCOUNTER — TELEPHONE (OUTPATIENT)
Dept: ENDOCRINOLOGY | Facility: CLINIC | Age: 44
End: 2019-01-18

## 2019-01-18 DIAGNOSIS — E10.9 TYPE 1 DIABETES MELLITUS WITHOUT COMPLICATION (H): ICD-10-CM

## 2019-01-18 NOTE — PROGRESS NOTES
SUBJECTIVE:                                                   Jenise Smith is a 43 year old female who presents to clinic today for the following health issue(s):  Patient presents with:  Gyn Exam: here for pap and breast exam      HPI:  Jenise presents for an annual exam. She is a type I DM that is followed by Dr. Chan. In  she had a combined myosure hysteroscopy and novasure endometrial ablation for heavy menses. She has noticed that her menses are starting to get heavy again. She also has breast pain in the week prior to her menses as well. She is wondering if she will return to her symptoms from 5 years ago. She has a history of fibrocystic breast pain and reports drinking 5 cups of coffee in the morning with associated increased urinary frequency that does not persist into nocturia. She lives with her  of 5 years in a blended family and feels safe. She would like pelvic and serological STD screening today.    Patient's last menstrual period was 2019 (exact date)..   Patient is sexually active,   Using vasectomy for contraception.    reports that  has never smoked. she has never used smokeless tobacco.    STD testing offered? accepted    Health maintenance updated:  yes    Today's PHQ-2 Score:   PHQ-2 (  [employer]) 1/15/2019   Q1: Little interest or pleasure in doing things 0   Q2: Feeling down, depressed or hopeless 0   PHQ-2 Score 0   Q1: Little interest or pleasure in doing things -   Q2: Feeling down, depressed or hopeless -   PHQ-2 Score -     Today's PHQ-9 Score:   PHQ-9 SCORE 2019   PHQ-9 Total Score 2     Today's NEFTALY-7 Score:   NEFTALY-7 SCORE 2019   Total Score 4       Problem list and histories reviewed & adjusted, as indicated.  Additional history: as documented.    Patient Active Problem List   Diagnosis     Menorrhagia     Endometrial polyp     Hyperlipidemia with target LDL less than 100     Type 1 diabetes mellitus without complication (H)     Other  specified hypothyroidism     Axillary hyperhidrosis     Anxiety     Dysmenorrhea     Mastalgia in female     Lump or mass in breast     Past Surgical History:   Procedure Laterality Date     DILATION AND CURETTAGE, HYSTEROSCOPY, ABLATE ENDOMETRIUM NOVASURE, COMBINED  1/23/2014    Procedure: COMBINED DILATION AND CURETTAGE, HYSTEROSCOPY, ABLATE ENDOMETRIUM NOVASURE;  HYSTEROSCOPY, DILATION, CURETTAGE, WITH NOVASURE ABLATION, MYOSURE MORCELLATOR;  Surgeon: Swetha Queen MD;  Location: [facility]     LAPAROSCOPY       LEEP TX, CERVICAL       OPERATIVE HYSTEROSCOPY WITH MORCELLATOR  1/23/2014    Procedure: OPERATIVE HYSTEROSCOPY WITH MORCELLATOR;;  Surgeon: Swetha Queen MD;  Location: [facility]     wisdom teeth[        Social History     Tobacco Use     Smoking status: Never Smoker     Smokeless tobacco: Never Used   Substance Use Topics     Alcohol use: Yes     Alcohol/week: 0.0 oz     Comment: 3 per week      Problem (# of Occurrences) Relation (Name,Age of Onset)    Breast Cancer (1) Paternal Grandmother    Cancer - colorectal (1) Paternal Uncle (60)    Diabetes (3) Father: type 1, Sister: type 1, Mother: type 2    Thyroid Disease (1) Sister            Current Outpatient Medications   Medication Sig     blood glucose (ACCU-CHEK FASTCLIX) lancing device 1 Dose once daily.     blood glucose monitoring (FABRICIO CONTOUR NEXT) test strip 1 Dose 6 times daily. Test 6-7 times daily     blood glucose monitoring (NO BRAND SPECIFIED) test strip Use to test blood sugar 6 times daily or as directed.     insulin lispro (HUMALOG KWIKPEN) 100 UNIT/ML injection Inject 4-8 units subcutaneous at mealtime, total daily dose approx 25 units, E10.9     insulin lispro (HUMALOG VIAL) 100 UNIT/ML vial Use with insulin pump, total daily dose approx 40 units     insulin lispro (HUMALOG VIAL) 100 UNIT/ML vial Inject 100 Units Subcutaneous 3 times daily (before meals)     insulin lispro (HUMALOG) 100 UNIT/ML Cartridge  "Inject 4-8 units subcutaneous at mealtime, total daily dose approx 25 units, E10.9     insulin pen needle (BD RYAN U/F) 32G X 4 MM Use 7 pen needles daily or as directed.     insulin pen needle (BD RYAN U/F) 32G X 4 MM Use 4 pen needles daily or as directed.     levothyroxine (SYNTHROID/LEVOTHROID) 50 MCG tablet Take 1 tablet (50 mcg) by mouth daily     Multiple Vitamins-Minerals (MULTI FOR HER PO)      VITAMIN D, CHOLECALCIFEROL, PO Take 1,000 Units by mouth     glucagon (GLUCAGON EMERGENCY) 1 MG kit Inject 1 mg into the muscle once for 1 dose     No current facility-administered medications for this visit.      Allergies   Allergen Reactions     Latex      Facial redness       ROS:  12 point review of systems negative other than symptoms noted below.  Constitutional: Weight Gain  Breast: Lumps and Tenderness  Gastrointestinal: Constipation  Genitourinary: Cramps and Urgency  Psychiatric: Anxiety and Difficulty Sleeping    OBJECTIVE:     /76   Pulse 80   Ht 1.715 m (5' 7.5\")   Wt 68.9 kg (152 lb)   LMP 01/07/2019 (Exact Date)   BMI 23.46 kg/m    Body mass index is 23.46 kg/m .    Exam:  Constitutional:  Appearance: Well nourished, well developed alert, in no acute distress  Breasts:  Inspection of Breasts:  No lymphadenopathy present; Palpation of Breasts and Axillae:  Bilateral fibrocystic breasts, left breast with palpable lump at the 1 o'clock position that is mobile and tender about 1.5cm, bilateral breast tenderness.  Axillary Lymph Nodes:  No lymphadenopathy present  Skin:General Inspection:  No rashes present, no lesions present, no areas of discoloration; Genitalia and Groin:  No rashes present, no lesions present, no areas of discoloration, no masses present.  Neurologic/Psychiatric:  Mental Status:  Oriented X3   Pelvic Exam:  External Genitalia:     Normal appearance for age, no discharge present, no tenderness present, no inflammatory lesions present, color normal  Vagina:     Normal vaginal " vault without central or paravaginal defects, no discharge present, no inflammatory lesions present, no masses present  Bladder:     Nontender to palpation  Urethra:   Urethral Body:  Urethra palpation normal, urethra structural support normal   Urethral Meatus:  No erythema or lesions present  Cervix:     Appearance healthy, no lesions present, nontender to palpation, no bleeding present  Uterus:     Uterus: firm, normal sized and nontender, anteverted in position.   Adnexa:     No adnexal tenderness present, no adnexal masses present  Perineum:     Perineum within normal limits, no evidence of trauma, no rashes or skin lesions present  Genitalia and Groin:     No rashes present, no lesions present, no areas of discoloration, no masses present       In-Clinic Test Results:  No results found for this or any previous visit (from the past 24 hour(s)).    ASSESSMENT/PLAN:                                                        ICD-10-CM    1. Screening for cervical cancer Z12.4 Pap imaged thin layer screen with HPV - recommended age 30 - 65     HPV High Risk Types DNA Cervical   2. Screen for STD (sexually transmitted disease) Z11.3 NEISSERIA GONORRHOEA PCR     Treponema Abs w Reflex to RPR and Titer     Hepatitis C antibody     Hepatitis B surface antigen   3. Screening for chlamydial disease Z11.8 CHLAMYDIA TRACHOMATIS PCR   4. Breast lump on left side at 3 o'clock position N63.20        Pap smear was done today as well as GC/CT.  Discussed management of heavy menses. Discussed benefits of hormonal suppression versus local treatment with a Mirena IUD if her cavity is able to accessed after an endometrial ablation. She has no contraindication to OCP use even with Type 1 DM. I discussed Loestrin versus the Nuva Ring.    Palpable breast lump in the left breast was evaluated at [facility] in October 2018 and was noted to be benign.    Queta Clarke MD  [facility] [place]

## 2019-01-19 NOTE — TELEPHONE ENCOUNTER
Received fax from [facility] wanting clarification on pt's Humalog RX. Pharmacy states they need clarification on if patient is using cartridges or vials.    Ning LERMA(R)

## 2019-01-22 ENCOUNTER — OFFICE VISIT (OUTPATIENT)
Dept: OBGYN | Facility: CLINIC | Age: 44
End: 2019-01-22
Payer: COMMERCIAL

## 2019-01-22 VITALS
BODY MASS INDEX: 23.04 KG/M2 | HEIGHT: 68 IN | HEART RATE: 80 BPM | DIASTOLIC BLOOD PRESSURE: 76 MMHG | SYSTOLIC BLOOD PRESSURE: 104 MMHG | WEIGHT: 152 LBS

## 2019-01-22 DIAGNOSIS — E10.9 TYPE 1 DIABETES MELLITUS WITHOUT COMPLICATION (H): ICD-10-CM

## 2019-01-22 DIAGNOSIS — Z11.8 SCREENING FOR CHLAMYDIAL DISEASE: ICD-10-CM

## 2019-01-22 DIAGNOSIS — Z11.3 SCREEN FOR STD (SEXUALLY TRANSMITTED DISEASE): ICD-10-CM

## 2019-01-22 DIAGNOSIS — Z12.4 SCREENING FOR CERVICAL CANCER: Primary | ICD-10-CM

## 2019-01-22 DIAGNOSIS — Z13.89 SCREENING FOR DIABETIC PERIPHERAL NEUROPATHY: ICD-10-CM

## 2019-01-22 DIAGNOSIS — Z11.4 SCREENING FOR HIV (HUMAN IMMUNODEFICIENCY VIRUS): ICD-10-CM

## 2019-01-22 DIAGNOSIS — E03.9 HYPOTHYROIDISM, UNSPECIFIED TYPE: ICD-10-CM

## 2019-01-22 DIAGNOSIS — N63.25 BREAST LUMP ON LEFT SIDE AT 3 O'CLOCK POSITION: ICD-10-CM

## 2019-01-22 LAB
CHOLEST SERPL-MCNC: 187 MG/DL
GLUCOSE SERPL-MCNC: 124 MG/DL (ref 70–99)
HDLC SERPL-MCNC: 86 MG/DL
HIV 1+2 AB+HIV1 P24 AG SERPL QL IA: NONREACTIVE
LDLC SERPL CALC-MCNC: 85 MG/DL
NONHDLC SERPL-MCNC: 101 MG/DL
TRIGL SERPL-MCNC: 79 MG/DL
TSH SERPL DL<=0.005 MIU/L-ACNC: 2.47 MU/L (ref 0.4–4)

## 2019-01-22 PROCEDURE — 87624 HPV HI-RISK TYP POOLED RSLT: CPT | Performed by: OBSTETRICS & GYNECOLOGY

## 2019-01-22 PROCEDURE — 84443 ASSAY THYROID STIM HORMONE: CPT | Performed by: PHYSICIAN ASSISTANT

## 2019-01-22 PROCEDURE — 87340 HEPATITIS B SURFACE AG IA: CPT | Performed by: OBSTETRICS & GYNECOLOGY

## 2019-01-22 PROCEDURE — 80061 LIPID PANEL: CPT | Performed by: PHYSICIAN ASSISTANT

## 2019-01-22 PROCEDURE — 87389 HIV-1 AG W/HIV-1&-2 AB AG IA: CPT | Performed by: PHYSICIAN ASSISTANT

## 2019-01-22 PROCEDURE — 86803 HEPATITIS C AB TEST: CPT | Performed by: OBSTETRICS & GYNECOLOGY

## 2019-01-22 PROCEDURE — 87491 CHLMYD TRACH DNA AMP PROBE: CPT | Performed by: OBSTETRICS & GYNECOLOGY

## 2019-01-22 PROCEDURE — 82947 ASSAY GLUCOSE BLOOD QUANT: CPT | Performed by: PHYSICIAN ASSISTANT

## 2019-01-22 PROCEDURE — 36415 COLL VENOUS BLD VENIPUNCTURE: CPT | Performed by: PHYSICIAN ASSISTANT

## 2019-01-22 PROCEDURE — G0145 SCR C/V CYTO,THINLAYER,RESCR: HCPCS | Performed by: OBSTETRICS & GYNECOLOGY

## 2019-01-22 PROCEDURE — 86780 TREPONEMA PALLIDUM: CPT | Performed by: OBSTETRICS & GYNECOLOGY

## 2019-01-22 PROCEDURE — 87591 N.GONORRHOEAE DNA AMP PROB: CPT | Performed by: OBSTETRICS & GYNECOLOGY

## 2019-01-22 PROCEDURE — 99203 OFFICE O/P NEW LOW 30 MIN: CPT | Performed by: OBSTETRICS & GYNECOLOGY

## 2019-01-22 ASSESSMENT — MIFFLIN-ST. JEOR: SCORE: 1395.03

## 2019-01-22 ASSESSMENT — ANXIETY QUESTIONNAIRES
3. WORRYING TOO MUCH ABOUT DIFFERENT THINGS: SEVERAL DAYS
5. BEING SO RESTLESS THAT IT IS HARD TO SIT STILL: NOT AT ALL
1. FEELING NERVOUS, ANXIOUS, OR ON EDGE: SEVERAL DAYS
IF YOU CHECKED OFF ANY PROBLEMS ON THIS QUESTIONNAIRE, HOW DIFFICULT HAVE THESE PROBLEMS MADE IT FOR YOU TO DO YOUR WORK, TAKE CARE OF THINGS AT HOME, OR GET ALONG WITH OTHER PEOPLE: NOT DIFFICULT AT ALL
GAD7 TOTAL SCORE: 4
6. BECOMING EASILY ANNOYED OR IRRITABLE: SEVERAL DAYS
7. FEELING AFRAID AS IF SOMETHING AWFUL MIGHT HAPPEN: NOT AT ALL
2. NOT BEING ABLE TO STOP OR CONTROL WORRYING: SEVERAL DAYS

## 2019-01-22 ASSESSMENT — PATIENT HEALTH QUESTIONNAIRE - PHQ9
5. POOR APPETITE OR OVEREATING: NOT AT ALL
SUM OF ALL RESPONSES TO PHQ QUESTIONS 1-9: 2

## 2019-01-23 LAB
C TRACH DNA SPEC QL NAA+PROBE: NEGATIVE
HBV SURFACE AG SERPL QL IA: NONREACTIVE
HCV AB SERPL QL IA: NONREACTIVE
N GONORRHOEA DNA SPEC QL NAA+PROBE: NEGATIVE
SPECIMEN SOURCE: NORMAL
SPECIMEN SOURCE: NORMAL
T PALLIDUM AB SER QL: NONREACTIVE

## 2019-01-23 ASSESSMENT — ANXIETY QUESTIONNAIRES: GAD7 TOTAL SCORE: 4

## 2019-01-23 NOTE — RESULT ENCOUNTER NOTE
Jenise,    Your HIV test was negative.  Your TSH (thyroid test) was in normal range.  Your cholesterol profile looks great and is at goal.  Your blood sugar was 124.    Please let me know if you have any questions.    Beba Dailey PA-C

## 2019-01-24 LAB
COPATH REPORT: NORMAL
PAP: NORMAL

## 2019-01-25 LAB
FINAL DIAGNOSIS: NORMAL
HPV HR 12 DNA CVX QL NAA+PROBE: NEGATIVE
HPV16 DNA SPEC QL NAA+PROBE: NEGATIVE
HPV18 DNA SPEC QL NAA+PROBE: NEGATIVE
SPECIMEN DESCRIPTION: NORMAL
SPECIMEN SOURCE CVX/VAG CYTO: NORMAL

## 2019-02-07 ENCOUNTER — MYC MEDICAL ADVICE (OUTPATIENT)
Dept: ENDOCRINOLOGY | Facility: CLINIC | Age: 44
End: 2019-02-07

## 2019-02-15 ENCOUNTER — OFFICE VISIT (OUTPATIENT)
Dept: ENDOCRINOLOGY | Facility: CLINIC | Age: 44
End: 2019-02-15
Payer: COMMERCIAL

## 2019-02-15 VITALS
HEIGHT: 68 IN | BODY MASS INDEX: 23.79 KG/M2 | WEIGHT: 157 LBS | DIASTOLIC BLOOD PRESSURE: 65 MMHG | SYSTOLIC BLOOD PRESSURE: 110 MMHG | HEART RATE: 65 BPM

## 2019-02-15 DIAGNOSIS — Z96.41 INSULIN PUMP STATUS: ICD-10-CM

## 2019-02-15 DIAGNOSIS — E10.9 TYPE 1 DIABETES MELLITUS WITHOUT COMPLICATION (H): Primary | ICD-10-CM

## 2019-02-15 DIAGNOSIS — E03.9 HYPOTHYROIDISM, UNSPECIFIED TYPE: ICD-10-CM

## 2019-02-15 PROCEDURE — 95251 CONT GLUC MNTR ANALYSIS I&R: CPT | Performed by: INTERNAL MEDICINE

## 2019-02-15 PROCEDURE — 99214 OFFICE O/P EST MOD 30 MIN: CPT | Performed by: INTERNAL MEDICINE

## 2019-02-15 RX ORDER — LEVOTHYROXINE SODIUM 50 UG/1
50 TABLET ORAL DAILY
Qty: 90 TABLET | Refills: 3 | Status: SHIPPED | OUTPATIENT
Start: 2019-02-15 | End: 2019-07-20

## 2019-02-15 ASSESSMENT — MIFFLIN-ST. JEOR: SCORE: 1412.71

## 2019-02-15 NOTE — PROGRESS NOTES
Name: Jenise Smith    HPI:  Recent issues:  Here for f/u diabetes evaluation.  Feeling well overall, but noticing higher glucose levels week before menstrual cycles, also wt gain trends.  Discussed idea of taking OCP medication (continuous dosing) with Dr. Ashford          Diabetes:  Previous diagnosis of IFG with high GAD65 Ab level  6/2011. Developed acute hyperglycemia and diagnosis of T1DM  Treatment with insulin medication, MDI plan  2/2013. Changed to OmniPod pump with Novolog insulin  Used DexcomG5 CGMS system  5/2017. Changed to Medtronic 630G pump  9/2017. Upgraded to Medtronic 670G pump and Guardian3 sensor, Humalog insulin  8/2018. Discontinued Medtronic pump and sensor, changed to MDI plan   Had taken Basaglar 17U at bedtime and mealtime Humalog  Had taken Basaglar 13U at bedtime, Humalog Luxura pen 1:7 at mealtime, ISF 60, goal target  mg/dl  12/2018. Restarted OmniPod pump   Novolog in pump[    Current pump settings:      Recent Dexcom CGMS data:      Has reduced hypoglycemia awareness   Has [facility] lab ([facility]), trained to recognize hypoglycemia smell/symptoms  Using Contour Next Link BG meter, variable testing, tests 6x/day  Previous [facility] labs include:  Lab Results   Component Value Date    A1C 6.8 (H) 11/06/2018     11/06/2018    POTASSIUM 4.1 11/06/2018    CHLORIDE 103 11/06/2018    CO2 25 11/06/2018    ANIONGAP 9 11/06/2018     (H) 01/22/2019    BUN 14 11/06/2018    CR 0.86 11/06/2018    GFRESTIMATED 72 11/06/2018    GFRESTBLACK 88 11/06/2018    VIJI 9.1 11/06/2018    CHOL 187 01/22/2019    TRIG 79 01/22/2019    HDL 86 01/22/2019    LDL 85 01/22/2019    NHDL 101 01/22/2019    UCRR 124 03/09/2018    MICROL 6 03/09/2018    UMALCR 4.51 03/09/2018     Exercises with classes at health club- yoga, strength   Tried Temp Target with exercise, noticed higher glucose levels so not used  Previous Las[facility] surgery at [facility] 2015  Last eye exam 1/2019 with Dr. Shultz, no  DR reported  DM Complications: none known        Thyroid:  2011. History of hypothyroidism  Takes levothyroxine medication  Lab Results   Component Value Date    TSH 2.47 01/22/2019    T4 0.98 11/06/2018     Eary 10/2018. Dose incease to levothyroxine 0.05 mg daily per GYN physician  Current dose:  Levothyroxine 0.025 mg 2-tabs QAM      , lives in [place],  [place], daughter Myra and stepchildren Tisha + Anoop  Works for [employer] as   Now sees Beba Dailey PAC/[facility] [place] [facility] for general medicine evaluations.  Also sees Dr. Ashford/[facility]    PMH/PSH:  Past Medical History:   Diagnosis Date     Diabetes mellitus type 1 (H)      Dysmenorrhea      Excessive or frequent menstruation      Hypothyroid      Insulin pump in place      Lumbar back pain      Other and unspecified hyperlipidemia      PONV (postoperative nausea and vomiting)      Past Surgical History:   Procedure Laterality Date     DILATION AND CURETTAGE, HYSTEROSCOPY, ABLATE ENDOMETRIUM NOVASURE, COMBINED  1/23/2014    Procedure: COMBINED DILATION AND CURETTAGE, HYSTEROSCOPY, ABLATE ENDOMETRIUM NOVASURE;  HYSTEROSCOPY, DILATION, CURETTAGE, WITH NOVASURE ABLATION, MYOSURE MORCELLATOR;  Surgeon: Swetha Queen MD;  Location: [facility]     LAPAROSCOPY       LEEP TX, CERVICAL       OPERATIVE HYSTEROSCOPY WITH MORCELLATOR  1/23/2014    Procedure: OPERATIVE HYSTEROSCOPY WITH MORCELLATOR;;  Surgeon: Swetha Queen MD;  Location: [facility]     wisdom teeth[         Family Hx:  Family History   Problem Relation Age of Onset     Diabetes Father         type 1     Diabetes Sister         type 1     Thyroid Disease Sister      Diabetes Mother         type 2     Breast Cancer Paternal Grandmother      Cancer - colorectal Paternal Uncle 60         Social Hx:  Social History     Socioeconomic History     Marital status:      Spouse name: Not on file     Number of children: Not on file     Years of  education: Not on file     Highest education level: Not on file   Social Needs     Financial resource strain: Not on file     Food insecurity - worry: Not on file     Food insecurity - inability: Not on file     Transportation needs - medical: Not on file     Transportation needs - non-medical: Not on file   Occupational History     Employer: [employer]   Tobacco Use     Smoking status: Never Smoker     Smokeless tobacco: Never Used   Substance and Sexual Activity     Alcohol use: Yes     Alcohol/week: 0.0 oz     Comment: 3 per week     Drug use: No     Sexual activity: Yes     Partners: Male     Birth control/protection: Male Surgical   Other Topics Concern     Parent/sibling w/ CABG, MI or angioplasty before 65F 55M? Not Asked   Social History Narrative     in May 2014    2 step children and one biol dtr age 10.    Works for [employer]--works from home          MEDICATIONS:  has a current medication list which includes the following prescription(s): eszopiclone, insulin lispro, levothyroxine, multiple vitamins-minerals, cholecalciferol, blood glucose, blood glucose, blood glucose, glucagon, insulin lispro, insulin lispro, insulin lispro, insulin pen needle, and insulin pen needle.    ROS:     ROS: 10 point ROS neg other than the symptoms noted above in the HPI.    GENERAL: energy good, wt stable; denies fevers, chills, night sweats.   HEENT: no dysphagia, odonophagia, diplopia, neck pain  THYROID:  no apparent hyper or hypothyroid symptoms  CV: no chest pain, pressure, palpitations  LUNGS: no SOB, CLIFFORD, cough, wheezing   ABDOMEN: some postmeal indigestion and mild nausea; denies diarrhea, constipation, abdominal pain  EXTREMITIES: no rashes, ulcers, edema  NEUROLOGY: no headaches, denies changes in vision, tingling, extremitiy numbness   MSK: no muscle aches or pains, weakness  SKIN: no rashes or lesions  : regular menstrual cycles?  PSYCH:  stable mood, no significant anxiety or  "depression  ENDOCRINE: no heat or cold intolerance    Physical Exam   VS: /65   Pulse 65   Ht 1.715 m (5' 7.5\")   Wt 71.2 kg (157 lb)   BMI 24.23 kg/m    GENERAL: AXOX3, NAD, well dressed, answering questions appropriately, appears stated age.  THYROID:  normal gland, no apparent nodules or goiter  CV:  RRR without murmurs  LUNGS:  CTA bilaterally  ABDOMEN: normal size  EXTREMITIES: no edema, no lesions  NEUROLOGY: CN grossly intact, no tremors  MSK: grossly intact  SKIN: no rashes, no lesions    LABS:    All pertinent notes, labs, and images personally reviewed by me.     A/P:  Encounter Diagnoses   Name Primary?     Type 1 diabetes mellitus without complication (H) Yes     Hypothyroidism, unspecified type      Insulin pump status      Comments:  Reviewed health history, diabetes and thyroid issues.  Recent glucose control good.    Plan:  Discussed general issues with the diabetes diagnosis and management  We discussed the hgbA1c test which reflects previous overall glucose levels or control  Discussed the importance of blood glucose (BG) testing to assess glucose trends  Provided general overview of the multiple daily injection (MDI) plan using rapid acting mealtime and longacting insulin medications  Reviewed recent Dexcom CGMS glucose sensor report, in detail    Recommend:  Reviewed her transition plan to the OmniPod pump and diabetes management  Pump setting changes:   Bolus: I:C MN 7 to 8  Test BG levels premeal and bedtime  No lab testing today  Monitor for symptom changes  Agree with plan for DexcomG6 use  Reviewed the future OmniPod PDM upgrade to the Dash device   Anticipate Dash available 3/2019   Call the OmniPod 1-800 number request \"priority access\" list for this device   Contact our office if needing assistance with Dash programming when available  Would not restart the LoEstrin OCP med at this time  Will update her levothyroxine Rx to her pharmacy  Advise having fasting lipid panel testing " and dilated eye examination, at least annually    Addressed patient questions today    Labs ordered today:   Orders Placed This Encounter   Procedures     GLUCOSE MONITOR, 72 HOUR, PHYS INTERP     Radiology/Consults ordered today: None    More than 50% of the time spent with Ms. Smith on counseling / coordinating her care.  Total appointment time was 30 minutes.    Follow-up:  3 mo.    Lyndon Gaming MD  Endocrinology  [place] [facility]

## 2019-02-22 ENCOUNTER — MEDICAL CORRESPONDENCE (OUTPATIENT)
Dept: HEALTH INFORMATION MANAGEMENT | Facility: CLINIC | Age: 44
End: 2019-02-22

## 2019-03-01 ENCOUNTER — MYC MEDICAL ADVICE (OUTPATIENT)
Dept: FAMILY MEDICINE | Facility: CLINIC | Age: 44
End: 2019-03-01

## 2019-03-01 DIAGNOSIS — F41.9 ANXIETY: Primary | ICD-10-CM

## 2019-03-04 ENCOUNTER — MYC MEDICAL ADVICE (OUTPATIENT)
Dept: FAMILY MEDICINE | Facility: CLINIC | Age: 44
End: 2019-03-04

## 2019-03-04 ASSESSMENT — ANXIETY QUESTIONNAIRES
5. BEING SO RESTLESS THAT IT IS HARD TO SIT STILL: NEARLY EVERY DAY
GAD7 TOTAL SCORE: 20
7. FEELING AFRAID AS IF SOMETHING AWFUL MIGHT HAPPEN: MORE THAN HALF THE DAYS
1. FEELING NERVOUS, ANXIOUS, OR ON EDGE: NEARLY EVERY DAY
3. WORRYING TOO MUCH ABOUT DIFFERENT THINGS: NEARLY EVERY DAY
GAD7 TOTAL SCORE: 20
4. TROUBLE RELAXING: NEARLY EVERY DAY
7. FEELING AFRAID AS IF SOMETHING AWFUL MIGHT HAPPEN: MORE THAN HALF THE DAYS
GAD7 TOTAL SCORE: 20
6. BECOMING EASILY ANNOYED OR IRRITABLE: NEARLY EVERY DAY
2. NOT BEING ABLE TO STOP OR CONTROL WORRYING: NEARLY EVERY DAY

## 2019-03-04 ASSESSMENT — PATIENT HEALTH QUESTIONNAIRE - PHQ9
10. IF YOU CHECKED OFF ANY PROBLEMS, HOW DIFFICULT HAVE THESE PROBLEMS MADE IT FOR YOU TO DO YOUR WORK, TAKE CARE OF THINGS AT HOME, OR GET ALONG WITH OTHER PEOPLE: VERY DIFFICULT
SUM OF ALL RESPONSES TO PHQ QUESTIONS 1-9: 8
SUM OF ALL RESPONSES TO PHQ QUESTIONS 1-9: 8

## 2019-03-04 NOTE — TELEPHONE ENCOUNTER
Pt requesting fluoxetine, historical in chart - prescribed by previous provider.  Pt filled out PHQ and NEFTALY below today  Last seen in clinic 1/15/19.    Please advise if RX appropriate, and when you want in clinic f/u?  2mo (sooner if worsening sx)?  Bel Barrios RN      PHQ-9 SCORE 1/22/2019 3/4/2019   PHQ-9 Total Score MyChart - 8 (Mild depression)   PHQ-9 Total Score 2 8     NEFTALY-7 SCORE 1/22/2019 3/4/2019   Total Score - 20 (severe anxiety)   Total Score 4 20

## 2019-03-04 NOTE — TELEPHONE ENCOUNTER
Sent pt a separate mychart with gad7 and phq9, will route to Beba when complete for review.  Bel Barrios RN

## 2019-03-05 ASSESSMENT — PATIENT HEALTH QUESTIONNAIRE - PHQ9: SUM OF ALL RESPONSES TO PHQ QUESTIONS 1-9: 8

## 2019-03-05 ASSESSMENT — ANXIETY QUESTIONNAIRES: GAD7 TOTAL SCORE: 20

## 2019-03-19 NOTE — TELEPHONE ENCOUNTER
3/19/2019    Call Regarding Onboarding: [facility]    Attempt 3    Message left with patient     Comments: pt on boarded      Outreach

## 2019-03-27 ENCOUNTER — OFFICE VISIT (OUTPATIENT)
Dept: FAMILY MEDICINE | Facility: CLINIC | Age: 44
End: 2019-03-27
Payer: COMMERCIAL

## 2019-03-27 VITALS
WEIGHT: 152 LBS | BODY MASS INDEX: 23.04 KG/M2 | SYSTOLIC BLOOD PRESSURE: 108 MMHG | TEMPERATURE: 97.1 F | OXYGEN SATURATION: 99 % | HEART RATE: 63 BPM | DIASTOLIC BLOOD PRESSURE: 65 MMHG | HEIGHT: 68 IN

## 2019-03-27 DIAGNOSIS — B96.89 BACTERIAL VAGINOSIS: Primary | ICD-10-CM

## 2019-03-27 DIAGNOSIS — N76.0 BACTERIAL VAGINOSIS: Primary | ICD-10-CM

## 2019-03-27 DIAGNOSIS — N89.8 VAGINAL DISCHARGE: ICD-10-CM

## 2019-03-27 DIAGNOSIS — B37.31 YEAST INFECTION OF THE VAGINA: ICD-10-CM

## 2019-03-27 LAB
SPECIMEN SOURCE: ABNORMAL
WET PREP SPEC: ABNORMAL

## 2019-03-27 PROCEDURE — 99213 OFFICE O/P EST LOW 20 MIN: CPT | Performed by: PHYSICIAN ASSISTANT

## 2019-03-27 PROCEDURE — 87210 SMEAR WET MOUNT SALINE/INK: CPT | Performed by: PHYSICIAN ASSISTANT

## 2019-03-27 RX ORDER — METRONIDAZOLE 7.5 MG/G
1 GEL VAGINAL 2 TIMES DAILY
Qty: 70 G | Refills: 1 | Status: SHIPPED | OUTPATIENT
Start: 2019-03-27 | End: 2019-05-13

## 2019-03-27 RX ORDER — FLUCONAZOLE 150 MG/1
150 TABLET ORAL ONCE
Qty: 1 TABLET | Refills: 1 | Status: SHIPPED | OUTPATIENT
Start: 2019-03-27 | End: 2019-03-27

## 2019-03-27 ASSESSMENT — MIFFLIN-ST. JEOR: SCORE: 1390.03

## 2019-03-27 NOTE — PROGRESS NOTES
HPI: Jenise is a pleasant 45 yo female here with vaginal sxs of vaginal odor x 1-2 months  She has noticed this in the past as well and does have hx of BV but hasn't been tested for that for over a year.  She as some white vaginal discharge  Denies vaginal itching  No urinary sxs    She also has hyperhidrosis and considering doing Miradry  She has tried prescription deoderent without relief.    Past Medical History:   Diagnosis Date     Diabetes mellitus type 1 (H)      Dysmenorrhea      Excessive or frequent menstruation      Hypothyroid      Insulin pump in place      Lumbar back pain      Other and unspecified hyperlipidemia      PONV (postoperative nausea and vomiting)      Past Surgical History:   Procedure Laterality Date     DILATION AND CURETTAGE, HYSTEROSCOPY, ABLATE ENDOMETRIUM NOVASURE, COMBINED  1/23/2014    Procedure: COMBINED DILATION AND CURETTAGE, HYSTEROSCOPY, ABLATE ENDOMETRIUM NOVASURE;  HYSTEROSCOPY, DILATION, CURETTAGE, WITH NOVASURE ABLATION, MYOSURE MORCELLATOR;  Surgeon: Swetha Queen MD;  Location: [facility]     LAPAROSCOPY       LEEP TX, CERVICAL       OPERATIVE HYSTEROSCOPY WITH MORCELLATOR  1/23/2014    Procedure: OPERATIVE HYSTEROSCOPY WITH MORCELLATOR;;  Surgeon: Swetha Queen MD;  Location: [facility]     wisdom teeth[       Social History     Tobacco Use     Smoking status: Never Smoker     Smokeless tobacco: Never Used   Substance Use Topics     Alcohol use: Yes     Alcohol/week: 0.0 oz     Comment: 3 per week     Current Outpatient Medications   Medication Sig Dispense Refill     blood glucose (ACCU-CHEK FASTCLIX) lancing device 1 Dose once daily.       blood glucose monitoring (FABRICIO CONTOUR NEXT) test strip 1 Dose 6 times daily. Test 6-7 times daily       blood glucose monitoring (NO BRAND SPECIFIED) test strip Use to test blood sugar 6 times daily or as directed. 550 strip 3     eszopiclone (LUNESTA) 2 MG tablet Take 1 tablet (2 mg) by mouth At  "Bedtime 30 tablet 1     fluconazole (DIFLUCAN) 150 MG tablet Take 1 tablet (150 mg) by mouth once for 1 dose 1 tablet 1     FLUoxetine (PROZAC) 20 MG capsule Take 1 capsule (20 mg) by mouth daily 90 capsule 0     insulin lispro (HUMALOG KWIKPEN) 100 UNIT/ML injection Inject 4-8 units subcutaneous at mealtime, total daily dose approx 25 units, E10.9 15 mL 3     insulin lispro (HUMALOG VIAL) 100 UNIT/ML vial Use with insulin pump, total daily dose approx 40 units 40 mL 3     insulin lispro (HUMALOG VIAL) 100 UNIT/ML vial Inject 100 Units Subcutaneous 3 times daily (before meals)       insulin lispro (HUMALOG) 100 UNIT/ML Cartridge Inject 4-8 units subcutaneous at mealtime, total daily dose approx 25 units, E10.9 30 mL 3     insulin pen needle (BD RYAN U/F) 32G X 4 MM Use 7 pen needles daily or as directed. 650 each 3     insulin pen needle (BD RYAN U/F) 32G X 4 MM Use 4 pen needles daily or as directed. 400 each 3     levothyroxine (SYNTHROID/LEVOTHROID) 50 MCG tablet Take 1 tablet (50 mcg) by mouth daily 90 tablet 3     metroNIDAZOLE (METROGEL) 0.75 % vaginal gel Place 1 applicator (5 g) vaginally 2 times daily 70 g 1     Multiple Vitamins-Minerals (MULTI FOR HER PO)        VITAMIN D, CHOLECALCIFEROL, PO Take 1,000 Units by mouth       glucagon (GLUCAGON EMERGENCY) 1 MG kit Inject 1 mg into the muscle once for 1 dose 1 mg 2     Allergies   Allergen Reactions     Latex      Facial redness             PHYSICAL EXAM:    /65 (BP Location: Right arm, Patient Position: Sitting, Cuff Size: Adult Regular)   Pulse 63   Temp 97.1  F (36.2  C) (Oral)   Ht 1.715 m (5' 7.5\")   Wt 68.9 kg (152 lb)   LMP 03/08/2019 (Exact Date)   SpO2 99%   Breastfeeding? No   BMI 23.46 kg/m      Patient appears non toxic  Speculum exam reveals scant yellow discharge    Results for orders placed or performed in visit on 03/27/19   Wet prep   Result Value Ref Range    Specimen Description Cervix     Wet Prep Clue cells seen (A)     Wet " Prep No Trichomonas seen     Wet Prep No yeast seen      Assessment and Plan:     (N76.0,  B96.89) Bacterial vaginosis  (primary encounter diagnosis)  Comment:   Plan: tx with metrogel vag bid x 5d.      (N89.8) Vaginal discharge  Comment:   Plan: Wet prep, metroNIDAZOLE (METROGEL) 0.75 %         vaginal gel            (B37.3) Yeast infection of the vagina  Comment:   Plan: fluconazole (DIFLUCAN) 150 MG tablet        X 1 if develops itching as she is prone to vaginal yeast infections because of her diabetes.      Beba Dailey PA-C

## 2019-04-10 DIAGNOSIS — F41.9 ANXIETY: ICD-10-CM

## 2019-04-10 NOTE — TELEPHONE ENCOUNTER
"Pending Prescriptions:                       Disp   Refills    FLUoxetine (PROZAC) 20 MG capsule [Pharma*90 cap*0            Sig: TAKE 1 CAPSULE BY MOUTH  DAILY    Last Written Prescription Date:  03/04/2019  Last Fill Quantity: 90,  # refills: 0   Last office visit: 3/27/2019 with prescribing provider:  Beba Dailey   Future Office Visit:   Next 5 appointments (look out 90 days)    Jun 25, 2019  1:30 PM CDT  Return Visit with Lyndon Gaming MD  [facility] ([address] [id number] [phone]         Requested Prescriptions   Pending Prescriptions Disp Refills     FLUoxetine (PROZAC) 20 MG capsule [Pharmacy Med Name: FLUOXETINE  20MG  CAP] 90 capsule 0     Sig: TAKE 1 CAPSULE BY MOUTH  DAILY       SSRIs Protocol Passed - 4/10/2019  3:31 PM        Passed - Recent (12 mo) or future (30 days) visit within the authorizing provider's specialty     Patient had office visit in the last 12 months or has a visit in the next 30 days with authorizing provider or within the authorizing provider's specialty.  See \"Patient Info\" tab in inbasket, or \"Choose Columns\" in Meds & Orders section of the refill encounter.              Passed - Medication is active on med list        Passed - Patient is age 18 or older        Passed - No active pregnancy on record        Passed - No positive pregnancy test in last 12 months          "

## 2019-04-12 ENCOUNTER — MYC REFILL (OUTPATIENT)
Dept: FAMILY MEDICINE | Facility: CLINIC | Age: 44
End: 2019-04-12

## 2019-04-12 DIAGNOSIS — F41.9 ANXIETY: ICD-10-CM

## 2019-04-12 NOTE — TELEPHONE ENCOUNTER
"Pending Prescriptions:                       Disp   Refills    FLUoxetine (PROZAC) 20 MG capsule         90 cap*0            Sig: Take 1 capsule (20 mg) by mouth daily    Last Written Prescription Date:  03/04/2019  Last Fill Quantity: 90,  # refills: 0   Last office visit: 3/27/2019 with prescribing provider:  Beba Dailey   Future Office Visit:   Next 5 appointments (look out 90 days)    Jun 25, 2019  1:30 PM CDT  Return Visit with Lyndon Gaming MD  [facility] ([address] [phone]         Requested Prescriptions   Pending Prescriptions Disp Refills     FLUoxetine (PROZAC) 20 MG capsule 90 capsule 0     Sig: Take 1 capsule (20 mg) by mouth daily       SSRIs Protocol Passed - 4/12/2019  7:28 AM        Passed - Recent (12 mo) or future (30 days) visit within the authorizing provider's specialty     Patient had office visit in the last 12 months or has a visit in the next 30 days with authorizing provider or within the authorizing provider's specialty.  See \"Patient Info\" tab in inbasket, or \"Choose Columns\" in Meds & Orders section of the refill encounter.              Passed - Medication is active on med list        Passed - Patient is age 18 or older        Passed - No active pregnancy on record        Passed - No positive pregnancy test in last 12 months          "

## 2019-04-19 ENCOUNTER — TELEPHONE (OUTPATIENT)
Dept: ENDOCRINOLOGY | Facility: CLINIC | Age: 44
End: 2019-04-19

## 2019-04-19 NOTE — TELEPHONE ENCOUNTER
Reason for Call:  Medication or medication refill:    Do you use a [facility]?  Name of the pharmacy and phone number for the current request:     [facility] - [address]    Name of the medication requested:   levothyroxine (SYNTHROID/LEVOTHROID) 50 MCG tablet    Can we leave a detailed message on this number? NO    Phone number contact can be reached at: Other phone number:  [phone]    Best Time: M-F ; 5am-6pm PT    Call taken on 4/19/2019 at 4:25 PM by Rose Mary Vivar

## 2019-04-22 ENCOUNTER — MYC REFILL (OUTPATIENT)
Dept: ENDOCRINOLOGY | Facility: CLINIC | Age: 44
End: 2019-04-22

## 2019-04-22 DIAGNOSIS — E03.9 HYPOTHYROIDISM, UNSPECIFIED TYPE: ICD-10-CM

## 2019-04-22 RX ORDER — LEVOTHYROXINE SODIUM 50 UG/1
50 TABLET ORAL DAILY
Qty: 90 TABLET | Refills: 3 | OUTPATIENT
Start: 2019-04-22

## 2019-04-22 NOTE — TELEPHONE ENCOUNTER
Message to pharmacy: On 2/15/19, a script for 90 tablets and 3 refills sent to the pharmacy. Please utilize these refills.

## 2019-04-22 NOTE — TELEPHONE ENCOUNTER
Message sent to pharmacy: On 2/15/19, a script for 90 tablets and 3 refills was sent. Please utilize these refills.

## 2019-04-30 NOTE — PROGRESS NOTES
Answers for HPI/ROS submitted by the patient on 1/29/2017   Annual Exam:  Getting at least 3 servings of Calcium per day:: Yes  Bi-annual eye exam:: Yes  Dental care twice a year:: Yes  Sleep apnea or symptoms of sleep apnea:: None  Diet:: Diabetic  Frequency of exercise:: 2-3 days/week  Duration of exercise:: 45-60 minutes  Taking medications regularly:: No  Barriers to taking medications:: None  Medication side effects:: None  Additional concerns today:: No  PHQ-2 Depressed: Not at all, Not at all  PHQ-2 Score: 0       Refer for Blepharoplasty Evaluation.

## 2019-05-13 ENCOUNTER — OFFICE VISIT (OUTPATIENT)
Dept: FAMILY MEDICINE | Facility: CLINIC | Age: 44
End: 2019-05-13
Payer: COMMERCIAL

## 2019-05-13 VITALS
BODY MASS INDEX: 23.49 KG/M2 | OXYGEN SATURATION: 96 % | HEART RATE: 74 BPM | HEIGHT: 68 IN | WEIGHT: 155 LBS | DIASTOLIC BLOOD PRESSURE: 62 MMHG | SYSTOLIC BLOOD PRESSURE: 108 MMHG | TEMPERATURE: 97.6 F

## 2019-05-13 DIAGNOSIS — B96.89 BV (BACTERIAL VAGINOSIS): Primary | ICD-10-CM

## 2019-05-13 DIAGNOSIS — N76.0 BV (BACTERIAL VAGINOSIS): Primary | ICD-10-CM

## 2019-05-13 DIAGNOSIS — E10.9 TYPE 1 DIABETES MELLITUS WITHOUT COMPLICATION (H): ICD-10-CM

## 2019-05-13 DIAGNOSIS — N89.8 VAGINAL DISCHARGE: ICD-10-CM

## 2019-05-13 LAB
SPECIMEN SOURCE: ABNORMAL
WET PREP SPEC: ABNORMAL

## 2019-05-13 PROCEDURE — 99213 OFFICE O/P EST LOW 20 MIN: CPT | Performed by: PHYSICIAN ASSISTANT

## 2019-05-13 PROCEDURE — 87210 SMEAR WET MOUNT SALINE/INK: CPT | Performed by: PHYSICIAN ASSISTANT

## 2019-05-13 RX ORDER — METRONIDAZOLE 7.5 MG/G
1 GEL VAGINAL DAILY
Qty: 70 G | Refills: 1 | Status: SHIPPED | OUTPATIENT
Start: 2019-05-13 | End: 2019-12-30

## 2019-05-13 ASSESSMENT — MIFFLIN-ST. JEOR: SCORE: 1403.64

## 2019-05-13 NOTE — PROGRESS NOTES
HPI: Jenise is a 45 yo female with type 1 DM with PMH of BV in March here with ongoing vaginal symptoms.  She was treated with metrogel bid x 5d and did get better, but sxs never totally resolved  She has vaginal odor and white discharge  Occas discharge clumpy  She had her period last week.  Denies vaginal itching.    Past Medical History:   Diagnosis Date     Diabetes mellitus type 1 (H)      Dysmenorrhea      Excessive or frequent menstruation      Hypothyroid      Insulin pump in place      Lumbar back pain      Other and unspecified hyperlipidemia      PONV (postoperative nausea and vomiting)      Past Surgical History:   Procedure Laterality Date     DILATION AND CURETTAGE, HYSTEROSCOPY, ABLATE ENDOMETRIUM NOVASURE, COMBINED  1/23/2014    Procedure: COMBINED DILATION AND CURETTAGE, HYSTEROSCOPY, ABLATE ENDOMETRIUM NOVASURE;  HYSTEROSCOPY, DILATION, CURETTAGE, WITH NOVASURE ABLATION, MYOSURE MORCELLATOR;  Surgeon: Swetha Queen MD;  Location: [facility]     LAPAROSCOPY       LEEP TX, CERVICAL       OPERATIVE HYSTEROSCOPY WITH MORCELLATOR  1/23/2014    Procedure: OPERATIVE HYSTEROSCOPY WITH MORCELLATOR;;  Surgeon: Swetha Queen MD;  Location: [facility]     wisdom teeth[       Social History     Tobacco Use     Smoking status: Never Smoker     Smokeless tobacco: Never Used   Substance Use Topics     Alcohol use: Yes     Alcohol/week: 0.0 oz     Comment: 3 per week     Current Outpatient Medications   Medication Sig Dispense Refill     blood glucose (ACCU-CHEK FASTCLIX) lancing device 1 Dose once daily.       blood glucose monitoring (FABRICIO CONTOUR NEXT) test strip 1 Dose 6 times daily. Test 6-7 times daily       blood glucose monitoring (NO BRAND SPECIFIED) test strip Use to test blood sugar 6 times daily or as directed. 550 strip 3     eszopiclone (LUNESTA) 2 MG tablet Take 1 tablet (2 mg) by mouth At Bedtime 30 tablet 1     FLUoxetine (PROZAC) 20 MG capsule Take 1 capsule (20 mg)  "by mouth daily 90 capsule 3     insulin lispro (HUMALOG KWIKPEN) 100 UNIT/ML injection Inject 4-8 units subcutaneous at mealtime, total daily dose approx 25 units, E10.9 15 mL 3     insulin lispro (HUMALOG VIAL) 100 UNIT/ML vial Use with insulin pump, total daily dose approx 40 units 40 mL 3     insulin lispro (HUMALOG VIAL) 100 UNIT/ML vial Inject 100 Units Subcutaneous 3 times daily (before meals)       insulin lispro (HUMALOG) 100 UNIT/ML Cartridge Inject 4-8 units subcutaneous at mealtime, total daily dose approx 25 units, E10.9 30 mL 3     insulin pen needle (BD RYAN U/F) 32G X 4 MM Use 7 pen needles daily or as directed. 650 each 3     insulin pen needle (BD RYAN U/F) 32G X 4 MM Use 4 pen needles daily or as directed. 400 each 3     levothyroxine (SYNTHROID/LEVOTHROID) 50 MCG tablet Take 1 tablet (50 mcg) by mouth daily 90 tablet 3     metroNIDAZOLE (METROGEL) 0.75 % vaginal gel Place 1 applicator (5 g) vaginally daily 70 g 1     Multiple Vitamins-Minerals (MULTI FOR HER PO)        VITAMIN D, CHOLECALCIFEROL, PO Take 1,000 Units by mouth       glucagon (GLUCAGON EMERGENCY) 1 MG kit Inject 1 mg into the muscle once for 1 dose 1 mg 2     Allergies   Allergen Reactions     Latex      Facial redness     FAMILY HISTORY NOTED AND REVIEWED    PHYSICAL EXAM:    /62 (BP Location: Right arm, Patient Position: Chair, Cuff Size: Adult Regular)   Pulse 74   Temp 97.6  F (36.4  C) (Oral)   Ht 1.715 m (5' 7.5\")   Wt 70.3 kg (155 lb)   LMP 05/05/2019   SpO2 96%   Breastfeeding? No   BMI 23.92 kg/m      Patient appears non toxic  : no vulvar lesions  Speculum exam reveals normal cervix. Scant yellow discharge    Results for orders placed or performed in visit on 05/13/19   Wet prep   Result Value Ref Range    Specimen Description Vagina     Wet Prep No Trichomonas seen     Wet Prep No yeast seen     Wet Prep WBC'S seen  Moderate       Wet Prep Clue cells seen  Rare   (A)      Assessment and Plan:     (N76.0,  " B96.89) BV (bacterial vaginosis)  (primary encounter diagnosis)  Comment:  Plan: metroNIDAZOLE (METROGEL) 0.75 % vaginal gel        At bedtime x 5d.  Try OTC Rephresh    (N89.8) Vaginal discharge  Comment:   Plan: Wet prep            (E10.9) Type 1 diabetes mellitus without complication (H)  Comment:   Plan: has f/u with Dr. Vance next month      Beba Dailey PA-C

## 2019-05-29 ENCOUNTER — MYC MEDICAL ADVICE (OUTPATIENT)
Dept: ENDOCRINOLOGY | Facility: CLINIC | Age: 44
End: 2019-05-29

## 2019-05-29 DIAGNOSIS — E10.9 TYPE 1 DIABETES MELLITUS WITHOUT COMPLICATION (H): ICD-10-CM

## 2019-06-17 ENCOUNTER — MYC MEDICAL ADVICE (OUTPATIENT)
Dept: ENDOCRINOLOGY | Facility: CLINIC | Age: 44
End: 2019-06-17

## 2019-06-25 ENCOUNTER — MYC MEDICAL ADVICE (OUTPATIENT)
Dept: ENDOCRINOLOGY | Facility: CLINIC | Age: 44
End: 2019-06-25

## 2019-06-25 ENCOUNTER — OFFICE VISIT (OUTPATIENT)
Dept: ENDOCRINOLOGY | Facility: CLINIC | Age: 44
End: 2019-06-25
Payer: COMMERCIAL

## 2019-06-25 VITALS
HEART RATE: 74 BPM | WEIGHT: 153 LBS | BODY MASS INDEX: 23.19 KG/M2 | DIASTOLIC BLOOD PRESSURE: 72 MMHG | SYSTOLIC BLOOD PRESSURE: 118 MMHG | HEIGHT: 68 IN

## 2019-06-25 DIAGNOSIS — E03.9 HYPOTHYROIDISM, UNSPECIFIED TYPE: ICD-10-CM

## 2019-06-25 DIAGNOSIS — E10.9 TYPE 1 DIABETES MELLITUS WITHOUT COMPLICATION (H): Primary | ICD-10-CM

## 2019-06-25 DIAGNOSIS — Z96.41 INSULIN PUMP STATUS: ICD-10-CM

## 2019-06-25 LAB — HBA1C MFR BLD: 6 % (ref 0–5.6)

## 2019-06-25 PROCEDURE — 84439 ASSAY OF FREE THYROXINE: CPT | Performed by: INTERNAL MEDICINE

## 2019-06-25 PROCEDURE — 80048 BASIC METABOLIC PNL TOTAL CA: CPT | Performed by: INTERNAL MEDICINE

## 2019-06-25 PROCEDURE — 83036 HEMOGLOBIN GLYCOSYLATED A1C: CPT | Performed by: INTERNAL MEDICINE

## 2019-06-25 PROCEDURE — 84443 ASSAY THYROID STIM HORMONE: CPT | Performed by: INTERNAL MEDICINE

## 2019-06-25 PROCEDURE — 99214 OFFICE O/P EST MOD 30 MIN: CPT | Performed by: INTERNAL MEDICINE

## 2019-06-25 PROCEDURE — 36415 COLL VENOUS BLD VENIPUNCTURE: CPT | Performed by: INTERNAL MEDICINE

## 2019-06-25 ASSESSMENT — MIFFLIN-ST. JEOR: SCORE: 1394.56

## 2019-06-25 NOTE — LETTER
6/25/2019         RE: Jenise Smith  [address] [facility] [place] [address]        Dear Colleague,    Thank you for referring your patient, Jenise Smith, to the [facility]. Please see a copy of my visit note below.    Name: Jenise Smith    HPI:  Recent issues:  Here for f/u diabetes evaluation, with Assist dog Juliet today.  Feeling well overall  Having issues with her [facility] insurance coverage for Dash PDM        Diabetes:  Previous diagnosis of IFG with high GAD65 Ab level  6/2011. Developed acute hyperglycemia and diagnosis of T1DM  Treatment with insulin medication, MDI plan  2/2013. Changed to OmniPod pump with Novolog insulin  Used DexcomG5 CGMS system  5/2017. Changed to Medtronic 630G pump  9/2017. Upgraded to Medtronic 670G pump and Guardian3 sensor, Humalog insulin  8/2018. Discontinued Medtronic pump and sensor, changed to MDI plan   Had taken Basaglar 17U at bedtime and mealtime Humalog  Had taken Basaglar 13U at bedtime, Humalog Luxura pen 1:7 at mealtime, ISF 60, goal target  mg/dl  12/2018. Restarted OmniPod pump   Novolog in pump[    Current pump settings:      Recent Dexcom CGMS data:    Has reduced hypoglycemia awareness   Has chosarah lab (Juliet), trained to recognize hypoglycemia smell/symptoms  Using Contour Next Link BG meter, variable testing, tests 6x/day  Exercises with classes at health club- yoga, strength   Tried Temp Target with exercise, noticed higher glucose levels so not used  Previous [facility] labs include:  Lab Results   Component Value Date    A1C 6.8 (H) 11/06/2018     11/06/2018    POTASSIUM 4.1 11/06/2018    CHLORIDE 103 11/06/2018    CO2 25 11/06/2018    ANIONGAP 9 11/06/2018     (H) 01/22/2019    BUN 14 11/06/2018    CR 0.86 11/06/2018    GFRESTIMATED 72 11/06/2018    GFRESTBLACK 88 11/06/2018    VIJI 9.1 11/06/2018    CHOL 187 01/22/2019    TRIG 79 01/22/2019    HDL 86 01/22/2019    LDL 85 01/22/2019    NHDL 101 01/22/2019    UCRR 124  03/09/2018    MICROL 6 03/09/2018    UMALCR 4.51 03/09/2018     Previous Las[facility] surgery at [facility] 2015  Last eye exam 1/2019 with Dr. Shultz, no DR reported  DM Complications: none known      Thyroid:  2011. History of hypothyroidism  Takes levothyroxine medication  Lab Results   Component Value Date    TSH 2.47 01/22/2019    T4 0.98 11/06/2018     Eary 10/2018. Dose incease to levothyroxine 0.05 mg daily per GYN physician  Current dose:  Levothyroxine 0.05 mg QAM      , lives in [place],  [place], daughter Myra and stepchildren Tisha Davalos  Works for [employer] as   Now sees Beba Dailey [facility] for general medicine evaluations.  Also sees Dr. Ashford[facility]    PMH/PSH:  Past Medical History:   Diagnosis Date     Diabetes mellitus type 1 (H)      Dysmenorrhea      Excessive or frequent menstruation      Hypothyroid      Insulin pump in place      Lumbar back pain      Other and unspecified hyperlipidemia      PONV (postoperative nausea and vomiting)      Past Surgical History:   Procedure Laterality Date     DILATION AND CURETTAGE, HYSTEROSCOPY, ABLATE ENDOMETRIUM NOVASURE, COMBINED  1/23/2014    Procedure: COMBINED DILATION AND CURETTAGE, HYSTEROSCOPY, ABLATE ENDOMETRIUM NOVASURE;  HYSTEROSCOPY, DILATION, CURETTAGE, WITH NOVASURE ABLATION, MYOSURE MORCELLATOR;  Surgeon: Swetha Queen MD;  Location: [facility]     LAPAROSCOPY       LEEP TX, CERVICAL       OPERATIVE HYSTEROSCOPY WITH MORCELLATOR  1/23/2014    Procedure: OPERATIVE HYSTEROSCOPY WITH MORCELLATOR;;  Surgeon: Swetha Queen MD;  Location: [facility]     wisdom teeth[         Family Hx:  Family History   Problem Relation Age of Onset     Diabetes Father         type 1     Diabetes Sister         type 1     Thyroid Disease Sister      Diabetes Mother         type 2     Breast Cancer Paternal Grandmother      Cancer - colorectal Paternal Uncle 60         Social  Hx:  Social History     Socioeconomic History     Marital status:      Spouse name: Not on file     Number of children: Not on file     Years of education: Not on file     Highest education level: Not on file   Occupational History     Employer: [employer]   Social Needs     Financial resource strain: Not on file     Food insecurity:     Worry: Not on file     Inability: Not on file     Transportation needs:     Medical: Not on file     Non-medical: Not on file   Tobacco Use     Smoking status: Never Smoker     Smokeless tobacco: Never Used   Substance and Sexual Activity     Alcohol use: Yes     Alcohol/week: 0.0 oz     Comment: 3 per week     Drug use: No     Sexual activity: Yes     Partners: Male     Birth control/protection: Male Surgical   Lifestyle     Physical activity:     Days per week: Not on file     Minutes per session: Not on file     Stress: Not on file   Relationships     Social connections:     Talks on phone: Not on file     Gets together: Not on file     Attends Gnosticist service: Not on file     Active member of club or organization: Not on file     Attends meetings of clubs or organizations: Not on file     Relationship status: Not on file     Intimate partner violence:     Fear of current or ex partner: Not on file     Emotionally abused: Not on file     Physically abused: Not on file     Forced sexual activity: Not on file   Other Topics Concern     Parent/sibling w/ CABG, MI or angioplasty before 65F 55M? Not Asked   Social History Narrative     in May 2014    2 step children and one biol dtr age 10.    Works for [employer]--works from home          MEDICATIONS:  has a current medication list which includes the following prescription(s): fluoxetine, insulin lispro, levothyroxine, multiple vitamins-minerals, cholecalciferol, blood glucose, blood glucose, blood glucose, eszopiclone, glucagon, insulin lispro, insulin lispro, insulin lispro, insulin pen needle,  "insulin pen needle, and metronidazole.    ROS:     ROS: 10 point ROS neg other than the symptoms noted above in the HPI.    GENERAL: energy good, wt stable; denies fevers, chills, night sweats.   HEENT: no dysphagia, odonophagia, diplopia, neck pain  THYROID:  no apparent hyper or hypothyroid symptoms  CV: no chest pain, pressure, palpitations  LUNGS: no SOB, CLIFFORD, cough, wheezing   ABDOMEN: some postmeal indigestion and mild nausea; denies diarrhea, constipation, abdominal pain  EXTREMITIES: no rashes, ulcers, edema  NEUROLOGY: no headaches, denies changes in vision, tingling, extremitiy numbness   MSK: no muscle aches or pains, weakness  SKIN: no rashes or lesions  : regular menstrual cycles?  PSYCH:  stable mood, no significant anxiety or depression  ENDOCRINE: no heat or cold intolerance    Physical Exam   VS: /72   Pulse 74   Ht 1.715 m (5' 7.5\")   Wt 69.4 kg (153 lb)   BMI 23.61 kg/m     GENERAL: AXOX3, NAD, well dressed, answering questions appropriately, appears stated age.  THYROID:  normal gland, no apparent nodules or goiter  ABDOMEN: normal size  EXTREMITIES: no edema, no lesions  NEUROLOGY: CN grossly intact, no tremors  MSK: grossly intact  SKIN: no rashes, no lesions    LABS:    All pertinent notes, labs, and images personally reviewed by me.     A/P:  Encounter Diagnoses   Name Primary?     Type 1 diabetes mellitus without complication (H) Yes     Insulin pump status      Hypothyroidism, unspecified type      Comments:  Reviewed health history, diabetes and thyroid issues.  Recent glucose control good.    Plan:  Discussed general issues with the diabetes diagnosis and management  We discussed the hgbA1c test which reflects previous overall glucose levels or control  Discussed the importance of blood glucose (BG) testing to assess glucose trends  Provided general overview of the multiple daily injection (MDI) plan using rapid acting mealtime and longacting insulin medications  Reviewed " recent Dexcom CGMS glucose sensor report, in detail    Recommend:  Reviewed her transition plan to the OmniPod pump and diabetes management  Pump setting changes:   Basals: 330a-630a 0.55 to 0.6   Bolus:  5p-9p 8 to 7.5, then 9p to [place] 9.0    Test BG levels premeal and bedtime  Check labs today  Discussed her use of the Tide [employer] Loop phone erinn  Monitor for symptom changes  Reviewed the OmniPod PDM upgrade to the Dash device   We contacted the [employer] for assistance with Dash Rx and PA issues   Contact our office if needing assistance with Dash programming when available  Discussed her concerns about the CGMS interpretation billing and her expense  Would not restart the LoEstrin OCP med at this time  Continue current levothyroxine daily dose  Advise having fasting lipid panel testing and dilated eye examination, at least annually    Addressed patient questions today    Labs ordered today:   Orders Placed This Encounter   Procedures     Hemoglobin A1c     TSH     T4 free     Basic metabolic panel     Radiology/Consults ordered today: None    More than 50% of the time spent with Mrs. Smith on counseling / coordinating her care.  Total appointment time was 30 minutes.    Follow-up:  3 mo.    Lyndon Gaming MD  Endocrinology  [facility]        Again, thank you for allowing me to participate in the care of your patient.        Sincerely,        Lyndon Gaming MD

## 2019-06-25 NOTE — PROGRESS NOTES
Name: Jenise Smith    HPI:  Recent issues:  Here for f/u diabetes evaluation, with Assist dog Juliet today.  Feeling well overall  Having issues with her [facility] insurance coverage for Dash PDM        Diabetes:  Previous diagnosis of IFG with high GAD65 Ab level  6/2011. Developed acute hyperglycemia and diagnosis of T1DM  Treatment with insulin medication, MDI plan  2/2013. Changed to OmniPod pump with Novolog insulin  Used DexcomG5 CGMS system  5/2017. Changed to Medtronic 630G pump  9/2017. Upgraded to Medtronic 670G pump and Guardian3 sensor, Humalog insulin  8/2018. Discontinued Medtronic pump and sensor, changed to MDI plan   Had taken Basaglar 17U at bedtime and mealtime Humalog  Had taken Basaglar 13U at bedtime, Humalog Luxura pen 1:7 at mealtime, ISF 60, goal target  mg/dl  12/2018. Restarted OmniPod pump   Novolog in pump[    Current pump settings:      Recent Dexcom CGMS data:    Has reduced hypoglycemia awareness   Has chocolate lab (Juliet), trained to recognize hypoglycemia smell/symptoms  Using Contour Next Link BG meter, variable testing, tests 6x/day  Exercises with classes at health club- yoga, strength   Tried Temp Target with exercise, noticed higher glucose levels so not used  Previous [facility] labs include:  Lab Results   Component Value Date    A1C 6.8 (H) 11/06/2018     11/06/2018    POTASSIUM 4.1 11/06/2018    CHLORIDE 103 11/06/2018    CO2 25 11/06/2018    ANIONGAP 9 11/06/2018     (H) 01/22/2019    BUN 14 11/06/2018    CR 0.86 11/06/2018    GFRESTIMATED 72 11/06/2018    GFRESTBLACK 88 11/06/2018    VIJI 9.1 11/06/2018    CHOL 187 01/22/2019    TRIG 79 01/22/2019    HDL 86 01/22/2019    LDL 85 01/22/2019    NHDL 101 01/22/2019    UCRR 124 03/09/2018    MICROL 6 03/09/2018    UMALCR 4.51 03/09/2018     Previous Lasik surgery at [facility] 2015  Last eye exam 1/2019 with Dr. Shultz, no DR reported  DM Complications: none known      Thyroid:  2011. History of  hypothyroidism  Takes levothyroxine medication  Lab Results   Component Value Date    TSH 2.47 01/22/2019    T4 0.98 11/06/2018     Eary 10/2018. Dose incease to levothyroxine 0.05 mg daily per GYN physician  Current dose:  Levothyroxine 0.05 mg QAM      , lives in [place],  [place], daughter Myra and stepchildren Tisha Davalos  Works for [employer] as   Now sees [facility] [place] [facility] for general medicine evaluations.  Also sees Dr. Ashford[facility]    PMH/PSH:  Past Medical History:   Diagnosis Date     Diabetes mellitus type 1 (H)      Dysmenorrhea      Excessive or frequent menstruation      Hypothyroid      Insulin pump in place      Lumbar back pain      Other and unspecified hyperlipidemia      PONV (postoperative nausea and vomiting)      Past Surgical History:   Procedure Laterality Date     DILATION AND CURETTAGE, HYSTEROSCOPY, ABLATE ENDOMETRIUM NOVASURE, COMBINED  1/23/2014    Procedure: COMBINED DILATION AND CURETTAGE, HYSTEROSCOPY, ABLATE ENDOMETRIUM NOVASURE;  HYSTEROSCOPY, DILATION, CURETTAGE, WITH NOVASURE ABLATION, MYOSURE MORCELLATOR;  Surgeon: Swetha Queen MD;  Location: [facility]     LAPAROSCOPY       LEEP TX, CERVICAL       OPERATIVE HYSTEROSCOPY WITH MORCELLATOR  1/23/2014    Procedure: OPERATIVE HYSTEROSCOPY WITH MORCELLATOR;;  Surgeon: Swetha Queen MD;  Location: [facility]     wisdom teeth[         Family Hx:  Family History   Problem Relation Age of Onset     Diabetes Father         type 1     Diabetes Sister         type 1     Thyroid Disease Sister      Diabetes Mother         type 2     Breast Cancer Paternal Grandmother      Cancer - colorectal Paternal Uncle 60         Social Hx:  Social History     Socioeconomic History     Marital status:      Spouse name: Not on file     Number of children: Not on file     Years of education: Not on file     Highest education level: Not on file   Occupational History      Employer: [employer]   Social Needs     Financial resource strain: Not on file     Food insecurity:     Worry: Not on file     Inability: Not on file     Transportation needs:     Medical: Not on file     Non-medical: Not on file   Tobacco Use     Smoking status: Never Smoker     Smokeless tobacco: Never Used   Substance and Sexual Activity     Alcohol use: Yes     Alcohol/week: 0.0 oz     Comment: 3 per week     Drug use: No     Sexual activity: Yes     Partners: Male     Birth control/protection: Male Surgical   Lifestyle     Physical activity:     Days per week: Not on file     Minutes per session: Not on file     Stress: Not on file   Relationships     Social connections:     Talks on phone: Not on file     Gets together: Not on file     Attends Quaker service: Not on file     Active member of club or organization: Not on file     Attends meetings of clubs or organizations: Not on file     Relationship status: Not on file     Intimate partner violence:     Fear of current or ex partner: Not on file     Emotionally abused: Not on file     Physically abused: Not on file     Forced sexual activity: Not on file   Other Topics Concern     Parent/sibling w/ CABG, MI or angioplasty before 65F 55M? Not Asked   Social History Narrative     in May 2014    2 step children and one biol dtr age 10.    Works for [employer]--works from home          MEDICATIONS:  has a current medication list which includes the following prescription(s): fluoxetine, insulin lispro, levothyroxine, multiple vitamins-minerals, cholecalciferol, blood glucose, blood glucose, blood glucose, eszopiclone, glucagon, insulin lispro, insulin lispro, insulin lispro, insulin pen needle, insulin pen needle, and metronidazole.    ROS:     ROS: 10 point ROS neg other than the symptoms noted above in the HPI.    GENERAL: energy good, wt stable; denies fevers, chills, night sweats.   HEENT: no dysphagia, odonophagia, diplopia, neck  "pain  THYROID:  no apparent hyper or hypothyroid symptoms  CV: no chest pain, pressure, palpitations  LUNGS: no SOB, CLIFFORD, cough, wheezing   ABDOMEN: some postmeal indigestion and mild nausea; denies diarrhea, constipation, abdominal pain  EXTREMITIES: no rashes, ulcers, edema  NEUROLOGY: no headaches, denies changes in vision, tingling, extremitiy numbness   MSK: no muscle aches or pains, weakness  SKIN: no rashes or lesions  : regular menstrual cycles?  PSYCH:  stable mood, no significant anxiety or depression  ENDOCRINE: no heat or cold intolerance    Physical Exam   VS: /72   Pulse 74   Ht 1.715 m (5' 7.5\")   Wt 69.4 kg (153 lb)   BMI 23.61 kg/m    GENERAL: AXOX3, NAD, well dressed, answering questions appropriately, appears stated age.  THYROID:  normal gland, no apparent nodules or goiter  ABDOMEN: normal size  EXTREMITIES: no edema, no lesions  NEUROLOGY: CN grossly intact, no tremors  MSK: grossly intact  SKIN: no rashes, no lesions    LABS:    All pertinent notes, labs, and images personally reviewed by me.     A/P:  Encounter Diagnoses   Name Primary?     Type 1 diabetes mellitus without complication (H) Yes     Insulin pump status      Hypothyroidism, unspecified type      Comments:  Reviewed health history, diabetes and thyroid issues.  Recent glucose control good.    Plan:  Discussed general issues with the diabetes diagnosis and management  We discussed the hgbA1c test which reflects previous overall glucose levels or control  Discussed the importance of blood glucose (BG) testing to assess glucose trends  Provided general overview of the multiple daily injection (MDI) plan using rapid acting mealtime and longacting insulin medications  Reviewed recent Dexcom CGMS glucose sensor report, in detail    Recommend:  Reviewed her transition plan to the OmniPod pump and diabetes management  Pump setting changes:   Basals: 330a-630a 0.55 to 0.6   Bolus:  5p-9p 8 to 7.5, then 9p to [place] 9.0    Test BG " levels premeal and bedtime  Check labs today  Discussed her use of the Tide Pool Loop phone erinn  Monitor for symptom changes  Reviewed the OmniPod PDM upgrade to the Dash device   We contacted the [employer]  for assistance with Dash Rx and PA issues   Contact our office if needing assistance with Dash programming when available  Discussed her concerns about the CGMS interpretation billing and her expense  Would not restart the LoEstrin OCP med at this time  Continue current levothyroxine daily dose  Advise having fasting lipid panel testing and dilated eye examination, at least annually    Addressed patient questions today    Labs ordered today:   Orders Placed This Encounter   Procedures     Hemoglobin A1c     TSH     T4 free     Basic metabolic panel     Radiology/Consults ordered today: None    More than 50% of the time spent with Mrs. Smith on counseling / coordinating her care.  Total appointment time was 30 minutes.    Follow-up:  3 mo.    Lyndon Gaming MD  Endocrinology  [facility]

## 2019-06-26 LAB
ANION GAP SERPL CALCULATED.3IONS-SCNC: 7 MMOL/L (ref 3–14)
BUN SERPL-MCNC: 17 MG/DL (ref 7–30)
CALCIUM SERPL-MCNC: 9.2 MG/DL (ref 8.5–10.1)
CHLORIDE SERPL-SCNC: 102 MMOL/L (ref 94–109)
CO2 SERPL-SCNC: 27 MMOL/L (ref 20–32)
CREAT SERPL-MCNC: 0.78 MG/DL (ref 0.52–1.04)
GFR SERPL CREATININE-BSD FRML MDRD: >90 ML/MIN/{1.73_M2}
GLUCOSE SERPL-MCNC: 199 MG/DL (ref 70–99)
POTASSIUM SERPL-SCNC: 4 MMOL/L (ref 3.4–5.3)
SODIUM SERPL-SCNC: 136 MMOL/L (ref 133–144)
T4 FREE SERPL-MCNC: 0.92 NG/DL (ref 0.76–1.46)
TSH SERPL DL<=0.005 MIU/L-ACNC: 1.54 MU/L (ref 0.4–4)

## 2019-07-02 NOTE — TELEPHONE ENCOUNTER
Reason for Call:  Other call back    Detailed comments: Mandie from [employer] called and wanted to talk to DR. Gaming about the appeal letter for this Pt.     Phone Number Patient can be reached at: Other phone number:  [phone]    Best Time: Any    Can we leave a detailed message on this number? YES    Call taken on 7/2/2019 at 10:35 AM by Rose Mary Vivar

## 2019-07-03 NOTE — TELEPHONE ENCOUNTER
Spoke with [employer]by - Looks like the reason the insurance has been pushing back is- (they only cover 1 pump every 4 years)    Since they aren't understanding that the pt only needs the supplies like infusion set/pods for the dash - NOT a new whole pump. (Omn[employer]od is proving a DASH PDM free of cost)    Pt will need a 2nd appeal letter - stating no need of new pump only supplies (infusion set/pods).    Sophy Mclaughlin MA

## 2019-07-03 NOTE — TELEPHONE ENCOUNTER
"Message noted, but I am away from the clinic this week.  Will ask our MA to call Fabby to find out the reason for the delay with the OmniPod Dash shipment, after my \"appeal letter\" (see letter in chart) sent a few weeks ago.    TAMIKO Gaming MD  Endocrinology  [facility]        "

## 2019-07-16 ENCOUNTER — MYC MEDICAL ADVICE (OUTPATIENT)
Dept: ENDOCRINOLOGY | Facility: CLINIC | Age: 44
End: 2019-07-16

## 2019-07-18 ENCOUNTER — MEDICAL CORRESPONDENCE (OUTPATIENT)
Dept: HEALTH INFORMATION MANAGEMENT | Facility: CLINIC | Age: 44
End: 2019-07-18

## 2019-07-19 NOTE — TELEPHONE ENCOUNTER
Message noted.  We need to find the phone number related to the [facility] Dash pod appeal, so I can contact them.    Lyndon Gaming MD  [place] [facility]

## 2019-07-20 ENCOUNTER — MYC REFILL (OUTPATIENT)
Dept: ENDOCRINOLOGY | Facility: CLINIC | Age: 44
End: 2019-07-20

## 2019-07-20 DIAGNOSIS — F51.01 PRIMARY INSOMNIA: ICD-10-CM

## 2019-07-20 DIAGNOSIS — E03.9 HYPOTHYROIDISM, UNSPECIFIED TYPE: ICD-10-CM

## 2019-07-20 NOTE — TELEPHONE ENCOUNTER
Requested Prescriptions   Pending Prescriptions Disp Refills     eszopiclone (LUNESTA) 2 MG tablet 30 tablet 1     Sig: Take 1 tablet (2 mg) by mouth At Bedtime       There is no refill protocol information for this order            Last Written Prescription Date:  1/22/19  Last Fill Quantity: 30 tablet,   # refills: 1  Last Office Visit: 5/13/2019 (Ashlie)  Future Office visit:    Next 5 appointments (look out 90 days)    Sep 27, 2019  9:00 AM CDT  [facility] Visit with Lyndon Gaming MD  [facility] [address] [place] [id number] [phone]           Routing refill request to provider for review/approval because:  Drug not on the [facility], [facility] or [facility] refill protocol or controlled substance

## 2019-07-20 NOTE — TELEPHONE ENCOUNTER
"Last Written Prescription Date:  2/15/19  Last Fill Quantity: 90 tablet,  # refills: 3   Last office visit: 6/25/2019 with prescribing provider:  Mekhi   Future Office Visit:   Next 5 appointments (look out 90 days)    Sep 27, 2019  9:00 AM CDT  [facility] Visit with Lyndon Gaming MD  [facility] [address] [place] [id number] [phone]         Requested Prescriptions   Pending Prescriptions Disp Refills     levothyroxine (SYNTHROID/LEVOTHROID) 50 MCG tablet 90 tablet 3     Sig: Take 1 tablet (50 mcg) by mouth daily       Thyroid Protocol Passed - 7/20/2019 10:05 AM        Passed - Patient is 12 years or older        Passed - Recent (12 mo) or future (30 days) visit within the authorizing provider's specialty     Patient had office visit in the last 12 months or has a visit in the next 30 days with authorizing provider or within the authorizing provider's specialty.  See \"Patient Info\" tab in inbasket, or \"Choose Columns\" in Meds & Orders section of the refill encounter.              Passed - Medication is active on med list        Passed - Normal TSH on file in past 12 months     Recent Labs   Lab Test 06/25/19  1424   TSH 1.54              Passed - No active pregnancy on record     If patient is pregnant or has had a positive pregnancy test, please check TSH.          Passed - No positive pregnancy test in past 12 months     If patient is pregnant or has had a positive pregnancy test, please check TSH.            "

## 2019-07-22 RX ORDER — ESZOPICLONE 2 MG/1
2 TABLET, FILM COATED ORAL AT BEDTIME
Qty: 30 TABLET | Refills: 1 | Status: SHIPPED | OUTPATIENT
Start: 2019-07-22 | End: 2021-01-08

## 2019-07-22 RX ORDER — LEVOTHYROXINE SODIUM 50 UG/1
50 TABLET ORAL DAILY
Qty: 90 TABLET | Refills: 0 | Status: SHIPPED | OUTPATIENT
Start: 2019-07-22 | End: 2019-08-22

## 2019-07-22 NOTE — TELEPHONE ENCOUNTER
RX Faxed to    [facility] - [place], [place] - [address]

## 2019-07-24 DIAGNOSIS — E10.9 TYPE 1 DIABETES MELLITUS WITHOUT COMPLICATION (H): Primary | ICD-10-CM

## 2019-07-24 RX ORDER — NAPROXEN SODIUM 220 MG
TABLET ORAL
Qty: 100 EACH | Refills: 1 | Status: SHIPPED | OUTPATIENT
Start: 2019-07-24 | End: 2021-02-02

## 2019-07-26 ENCOUNTER — MEDICAL CORRESPONDENCE (OUTPATIENT)
Dept: HEALTH INFORMATION MANAGEMENT | Facility: CLINIC | Age: 44
End: 2019-07-26

## 2019-08-02 NOTE — TELEPHONE ENCOUNTER
I spoke with the [employer] rep (Fabby) today, and was informed that the 2nd appeal letter to [facility] for the OmniPod Dash pod Rx was approved for the patient.  I had not received a correspondence from [facility] about this issue.    Lyndon Gaming MD  [place] [facility]

## 2019-08-15 ENCOUNTER — MYC REFILL (OUTPATIENT)
Dept: ENDOCRINOLOGY | Facility: CLINIC | Age: 44
End: 2019-08-15

## 2019-08-15 DIAGNOSIS — E03.9 HYPOTHYROIDISM, UNSPECIFIED TYPE: ICD-10-CM

## 2019-08-15 RX ORDER — LEVOTHYROXINE SODIUM 50 UG/1
TABLET ORAL
Qty: 90 TABLET | Refills: 0 | OUTPATIENT
Start: 2019-08-15

## 2019-08-15 NOTE — TELEPHONE ENCOUNTER
Request is early - 90 day Rx sent 7/22/19    Rx refused. Duplicate/early request. Pharmacy notified.    Mari RATLIFF RN

## 2019-08-15 NOTE — TELEPHONE ENCOUNTER
"Last Written Prescription Date:  7/22/19  Last Fill Quantity: 90 tablet,  # refills: 0   Last office visit: 6/25/2019 with prescribing provider:  Mekhi   Future Office Visit:   Next 5 appointments (look out 90 days)    Sep 27, 2019  9:00 AM CDT  Return Visit with Lyndon Gaming MD  [facility] [address] [place] [id number] [phone]         Requested Prescriptions   Pending Prescriptions Disp Refills     levothyroxine (SYNTHROID/LEVOTHROID) 50 MCG tablet [Pharmacy Med Name: LEVOTHYROXINE  0.05MG  TAB] 90 tablet 0     Sig: TAKE 1 TABLET BY MOUTH  DAILY       Thyroid Protocol Passed - 8/15/2019  7:40 AM        Passed - Patient is 12 years or older        Passed - Recent (12 mo) or future (30 days) visit within the authorizing provider's specialty     Patient had office visit in the last 12 months or has a visit in the next 30 days with authorizing provider or within the authorizing provider's specialty.  See \"Patient Info\" tab in inbasket, or \"Choose Columns\" in Meds & Orders section of the refill encounter.              Passed - Medication is active on med list        Passed - Normal TSH on file in past 12 months     Recent Labs   Lab Test 06/25/19  1424   TSH 1.54              Passed - No active pregnancy on record     If patient is pregnant or has had a positive pregnancy test, please check TSH.          Passed - No positive pregnancy test in past 12 months     If patient is pregnant or has had a positive pregnancy test, please check TSH.            "

## 2019-08-16 RX ORDER — LEVOTHYROXINE SODIUM 50 UG/1
50 TABLET ORAL DAILY
Start: 2019-08-16

## 2019-08-16 NOTE — TELEPHONE ENCOUNTER
"Requested Prescriptions   Pending Prescriptions Disp Refills     levothyroxine (SYNTHROID/LEVOTHROID) 50 MCG tablet 90 tablet 0     Sig: Take 1 tablet (50 mcg) by mouth daily       Thyroid Protocol Passed - 8/15/2019  9:24 PM        Passed - Patient is 12 years or older        Passed - Recent (12 mo) or future (30 days) visit within the authorizing provider's specialty     Patient had office visit in the last 12 months or has a visit in the next 30 days with authorizing provider or within the authorizing provider's specialty.  See \"Patient Info\" tab in inbasket, or \"Choose Columns\" in Meds & Orders section of the refill encounter.              Passed - Medication is active on med list        Passed - Normal TSH on file in past 12 months     Recent Labs   Lab Test 06/25/19  1424   TSH 1.54              Passed - No active pregnancy on record     If patient is pregnant or has had a positive pregnancy test, please check TSH.          Passed - No positive pregnancy test in past 12 months     If patient is pregnant or has had a positive pregnancy test, please check TSH.          Last Written Prescription Date:  07/22/19  Last Fill Quantity: 90,  # refills: 0   Last office visit: 6/25/2019 with prescribing provider:     Future Office Visit:   Next 5 appointments (look out 90 days)    Sep 27, 2019  9:00 AM CDT  Return Visit with Lyndon Gaming MD  [facility] [address] [place] [id number] [phone]         Carina Tay MA    "

## 2019-08-22 DIAGNOSIS — E03.9 HYPOTHYROIDISM, UNSPECIFIED TYPE: ICD-10-CM

## 2019-08-22 NOTE — TELEPHONE ENCOUNTER
"levothyroxine (SYNTHROID/LEVOTHROID) 50 MCG tablet 90 tablet 0 7/22/2019     Last Written Prescription Date:  7/22/2019  Last Fill Quantity: 90,  # refills: 0   Last office visit: 6/25/2019 with prescribing provider: WILLOW Gaming    Future Office Visit:   Next 5 appointments (look out 90 days)    Sep 27, 2019  9:00 AM CDT  Return Visit with Lyndon Gaming MD  [facility] ([address] [address] [phone]         Requested Prescriptions   Pending Prescriptions Disp Refills     levothyroxine (SYNTHROID/LEVOTHROID) 50 MCG tablet [Pharmacy Med Name: LEVOTHYROXINE  0.05MG  TAB] 90 tablet 0     Sig: TAKE 1 TABLET BY MOUTH  DAILY       Thyroid Protocol Passed - 8/22/2019  5:09 PM        Passed - Patient is 12 years or older        Passed - Recent (12 mo) or future (30 days) visit within the authorizing provider's specialty     Patient had office visit in the last 12 months or has a visit in the next 30 days with authorizing provider or within the authorizing provider's specialty.  See \"Patient Info\" tab in inbasket, or \"Choose Columns\" in Meds & Orders section of the refill encounter.              Passed - Medication is active on med list        Passed - Normal TSH on file in past 12 months     Recent Labs   Lab Test 06/25/19  1424   TSH 1.54              Passed - No active pregnancy on record     If patient is pregnant or has had a positive pregnancy test, please check TSH.          Passed - No positive pregnancy test in past 12 months     If patient is pregnant or has had a positive pregnancy test, please check TSH.            "

## 2019-08-23 ENCOUNTER — MYC REFILL (OUTPATIENT)
Dept: ENDOCRINOLOGY | Facility: CLINIC | Age: 44
End: 2019-08-23

## 2019-08-23 DIAGNOSIS — E03.9 HYPOTHYROIDISM, UNSPECIFIED TYPE: ICD-10-CM

## 2019-08-23 RX ORDER — LEVOTHYROXINE SODIUM 50 UG/1
TABLET ORAL
Qty: 90 TABLET | Refills: 0 | Status: SHIPPED | OUTPATIENT
Start: 2019-08-23 | End: 2019-09-27

## 2019-08-23 RX ORDER — LEVOTHYROXINE SODIUM 50 UG/1
50 TABLET ORAL DAILY
Qty: 90 TABLET
Start: 2019-08-23

## 2019-08-23 NOTE — TELEPHONE ENCOUNTER
levothyroxine (SYNTHROID/LEVOTHROID) 50 MCG tablet 90 tablet 0 8/23/2019  No   Sig: TAKE 1 TABLET BY MOUTH  DAILY   Sent to pharmacy as: levothyroxine (SYNTHROID/LEVOTHROID) 50 MCG tablet   Class: E-Prescribe   Notes to Pharmacy: May be too soon, sent on 7/22/19 #90   Order: [id number]   E-Prescribing Status: Receipt confirmed by pharmacy (8/23/2019  9:27 AM CDT)

## 2019-08-23 NOTE — TELEPHONE ENCOUNTER
"  levothyroxine (SYNTHROID/LEVOTHROID) 50 MCG tablet 90 tablet 0 8/23/2019       Last Written Prescription Date:  08/23/2019  Last Fill Quantity: 90,  # refills: 0   Last office visit: 6/25/2019 with prescribing provider:     Future Office Visit:   Next 5 appointments (look out 90 days)    Sep 27, 2019  9:00 AM CDT  Return Visit with Lyndon Gaming MD  [facility] ([address] [phone]         Requested Prescriptions   Pending Prescriptions Disp Refills     levothyroxine (SYNTHROID/LEVOTHROID) 50 MCG tablet 90 tablet 0     Sig: Take 1 tablet (50 mcg) by mouth daily       Thyroid Protocol Passed - 8/23/2019  7:04 AM        Passed - Patient is 12 years or older        Passed - Recent (12 mo) or future (30 days) visit within the authorizing provider's specialty     Patient had office visit in the last 12 months or has a visit in the next 30 days with authorizing provider or within the authorizing provider's specialty.  See \"Patient Info\" tab in inbasket, or \"Choose Columns\" in Meds & Orders section of the refill encounter.              Passed - Medication is active on med list        Passed - Normal TSH on file in past 12 months     Recent Labs   Lab Test 06/25/19  1424   TSH 1.54              Passed - No active pregnancy on record     If patient is pregnant or has had a positive pregnancy test, please check TSH.          Passed - No positive pregnancy test in past 12 months     If patient is pregnant or has had a positive pregnancy test, please check TSH.            "

## 2019-09-09 ENCOUNTER — MYC MEDICAL ADVICE (OUTPATIENT)
Dept: FAMILY MEDICINE | Facility: CLINIC | Age: 44
End: 2019-09-09

## 2019-09-10 ENCOUNTER — MEDICAL CORRESPONDENCE (OUTPATIENT)
Dept: HEALTH INFORMATION MANAGEMENT | Facility: CLINIC | Age: 44
End: 2019-09-10

## 2019-09-10 ENCOUNTER — MYC MEDICAL ADVICE (OUTPATIENT)
Dept: OBGYN | Facility: CLINIC | Age: 44
End: 2019-09-10

## 2019-09-10 NOTE — TELEPHONE ENCOUNTER
To PCP:     Pt is requesting a US breast to be completed at [facility] on same day as Mammo-    Pended US of the Left Breast; please review closely for appropriateness.     Triage/Team can fax to [facility] once notified of signing    Thank you!  Yaima HOBBS RN

## 2019-09-23 ENCOUNTER — MEDICAL CORRESPONDENCE (OUTPATIENT)
Dept: HEALTH INFORMATION MANAGEMENT | Facility: CLINIC | Age: 44
End: 2019-09-23

## 2019-09-26 ENCOUNTER — TRANSFERRED RECORDS (OUTPATIENT)
Dept: HEALTH INFORMATION MANAGEMENT | Facility: CLINIC | Age: 44
End: 2019-09-26

## 2019-09-27 ENCOUNTER — MYC MEDICAL ADVICE (OUTPATIENT)
Dept: ENDOCRINOLOGY | Facility: CLINIC | Age: 44
End: 2019-09-27

## 2019-09-27 ENCOUNTER — OFFICE VISIT (OUTPATIENT)
Dept: ENDOCRINOLOGY | Facility: CLINIC | Age: 44
End: 2019-09-27
Payer: COMMERCIAL

## 2019-09-27 VITALS
WEIGHT: 154 LBS | DIASTOLIC BLOOD PRESSURE: 61 MMHG | SYSTOLIC BLOOD PRESSURE: 98 MMHG | BODY MASS INDEX: 23.34 KG/M2 | HEART RATE: 61 BPM | HEIGHT: 68 IN

## 2019-09-27 DIAGNOSIS — E10.9 TYPE 1 DIABETES MELLITUS WITHOUT COMPLICATION (H): Primary | ICD-10-CM

## 2019-09-27 DIAGNOSIS — E03.9 HYPOTHYROIDISM, UNSPECIFIED TYPE: ICD-10-CM

## 2019-09-27 DIAGNOSIS — Z96.41 INSULIN PUMP STATUS: ICD-10-CM

## 2019-09-27 LAB
ANION GAP SERPL CALCULATED.3IONS-SCNC: 5 MMOL/L (ref 3–14)
BUN SERPL-MCNC: 16 MG/DL (ref 7–30)
CALCIUM SERPL-MCNC: 8.8 MG/DL (ref 8.5–10.1)
CHLORIDE SERPL-SCNC: 105 MMOL/L (ref 94–109)
CO2 SERPL-SCNC: 26 MMOL/L (ref 20–32)
CREAT SERPL-MCNC: 0.74 MG/DL (ref 0.52–1.04)
CREAT UR-MCNC: 14 MG/DL
GFR SERPL CREATININE-BSD FRML MDRD: >90 ML/MIN/{1.73_M2}
GLUCOSE SERPL-MCNC: 172 MG/DL (ref 70–99)
HBA1C MFR BLD: 6.1 % (ref 0–5.6)
MICROALBUMIN UR-MCNC: <5 MG/L
MICROALBUMIN/CREAT UR: NORMAL MG/G CR (ref 0–25)
POTASSIUM SERPL-SCNC: 3.9 MMOL/L (ref 3.4–5.3)
SODIUM SERPL-SCNC: 136 MMOL/L (ref 133–144)

## 2019-09-27 PROCEDURE — 83036 HEMOGLOBIN GLYCOSYLATED A1C: CPT | Performed by: INTERNAL MEDICINE

## 2019-09-27 PROCEDURE — 99215 OFFICE O/P EST HI 40 MIN: CPT | Performed by: INTERNAL MEDICINE

## 2019-09-27 PROCEDURE — 80048 BASIC METABOLIC PNL TOTAL CA: CPT | Performed by: INTERNAL MEDICINE

## 2019-09-27 PROCEDURE — 82043 UR ALBUMIN QUANTITATIVE: CPT | Performed by: INTERNAL MEDICINE

## 2019-09-27 PROCEDURE — 36415 COLL VENOUS BLD VENIPUNCTURE: CPT | Performed by: INTERNAL MEDICINE

## 2019-09-27 RX ORDER — LEVOTHYROXINE SODIUM 50 UG/1
50 TABLET ORAL DAILY
Qty: 90 TABLET | Refills: 3 | Status: SHIPPED | OUTPATIENT
Start: 2019-09-27 | End: 2020-06-03

## 2019-09-27 ASSESSMENT — MIFFLIN-ST. JEOR: SCORE: 1399.1

## 2019-09-27 NOTE — PROGRESS NOTES
Name: Jenise Smith    HPI:  Recent issues:  Here for f/u diabetes evaluation, with Assist dog Juliet today.  Feeling well overall  Having issues with her [facility] insurance coverage for Dash PDM        Diabetes:  Previous diagnosis of IFG with high GAD65 Ab level  6/2011. Developed acute hyperglycemia and diagnosis of T1DM  Treatment with insulin medication, MDI plan  2/2013. Changed to OmniPod pump with Novolog insulin  Used DexcomG5 CGMS system  5/2017. Changed to Medtronic 630G pump  9/2017. Upgraded to Medtronic 670G pump and Guardian3 sensor, Humalog insulin  8/2018. Discontinued Medtronic pump and sensor, changed to MDI plan   Had taken Basaglar 17U at bedtime and mealtime Humalog  Had taken Basaglar 13U at bedtime, Humalog Luxura pen 1:7 at mealtime, ISF 60, goal target  mg/dl  12/2018. Restarted OmniPod pump   Novolog in pump[    Current pump settings:        Recent Dexcom CGMS data:        Has reduced hypoglycemia awareness   Has chocolate lab (Juliet), trained to recognize hypoglycemia smell/symptoms  Using Contour Next Link BG meter, variable testing, tests 6x/day  Exercises with classes at health club- yoga, strength   Tried Temp Target with exercise, noticed higher glucose levels so not used  Previous [facility] labs include:  Lab Results   Component Value Date    A1C 6.1 (H) 09/27/2019     09/27/2019    POTASSIUM 3.9 09/27/2019    CHLORIDE 105 09/27/2019    CO2 26 09/27/2019    ANIONGAP 5 09/27/2019     (H) 09/27/2019    BUN 16 09/27/2019    CR 0.74 09/27/2019    GFRESTIMATED >90 09/27/2019    GFRESTBLACK >90 09/27/2019    VIJI 8.8 09/27/2019    CHOL 187 01/22/2019    TRIG 79 01/22/2019    HDL 86 01/22/2019    LDL 85 01/22/2019    NHDL 101 01/22/2019    UCRR 14 09/27/2019    MICROL <5 09/27/2019    UMALCR Unable to calculate due to low value 09/27/2019     Previous Lasik surgery at [facility] 2015  Last eye exam 1/2019 with Dr. Shultz, no DR reported  DM Complications: none  known      Thyroid:  2011. History of hypothyroidism  Takes levothyroxine medication  Lab Results   Component Value Date    TSH 1.54 06/25/2019    T4 0.92 06/25/2019     Eary 10/2018. Dose incease to levothyroxine 0.05 mg daily per GYN physician  Current dose:  Levothyroxine 0.05 mg QAM      , lives in [place],  [place], daughter Myra and stepchildren Tisha Davalos  Works for [employer] as   Now sees Beba Dailey PAC/[facility] for general medicine evaluations.  Also sees Dr. Ashford/ARIAN Whiting    PMH/PSH:  Past Medical History:   Diagnosis Date     Diabetes mellitus type 1 (H)      Dysmenorrhea      Excessive or frequent menstruation      Hypothyroid      Insulin pump in place      Lumbar back pain      Other and unspecified hyperlipidemia      PONV (postoperative nausea and vomiting)      Past Surgical History:   Procedure Laterality Date     DILATION AND CURETTAGE, HYSTEROSCOPY, ABLATE ENDOMETRIUM NOVASURE, COMBINED  1/23/2014    Procedure: COMBINED DILATION AND CURETTAGE, HYSTEROSCOPY, ABLATE ENDOMETRIUM NOVASURE;  HYSTEROSCOPY, DILATION, CURETTAGE, WITH NOVASURE ABLATION, MYOSURE MORCELLATOR;  Surgeon: Swetha Queen MD;  Location: [facility]     LAPAROSCOPY       LEEP TX, CERVICAL       OPERATIVE HYSTEROSCOPY WITH MORCELLATOR  1/23/2014    Procedure: OPERATIVE HYSTEROSCOPY WITH MORCELLATOR;;  Surgeon: Swetha Queen MD;  Location: [facility]     wisdom teeth[         Family Hx:  Family History   Problem Relation Age of Onset     Diabetes Father         type 1     Diabetes Sister         type 1     Thyroid Disease Sister      Diabetes Mother         type 2     Breast Cancer Paternal Grandmother      Cancer - colorectal Paternal Uncle 60         Social Hx:  Social History     Socioeconomic History     Marital status:      Spouse name: Not on file     Number of children: Not on file     Years of education: Not on file     Highest education level:  Not on file   Occupational History     Employer: [employer]   Social Needs     Financial resource strain: Not on file     Food insecurity:     Worry: Not on file     Inability: Not on file     Transportation needs:     Medical: Not on file     Non-medical: Not on file   Tobacco Use     Smoking status: Never Smoker     Smokeless tobacco: Never Used   Substance and Sexual Activity     Alcohol use: Yes     Alcohol/week: 0.0 standard drinks     Comment: 3 per week     Drug use: No     Sexual activity: Yes     Partners: Male     Birth control/protection: Male Surgical   Lifestyle     Physical activity:     Days per week: Not on file     Minutes per session: Not on file     Stress: Not on file   Relationships     Social connections:     Talks on phone: Not on file     Gets together: Not on file     Attends [employer] service: Not on file     Active member of club or organization: Not on file     Attends meetings of clubs or organizations: Not on file     Relationship status: Not on file     Intimate partner violence:     Fear of current or ex partner: Not on file     Emotionally abused: Not on file     Physically abused: Not on file     Forced sexual activity: Not on file   Other Topics Concern     Parent/sibling w/ CABG, MI or angioplasty before 65F 55M? Not Asked   Social History Narrative     in May 2014    2 step children and one biol dtr age 10.    Works for [employer]--works from home          MEDICATIONS:  has a current medication list which includes the following prescription(s): levothyroxine, blood glucose, blood glucose, blood glucose, eszopiclone, fluoxetine, glucagon, insulin lispro, insulin lispro, insulin lispro, insulin lispro, insulin pen needle, insulin pen needle, insulin syringe, metronidazole, multiple vitamins-minerals, and cholecalciferol.    ROS:     ROS: 10 point ROS neg other than the symptoms noted above in the HPI.    GENERAL: energy good, wt stable; denies fevers,  "chills, night sweats.   HEENT: no dysphagia, odonophagia, diplopia, neck pain  THYROID:  no apparent hyper or hypothyroid symptoms  CV: no chest pain, pressure, palpitations  LUNGS: no SOB, CLIFFORD, cough, wheezing   ABDOMEN: some postmeal indigestion and mild nausea; denies diarrhea, constipation, abdominal pain  EXTREMITIES: no rashes, ulcers, edema  NEUROLOGY: no headaches, denies changes in vision, tingling, extremitiy numbness   MSK: no muscle aches or pains, weakness  SKIN: no rashes or lesions  : regular menstrual cycles?  PSYCH:  stable mood, no significant anxiety or depression  ENDOCRINE: no heat or cold intolerance    Physical Exam   VS: BP 98/61   Pulse 61   Ht 1.715 m (5' 7.5\")   Wt 69.9 kg (154 lb)   BMI 23.76 kg/m    GENERAL: AXOX3, NAD, well dressed, answering questions appropriately, appears stated age.  THYROID:  normal gland, no apparent nodules or goiter  ABDOMEN: normal size  EXTREMITIES: no edema, no lesions  NEUROLOGY: CN grossly intact, no tremors  MSK: grossly intact  SKIN: no rashes, no lesions    LABS:    All pertinent notes, labs, and images personally reviewed by me.     A/P:  Encounter Diagnoses   Name Primary?     Type 1 diabetes mellitus without complication (H) Yes     Insulin pump status      Hypothyroidism, unspecified type      Comments:  Reviewed health history, diabetes and thyroid issues.  Recent glucose control good.    Plan:  Discussed general issues with the diabetes diagnosis and management  We discussed the hgbA1c test which reflects previous overall glucose levels or control  Discussed the importance of blood glucose (BG) testing to assess glucose trends  Provided general overview of the multiple daily injection (MDI) plan using rapid acting mealtime and longacting insulin medications  Reviewed recent Dexcom CGMS glucose sensor report, in detail    Recommend:  Reviewed her transition plan to the OmniPod pump and diabetes management  Pump setting changes:   Basals: add " "4a-6a 0.5 unit(s)/hr   Bolus I:C: 5p 7 to 7.5    Test BG levels premeal and bedtime and/or use Dexcom CGMS sensor  Check labs today  Monitor for symptom changes  Reviewed the OmniPod PDM basal rates (including her new \"happy\" active basal pattern  We have discussed her concerns about the CGMS interpretation billing and her expense  Continue current levothyroxine daily dose  Discussed idea of T1DM- insulin pump user patient support group  Advise having fasting lipid panel testing and dilated eye examination, at least annually  Reminded her to take backup insulin pens on her trip to [place] 10/9/19    Keep regular f/u GYN evaluations  Would not restart the LoEstrin OCP med at this time  Addressed patient questions today    Labs ordered today:   Orders Placed This Encounter   Procedures     Hemoglobin A1c     Basic metabolic panel     Albumin Random Urine Quantitative with Creat Ratio     Radiology/Consults ordered today: None    More than 50% of the time spent with Mrs. Smith on counseling / coordinating her care.  Total appointment time was 45 minutes.    Follow-up:  3 mo.    DARI Gaming MD, MS  [facility]            "

## 2019-09-27 NOTE — LETTER
9/27/2019         RE: Jenise Smith  [address] [place] [address]        Dear Colleague,    Thank you for referring your patient, Jenise Smith, to the [facility]. Please see a copy of my visit note below.    Name: Jenise Smith    HPI:  Recent issues:  Here for f/u diabetes evaluation, with Assist dog Juliet today.  Feeling well overall  Having issues with her [facility] insurance coverage for Dash PDM        Diabetes:  Previous diagnosis of IFG with high GAD65 Ab level  6/2011. Developed acute hyperglycemia and diagnosis of T1DM  Treatment with insulin medication, MDI plan  2/2013. Changed to OmniPod pump with Novolog insulin  Used DexcomG5 CGMS system  5/2017. Changed to Medtronic 630G pump  9/2017. Upgraded to Medtronic 670G pump and Guardian3 sensor, Humalog insulin  8/2018. Discontinued Medtronic pump and sensor, changed to MDI plan   Had taken Basaglar 17U at bedtime and mealtime Humalog  Had taken Basaglar 13U at bedtime, Humalog Luxura pen 1:7 at mealtime, ISF 60, goal target  mg/dl  12/2018. Restarted OmniPod pump   Novolog in pump[    Current pump settings:        Recent Dexcom CGMS data:        Has reduced hypoglycemia awareness   Has elizabeth lab (Juliet), trained to recognize hypoglycemia smell/symptoms  Using Contour Next Link BG meter, variable testing, tests 6x/day  Exercises with classes at health club- yoga, strength   Tried Temp Target with exercise, noticed higher glucose levels so not used  Previous [facility] labs include:  Lab Results   Component Value Date    A1C 6.1 (H) 09/27/2019     09/27/2019    POTASSIUM 3.9 09/27/2019    CHLORIDE 105 09/27/2019    CO2 26 09/27/2019    ANIONGAP 5 09/27/2019     (H) 09/27/2019    BUN 16 09/27/2019    CR 0.74 09/27/2019    GFRESTIMATED >90 09/27/2019    GFRESTBLACK >90 09/27/2019    VIJI 8.8 09/27/2019    CHOL 187 01/22/2019    TRIG 79 01/22/2019    HDL 86 01/22/2019    LDL 85 01/22/2019    NHDL 101 01/22/2019     UCRR 14 09/27/2019    MICROL <5 09/27/2019    UMALCR Unable to calculate due to low value 09/27/2019     Previous Lasik surgery at [facility] 2015  Last eye exam 1/2019 with Dr. Shultz, no DR reported  DM Complications: none known      Thyroid:  2011. History of hypothyroidism  Takes levothyroxine medication  Lab Results   Component Value Date    TSH 1.54 06/25/2019    T4 0.92 06/25/2019     Eary 10/2018. Dose incease to levothyroxine 0.05 mg daily per GYN physician  Current dose:  Levothyroxine 0.05 mg QAM      , lives in [place],  [place], daughter Myra and stepchildren Tisha Davalos  Works for [employer] as   Now sees Beba Dailey [facility] for general medicine evaluations.  Also sees Dr. Ashford[facility]    PMH/PSH:  Past Medical History:   Diagnosis Date     Diabetes mellitus type 1 (H)      Dysmenorrhea      Excessive or frequent menstruation      Hypothyroid      Insulin pump in place      Lumbar back pain      Other and unspecified hyperlipidemia      PONV (postoperative nausea and vomiting)      Past Surgical History:   Procedure Laterality Date     DILATION AND CURETTAGE, HYSTEROSCOPY, ABLATE ENDOMETRIUM NOVASURE, COMBINED  1/23/2014    Procedure: COMBINED DILATION AND CURETTAGE, HYSTEROSCOPY, ABLATE ENDOMETRIUM NOVASURE;  HYSTEROSCOPY, DILATION, CURETTAGE, WITH NOVASURE ABLATION, MYOSURE MORCELLATOR;  Surgeon: Swetha Queen MD;  Location: [facility]     LAPAROSCOPY       LEEP TX, CERVICAL       OPERATIVE HYSTEROSCOPY WITH MORCELLATOR  1/23/2014    Procedure: OPERATIVE HYSTEROSCOPY WITH MORCELLATOR;;  Surgeon: Swetha Queen MD;  Location: [facility]     wisdom teeth[         Family Hx:  Family History   Problem Relation Age of Onset     Diabetes Father         type 1     Diabetes Sister         type 1     Thyroid Disease Sister      Diabetes Mother         type 2     Breast Cancer Paternal Grandmother      Cancer - colorectal  Paternal Uncle 60         Social Hx:  Social History     Socioeconomic History     Marital status:      Spouse name: Not on file     Number of children: Not on file     Years of education: Not on file     Highest education level: Not on file   Occupational History     Employer: [employer]   Social Needs     Financial resource strain: Not on file     Food insecurity:     Worry: Not on file     Inability: Not on file     Transportation needs:     Medical: Not on file     Non-medical: Not on file   Tobacco Use     Smoking status: Never Smoker     Smokeless tobacco: Never Used   Substance and Sexual Activity     Alcohol use: Yes     Alcohol/week: 0.0 standard drinks     Comment: 3 per week     Drug use: No     Sexual activity: Yes     Partners: Male     Birth control/protection: Male Surgical   Lifestyle     Physical activity:     Days per week: Not on file     Minutes per session: Not on file     Stress: Not on file   Relationships     Social connections:     Talks on phone: Not on file     Gets together: Not on file     Attends Yarsanism service: Not on file     Active member of club or organization: Not on file     Attends meetings of clubs or organizations: Not on file     Relationship status: Not on file     Intimate partner violence:     Fear of current or ex partner: Not on file     Emotionally abused: Not on file     Physically abused: Not on file     Forced sexual activity: Not on file   Other Topics Concern     Parent/sibling w/ CABG, MI or angioplasty before 65F 55M? Not Asked   Social History Narrative     in May 2014    2 step children and one biol dtr age 10.    Works for [employer]--works from home          MEDICATIONS:  has a current medication list which includes the following prescription(s): levothyroxine, blood glucose, blood glucose, blood glucose, eszopiclone, fluoxetine, glucagon, insulin lispro, insulin lispro, insulin lispro, insulin lispro, insulin pen needle,  "insulin pen needle, insulin syringe, metronidazole, multiple vitamins-minerals, and cholecalciferol.    ROS:     ROS: 10 point ROS neg other than the symptoms noted above in the HPI.    GENERAL: energy good, wt stable; denies fevers, chills, night sweats.   HEENT: no dysphagia, odonophagia, diplopia, neck pain  THYROID:  no apparent hyper or hypothyroid symptoms  CV: no chest pain, pressure, palpitations  LUNGS: no SOB, CLIFFORD, cough, wheezing   ABDOMEN: some postmeal indigestion and mild nausea; denies diarrhea, constipation, abdominal pain  EXTREMITIES: no rashes, ulcers, edema  NEUROLOGY: no headaches, denies changes in vision, tingling, extremitiy numbness   MSK: no muscle aches or pains, weakness  SKIN: no rashes or lesions  : regular menstrual cycles?  PSYCH:  stable mood, no significant anxiety or depression  ENDOCRINE: no heat or cold intolerance    Physical Exam   VS: BP 98/61   Pulse 61   Ht 1.715 m (5' 7.5\")   Wt 69.9 kg (154 lb)   BMI 23.76 kg/m     GENERAL: AXOX3, NAD, well dressed, answering questions appropriately, appears stated age.  THYROID:  normal gland, no apparent nodules or goiter  ABDOMEN: normal size  EXTREMITIES: no edema, no lesions  NEUROLOGY: CN grossly intact, no tremors  MSK: grossly intact  SKIN: no rashes, no lesions    LABS:    All pertinent notes, labs, and images personally reviewed by me.     A/P:  Encounter Diagnoses   Name Primary?     Type 1 diabetes mellitus without complication (H) Yes     Insulin pump status      Hypothyroidism, unspecified type      Comments:  Reviewed health history, diabetes and thyroid issues.  Recent glucose control good.    Plan:  Discussed general issues with the diabetes diagnosis and management  We discussed the hgbA1c test which reflects previous overall glucose levels or control  Discussed the importance of blood glucose (BG) testing to assess glucose trends  Provided general overview of the multiple daily injection (MDI) plan using rapid " "acting mealtime and longacting insulin medications  Reviewed recent Dexcom CGMS glucose sensor report, in detail    Recommend:  Reviewed her transition plan to the OmniPod pump and diabetes management  Pump setting changes:   Basals: add 4a-6a 0.5 unit(s)/hr   Bolus I:C: 5p 7 to 7.5    Test BG levels premeal and bedtime and/or use Dexcom CGMS sensor  Check labs today  Monitor for symptom changes  Reviewed the OmniPod PDM basal rates (including her new \"happy\" active basal pattern  We have discussed her concerns about the CGMS interpretation billing and her expense  Continue current levothyroxine daily dose  Discussed idea of T1DM- insulin pump user patient support group  Advise having fasting lipid panel testing and dilated eye examination, at least annually  Reminded her to take backup insulin pens on her trip to [place] 10/9/19    Keep regular f/u GYN evaluations  Would not restart the LoEstrin OCP med at this time  Addressed patient questions today    Labs ordered today:   Orders Placed This Encounter   Procedures     Hemoglobin A1c     Basic metabolic panel     Albumin Random Urine Quantitative with Creat Ratio     Radiology/Consults ordered today: None    More than 50% of the time spent with Mrs. Smith on counseling / coordinating her care.  Total appointment time was 45 minutes.    Follow-up:  3 mo.    DARI Gaming MD, MS  [facility]              Again, thank you for allowing me to participate in the care of your patient.        Sincerely,        Lyndon Gaming MD    "

## 2019-11-04 ENCOUNTER — E-VISIT (OUTPATIENT)
Dept: FAMILY MEDICINE | Facility: CLINIC | Age: 44
End: 2019-11-04
Payer: COMMERCIAL

## 2019-11-04 DIAGNOSIS — F41.9 ANXIETY: Primary | ICD-10-CM

## 2019-11-04 PROCEDURE — 99444 ZZC PHYSICIAN ONLINE EVALUATION & MANAGEMENT SERVICE: CPT | Performed by: PHYSICIAN ASSISTANT

## 2019-11-04 ASSESSMENT — ANXIETY QUESTIONNAIRES
5. BEING SO RESTLESS THAT IT IS HARD TO SIT STILL: NOT AT ALL
GAD7 TOTAL SCORE: 0
6. BECOMING EASILY ANNOYED OR IRRITABLE: NOT AT ALL
3. WORRYING TOO MUCH ABOUT DIFFERENT THINGS: NOT AT ALL
2. NOT BEING ABLE TO STOP OR CONTROL WORRYING: NOT AT ALL
GAD7 TOTAL SCORE: 0
4. TROUBLE RELAXING: NOT AT ALL
7. FEELING AFRAID AS IF SOMETHING AWFUL MIGHT HAPPEN: NOT AT ALL
1. FEELING NERVOUS, ANXIOUS, OR ON EDGE: NOT AT ALL
7. FEELING AFRAID AS IF SOMETHING AWFUL MIGHT HAPPEN: NOT AT ALL
GAD7 TOTAL SCORE: 0

## 2019-11-04 ASSESSMENT — PATIENT HEALTH QUESTIONNAIRE - PHQ9
SUM OF ALL RESPONSES TO PHQ QUESTIONS 1-9: 1
SUM OF ALL RESPONSES TO PHQ QUESTIONS 1-9: 1
10. IF YOU CHECKED OFF ANY PROBLEMS, HOW DIFFICULT HAVE THESE PROBLEMS MADE IT FOR YOU TO DO YOUR WORK, TAKE CARE OF THINGS AT HOME, OR GET ALONG WITH OTHER PEOPLE: NOT DIFFICULT AT ALL

## 2019-11-05 DIAGNOSIS — F41.9 ANXIETY: Primary | ICD-10-CM

## 2019-11-05 RX ORDER — FLUOXETINE 10 MG/1
10 CAPSULE ORAL DAILY
Qty: 30 CAPSULE | Refills: 0 | Status: SHIPPED | OUTPATIENT
Start: 2019-11-05 | End: 2019-12-30 | Stop reason: DRUGHIGH

## 2019-11-05 ASSESSMENT — PATIENT HEALTH QUESTIONNAIRE - PHQ9: SUM OF ALL RESPONSES TO PHQ QUESTIONS 1-9: 1

## 2019-11-05 ASSESSMENT — ANXIETY QUESTIONNAIRES: GAD7 TOTAL SCORE: 0

## 2019-11-05 NOTE — TELEPHONE ENCOUNTER
Jenise,  I understand you would like to wean off of prozac.  I would recommend we lower the dose from 20mg daily to 10mg daily for a few weeks, then 10mg every other day for 2 weeks and then go off.  I will send in a prescription for the 10mg dose.

## 2019-11-19 ENCOUNTER — MEDICAL CORRESPONDENCE (OUTPATIENT)
Dept: HEALTH INFORMATION MANAGEMENT | Facility: CLINIC | Age: 44
End: 2019-11-19

## 2019-11-25 ENCOUNTER — E-VISIT (OUTPATIENT)
Dept: FAMILY MEDICINE | Facility: CLINIC | Age: 44
End: 2019-11-25
Payer: COMMERCIAL

## 2019-11-25 DIAGNOSIS — N76.0 VAGINOSIS: Primary | ICD-10-CM

## 2019-11-25 PROCEDURE — 99444 ZZC PHYSICIAN ONLINE EVALUATION & MANAGEMENT SERVICE: CPT | Performed by: PHYSICIAN ASSISTANT

## 2019-11-26 RX ORDER — METRONIDAZOLE 7.5 MG/G
1 GEL VAGINAL DAILY
Qty: 70 G | Refills: 0 | Status: SHIPPED | OUTPATIENT
Start: 2019-11-26 | End: 2020-01-10

## 2019-12-03 ENCOUNTER — MYC MEDICAL ADVICE (OUTPATIENT)
Dept: ENDOCRINOLOGY | Facility: CLINIC | Age: 44
End: 2019-12-03

## 2019-12-05 ENCOUNTER — MYC MEDICAL ADVICE (OUTPATIENT)
Dept: ENDOCRINOLOGY | Facility: CLINIC | Age: 44
End: 2019-12-05

## 2019-12-11 ENCOUNTER — MYC MEDICAL ADVICE (OUTPATIENT)
Dept: ENDOCRINOLOGY | Facility: CLINIC | Age: 44
End: 2019-12-11

## 2019-12-30 ENCOUNTER — OFFICE VISIT (OUTPATIENT)
Dept: ENDOCRINOLOGY | Facility: CLINIC | Age: 44
End: 2019-12-30
Payer: COMMERCIAL

## 2019-12-30 VITALS — DIASTOLIC BLOOD PRESSURE: 62 MMHG | SYSTOLIC BLOOD PRESSURE: 117 MMHG | HEART RATE: 70 BPM

## 2019-12-30 DIAGNOSIS — Z96.41 INSULIN PUMP STATUS: ICD-10-CM

## 2019-12-30 DIAGNOSIS — E10.9 TYPE 1 DIABETES MELLITUS WITHOUT COMPLICATION (H): Primary | ICD-10-CM

## 2019-12-30 DIAGNOSIS — E06.3 HYPOTHYROIDISM DUE TO HASHIMOTO'S THYROIDITIS: ICD-10-CM

## 2019-12-30 DIAGNOSIS — E13.9 DIABETES MELLITUS TYPE 1.5, MANAGED AS TYPE 2 (H): Primary | ICD-10-CM

## 2019-12-30 PROCEDURE — 83036 HEMOGLOBIN GLYCOSYLATED A1C: CPT | Performed by: INTERNAL MEDICINE

## 2019-12-30 PROCEDURE — 36415 COLL VENOUS BLD VENIPUNCTURE: CPT | Performed by: INTERNAL MEDICINE

## 2019-12-30 PROCEDURE — 80048 BASIC METABOLIC PNL TOTAL CA: CPT | Performed by: INTERNAL MEDICINE

## 2019-12-30 PROCEDURE — 84443 ASSAY THYROID STIM HORMONE: CPT | Performed by: INTERNAL MEDICINE

## 2019-12-30 PROCEDURE — 99214 OFFICE O/P EST MOD 30 MIN: CPT | Performed by: INTERNAL MEDICINE

## 2019-12-30 PROCEDURE — 84460 ALANINE AMINO (ALT) (SGPT): CPT | Performed by: INTERNAL MEDICINE

## 2019-12-30 NOTE — PROGRESS NOTES
Name: Jenise Smith    HPI:  Recent issues:  Here for f/u diabetes evaluation, with her daughter Neli today  Patient feeling well overall, no new health issues reported.        Diabetes:  Previous diagnosis of IFG with high GAD65 Ab level  6/2011. Developed acute hyperglycemia and diagnosis of T1DM  Treatment with insulin medication, MDI plan  2/2013. Changed to OmniPod pump with Novolog insulin  Used DexcomG5 CGMS system  5/2017. Changed to Medtronic 630G pump  9/2017. Upgraded to Medtronic 670G pump and Guardian3 sensor, Humalog insulin  8/2018. Discontinued Medtronic pump and sensor, changed to MDI plan   Had taken Basaglar 17U at bedtime and mealtime Humalog  Had taken Basaglar 13U at bedtime, Humalog Luxura pen 1:7 at mealtime, ISF 60, goal target  mg/dl  12/2018. Restarted OmniPod pump   Humalog in pump    Current pump settings:   Basals (Happy, Regular)   Happy::  Bolus I:C ISF:  AI 3 hrs   MN 0.55  MN 8  MN 65    6a 0.8   5p 7.0  8a 60   10a 0.85  9p 9  9p 65   4p 0.5   6p 0.55     Using DexcomG6 CGMS data:    Has reduced hypoglycemia awareness   Has chocolate lab (Juliet), trained to recognize hypoglycemia smell/symptoms  Using Contour Next Link BG meter, variable testing, tests 6x/day  Exercises with classes at health club- yoga, strength   Tried Temp Target with exercise, noticed higher glucose levels so not used  Previous [facility] labs include:  Lab Results   Component Value Date    A1C 6.1 (H) 09/27/2019     09/27/2019    POTASSIUM 3.9 09/27/2019    CHLORIDE 105 09/27/2019    CO2 26 09/27/2019    ANIONGAP 5 09/27/2019     (H) 09/27/2019    BUN 16 09/27/2019    CR 0.74 09/27/2019    GFRESTIMATED >90 09/27/2019    GFRESTBLACK >90 09/27/2019    VIJI 8.8 09/27/2019    CHOL 187 01/22/2019    TRIG 79 01/22/2019    HDL 86 01/22/2019    LDL 85 01/22/2019    NHDL 101 01/22/2019    UCRR 14 09/27/2019    MICROL <5 09/27/2019    UMALCR Unable to calculate due to low value 09/27/2019      Previous Lasik surgery at [facility] 2015  Last eye exam 1/2019 with Dr. Shultz, no DR reported  Plans to see practitioner at [facility] soon  DM Complications: none known      Thyroid:  2011. History of hypothyroidism  Takes levothyroxine medication  Lab Results   Component Value Date    TSH 1.54 06/25/2019    T4 0.92 06/25/2019     Eary 10/2018. Dose incease to levothyroxine 0.05 mg daily per GYN physician  Current dose:  Levothyroxine 0.05 mg QAM      , lives in [place],  [place], daughter Myra and stepchildren Tisha Davalos  Works for [employer] as   Now sees Beba Dailey [facility] for general medicine evaluations.  Also sees Dr. Ashford[facility]    PMH/PSH:  Past Medical History:   Diagnosis Date     Diabetes mellitus type 1 (H)      Dysmenorrhea      Excessive or frequent menstruation      Hypothyroid      Insulin pump in place      Lumbar back pain      Other and unspecified hyperlipidemia      PONV (postoperative nausea and vomiting)      Past Surgical History:   Procedure Laterality Date     DILATION AND CURETTAGE, HYSTEROSCOPY, ABLATE ENDOMETRIUM NOVASURE, COMBINED  1/23/2014    Procedure: COMBINED DILATION AND CURETTAGE, HYSTEROSCOPY, ABLATE ENDOMETRIUM NOVASURE;  HYSTEROSCOPY, DILATION, CURETTAGE, WITH NOVASURE ABLATION, MYOSURE MORCELLATOR;  Surgeon: Swetha Queen MD;  Location: [facility]     LAPAROSCOPY       LEEP TX, CERVICAL       OPERATIVE HYSTEROSCOPY WITH MORCELLATOR  1/23/2014    Procedure: OPERATIVE HYSTEROSCOPY WITH MORCELLATOR;;  Surgeon: Swetha Queen MD;  Location: [facility]     wisdom teeth[         Family Hx:  Family History   Problem Relation Age of Onset     Diabetes Father         type 1     Diabetes Sister         type 1     Thyroid Disease Sister      Diabetes Mother         type 2     Breast Cancer Paternal Grandmother      Cancer - colorectal Paternal Uncle 60         Social Hx:  Social  History     Socioeconomic History     Marital status:      Spouse name: Not on file     Number of children: Not on file     Years of education: Not on file     Highest education level: Not on file   Occupational History     Employer: [employer]   Social Needs     Financial resource strain: Not on file     Food insecurity:     Worry: Not on file     Inability: Not on file     Transportation needs:     Medical: Not on file     Non-medical: Not on file   Tobacco Use     Smoking status: Never Smoker     Smokeless tobacco: Never Used   Substance and Sexual Activity     Alcohol use: Yes     Alcohol/week: 0.0 standard drinks     Comment: 3 per week     Drug use: No     Sexual activity: Yes     Partners: Male     Birth control/protection: Male Surgical   Lifestyle     Physical activity:     Days per week: Not on file     Minutes per session: Not on file     Stress: Not on file   Relationships     Social connections:     Talks on phone: Not on file     Gets together: Not on file     Attends Voodoo service: Not on file     Active member of club or organization: Not on file     Attends meetings of clubs or organizations: Not on file     Relationship status: Not on file     Intimate partner violence:     Fear of current or ex partner: Not on file     Emotionally abused: Not on file     Physically abused: Not on file     Forced sexual activity: Not on file   Other Topics Concern     Parent/sibling w/ CABG, MI or angioplasty before 65F 55M? Not Asked   Social History Narrative     in May 2014    2 step children and one biol dtr age 10.    Works for [employer]--works from home          MEDICATIONS:  has a current medication list which includes the following prescription(s): blood glucose, blood glucose, blood glucose, eszopiclone, fluoxetine, insulin lispro, insulin pen needle, insulin syringe, levothyroxine, metronidazole, multiple vitamins-minerals, cholecalciferol, and glucagon.    ROS:      ROS: 10 point ROS neg other than the symptoms noted above in the HPI.    GENERAL: energy good, wt stable; denies fevers, chills, night sweats.   HEENT: no dysphagia, odonophagia, diplopia, neck pain  THYROID:  no apparent hyper or hypothyroid symptoms  CV: no chest pain, pressure, palpitations  LUNGS: no SOB, CLIFFORD, cough, wheezing   ABDOMEN: some postmeal indigestion and mild nausea; denies diarrhea, constipation, abdominal pain  EXTREMITIES: no rashes, ulcers, edema  NEUROLOGY: no headaches, denies changes in vision, tingling, extremitiy numbness   MSK: no muscle aches or pains, weakness  SKIN: no rashes or lesions  : regular menstrual cycles?  PSYCH:  stable mood, no significant anxiety or depression  ENDOCRINE: no heat or cold intolerance    Physical Exam   VS: /62 (BP Location: Right arm, Cuff Size: Adult Regular)   Pulse 70   Breastfeeding No   GENERAL: AXOX3, NAD, well dressed, answering questions appropriately, appears stated age.  THYROID:  normal gland, no apparent nodules or goiter  ABDOMEN: normal size  EXTREMITIES: more prominent lateral ankle bone shape; no pedal edema, no lesions  NEUROLOGY: CN grossly intact, no tremors  MSK: grossly intact  SKIN: no rashes, no lesions    LABS:    All pertinent notes, labs, and images personally reviewed by me.     A/P:  Encounter Diagnoses   Name Primary?     Type 1 diabetes mellitus without complication (H) Yes     Insulin pump status      Hypothyroidism due to Hashimoto's thyroiditis      Comments:  Reviewed health history, diabetes and thyroid issues.  Recent glucose control good.    Plan:  Discussed general issues with the diabetes diagnosis and management  We discussed the hgbA1c test which reflects previous overall glucose levels or control  Discussed the importance of blood glucose (BG) testing to assess glucose trends  Provided general overview of the multiple daily injection (MDI) plan using rapid acting mealtime and longacting insulin  medications  Reviewed recent Dexcom CGMS glucose sensor report, in detail    Recommend:  Reviewed her transition plan to the OmniPod pump and diabetes management  Pump setting changes:   Basals (Regular): MN and 6p 5.5 to 5.0 unit(s)/hr    Test BG levels premeal and bedtime and/or use Dexcom CGMS sensor  Check labs today  Monitor for symptom changes  Reviewed her questions, interest in trying Jardiance medication   Discussed the on-label, also off-label use of this medication   Reviewed potential side effects, also rare risk of DKA   She understood, would like to try this medication.  Will start Jardiance 10 mg daily, new Rx sent to pharmacy.   Contact me with update on SG trends soon.  We have previously discussed her concerns about the CGMS interpretation billing and her expense  Discussed idea of T1DM- insulin pump user patient support group  Advise having fasting lipid panel testing and dilated eye examination, at least annually  Continue current levothyroxine daily dose    Keep regular f/u GYN evaluations  Would not restart the LoEstrin OCP med at this time  Addressed patient questions today    Labs ordered today:   Orders Placed This Encounter   Procedures     Hemoglobin A1c     Basic metabolic panel     ALT     TSH     Radiology/Consults ordered today: None    More than 50% of the time spent with Mrs. Smith on counseling / coordinating her care.  Total appointment time was 35 minutes.    Follow-up:  4/2020.    DARI Gaming MD, MS  Endocrinology  [facility]

## 2019-12-31 LAB
ALT SERPL W P-5'-P-CCNC: 27 U/L (ref 0–50)
ANION GAP SERPL CALCULATED.3IONS-SCNC: 7 MMOL/L (ref 3–14)
BUN SERPL-MCNC: 24 MG/DL (ref 7–30)
CALCIUM SERPL-MCNC: 8.8 MG/DL (ref 8.5–10.1)
CHLORIDE SERPL-SCNC: 105 MMOL/L (ref 94–109)
CO2 SERPL-SCNC: 27 MMOL/L (ref 20–32)
CREAT SERPL-MCNC: 0.79 MG/DL (ref 0.52–1.04)
GFR SERPL CREATININE-BSD FRML MDRD: >90 ML/MIN/{1.73_M2}
GLUCOSE SERPL-MCNC: 90 MG/DL (ref 70–99)
HBA1C MFR BLD: 6.1 % (ref 0–5.6)
POTASSIUM SERPL-SCNC: 3.9 MMOL/L (ref 3.4–5.3)
SODIUM SERPL-SCNC: 139 MMOL/L (ref 133–144)
TSH SERPL DL<=0.005 MIU/L-ACNC: 1.51 MU/L (ref 0.4–4)

## 2020-01-02 ENCOUNTER — TELEPHONE (OUTPATIENT)
Dept: ENDOCRINOLOGY | Facility: CLINIC | Age: 45
End: 2020-01-02

## 2020-01-02 NOTE — TELEPHONE ENCOUNTER
Prior Authorization Retail Medication Request    Medication/Dose: Jardiance 10 mg  ICD code (if different than what is on RX):  E13.9  Previously Tried and Failed:  None  Rationale:  Patient with variable testing results on glucometer needing more adequate control    Insurance Name:  [employer]  Insurance ID:  [id number]      Pharmacy Information (if different than what is on RX)  [facility]:  [id number]  Phone:  [phone]

## 2020-01-03 ENCOUNTER — MYC MEDICAL ADVICE (OUTPATIENT)
Dept: ENDOCRINOLOGY | Facility: CLINIC | Age: 45
End: 2020-01-03

## 2020-01-03 NOTE — TELEPHONE ENCOUNTER
PRIOR AUTHORIZATION DENIED    Medication: Jardiance 10 mg-DENIED    Denial Date: 1/3/2020    Denial Rational: Patient must have a history of trial & failure to the formulary alternative(s) or have a contraindication or intolerance to the formulary alternatives: Metformin          Appeal Information:     If provider would like to appeal please provide a letter of medical necessity stating why formulary alternatives would not be clinically appropriate for patient and route back to the PA team.

## 2020-01-03 NOTE — TELEPHONE ENCOUNTER
Central Prior Authorization Team   Phone: [phone]      PA Initiation    Medication: Jardiance 10 mg-Initiated  Insurance Company: [employer] ([facility]) - Phone [phone] Fax [phone]  Pharmacy Filling the Rx: [id number] - [place], [place] - [address] & [facility]  Filling Pharmacy Phone: [phone]  Filling Pharmacy Fax:    Start Date: 1/3/2020

## 2020-01-06 NOTE — TELEPHONE ENCOUNTER
Prior authorization started in separate telephone encounter. Has been denied and waiting to hear how to proceed from there.    Royer Miner CMA on 1/6/2020 at 9:01 AM

## 2020-01-07 NOTE — TELEPHONE ENCOUNTER
Message noted.  I believe her insurance requires use of metformin medication before they will authorize the Jardiance medication.  I have spoken with this patient about this and she will try samples of (off label) Jardiance for a short time period then feedback to me.  We will not need to try a PA for Jardiance.  Message to PAIGE RODRIGUEZ.    DARI Gaming MD, [facility]

## 2020-01-10 ENCOUNTER — MYC MEDICAL ADVICE (OUTPATIENT)
Dept: ENDOCRINOLOGY | Facility: CLINIC | Age: 45
End: 2020-01-10

## 2020-01-10 ENCOUNTER — OFFICE VISIT (OUTPATIENT)
Dept: OBGYN | Facility: CLINIC | Age: 45
End: 2020-01-10
Payer: COMMERCIAL

## 2020-01-10 VITALS
WEIGHT: 156 LBS | SYSTOLIC BLOOD PRESSURE: 108 MMHG | HEIGHT: 67 IN | DIASTOLIC BLOOD PRESSURE: 60 MMHG | BODY MASS INDEX: 24.48 KG/M2

## 2020-01-10 DIAGNOSIS — Z01.419 ENCOUNTER FOR GYNECOLOGICAL EXAMINATION WITHOUT ABNORMAL FINDING: Primary | ICD-10-CM

## 2020-01-10 DIAGNOSIS — N93.9 ABNORMAL UTERINE BLEEDING: ICD-10-CM

## 2020-01-10 PROCEDURE — 99396 PREV VISIT EST AGE 40-64: CPT | Performed by: OBSTETRICS & GYNECOLOGY

## 2020-01-10 PROCEDURE — 99213 OFFICE O/P EST LOW 20 MIN: CPT | Mod: 25 | Performed by: OBSTETRICS & GYNECOLOGY

## 2020-01-10 ASSESSMENT — ANXIETY QUESTIONNAIRES
1. FEELING NERVOUS, ANXIOUS, OR ON EDGE: SEVERAL DAYS
2. NOT BEING ABLE TO STOP OR CONTROL WORRYING: NOT AT ALL
7. FEELING AFRAID AS IF SOMETHING AWFUL MIGHT HAPPEN: NOT AT ALL
IF YOU CHECKED OFF ANY PROBLEMS ON THIS QUESTIONNAIRE, HOW DIFFICULT HAVE THESE PROBLEMS MADE IT FOR YOU TO DO YOUR WORK, TAKE CARE OF THINGS AT HOME, OR GET ALONG WITH OTHER PEOPLE: NOT DIFFICULT AT ALL
3. WORRYING TOO MUCH ABOUT DIFFERENT THINGS: NOT AT ALL
6. BECOMING EASILY ANNOYED OR IRRITABLE: NOT AT ALL
5. BEING SO RESTLESS THAT IT IS HARD TO SIT STILL: NOT AT ALL
GAD7 TOTAL SCORE: 1

## 2020-01-10 ASSESSMENT — MIFFLIN-ST. JEOR: SCORE: 1400.24

## 2020-01-10 ASSESSMENT — PATIENT HEALTH QUESTIONNAIRE - PHQ9
5. POOR APPETITE OR OVEREATING: NOT AT ALL
SUM OF ALL RESPONSES TO PHQ QUESTIONS 1-9: 1

## 2020-01-10 NOTE — PROGRESS NOTES
Jenise is a 44 year old  female who presents for annual exam.     Besides routine health maintenance, has been having more clots in the last 3 a cycles, has h/o ablation in .  Also has frequent bacterial infections she attributes to her diabetes.    HPI:    Patient with monthly cycles.  Last two months have been heavier in the way of clot.  Patient with more cramps as well.  Patient had thyroid done in December.    Mammogram sept    The patient's PCP is Beba Dailey PA-C.      GYNECOLOGIC HISTORY:    Patient's last menstrual period was 2019.    Regular menses? yes  Menses every 28-30 days.  Length of menses: 5 days    Her current contraception method is: vasectomy.  She  reports that she has never smoked. She has never used smokeless tobacco.  Patient is sexually active.  STD testing offered?  Declined     Last PHQ-9 score on record =   PHQ-9 SCORE 1/10/2020   PHQ-9 Total Score MyChart -   PHQ-9 Total Score 1     Last GAD7 score on record =   NEFTALY-7 SCORE 1/10/2020   Total Score -   Total Score 1     Alcohol Score = 2    HEALTH MAINTENANCE:  Cholesterol:  Recent Labs   Lab Test 19  0848 17  0921 13   CHOL 187 190   < > 165 178   HDL 86 101   < > 98 84   LDL 85 74   < > 50 78   TRIG 79 74   < > 85 80   CHOLHDLRATIO  --   --   --  1.7  --     < > = values in this interval not displayed.     Last Mammo: 19, Result: Normal, Next Mammo: one year  Pap:   Lab Results   Component Value Date    PAP NIL, NEG-HPV 2019   Colonoscopy:  N/A, Result: not applicable, Next Colonoscopy: screen age 52  Dexa:  N/A  Health maintenance updated:  yes    HISTORY:  OB History    Para Term  AB Living   1 1 1 0 0 1   SAB TAB Ectopic Multiple Live Births   0 0 0 0 1      # Outcome Date GA Lbr Noé/2nd Weight Sex Delivery Anes PTL Lv   1 Term     F    ARNIE       Patient Active Problem List   Diagnosis     Menorrhagia     Endometrial polyp     Hyperlipidemia with  target LDL less than 100     Type 1 diabetes mellitus without complication (H)     Other specified hypothyroidism     Axillary hyperhidrosis     Anxiety     Dysmenorrhea     Mastalgia in female     Lump or mass in breast     Past Surgical History:   Procedure Laterality Date     DILATION AND CURETTAGE, HYSTEROSCOPY, ABLATE ENDOMETRIUM NOVASURE, COMBINED  1/23/2014    Procedure: COMBINED DILATION AND CURETTAGE, HYSTEROSCOPY, ABLATE ENDOMETRIUM NOVASURE;  HYSTEROSCOPY, DILATION, CURETTAGE, WITH NOVASURE ABLATION, MYOSURE MORCELLATOR;  Surgeon: Swetha Queen MD;  Location: [facility]     LAPAROSCOPY       LEEP TX, CERVICAL       OPERATIVE HYSTEROSCOPY WITH MORCELLATOR  1/23/2014    Procedure: OPERATIVE HYSTEROSCOPY WITH MORCELLATOR;;  Surgeon: Swetha Queen MD;  Location: [facility]     wisdom teeth[        Social History     Tobacco Use     Smoking status: Never Smoker     Smokeless tobacco: Never Used   Substance Use Topics     Alcohol use: Yes     Alcohol/week: 0.0 standard drinks     Comment: 3 per week      Problem (# of Occurrences) Relation (Name,Age of Onset)    Breast Cancer (1) Paternal Grandmother    Cancer - colorectal (1) Paternal Uncle (60)    Diabetes (3) Father: type 1, Sister: type 1, Mother: type 2    Thyroid Disease (1) Sister            Current Outpatient Medications   Medication Sig     blood glucose (ACCU-CHEK FASTCLIX) lancing device 1 Dose once daily.     blood glucose monitoring (FABRICIO CONTOUR NEXT) test strip 1 Dose 6 times daily. Test 6-7 times daily     blood glucose monitoring (NO BRAND SPECIFIED) test strip Use to test blood sugar 6 times daily or as directed.     empagliflozin (JARDIANCE) 10 MG TABS tablet Take 1 tablet (10 mg) by mouth daily     eszopiclone (LUNESTA) 2 MG tablet Take 1 tablet (2 mg) by mouth At Bedtime     FLUoxetine (PROZAC) 20 MG capsule      glucagon (GLUCAGON EMERGENCY) 1 MG kit Inject 1 mg into the muscle once for 1 dose     insulin  "lispro (HUMALOG VIAL) 100 UNIT/ML vial Use with insulin pump, total daily dose approx 70 units     insulin pen needle (BD RYAN U/F) 32G X 4 MM Use 7 pen needles daily or as directed.     insulin syringe 31G X 5/16\" 0.5 ML MISC Use for insulin injections up to 4x daily     levothyroxine (SYNTHROID/LEVOTHROID) 50 MCG tablet Take 1 tablet (50 mcg) by mouth daily     Multiple Vitamins-Minerals (MULTI FOR HER PO)      VITAMIN D, CHOLECALCIFEROL, PO Take 1,000 Units by mouth     No current facility-administered medications for this visit.      Allergies   Allergen Reactions     Latex      Facial redness       Past medical, surgical, social and family histories were reviewed and updated in EPIC.    ROS:   12 point review of systems negative other than symptoms noted below or in the HPI.  Genitourinary: clotting with menstrual cycle  No urinary frequency or dysuria, bladder or kidney problems    EXAM:  /60   Ht 1.702 m (5' 7\")   Wt 70.8 kg (156 lb)   LMP 12/28/2019   BMI 24.43 kg/m     BMI: Body mass index is 24.43 kg/m .    PHYSICAL EXAM:  Constitutional:   Appearance: Well nourished, well developed, alert, in no acute distress  Neck:  Lymph Nodes:  No lymphadenopathy present    Thyroid:  Gland size normal, nontender, no nodules or masses present  on palpation  Chest:  Respiratory Effort:  Breathing unlabored  Cardiovascular:    Heart: Auscultation:  Regular rate, normal rhythm, no murmurs present  Breasts: Inspection of Breasts:  No lymphadenopathy present., Palpation of Breasts and Axillae:  No masses present on palpation, no breast tenderness., Axillary Lymph Nodes:  No lymphadenopathy present. and No nodularity, asymmetry or nipple discharge bilaterally.  Gastrointestinal:   Abdominal Examination:  Abdomen nontender to palpation, tone normal without rigidity or guarding, no masses present, umbilicus without lesions   Liver and Spleen:  No hepatomegaly present, liver nontender to palpation    Hernias:  No " hernias present  Lymphatic: Lymph Nodes:  No other lymphadenopathy present  Skin:  General Inspection:  No rashes present, no lesions present, no areas of  discoloration  Neurologic:    Mental Status:  Oriented X3.  Normal strength and tone, sensory exam                grossly normal, mentation intact and speech normal.    Psychiatric:   Mentation appears normal and affect normal/bright.         Pelvic Exam:  External Genitalia:     Normal appearance for age, no discharge present, no tenderness present, no inflammatory lesions present, color normal  Vagina:     Normal vaginal vault without central or paravaginal defects, no discharge present, no inflammatory lesions present, no masses present  Bladder:     Nontender to palpation  Urethra:   Urethral Body:  Urethra palpation normal, urethra structural support normal   Urethral Meatus:  No erythema or lesions present  Cervix:     Appearance healthy, no lesions present, nontender to palpation, no bleeding present  Uterus:     Uterus: firm, normal sized and nontender, anteverted in position.   Adnexa:     No adnexal tenderness present, no adnexal masses present  Perineum:     Perineum within normal limits, no evidence of trauma, no rashes or skin lesions present  Anus:     Anus within normal limits, no hemorrhoids present  Inguinal Lymph Nodes:     No lymphadenopathy present  Pubic Hair:     Normal pubic hair distribution for age  Genitalia and Groin:     No rashes present, no lesions present, no areas of discoloration, no masses present      COUNSELING:   Reviewed preventive health counseling, as reflected in patient instructions    BMI: Body mass index is 24.43 kg/m .      ASSESSMENT:  44 year old female with satisfactory annual exam.  No diagnosis found.    PLAN:  1. AUB- ultrasound ordered.  Thyroid was normal.  Will see if we need to do any more evaluation after ultrasound  2. Up to date on health maintenance.  Diabetes well controlled.    Swetha Bean  MD

## 2020-01-10 NOTE — ADDENDUM NOTE
Addended by: OSIRIS GASTON on: 1/10/2020 03:45 PM     Modules accepted: Level of Service, SmartSet

## 2020-01-11 ASSESSMENT — ANXIETY QUESTIONNAIRES: GAD7 TOTAL SCORE: 1

## 2020-01-14 ENCOUNTER — MYC MEDICAL ADVICE (OUTPATIENT)
Dept: ENDOCRINOLOGY | Facility: CLINIC | Age: 45
End: 2020-01-14

## 2020-01-20 ENCOUNTER — MYC MEDICAL ADVICE (OUTPATIENT)
Dept: ENDOCRINOLOGY | Facility: CLINIC | Age: 45
End: 2020-01-20

## 2020-01-20 DIAGNOSIS — E13.9 DIABETES MELLITUS TYPE 1.5, MANAGED AS TYPE 2 (H): Primary | ICD-10-CM

## 2020-01-21 RX ORDER — METFORMIN HCL 500 MG
500 TABLET, EXTENDED RELEASE 24 HR ORAL
Qty: 7 TABLET | Refills: 0 | Status: SHIPPED | OUTPATIENT
Start: 2020-01-21 | End: 2021-02-02

## 2020-01-28 ENCOUNTER — ANCILLARY PROCEDURE (OUTPATIENT)
Dept: ULTRASOUND IMAGING | Facility: CLINIC | Age: 45
End: 2020-01-28
Attending: OBSTETRICS & GYNECOLOGY
Payer: COMMERCIAL

## 2020-01-28 ENCOUNTER — OFFICE VISIT (OUTPATIENT)
Dept: OBGYN | Facility: CLINIC | Age: 45
End: 2020-01-28
Attending: OBSTETRICS & GYNECOLOGY
Payer: COMMERCIAL

## 2020-01-28 VITALS
DIASTOLIC BLOOD PRESSURE: 70 MMHG | HEIGHT: 67 IN | WEIGHT: 154 LBS | BODY MASS INDEX: 24.17 KG/M2 | SYSTOLIC BLOOD PRESSURE: 102 MMHG

## 2020-01-28 DIAGNOSIS — N93.9 ABNORMAL UTERINE BLEEDING: ICD-10-CM

## 2020-01-28 DIAGNOSIS — N93.9 ABNORMAL UTERINE BLEEDING: Primary | ICD-10-CM

## 2020-01-28 PROCEDURE — 76830 TRANSVAGINAL US NON-OB: CPT | Performed by: OBSTETRICS & GYNECOLOGY

## 2020-01-28 PROCEDURE — 99214 OFFICE O/P EST MOD 30 MIN: CPT | Mod: 25 | Performed by: OBSTETRICS & GYNECOLOGY

## 2020-01-28 ASSESSMENT — MIFFLIN-ST. JEOR: SCORE: 1391.17

## 2020-01-28 NOTE — PROGRESS NOTES
SUBJECTIVE:                                                   Jenise Smith is a 44 year old female who presents to clinic today for the following health issue(s):  Patient presents with:  Ultrasound: follow-up      HPI:  Presenting for ultrasound follow-up.  Patient had an endometrial ablation in the past.  She reports that she did have a couple cycles where she had heavier bleeding.  She reports her last cycle was normal with no dysmenorrhea.  Patient has had no other concerns.    Patient's last menstrual period was 2020..     Patient is sexually active, .  Using vasectomy for contraception.    reports that she has never smoked. She has never used smokeless tobacco.    Today's PHQ-2 Score:   PHQ-2 (  [employer]) 1/15/2019   Q1: Little interest or pleasure in doing things 0   Q2: Feeling down, depressed or hopeless 0   PHQ-2 Score 0   Q1: Little interest or pleasure in doing things -   Q2: Feeling down, depressed or hopeless -   PHQ-2 Score -     Today's PHQ-9 Score:   PHQ-9 SCORE 1/10/2020   PHQ-9 Total Score MyChart -   PHQ-9 Total Score 1     Today's NEFTALY-7 Score:   NEFTALY-7 SCORE 1/10/2020   Total Score -   Total Score 1       Problem list and histories reviewed & adjusted, as indicated.  Additional history: as documented.    Patient Active Problem List   Diagnosis     Menorrhagia     Endometrial polyp     Hyperlipidemia with target LDL less than 100     Type 1 diabetes mellitus without complication (H)     Other specified hypothyroidism     Axillary hyperhidrosis     Anxiety     Dysmenorrhea     Mastalgia in female     Lump or mass in breast     Past Surgical History:   Procedure Laterality Date     DILATION AND CURETTAGE, HYSTEROSCOPY, ABLATE ENDOMETRIUM NOVASURE, COMBINED  2014    Procedure: COMBINED DILATION AND CURETTAGE, HYSTEROSCOPY, ABLATE ENDOMETRIUM NOVASURE;  HYSTEROSCOPY, DILATION, CURETTAGE, WITH NOVASURE ABLATION, MYOSURE MORCELLATOR;  Surgeon: Swetha Queen,  "MD;  Location: [facility]     LAPAROSCOPY       LEEP TX, CERVICAL       OPERATIVE HYSTEROSCOPY WITH MORCELLATOR  1/23/2014    Procedure: OPERATIVE HYSTEROSCOPY WITH MORCELLATOR;;  Surgeon: Swetha Queen MD;  Location: [facility]     wisdom teeth[        Social History     Tobacco Use     Smoking status: Never Smoker     Smokeless tobacco: Never Used   Substance Use Topics     Alcohol use: Yes     Alcohol/week: 0.0 standard drinks     Comment: 3 per week      Problem (# of Occurrences) Relation (Name,Age of Onset)    Breast Cancer (1) Paternal Grandmother    Cancer - colorectal (1) Paternal Uncle (60)    Diabetes (3) Father: type 1, Sister: type 1, Mother: type 2    Thyroid Disease (1) Sister            Current Outpatient Medications   Medication Sig     blood glucose (ACCU-CHEK FASTCLIX) lancing device 1 Dose once daily.     blood glucose monitoring ([employer] CONTOUR NEXT) test strip 1 Dose 6 times daily. Test 6-7 times daily     blood glucose monitoring (NO BRAND SPECIFIED) test strip Use to test blood sugar 6 times daily or as directed.     empagliflozin (JARDIANCE) 10 MG TABS tablet Take 1 tablet (10 mg) by mouth daily     eszopiclone (LUNESTA) 2 MG tablet Take 1 tablet (2 mg) by mouth At Bedtime     FLUoxetine (PROZAC) 20 MG capsule      insulin lispro (HUMALOG VIAL) 100 UNIT/ML vial Use with insulin pump, total daily dose approx 70 units     insulin pen needle (BD RYAN U/F) 32G X 4 MM Use 7 pen needles daily or as directed.     insulin syringe 31G X 5/16\" 0.5 ML MISC Use for insulin injections up to 4x daily     levothyroxine (SYNTHROID/LEVOTHROID) 50 MCG tablet Take 1 tablet (50 mcg) by mouth daily     metFORMIN (GLUCOPHAGE-XR) 500 MG 24 hr tablet Take 1 tablet (500 mg) by mouth daily (with dinner)     Multiple Vitamins-Minerals (MULTI FOR HER PO)      VITAMIN D, CHOLECALCIFEROL, PO Take 1,000 Units by mouth     glucagon (GLUCAGON EMERGENCY) 1 MG kit Inject 1 mg into the muscle once for 1 dose     No " "current facility-administered medications for this visit.      Allergies   Allergen Reactions     Latex      Facial redness       ROS:  12 point review of systems negative other than symptoms noted below or in the HPI.  No urinary frequency or dysuria, bladder or kidney problems      OBJECTIVE:     /70   Ht 1.702 m (5' 7\")   Wt 69.9 kg (154 lb)   LMP 01/23/2020   BMI 24.12 kg/m    Body mass index is 24.12 kg/m .    Exam:  Constitutional:  Appearance: Well nourished, well developed alert, in no acute distress  Chest:  Respiratory Effort:  Breathing unlabored.    Cardiovascular: no edema  Skin: General Inspection:  No rashes present, no lesions present, no areas of discoloration.  Neurologic:  Mental Status:  Oriented X3.  Normal strength and tone, sensory exam grossly normal, mentation intact and speech normal.    Psychiatric:  Mentation appears normal and affect normal/bright.     In-Clinic Test Results:  Results for orders placed or performed in visit on 01/28/20 (from the past 24 hour(s))   US Transvaginal Non OB    Narrative    Gynecological Ultrasound Report  Pelvic U/S - Transvaginal    [facility]  Referring Provider: Swetha Queen MD  Sonographer: Kirsten Crouch RUST  Indication: DUB  LMP (mm/dd/yyyy): 01/22/20  History:   Gynecological Ultrasonography:   Uterus: anteverted  Size: 8.09 x 5.71 x 5.21cm.    Findings: Right intramural fibroid= 1.2x 1.1x 1.1cm   Endometrium: Thickness total 5.85mm  Findings: Shadowing ill defined endometrium due to ablation  Right Ovary: 3.21 x 2.59 x 1.68cm.    Left Ovary: 3.25 x 3.19 x 2.02cm.   Cul de Sac/Pouch of John: No FF      Impression:  Endometrium s/p ablation, small fibroid noted       ASSESSMENT/PLAN:                                                        ICD-10-CM    1. Abnormal uterine bleeding N93.9        There are no Patient Instructions on file for this visit.    1. AUB-patient reports that her bleeding has " improved.  Discussed ultrasound findings with patient.  Discussed that she may have adenomyosis present.  Discussed with adenomyosis is.  She is going to watch her symptoms.  She is going to keep a diary in terms of if she has any dysmenorrhea or menorrhagia.    Discussed the different treatment options.  Discussed that status post ablation we typically do not use a Mirena IUD as it is harder to put in place.  We could always attempt this if she strongly desires under ultrasound guidance.  Also we discussed potential for birth control pills if it continues to be a concern.  At this point she wants to monitor her symptoms and will call if she has any other concerns.    She is also going to continue to monitor her thyroid.  She is at more risk for thyroid issues considering her diabetes diagnosis.    Patient had good questions about cervical cancer screening.  Went over the ASC CP guidelines with patient.  She was very anxious about not getting Pap smears every year.  Discussed that if she is anxious we could do a Pap smear next year.      25 minutes was spent face to face with the patient today discussing her history, diagnosis, and follow-up plan as noted above. Over 50% of the visit was spent in counseling and coordination of care.          Swetha Bean MD  [facility] [place]

## 2020-02-06 ENCOUNTER — OFFICE VISIT (OUTPATIENT)
Dept: FAMILY MEDICINE | Facility: CLINIC | Age: 45
End: 2020-02-06
Payer: COMMERCIAL

## 2020-02-06 VITALS
OXYGEN SATURATION: 98 % | HEIGHT: 67 IN | HEART RATE: 74 BPM | SYSTOLIC BLOOD PRESSURE: 112 MMHG | TEMPERATURE: 98.2 F | DIASTOLIC BLOOD PRESSURE: 70 MMHG | WEIGHT: 155 LBS | BODY MASS INDEX: 24.33 KG/M2

## 2020-02-06 DIAGNOSIS — N89.8 VAGINAL ODOR: ICD-10-CM

## 2020-02-06 DIAGNOSIS — E10.9 TYPE 1 DIABETES MELLITUS WITHOUT COMPLICATION (H): ICD-10-CM

## 2020-02-06 DIAGNOSIS — E78.5 HYPERLIPIDEMIA WITH TARGET LDL LESS THAN 100: ICD-10-CM

## 2020-02-06 DIAGNOSIS — N76.0 BACTERIAL VAGINOSIS: ICD-10-CM

## 2020-02-06 DIAGNOSIS — Z00.00 ROUTINE GENERAL MEDICAL EXAMINATION AT A HEALTH CARE FACILITY: Primary | ICD-10-CM

## 2020-02-06 DIAGNOSIS — B96.89 BACTERIAL VAGINOSIS: ICD-10-CM

## 2020-02-06 LAB
SPECIMEN SOURCE: ABNORMAL
WET PREP SPEC: ABNORMAL

## 2020-02-06 PROCEDURE — 99396 PREV VISIT EST AGE 40-64: CPT | Performed by: PHYSICIAN ASSISTANT

## 2020-02-06 PROCEDURE — 87210 SMEAR WET MOUNT SALINE/INK: CPT | Performed by: PHYSICIAN ASSISTANT

## 2020-02-06 RX ORDER — METRONIDAZOLE 7.5 MG/G
1 GEL VAGINAL DAILY
Qty: 70 G | Refills: 1 | Status: SHIPPED | OUTPATIENT
Start: 2020-02-06 | End: 2020-02-20

## 2020-02-06 ASSESSMENT — MIFFLIN-ST. JEOR: SCORE: 1390.71

## 2020-02-06 NOTE — RESULT ENCOUNTER NOTE
Jenise,    You do have bacterial vaginosis again.    I will send in the cream to clear this up.    Beba Dailey PA-C

## 2020-02-06 NOTE — PROGRESS NOTES
SUBJECTIVE:   CC: Jenise Smith is an 45 year old woman who presents for preventive health visit.     Pt feels may have BV again  Has some vaginal discomfort x 3 days  Denies vaginal discharge but does have some odor  She has normal periods. LMP about 2 weeks ago.  Denies any vaginal itching    Depression: she did try to go off of prozac since wasn't sure she should stay on this.  She is only rarely taking Lunesta to sleep; doesn't need a refill now.    DM: followed by Dr. Vance and Christy really controlling her blood sugars    Lab Results   Component Value Date    A1C 6.1 12/30/2019    A1C 6.1 09/27/2019    A1C 6.0 06/25/2019    A1C 6.8 11/06/2018    A1C 6.3 03/09/2018           Healthy Habits:    Getting at least 3 servings of Calcium per day:  Yes    Bi-annual eye exam:  Yes    Dental care twice a year:  Yes    Sleep apnea or symptoms of sleep apnea:  None    Diet:  Diabetic    Frequency of exercise:  4-5 days/week    Duration of exercise:  15-30 minutes    Taking medications regularly:  Yes    Barriers to taking medications:  None    Medication side effects:  None    PHQ-2 Total Score:    Additional concerns today:  Yes          Today's PHQ-2 Score:   PHQ-2 ( 1999 [employer]) 1/15/2019   Q1: Little interest or pleasure in doing things 0   Q2: Feeling down, depressed or hopeless 0   PHQ-2 Score 0   Q1: Little interest or pleasure in doing things -   Q2: Feeling down, depressed or hopeless -   PHQ-2 Score -       Abuse: Current or Past(Physical, Sexual or Emotional)- No  Do you feel safe in your environment? Yes        Social History     Tobacco Use     Smoking status: Never Smoker     Smokeless tobacco: Never Used   Substance Use Topics     Alcohol use: Yes     Alcohol/week: 0.0 standard drinks     Comment: 3 per week     If you drink alcohol do you typically have >3 drinks per day or >7 drinks per week? No    Alcohol Use 2/1/2017   Prescreen: >3 drinks/day or >7 drinks/week? The patient does not drink  >3 drinks per day nor >7 drinks per week.       Reviewed orders with patient.  Reviewed health maintenance and updated orders accordingly - Yes          Pertinent mammograms are reviewed under the imaging tab.  History of abnormal Pap smear: NO - age 30- 65 PAP every 3 years recommended  PAP / HPV Latest Ref Rng & Units 1/22/2019   PAP - NIL   HPV 16 DNA NEG:Negative Negative   HPV 18 DNA NEG:Negative Negative   OTHER HR HPV NEG:Negative Negative     Reviewed and updated as needed this visit by clinical staff  Tobacco  Allergies  Meds  Med Hx  Surg Hx         Reviewed and updated as needed this visit by Provider  Med Hx  Surg Hx        Past Medical History:   Diagnosis Date     Diabetes mellitus type 1 (H)      Dysmenorrhea      Excessive or frequent menstruation      Hypothyroid      Insulin pump in place      Lumbar back pain      Other and unspecified hyperlipidemia      PONV (postoperative nausea and vomiting)      Past Surgical History:   Procedure Laterality Date     DILATION AND CURETTAGE, HYSTEROSCOPY, ABLATE ENDOMETRIUM NOVASURE, COMBINED  1/23/2014    Procedure: COMBINED DILATION AND CURETTAGE, HYSTEROSCOPY, ABLATE ENDOMETRIUM NOVASURE;  HYSTEROSCOPY, DILATION, CURETTAGE, WITH NOVASURE ABLATION, MYOSURE MORCELLATOR;  Surgeon: Swetha Queen MD;  Location: [facility]     LAPAROSCOPY       LEEP TX, CERVICAL       OPERATIVE HYSTEROSCOPY WITH MORCELLATOR  1/23/2014    Procedure: OPERATIVE HYSTEROSCOPY WITH MORCELLATOR;;  Surgeon: Swetha Queen MD;  Location: [facility]     wisdom teeth[       Current Outpatient Medications   Medication Sig Dispense Refill     blood glucose (ACCU-CHEK FASTCLIX) lancing device 1 Dose once daily.       blood glucose monitoring (FABRICIO CONTOUR NEXT) test strip 1 Dose 6 times daily. Test 6-7 times daily       blood glucose monitoring (NO BRAND SPECIFIED) test strip Use to test blood sugar 6 times daily or as directed. 550 strip 3     empagliflozin  "(JARDIANCE) 10 MG TABS tablet Take 1 tablet (10 mg) by mouth daily 90 tablet 0     eszopiclone (LUNESTA) 2 MG tablet Take 1 tablet (2 mg) by mouth At Bedtime 30 tablet 1     FLUoxetine (PROZAC) 20 MG capsule        insulin lispro (HUMALOG VIAL) 100 UNIT/ML vial Use with insulin pump, total daily dose approx 70 units 70 mL 3     insulin pen needle (BD RYAN U/F) 32G X 4 MM Use 7 pen needles daily or as directed. 650 each 3     insulin syringe 31G X 5/16\" 0.5 ML MISC Use for insulin injections up to 4x daily 100 each 1     levothyroxine (SYNTHROID/LEVOTHROID) 50 MCG tablet Take 1 tablet (50 mcg) by mouth daily 90 tablet 3     metFORMIN (GLUCOPHAGE-XR) 500 MG 24 hr tablet Take 1 tablet (500 mg) by mouth daily (with dinner) 7 tablet 0     Multiple Vitamins-Minerals (MULTI FOR HER PO)        VITAMIN D, CHOLECALCIFEROL, PO Take 1,000 Units by mouth       glucagon (GLUCAGON EMERGENCY) 1 MG kit Inject 1 mg into the muscle once for 1 dose 1 mg 2         Review of Systems  CONSTITUTIONAL: NEGATIVE for fever, chills, change in weight  INTEGUMENTARU/SKIN: NEGATIVE for worrisome rashes, moles or lesions  EYES: NEGATIVE for vision changes or irritation  ENT: NEGATIVE for ear, mouth and throat problems  RESP: NEGATIVE for significant cough or SOB  BREAST: NEGATIVE for masses, tenderness or discharge  CV: NEGATIVE for chest pain, palpitations or peripheral edema  GI: NEGATIVE for nausea, abdominal pain, heartburn, or change in bowel habits  : See above  MUSCULOSKELETAL: NEGATIVE for significant arthralgias or myalgia  NEURO: NEGATIVE for weakness, dizziness or paresthesias  PSYCHIATRIC: NEGATIVE for changes in mood or affect     OBJECTIVE:   /70 (BP Location: Right arm, Patient Position: Sitting, Cuff Size: Adult Regular)   Pulse 74   Temp 98.2  F (36.8  C) (Oral)   Ht 1.702 m (5' 7\")   Wt 70.3 kg (155 lb)   LMP 01/23/2020   SpO2 98%   BMI 24.28 kg/m    Physical Exam  GENERAL: healthy, alert and no distress  EYES: " Eyes grossly normal to inspection, PERRL and conjunctivae and sclerae normal  HENT: ear canals and TM's normal, nose and mouth without ulcers or lesions  NECK: no adenopathy, no asymmetry, masses, or scars and thyroid normal to palpation  RESP: lungs clear to auscultation - no rales, rhonchi or wheezes  BREAST: normal without masses, tenderness or nipple discharge and no palpable axillary masses or adenopathy  CV: regular rate and rhythm, normal S1 S2, no S3 or S4, no murmur, click or rub, no peripheral edema and peripheral pulses strong  ABDOMEN: soft, nontender, no hepatosplenomegaly, no masses and bowel sounds normal  MS: no gross musculoskeletal defects noted, no edema  : no vulvar lesions. Speculum exam shows some white discharge  SKIN: no suspicious lesions or rashes  NEURO: Normal strength and tone, mentation intact and speech normal  PSYCH: mentation appears normal, affect normal/bright    Results for orders placed or performed in visit on 02/06/20   Wet prep     Status: Abnormal   Result Value Ref Range    Specimen Description Cervix     Wet Prep No Trichomonas seen     Wet Prep No yeast seen     Wet Prep Clue cells seen  Few   (A)     Wet Prep Mod number WBCs seen          ASSESSMENT/PLAN:   Assessment and Plan:     (Z00.00) Routine general medical examination at a health care facility  (primary encounter diagnosis)  Comment: she will return fasting for lipids  Plan: she should bring her biometric form to be completed as well. Other labs up to date.  Mammogram up to date.      (N76.0,  B96.89) Bacterial vaginosis  Comment:   Plan: metroNIDAZOLE (METROGEL) 0.75 % vaginal gel        At bedtime x 5d    (E78.5) Hyperlipidemia with target LDL less than 100  Comment:   Plan: Lipid panel reflex to direct LDL Fasting            (N89.8) Vaginal odor  Comment:   Plan: Wet prep            (E10.9) Type 1 diabetes mellitus without complication (H)  Comment:   Plan: managed by Dr. Vance and has good  "control        COUNSELING:  Reviewed preventive health counseling, as reflected in patient instructions    Estimated body mass index is 24.28 kg/m  as calculated from the following:    Height as of this encounter: 1.702 m (5' 7\").    Weight as of this encounter: 70.3 kg (155 lb).         reports that she has never smoked. She has never used smokeless tobacco.      Counseling Resources:  ATP IV Guidelines  Pooled Cohorts Equation Calculator  Breast Cancer Risk Calculator  FRAX Risk Assessment  ICSI Preventive Guidelines  Dietary Guidelines for Americans, 2010  USDA's MyPlate  ASA Prophylaxis  Lung CA Screening    Beba Dailey PA-C  [facility]  "

## 2020-02-07 ENCOUNTER — TELEPHONE (OUTPATIENT)
Dept: FAMILY MEDICINE | Facility: CLINIC | Age: 45
End: 2020-02-07

## 2020-02-07 DIAGNOSIS — E78.5 HYPERLIPIDEMIA WITH TARGET LDL LESS THAN 100: ICD-10-CM

## 2020-02-07 DIAGNOSIS — N93.9 ABNORMAL UTERINE BLEEDING: ICD-10-CM

## 2020-02-07 LAB
CHOLEST SERPL-MCNC: 180 MG/DL
HDLC SERPL-MCNC: 85 MG/DL
LDLC SERPL CALC-MCNC: 76 MG/DL
NONHDLC SERPL-MCNC: 95 MG/DL
TRIGL SERPL-MCNC: 95 MG/DL
TSH SERPL DL<=0.005 MIU/L-ACNC: 2.28 MU/L (ref 0.4–4)

## 2020-02-07 PROCEDURE — 80061 LIPID PANEL: CPT | Performed by: PHYSICIAN ASSISTANT

## 2020-02-07 PROCEDURE — 84443 ASSAY THYROID STIM HORMONE: CPT | Performed by: PHYSICIAN ASSISTANT

## 2020-02-07 PROCEDURE — 36415 COLL VENOUS BLD VENIPUNCTURE: CPT | Performed by: PHYSICIAN ASSISTANT

## 2020-02-07 NOTE — TELEPHONE ENCOUNTER
Reason for Call:  Form, our goal is to have forms completed with 72 hours, however, some forms may require a visit or additional information.    Type of letter, form or note:  [employer] Results Form     Who is the form from?: Patient    Where did the form come from: Patient or family brought in       What clinic location was the form placed at?: [facility]    Where the form was placed: Given to MA/MEEK Cochran on fifth     What number is listed as a contact on the form?:   Please send/fax to [employer]  Fax Number:  [phone]       Call taken on 2/7/2020 at 8:02 AM by Loren Burdick

## 2020-02-07 NOTE — TELEPHONE ENCOUNTER
DOD signed completed form  Faxed to [employer]   Form placed in brown accordion folder     Pt notified  Mari RATLIFF RN

## 2020-02-07 NOTE — TELEPHONE ENCOUNTER
Pt was going to bring in Biometric screening forms.  Could on of the RNs fill out the info requested once her lipids are back. Okay to use the glucose done in Dec and have DOD sign since I am out next week?  Thanks.  Should be straightforward.

## 2020-02-13 ENCOUNTER — MYC MEDICAL ADVICE (OUTPATIENT)
Dept: ENDOCRINOLOGY | Facility: CLINIC | Age: 45
End: 2020-02-13

## 2020-02-13 NOTE — TELEPHONE ENCOUNTER
Dr. Guidry - see below     Pt attached a blank diabetes drivers form and asking for you to complete this    Please advise     Mari RATLIFF RN

## 2020-02-14 NOTE — TELEPHONE ENCOUNTER
See below license form. Pt resent with top portion completed/signed    Thank you  Mari RATLIFF RN

## 2020-02-20 ENCOUNTER — OFFICE VISIT (OUTPATIENT)
Dept: FAMILY MEDICINE | Facility: CLINIC | Age: 45
End: 2020-02-20
Payer: COMMERCIAL

## 2020-02-20 VITALS
TEMPERATURE: 96.8 F | OXYGEN SATURATION: 100 % | HEIGHT: 67 IN | DIASTOLIC BLOOD PRESSURE: 66 MMHG | HEART RATE: 67 BPM | WEIGHT: 155 LBS | BODY MASS INDEX: 24.33 KG/M2 | SYSTOLIC BLOOD PRESSURE: 114 MMHG

## 2020-02-20 DIAGNOSIS — R30.0 DYSURIA: Primary | ICD-10-CM

## 2020-02-20 DIAGNOSIS — N76.0 BACTERIAL VAGINOSIS: ICD-10-CM

## 2020-02-20 DIAGNOSIS — B96.89 BACTERIAL VAGINOSIS: ICD-10-CM

## 2020-02-20 DIAGNOSIS — N89.8 VAGINAL DISCHARGE: ICD-10-CM

## 2020-02-20 LAB
ALBUMIN UR-MCNC: NEGATIVE MG/DL
APPEARANCE UR: CLEAR
BILIRUB UR QL STRIP: NEGATIVE
COLOR UR AUTO: YELLOW
GLUCOSE UR STRIP-MCNC: >=1000 MG/DL
HGB UR QL STRIP: ABNORMAL
KETONES UR STRIP-MCNC: 15 MG/DL
LEUKOCYTE ESTERASE UR QL STRIP: NEGATIVE
NITRATE UR QL: NEGATIVE
NON-SQ EPI CELLS #/AREA URNS LPF: ABNORMAL /LPF
PH UR STRIP: 6 PH (ref 5–7)
RBC #/AREA URNS AUTO: ABNORMAL /HPF
SOURCE: ABNORMAL
SP GR UR STRIP: 1.01 (ref 1–1.03)
SPECIMEN SOURCE: ABNORMAL
UROBILINOGEN UR STRIP-ACNC: 0.2 EU/DL (ref 0.2–1)
WBC #/AREA URNS AUTO: ABNORMAL /HPF
WET PREP SPEC: ABNORMAL

## 2020-02-20 PROCEDURE — 87210 SMEAR WET MOUNT SALINE/INK: CPT | Performed by: PHYSICIAN ASSISTANT

## 2020-02-20 PROCEDURE — 81001 URINALYSIS AUTO W/SCOPE: CPT | Performed by: PHYSICIAN ASSISTANT

## 2020-02-20 PROCEDURE — 99213 OFFICE O/P EST LOW 20 MIN: CPT | Performed by: PHYSICIAN ASSISTANT

## 2020-02-20 RX ORDER — METRONIDAZOLE 500 MG/1
500 TABLET ORAL 2 TIMES DAILY
Qty: 14 TABLET | Refills: 0 | Status: SHIPPED | OUTPATIENT
Start: 2020-02-20 | End: 2020-02-27

## 2020-02-20 RX ORDER — CLINDAMYCIN PHOSPHATE 20 MG/G
1 CREAM VAGINAL AT BEDTIME
Qty: 40 G | Refills: 1 | Status: SHIPPED | OUTPATIENT
Start: 2020-02-20 | End: 2020-02-27

## 2020-02-20 ASSESSMENT — MIFFLIN-ST. JEOR: SCORE: 1390.71

## 2020-02-20 NOTE — RESULT ENCOUNTER NOTE
Jenise,    Your urine test does not now infection.  Your vaginal swab is positive for clue cells which indicates you still or again have bacterial vaginosis.  Since you are getting your period, I will call in pills for this and hopefully it clears up the infection for good.  Avoid drinking any alcohol while on the medication.    Beba Dailey PA-C

## 2020-02-20 NOTE — PROGRESS NOTES
HPI: Jenise is a 46 yo diabetic female here with recurrent pain with urination and some vaginal discharge and odor for the past week or so  There is not itching.  Denies hematuria, flank pain or fever.  Pt  and not concerned about STDs  Had BV earlier this month and treated with metrogel vag at bedtime x 5d and sxs resolved for a short time but have recurred.    Past Medical History:   Diagnosis Date     Diabetes mellitus type 1 (H)      Dysmenorrhea      Excessive or frequent menstruation      Hypothyroid      Insulin pump in place      Lumbar back pain      Other and unspecified hyperlipidemia      PONV (postoperative nausea and vomiting)      Past Surgical History:   Procedure Laterality Date     DILATION AND CURETTAGE, HYSTEROSCOPY, ABLATE ENDOMETRIUM NOVASURE, COMBINED  1/23/2014    Procedure: COMBINED DILATION AND CURETTAGE, HYSTEROSCOPY, ABLATE ENDOMETRIUM NOVASURE;  HYSTEROSCOPY, DILATION, CURETTAGE, WITH NOVASURE ABLATION, MYOSURE MORCELLATOR;  Surgeon: Swetha Queen MD;  Location: [facility]     LAPAROSCOPY       LEEP TX, CERVICAL       OPERATIVE HYSTEROSCOPY WITH MORCELLATOR  1/23/2014    Procedure: OPERATIVE HYSTEROSCOPY WITH MORCELLATOR;;  Surgeon: Swetha Queen MD;  Location: [facility]     wisdom teeth[       Social History     Tobacco Use     Smoking status: Never Smoker     Smokeless tobacco: Never Used   Substance Use Topics     Alcohol use: Yes     Alcohol/week: 0.0 standard drinks     Comment: 3 per week     Current Outpatient Medications   Medication Sig Dispense Refill     clindamycin 2 % VA vaginal cream Place 1 applicator vaginally At Bedtime for 7 days 40 g 1     empagliflozin (JARDIANCE) 10 MG TABS tablet Take 1 tablet (10 mg) by mouth daily 90 tablet 0     eszopiclone (LUNESTA) 2 MG tablet Take 1 tablet (2 mg) by mouth At Bedtime 30 tablet 1     FLUoxetine (PROZAC) 20 MG capsule        insulin lispro (HUMALOG VIAL) 100 UNIT/ML vial Use with insulin  "pump, total daily dose approx 70 units 70 mL 3     levothyroxine (SYNTHROID/LEVOTHROID) 50 MCG tablet Take 1 tablet (50 mcg) by mouth daily 90 tablet 3     metroNIDAZOLE 500 MG PO tablet Take 1 tablet (500 mg) by mouth 2 times daily for 7 days 14 tablet 0     VITAMIN D, CHOLECALCIFEROL, PO Take 1,000 Units by mouth       blood glucose (ACCU-CHEK FASTCLIX) lancing device 1 Dose once daily.       blood glucose monitoring (FABRICIO CONTOUR NEXT) test strip 1 Dose 6 times daily. Test 6-7 times daily       blood glucose monitoring (NO BRAND SPECIFIED) test strip Use to test blood sugar 6 times daily or as directed. 550 strip 3     glucagon (GLUCAGON EMERGENCY) 1 MG kit Inject 1 mg into the muscle once for 1 dose 1 mg 2     insulin pen needle (BD RYAN U/F) 32G X 4 MM Use 7 pen needles daily or as directed. 650 each 3     insulin syringe 31G X 5/16\" 0.5 ML MISC Use for insulin injections up to 4x daily 100 each 1     metFORMIN (GLUCOPHAGE-XR) 500 MG 24 hr tablet Take 1 tablet (500 mg) by mouth daily (with dinner) (Patient not taking: Reported on 2/20/2020) 7 tablet 0     Multiple Vitamins-Minerals (MULTI FOR HER PO)        Allergies   Allergen Reactions     Latex      Facial redness     FAMILY HISTORY NOTED AND REVIEWED    PHYSICAL EXAM:    /66   Pulse 67   Temp 96.8  F (36  C) (Oral)   Ht 1.702 m (5' 7\")   Wt 70.3 kg (155 lb)   LMP 01/23/2020   SpO2 100%   BMI 24.28 kg/m      Patient appears non toxic  Speculum exam; there is some blood coming for cervix (period is due now)  Scant discharge seen  No external lesions    Results for orders placed or performed in visit on 02/20/20   UA with Microscopic     Status: Abnormal   Result Value Ref Range    Color Urine Yellow     Appearance Urine Clear     Glucose Urine >=1000 (A) NEG^Negative mg/dL    Bilirubin Urine Negative NEG^Negative    Ketones Urine 15 (A) NEG^Negative mg/dL    Specific Gravity Urine 1.015 1.003 - 1.035    pH Urine 6.0 5.0 - 7.0 pH    Protein " Albumin Urine Negative NEG^Negative mg/dL    Urobilinogen Urine 0.2 0.2 - 1.0 EU/dL    Nitrite Urine Negative NEG^Negative    Blood Urine Small (A) NEG^Negative    Leukocyte Esterase Urine Negative NEG^Negative    Source Midstream Urine     WBC Urine 0 - 5 OTO5^0 - 5 /HPF    RBC Urine O - 2 OTO2^O - 2 /HPF    Squamous Epithelial /LPF Urine Few FEW^Few /LPF   Wet prep     Status: Abnormal   Result Value Ref Range    Specimen Description Vagina     Wet Prep No Trichomonas seen     Wet Prep Clue cells seen  Few   (A)     Wet Prep Few WBCs seen     Wet Prep No yeast seen      Assessment and Plan:     (R30.0) Dysuria  (primary encounter diagnosis)  Comment:   Plan: UA with Microscopic            (N89.8) Vaginal discharge  Comment:   Plan: Wet prep            (N76.0,  B96.89) Bacterial vaginosis  Comment: menses starting so prescribed oral medication this time  Plan: metroNIDAZOLE        500 MG PO tablet bid x 7d, no etoh while on this              Beba Dailey PA-C

## 2020-02-25 ENCOUNTER — MYC MEDICAL ADVICE (OUTPATIENT)
Dept: ENDOCRINOLOGY | Facility: CLINIC | Age: 45
End: 2020-02-25

## 2020-02-25 DIAGNOSIS — E10.9 TYPE 1 DIABETES MELLITUS WITHOUT COMPLICATION (H): ICD-10-CM

## 2020-02-26 ENCOUNTER — TELEPHONE (OUTPATIENT)
Dept: ENDOCRINOLOGY | Facility: CLINIC | Age: 45
End: 2020-02-26

## 2020-02-26 NOTE — TELEPHONE ENCOUNTER
Clarification request re: Jardiance    Jardiance oral tablet 10 mg is not recommended in Type 1 Diabetes Mellitus.    Please clarify if to continue or not.

## 2020-02-26 NOTE — TELEPHONE ENCOUNTER
Dr. Gaming     Pt is requesting a box of each of long/short acting insulin pens for emergencies.     I have pended pharmacy     Unsure of dosing to pend and if you prefer Humalog/Novolog, etc.    Also pended needles- RX on file is likely .     Please advise,      Thank you!  Yaima HOBBS RN

## 2020-02-27 NOTE — TELEPHONE ENCOUNTER
Reason for Call:  Other prescription    Detailed comments: Pharmacy called again requesting clarification    Phone Number Patient can be reached at: Other phone number:  [phone]* ref #[id number]    Best Time: Anytime     Can we leave a detailed message on this number? Not Applicable    Call taken on 2/27/2020 at 9:06 AM by Morteza Good

## 2020-02-27 NOTE — TELEPHONE ENCOUNTER
Dr Gaming - see below     Pharmacy asking for clarification whether pt to continue Jardiance as it is not recommended for DM type I     Please advise    Mari RATLIFF RN

## 2020-03-01 DIAGNOSIS — E10.9 TYPE 1 DIABETES MELLITUS WITHOUT COMPLICATION (H): Primary | ICD-10-CM

## 2020-03-01 RX ORDER — INSULIN LISPRO 100 [IU]/ML
INJECTION, SOLUTION INTRAVENOUS; SUBCUTANEOUS
Qty: 15 ML | Refills: 1 | Status: SHIPPED | OUTPATIENT
Start: 2020-03-01 | End: 2021-10-07

## 2020-03-01 RX ORDER — INSULIN GLARGINE 100 [IU]/ML
INJECTION, SOLUTION SUBCUTANEOUS
Qty: 15 ML | Refills: 1 | Status: SHIPPED | OUTPATIENT
Start: 2020-03-01 | End: 2020-07-06

## 2020-03-01 NOTE — TELEPHONE ENCOUNTER
Message noted.  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  Please relay this message to pharmacy person, thanks.    DARI Gaming MD, MS  Endocrinology  [facility]

## 2020-03-03 ENCOUNTER — MYC MEDICAL ADVICE (OUTPATIENT)
Dept: ENDOCRINOLOGY | Facility: CLINIC | Age: 45
End: 2020-03-03

## 2020-03-09 ENCOUNTER — MYC MEDICAL ADVICE (OUTPATIENT)
Dept: ENDOCRINOLOGY | Facility: CLINIC | Age: 45
End: 2020-03-09

## 2020-03-11 ENCOUNTER — MYC MEDICAL ADVICE (OUTPATIENT)
Dept: ENDOCRINOLOGY | Facility: CLINIC | Age: 45
End: 2020-03-11

## 2020-03-11 DIAGNOSIS — E10.9 TYPE 1 DIABETES MELLITUS WITHOUT COMPLICATION (H): ICD-10-CM

## 2020-03-11 DIAGNOSIS — E13.9 DIABETES MELLITUS TYPE 1.5, MANAGED AS TYPE 2 (H): ICD-10-CM

## 2020-03-11 RX ORDER — INSULIN LISPRO 100 [IU]/ML
INJECTION, SOLUTION INTRAVENOUS; SUBCUTANEOUS
Qty: 15 ML | Refills: 1 | Status: CANCELLED | OUTPATIENT
Start: 2020-03-11

## 2020-03-25 ENCOUNTER — TELEPHONE (OUTPATIENT)
Dept: FAMILY MEDICINE | Facility: CLINIC | Age: 45
End: 2020-03-25

## 2020-03-25 ENCOUNTER — E-VISIT (OUTPATIENT)
Dept: FAMILY MEDICINE | Facility: CLINIC | Age: 45
End: 2020-03-25
Payer: COMMERCIAL

## 2020-03-25 DIAGNOSIS — L73.1 INGROWN HAIR: Primary | ICD-10-CM

## 2020-03-25 PROCEDURE — 99421 OL DIG E/M SVC 5-10 MIN: CPT | Performed by: PHYSICIAN ASSISTANT

## 2020-03-25 NOTE — TELEPHONE ENCOUNTER
Reason for Call:  Other [facility] called to get clearance for medical research study  Form. Will like call back at [phone]    Detailed comments: Will like call back to clarify doctor Mekhi answers2    Phone Number Patient can be reached at: Other phone number:  [phone]    Best Time: any    Can we leave a detailed message on this number? YES    Call taken on 3/25/2020 at 1:48 PM by Amaya Valadez

## 2020-03-26 NOTE — TELEPHONE ENCOUNTER
Pt has ingrown hair in panty line that is painful x 2 months  This does not drain  This is larger than a pea  She is trying warm compresses without relief  Has hx of nausea from keflex    Plan: cont moist heat and sent in rx for augmentin 875mg bid with food x 10d    Provider E-Visit time total (minutes): 7

## 2020-04-01 NOTE — TELEPHONE ENCOUNTER
Reason for Call:  Other Form    Detailed comments: Sara calling in. They still do not have the form.    Please send again.    Phone Number Patient can be reached at: Cell number on file:    Telephone Information:   Mobile [phone]       Best Time: any    Can we leave a detailed message on this number? YES    Call taken on 4/1/2020 at 3:27 PM by Lefty Santana

## 2020-04-03 NOTE — TELEPHONE ENCOUNTER
"Sara calling back. The form most recently faxed was the old form - there were changes to be made and sent. Please send the new one with the changes (last question changed form \"yes\" to \"no\")    Fax: [phone]    Ph: [phone]  "

## 2020-04-03 NOTE — TELEPHONE ENCOUNTER
Forms updated by Dr. Gaming and faxed back to Sara.    Royer Miner, SHEREE on 4/3/2020 at 3:34 PM

## 2020-05-01 ENCOUNTER — E-VISIT (OUTPATIENT)
Dept: FAMILY MEDICINE | Facility: CLINIC | Age: 45
End: 2020-05-01
Payer: COMMERCIAL

## 2020-05-01 DIAGNOSIS — N89.8 VAGINAL DISCHARGE: Primary | ICD-10-CM

## 2020-05-01 PROCEDURE — 99421 OL DIG E/M SVC 5-10 MIN: CPT | Performed by: PHYSICIAN ASSISTANT

## 2020-05-04 RX ORDER — METRONIDAZOLE 7.5 MG/G
1 GEL VAGINAL 2 TIMES DAILY
Qty: 70 G | Refills: 1 | Status: SHIPPED | OUTPATIENT
Start: 2020-05-04 | End: 2021-02-02

## 2020-05-24 ENCOUNTER — MYC MEDICAL ADVICE (OUTPATIENT)
Dept: ENDOCRINOLOGY | Facility: CLINIC | Age: 45
End: 2020-05-24

## 2020-05-26 NOTE — TELEPHONE ENCOUNTER
Patient called and switched to video visit on Friday.    Royer Miner CMA on 5/26/2020 at 11:47 AM

## 2020-06-03 ENCOUNTER — VIRTUAL VISIT (OUTPATIENT)
Dept: ENDOCRINOLOGY | Facility: CLINIC | Age: 45
End: 2020-06-03
Payer: COMMERCIAL

## 2020-06-03 DIAGNOSIS — E13.9 DIABETES MELLITUS TYPE 1.5, MANAGED AS TYPE 2 (H): Primary | ICD-10-CM

## 2020-06-03 DIAGNOSIS — E03.9 HYPOTHYROIDISM, UNSPECIFIED TYPE: ICD-10-CM

## 2020-06-03 DIAGNOSIS — Z96.41 INSULIN PUMP STATUS: ICD-10-CM

## 2020-06-03 PROCEDURE — 99214 OFFICE O/P EST MOD 30 MIN: CPT | Mod: GT | Performed by: INTERNAL MEDICINE

## 2020-06-03 RX ORDER — LEVOTHYROXINE SODIUM 50 UG/1
50 TABLET ORAL DAILY
Qty: 90 TABLET | Refills: 3 | Status: SHIPPED | OUTPATIENT
Start: 2020-06-03 | End: 2021-03-11

## 2020-06-03 NOTE — PROGRESS NOTES
"Jenise Smith is a 45 year old female who is being evaluated via a billable video visit.      The patient has been notified of following:     \"This video visit will be conducted via a call between you and your physician/provider. We have found that certain health care needs can be provided without the need for an in-person physical exam.  This service lets us provide the care you need with a video conversation.  If a prescription is necessary we can send it directly to your pharmacy.  If lab work is needed we can place an order for that and you can then stop by our lab to have the test done at a later time.    Video visits are billed at different rates depending on your insurance coverage.  Please reach out to your insurance provider with any questions.    If during the course of the call the physician/provider feels a video visit is not appropriate, you will not be charged for this service.\"    Patient has given verbal consent for Video visit? Yes    How would you like to obtain your AVS?  Reviewed verbally    Patient would like the video invitation sent by: Text to cell phone: [phone]     Will anyone else be joining your video visit? No        Recent issues:  Diabetes and thyroid followup  Feeling well overall, no new health issues reported  Taking the low dose (off label) Jardiance medication, reports improvement with glucose control           Diabetes:  Previous diagnosis of IFG with high GAD65 Ab level  6/2011. Developed acute hyperglycemia and diagnosis of T1DM  Treatment with insulin medication, MDI plan  2/2013. Changed to OmniPod pump with Novolog insulin  Used DexcomG5 CGMS system  5/2017. Changed to Medtronic 630G pump  9/2017. Upgraded to Medtronic 670G pump and Guardian3 sensor, Humalog insulin  8/2018. Discontinued Medtronic pump and sensor, changed to MDI plan              Had taken Basaglar 17U at bedtime and mealtime Humalog  Had taken Basaglar 13U at bedtime, Humalog Luxura pen 1:7 at " mealtime, ISF 60, goal target  mg/dl  12/2018. Restarted OmniPod pump              Humalog in pump   Jardiance 10 mg each morning     Previous pump settings:              Basals (Happy, Regular)              Happy::                        Bolus I:C         ISF:                  AI 3 hrs              MN 0.55                      MN 8                MN 65                6a 0.8                          5p 7.0              8a 60              10a 0.85                      9p 9                 9p 65              4p 0.5              6p 0.55      Using DexcomG6 CGMS data:           Has reduced hypoglycemia awareness              Has chocolate lab ([facility]), trained to recognize hypoglycemia smell/symptoms  Using Contour Next Link BG meter, variable testing, tests 6x/day  Exercises with classes at health club- yoga, strength              Tried Temp Target with exercise, noticed higher glucose levels so not used  Previous [facility] labs include:  Lab Results   Component Value Date    A1C 6.1 (H) 12/30/2019     12/30/2019    POTASSIUM 3.9 12/30/2019    CHLORIDE 105 12/30/2019    CO2 27 12/30/2019    ANIONGAP 7 12/30/2019    GLC 90 12/30/2019    BUN 24 12/30/2019    CR 0.79 12/30/2019    GFRESTIMATED >90 12/30/2019    GFRESTBLACK >90 12/30/2019    VIJI 8.8 12/30/2019    CHOL 180 02/07/2020    TRIG 95 02/07/2020    HDL 85 02/07/2020    LDL 76 02/07/2020    NHDL 95 02/07/2020    UCRR 14 09/27/2019    MICROL <5 09/27/2019    UMALCR Unable to calculate due to low value 09/27/2019     Previous Lasik surgery at [facility] 2015  Last eye exam 1/2019 with Dr. Shultz, no DR reported  Plans to see practitioner at [facility] soon  DM Complications: none known        Thyroid:  2011. History of hypothyroidism  Takes levothyroxine medication  Recent [facility] labs include:  Lab Results   Component Value Date    TSH 2.28 02/07/2020    T4 0.92 06/25/2019     Eary 10/2018. Dose incease to levothyroxine 0.05 mg daily per GYN  physician  Current dose:  Levothyroxine 0.05 mg daily        , lives in [place],  [place], daughter Myra and stepchildren Tisha Davalos  Works for [employer] as   Now sees Beba Dailey formerly [facility] for general medicine evaluations.  Also sees Dr. Ashford/[facility]      ROS: 10 point ROS neg other than the symptoms noted above in the HPI.     GENERAL: energy good, wt stable; denies fevers, chills, night sweats.   HEENT: no dysphagia, odonophagia, diplopia, neck pain  THYROID:  no apparent hyper or hypothyroid symptoms  CV: no chest pain, pressure, palpitations  LUNGS: no SOB, CLIFFORD, cough, wheezing   ABDOMEN: some postmeal indigestion and mild nausea; denies diarrhea, constipation, abdominal pain  EXTREMITIES: no rashes, ulcers, edema  NEUROLOGY: no headaches, denies changes in vision, tingling, extremitiy numbness   MSK: no muscle aches or pains, weakness  SKIN: no rashes or lesions  : regular menstrual cycles?  PSYCH:  stable mood, no significant anxiety or depression  ENDOCRINE: no heat or cold intolerance     Physical Exam (visual exam)  VS:  no vital signs taken for video visit  GENERAL: healthy, alert and NAD, well dressed, answering questions appropriately  EYES: eyes grossly normal to inspection, conjunctivae and sclerae normal, no exophthalmos or proptosis  THYROID:  no apparent nodules or goiter  LUNGS: no audible wheeze, cough or visible cyanosis, no visible retractions or increased work of breathing  ABDOMEN: abdomen not evaluated  EXTREMITIES: no hand tremors, limited exam  NEUROLOGY: CN grossly intact, mentation intact and speech normal   PSYCH: mentation appears normal, affect normal/bright, judgement and insight intact, normal speech and appearance well groomed       LABS:     All pertinent notes, labs, and images personally reviewed by me.      A/P:       Encounter Diagnoses   Name      Type 1 diabetes mellitus without complication (H)      Insulin pump status       Hypothyroidism due to Hashimoto's thyroiditis       Comments:  Reviewed health history, diabetes and thyroid issues.  Recent glucose control good.     Plan:  Discussed general issues with the diabetes diagnosis and management  We discussed the hgbA1c test which reflects previous overall glucose levels or control  Discussed the importance of blood glucose (BG) testing to assess glucose trends  Provided general overview of the multiple daily injection (MDI) plan using rapid acting mealtime and longacting insulin medications  Reviewed recent Dexcom CGMS glucose sensor report, in detail     Recommend:  Continue the insulin pump and diabetes management  Discussed use of the off-label Jardiance medication.    Pump setting changes:              Basals (Regular): 3a 0.55 to 0.5 unit(s)/hr     Test BG levels premeal and bedtime and/or use Dexcom CGMS sensor  No labs ordered at this time, but repeat in Fall 2020  Monitor for symptom changes  We have previously discussed her concerns about the CGMS interpretation billing and her expense  Discussed [facility]  Advise having fasting lipid panel testing and dilated eye examination, at least annually  Continue current levothyroxine daily dose     Keep regular f/u GYN evaluations  Would not restart the LoEstrin OCP med at this time  Addressed patient questions today     More than 50% of the time spent with Mrs. Smith on counseling / coordinating her care.  Total appointment time was 25 minutes.     Follow-up:  ~9/2020     DARI Gaming MD, MS  Endocrinology  [facility]    CC:  ANA Dailey        Video-Visit Details    Type of service:  Video Visit    Video Start Time: 11:35 am  Video End Time: 12:00 pm    Originating Location (pt. Location): home    Distant Location (provider location):  [facility]     Platform used for Video Visit: Micki

## 2020-06-21 ENCOUNTER — MYC MEDICAL ADVICE (OUTPATIENT)
Dept: FAMILY MEDICINE | Facility: CLINIC | Age: 45
End: 2020-06-21

## 2020-06-21 DIAGNOSIS — F41.9 ANXIETY: ICD-10-CM

## 2020-06-21 DIAGNOSIS — F41.9 ANXIETY: Primary | ICD-10-CM

## 2020-06-23 RX ORDER — FLUOXETINE 10 MG/1
CAPSULE ORAL
Qty: 30 CAPSULE | Refills: 0 | OUTPATIENT
Start: 2020-06-23

## 2020-06-23 NOTE — TELEPHONE ENCOUNTER
TO PCP:     See below - pt confirmed taking 20mg fluoxetine daily    Pended Rx    Mari RATLIFF RN

## 2020-06-29 ENCOUNTER — MEDICAL CORRESPONDENCE (OUTPATIENT)
Dept: HEALTH INFORMATION MANAGEMENT | Facility: CLINIC | Age: 45
End: 2020-06-29

## 2020-07-03 DIAGNOSIS — E10.9 TYPE 1 DIABETES MELLITUS WITHOUT COMPLICATION (H): ICD-10-CM

## 2020-07-06 RX ORDER — INSULIN GLARGINE 100 [IU]/ML
INJECTION, SOLUTION SUBCUTANEOUS
Qty: 15 ML | Refills: 1 | Status: SHIPPED | OUTPATIENT
Start: 2020-07-06 | End: 2020-08-07

## 2020-08-04 ENCOUNTER — TELEPHONE (OUTPATIENT)
Dept: ENDOCRINOLOGY | Facility: CLINIC | Age: 45
End: 2020-08-04

## 2020-08-04 DIAGNOSIS — E13.9 DIABETES MELLITUS TYPE 1.5, MANAGED AS TYPE 2 (H): Primary | ICD-10-CM

## 2020-08-05 NOTE — TELEPHONE ENCOUNTER
BASAGLAR KWIKPEN ALTERNATIVE REQUESTED:    BASAGLAR KWIKPEN 5X3 ML is not covered by insurance. Please send RX for alternative or rsubmit a PA.  Covered alternative:      TOUJEO SOLO  international unit(s)/ML

## 2020-08-07 NOTE — TELEPHONE ENCOUNTER
Message noted.  I have now sent the alternative Lantus Solostar pen Rx to pharmacy.    DARI Gaming MD, MS  [facility]

## 2020-09-10 ENCOUNTER — TELEPHONE (OUTPATIENT)
Dept: OBGYN | Facility: CLINIC | Age: 45
End: 2020-09-10

## 2020-09-10 NOTE — TELEPHONE ENCOUNTER
Patient called to ask if Dr. Bean could place an order for a breast ultrasound at [facility]? Said she has done this in the past. Okay to call patient with questions.

## 2020-09-10 NOTE — TELEPHONE ENCOUNTER
9/26/19 [facility] for left breast ultrasound  Hx of a large lump of fiber cystic tissue in left breast that was recommended to monitor    Pt asking for order send to [facility] - 3D mammogram with US     Routing to Dr Bean to advise.    Peri Vázquez RN on 9/10/2020 at 4:56 PM

## 2020-09-11 NOTE — TELEPHONE ENCOUNTER
Order signed by Dr Bean and faxed to [facility].  Called and left detailed vm informing pt order was faxed.  Peri Vázquez RN on 9/11/2020 at 10:02 AM

## 2020-09-15 ENCOUNTER — MEDICAL CORRESPONDENCE (OUTPATIENT)
Dept: HEALTH INFORMATION MANAGEMENT | Facility: CLINIC | Age: 45
End: 2020-09-15

## 2020-09-26 DIAGNOSIS — E13.9 DIABETES MELLITUS TYPE 1.5, MANAGED AS TYPE 2 (H): ICD-10-CM

## 2020-09-28 ENCOUNTER — TRANSFERRED RECORDS (OUTPATIENT)
Dept: HEALTH INFORMATION MANAGEMENT | Facility: CLINIC | Age: 45
End: 2020-09-28

## 2020-09-29 RX ORDER — EMPAGLIFLOZIN 10 MG/1
TABLET, FILM COATED ORAL
Qty: 90 TABLET | Refills: 0 | Status: SHIPPED | OUTPATIENT
Start: 2020-09-29 | End: 2020-11-16

## 2020-09-30 ENCOUNTER — VIRTUAL VISIT (OUTPATIENT)
Dept: ENDOCRINOLOGY | Facility: CLINIC | Age: 45
End: 2020-09-30
Payer: COMMERCIAL

## 2020-09-30 DIAGNOSIS — Z96.41 INSULIN PUMP STATUS: ICD-10-CM

## 2020-09-30 DIAGNOSIS — E13.9 DIABETES MELLITUS TYPE 1.5, MANAGED AS TYPE 2 (H): Primary | ICD-10-CM

## 2020-09-30 DIAGNOSIS — E06.3 HYPOTHYROIDISM DUE TO HASHIMOTO'S THYROIDITIS: ICD-10-CM

## 2020-09-30 PROCEDURE — 99214 OFFICE O/P EST MOD 30 MIN: CPT | Mod: 95 | Performed by: INTERNAL MEDICINE

## 2020-09-30 NOTE — LETTER
"    9/30/2020         RE: Jenise Smith  [address] [id number]        Dear Colleague,    Thank you for referring your patient, Jenise Smith, to the [facility]. Please see a copy of my visit note below.    Jenise Smith is a 45 year old female who is being evaluated via a billable video visit.      The patient has been notified of following:     \"This video visit will be conducted via a call between you and your physician/provider. We have found that certain health care needs can be provided without the need for an in-person physical exam.  This service lets us provide the care you need with a video conversation.  If a prescription is necessary we can send it directly to your pharmacy.  If lab work is needed we can place an order for that and you can then stop by our lab to have the test done at a later time.    Video visits are billed at different rates depending on your insurance coverage.  Please reach out to your insurance provider with any questions.    If during the course of the call the physician/provider feels a video visit is not appropriate, you will not be charged for this service.\"    Patient has given verbal consent for Video visit? Yes  How would you like to obtain your AVS? Reviewed verbally  If you are dropped from the video visit, the video invite should be resent to: Text to cell phone: [phone]  Will anyone else be joining your video visit? No        Recent issues:  Diabetes and thyroid followup  Feeling well overall, no new health issues reported  Taking the low dose (off label) Jardiance medication, reports improvement with glucose control           Diabetes:  Previous diagnosis of IFG with high GAD65 Ab level  6/2011. Developed acute hyperglycemia and diagnosis of T1DM  Treatment with insulin medication, MDI plan  2/2013. Changed to OmniPod pump with Novolog insulin  Used DexcomG5 CGMS system  5/2017. Changed to Medtronic 630G " pump  9/2017. Upgraded to Medtronic 670G pump and Guardian3 sensor, Humalog insulin  8/2018. Discontinued Medtronic pump and sensor, changed to MDI plan              Had taken Basaglar 17U at bedtime and mealtime Humalog  Had taken Basaglar 13U at bedtime, Humalog Luxura pen 1:7 at mealtime, ISF 60, goal target  mg/dl  12/2018. Restarted OmniPod pump              Humalog in pump              Jardiance 10 mg each morning     Using OmniPod Dash PDM  Current pump settings:    Using DexcomG6 CGMS data:            Has reduced hypoglycemia awareness              Has chocolate lab (Juliet), trained to recognize hypoglycemia smell/symptoms  Using Contour Next Link BG meter, variable testing, tests 6x/day  Exercises with classes at health club- yoga, strength              Tried Temp Target with exercise, noticed higher glucose levels so not used  Previous [facility] labs include:  Lab Results   Component Value Date    A1C 6.1 (H) 12/30/2019     12/30/2019    POTASSIUM 3.9 12/30/2019    CHLORIDE 105 12/30/2019    CO2 27 12/30/2019    ANIONGAP 7 12/30/2019    GLC 90 12/30/2019    BUN 24 12/30/2019    CR 0.79 12/30/2019    GFRESTIMATED >90 12/30/2019    GFRESTBLACK >90 12/30/2019    VIJI 8.8 12/30/2019    CHOL 180 02/07/2020    TRIG 95 02/07/2020    HDL 85 02/07/2020    LDL 76 02/07/2020    NHDL 95 02/07/2020    UCRR 14 09/27/2019    MICROL <5 09/27/2019    UMALCR Unable to calculate due to low value 09/27/2019     Previous Lasik surgery at [facility] 2015  Last eye exam 1/2019 with Dr. Shultz, no DR reported  Plans to see practitioner at [facility] soon  DM Complications: none known        Thyroid:  2011. History of hypothyroidism  Takes levothyroxine medication  Recent [facility] labs include:          Lab Results   Component Value Date    TSH 2.28 02/07/2020    T4 0.92 06/25/2019     Eary 10/2018. Dose incease to levothyroxine 0.05 mg daily per GYN physician  Current dose:  Levothyroxine 0.05 mg  daily        , lives in [place],  [place], daughter Myra and stepchildren Tisha Davalos  Works for [employer] as   Now sees Beba Dailey PAC/[facility] for general medicine evaluations.  Also sees Dr. Ashford[facility]        ROS: 10 point ROS neg other than the symptoms noted above in the HPI.     GENERAL: energy good, wt stable; denies fevers, chills, night sweats.   HEENT: no dysphagia, odonophagia, diplopia, neck pain  THYROID:  no apparent hyper or hypothyroid symptoms  CV: no chest pain, pressure, palpitations  LUNGS: no SOB, CLIFFORD, cough, wheezing   ABDOMEN: some postmeal indigestion and mild nausea; denies diarrhea, constipation, abdominal pain  EXTREMITIES: no rashes, ulcers, edema  NEUROLOGY: no headaches, denies changes in vision, tingling, extremitiy numbness   MSK: no muscle aches or pains, weakness  SKIN: no rashes or lesions  : regular menstrual cycles?  PSYCH:  stable mood, no significant anxiety or depression  ENDOCRINE: no heat or cold intolerance     Physical Exam (visual exam)  VS:  no vital signs taken for video visit  GENERAL: healthy, alert and NAD, well dressed, answering questions appropriately  EYES: eyes grossly normal to inspection, conjunctivae and sclerae normal, no exophthalmos or proptosis  THYROID:  no apparent nodules or goiter  LUNGS: no audible wheeze, cough or visible cyanosis, no visible retractions or increased work of breathing  ABDOMEN: abdomen not evaluated  EXTREMITIES: no hand tremors, limited exam  NEUROLOGY: CN grossly intact, mentation intact and speech normal   PSYCH: mentation appears normal, affect normal/bright, judgement and insight intact, normal speech and appearance well groomed        LABS:     All pertinent notes, labs, and images personally reviewed by me.      A/P:          Encounter Diagnoses   Name       Type 1 diabetes mellitus without complication (H)       Insulin pump status       Hypothyroidism due to Hashimoto's  "thyroiditis        Comments:  Reviewed health history, diabetes and thyroid issues.  Recent glucose control good.     Plan:  Discussed general issues with the diabetes diagnosis and management  We discussed the hgbA1c test which reflects previous overall glucose levels or control  Discussed the importance of blood glucose (BG) testing to assess glucose trends  Provided general overview of the multiple daily injection (MDI) plan using rapid acting mealtime and longacting insulin medications  Reviewed recent Dexcom CGMS glucose sensor report, in detail     Recommend:  Continue the insulin pump and diabetes management  Discussed use of the off-label Jardiance medication.    Pump setting changes:              Basals (Regular): 3-5p 0.3 to 0.25 unit(s)/hr     Test BG levels premeal and bedtime and/or use Dexcom CGMS sensor  Lab testing:   Plan non-fasting labs at [facility] soon   Lab orders placed  Monitor for symptom changes  We have previously discussed her concerns about the CGMS interpretation billing and her expense  Discussed her use of the \"sugar mate\" iPhone erinn with food diary, exercise, Dexcom trends  Advise having fasting lipid panel testing and dilated eye examination, at least annually  Continue current levothyroxine daily dose     Keep regular f/u GYN evaluations  Addressed patient questions today     More than 50% of the time spent with Mrs. Smith on counseling / coordinating her care.  Total appointment time was 25 minutes.     Follow-up:  ~1/2021     DARI Gaming MD, MS  Endocrinology  [facility]        Video-Visit Details    Type of service:  Video Visit    Video Start Time: 9:10 am  Video End Time: 9:35 am    Originating Location (pt. Location): home    Distant Location (provider location):  [facility]    Platform used for Video Visit: AmWell            Again, thank you for allowing me to participate in the care of your patient.  "       Sincerely,        Lyndon Gaming MD

## 2020-09-30 NOTE — PROGRESS NOTES
"Jenise Smith is a 45 year old female who is being evaluated via a billable video visit.      The patient has been notified of following:     \"This video visit will be conducted via a call between you and your physician/provider. We have found that certain health care needs can be provided without the need for an in-person physical exam.  This service lets us provide the care you need with a video conversation.  If a prescription is necessary we can send it directly to your pharmacy.  If lab work is needed we can place an order for that and you can then stop by our lab to have the test done at a later time.    Video visits are billed at different rates depending on your insurance coverage.  Please reach out to your insurance provider with any questions.    If during the course of the call the physician/provider feels a video visit is not appropriate, you will not be charged for this service.\"    Patient has given verbal consent for Video visit? Yes  How would you like to obtain your AVS? Reviewed verbally  If you are dropped from the video visit, the video invite should be resent to: Text to cell phone: [phone]  Will anyone else be joining your video visit? No        Recent issues:  Diabetes and thyroid followup  Feeling well overall, no new health issues reported  Taking the low dose (off label) Jardiance medication, reports improvement with glucose control           Diabetes:  Previous diagnosis of IFG with high GAD65 Ab level  6/2011. Developed acute hyperglycemia and diagnosis of T1DM  Treatment with insulin medication, MDI plan  2/2013. Changed to OmniPod pump with Novolog insulin  Used DexcomG5 CGMS system  5/2017. Changed to Medtronic 630G pump  9/2017. Upgraded to Medtronic 670G pump and Guardian3 sensor, Humalog insulin  8/2018. Discontinued Medtronic pump and sensor, changed to MDI plan              Had taken Basaglar 17U at bedtime and mealtime Humalog  Had taken Basaglar 13U at bedtime, Humalog " Luxura pen 1:7 at mealtime, ISF 60, goal target  mg/dl  12/2018. Restarted OmniPod pump              Humalog in pump              Jardiance 10 mg each morning     Using OmniPod Dash PDM  Current pump settings:    Using DexcomG6 CGMS data:            Has reduced hypoglycemia awareness              Has chocolate lab (Juliet), trained to recognize hypoglycemia smell/symptoms  Using Contour Next Link BG meter, variable testing, tests 6x/day  Exercises with classes at health club- yoga, strength              Tried Temp Target with exercise, noticed higher glucose levels so not used  Previous [facility] labs include:  Lab Results   Component Value Date    A1C 6.1 (H) 12/30/2019     12/30/2019    POTASSIUM 3.9 12/30/2019    CHLORIDE 105 12/30/2019    CO2 27 12/30/2019    ANIONGAP 7 12/30/2019    GLC 90 12/30/2019    BUN 24 12/30/2019    CR 0.79 12/30/2019    GFRESTIMATED >90 12/30/2019    GFRESTBLACK >90 12/30/2019    VIJI 8.8 12/30/2019    CHOL 180 02/07/2020    TRIG 95 02/07/2020    HDL 85 02/07/2020    LDL 76 02/07/2020    NHDL 95 02/07/2020    UCRR 14 09/27/2019    MICROL <5 09/27/2019    UMALCR Unable to calculate due to low value 09/27/2019     Previous Lasik surgery at [facility] 2015  Last eye exam 1/2019 with Dr. Shultz, no DR reported  Plans to see practitioner at [facility] soon  DM Complications: none known        Thyroid:  2011. History of hypothyroidism  Takes levothyroxine medication  Recent [facility] labs include:          Lab Results   Component Value Date    TSH 2.28 02/07/2020    T4 0.92 06/25/2019     Eary 10/2018. Dose incease to levothyroxine 0.05 mg daily per GYN physician  Current dose:  Levothyroxine 0.05 mg daily        , lives in [place],  [place], daughter Myra and stepchildren Tisha + Anoop  Works for [employer] as   Now sees Beba Dailey PAC/[facility] for general medicine evaluations.  Also sees Dr. Ashford/[facility]        ROS: 10 point ROS  neg other than the symptoms noted above in the HPI.     GENERAL: energy good, wt stable; denies fevers, chills, night sweats.   HEENT: no dysphagia, odonophagia, diplopia, neck pain  THYROID:  no apparent hyper or hypothyroid symptoms  CV: no chest pain, pressure, palpitations  LUNGS: no SOB, CLIFFORD, cough, wheezing   ABDOMEN: some postmeal indigestion and mild nausea; denies diarrhea, constipation, abdominal pain  EXTREMITIES: no rashes, ulcers, edema  NEUROLOGY: no headaches, denies changes in vision, tingling, extremitiy numbness   MSK: no muscle aches or pains, weakness  SKIN: no rashes or lesions  : regular menstrual cycles?  PSYCH:  stable mood, no significant anxiety or depression  ENDOCRINE: no heat or cold intolerance     Physical Exam (visual exam)  VS:  no vital signs taken for video visit  GENERAL: healthy, alert and NAD, well dressed, answering questions appropriately  EYES: eyes grossly normal to inspection, conjunctivae and sclerae normal, no exophthalmos or proptosis  THYROID:  no apparent nodules or goiter  LUNGS: no audible wheeze, cough or visible cyanosis, no visible retractions or increased work of breathing  ABDOMEN: abdomen not evaluated  EXTREMITIES: no hand tremors, limited exam  NEUROLOGY: CN grossly intact, mentation intact and speech normal   PSYCH: mentation appears normal, affect normal/bright, judgement and insight intact, normal speech and appearance well groomed        LABS:     All pertinent notes, labs, and images personally reviewed by me.      A/P:          Encounter Diagnoses   Name       Type 1 diabetes mellitus without complication (H)       Insulin pump status       Hypothyroidism due to Hashimoto's thyroiditis        Comments:  Reviewed health history, diabetes and thyroid issues.  Recent glucose control good.     Plan:  Discussed general issues with the diabetes diagnosis and management  We discussed the hgbA1c test which reflects previous overall glucose levels or  "control  Discussed the importance of blood glucose (BG) testing to assess glucose trends  Provided general overview of the multiple daily injection (MDI) plan using rapid acting mealtime and longacting insulin medications  Reviewed recent Dexcom CGMS glucose sensor report, in detail     Recommend:  Continue the insulin pump and diabetes management  Discussed use of the off-label Jardiance medication.    Pump setting changes:              Basals (Regular): 3-5p 0.3 to 0.25 unit(s)/hr     Test BG levels premeal and bedtime and/or use Dexcom CGMS sensor  Lab testing:   Plan non-fasting labs at [facility] soon   Lab orders placed  Monitor for symptom changes  We have previously discussed her concerns about the CGMS interpretation billing and her expense  Discussed her use of the \"sugar mate\" iPhone erinn with food diary, exercise, Dexcom trends  Advise having fasting lipid panel testing and dilated eye examination, at least annually  Continue current levothyroxine daily dose     Keep regular f/u GYN evaluations  Addressed patient questions today     More than 50% of the time spent with Mrs. Smith on counseling / coordinating her care.  Total appointment time was 25 minutes.     Follow-up:  ~1/2021     DARI Gaming MD, MS  Endocrinology  [facility]        Video-Visit Details    Type of service:  Video Visit    Video Start Time: 9:10 am  Video End Time: 9:35 am    Originating Location (pt. Location): home    Distant Location (provider location):  [facility]/[place]    Platform used for Video Visit: Katerine          "

## 2020-10-12 ENCOUNTER — MYC MEDICAL ADVICE (OUTPATIENT)
Dept: ENDOCRINOLOGY | Facility: CLINIC | Age: 45
End: 2020-10-12

## 2020-10-13 DIAGNOSIS — E06.3 HYPOTHYROIDISM DUE TO HASHIMOTO'S THYROIDITIS: ICD-10-CM

## 2020-10-13 DIAGNOSIS — E13.9 DIABETES MELLITUS TYPE 1.5, MANAGED AS TYPE 2 (H): ICD-10-CM

## 2020-10-13 LAB
ANION GAP SERPL CALCULATED.3IONS-SCNC: 5 MMOL/L (ref 3–14)
BUN SERPL-MCNC: 15 MG/DL (ref 7–30)
CALCIUM SERPL-MCNC: 8.8 MG/DL (ref 8.5–10.1)
CHLORIDE SERPL-SCNC: 105 MMOL/L (ref 94–109)
CO2 SERPL-SCNC: 26 MMOL/L (ref 20–32)
CREAT SERPL-MCNC: 0.87 MG/DL (ref 0.52–1.04)
GFR SERPL CREATININE-BSD FRML MDRD: 81 ML/MIN/{1.73_M2}
GLUCOSE SERPL-MCNC: 118 MG/DL (ref 70–99)
HBA1C MFR BLD: 5.9 % (ref 0–5.6)
POTASSIUM SERPL-SCNC: 4.1 MMOL/L (ref 3.4–5.3)
SODIUM SERPL-SCNC: 136 MMOL/L (ref 133–144)
T4 FREE SERPL-MCNC: 0.89 NG/DL (ref 0.76–1.46)
TSH SERPL DL<=0.005 MIU/L-ACNC: 1.82 MU/L (ref 0.4–4)

## 2020-10-13 PROCEDURE — 83036 HEMOGLOBIN GLYCOSYLATED A1C: CPT | Performed by: INTERNAL MEDICINE

## 2020-10-13 PROCEDURE — 84443 ASSAY THYROID STIM HORMONE: CPT | Performed by: INTERNAL MEDICINE

## 2020-10-13 PROCEDURE — 80048 BASIC METABOLIC PNL TOTAL CA: CPT | Performed by: INTERNAL MEDICINE

## 2020-10-13 PROCEDURE — 84439 ASSAY OF FREE THYROXINE: CPT | Performed by: INTERNAL MEDICINE

## 2020-11-11 ENCOUNTER — VIRTUAL VISIT (OUTPATIENT)
Dept: FAMILY MEDICINE | Facility: CLINIC | Age: 45
End: 2020-11-11
Payer: COMMERCIAL

## 2020-11-11 DIAGNOSIS — M54.50 ACUTE BILATERAL LOW BACK PAIN WITHOUT SCIATICA: Primary | ICD-10-CM

## 2020-11-11 PROCEDURE — 99213 OFFICE O/P EST LOW 20 MIN: CPT | Mod: 95 | Performed by: NURSE PRACTITIONER

## 2020-11-11 RX ORDER — CYCLOBENZAPRINE HCL 5 MG
5-10 TABLET ORAL 2 TIMES DAILY PRN
Qty: 30 TABLET | Refills: 1 | Status: SHIPPED | OUTPATIENT
Start: 2020-11-11 | End: 2021-02-02

## 2020-11-11 NOTE — PATIENT INSTRUCTIONS
AMERICO Tadeo (now with [facility])    Address: [address] [place] [address]    Phone: [phone]    I go to [place] and see Jeanmarie Braxton.       Take Tylenol, Ibuprofen or Aleve for pain.   Ibuprofen and Aleve are both antiinflammatories so you should never take these together during the same 24 hour period.  If you take a high dose of Ibuprofen, do not take it consistently for more than 5 days   Tylenol only helps with pain and does not help with inflammation. Tylenol is the safest option if you have kidney or heart history.  You have to take at least 600mg of ibuprofen to get the antiinflammatory affect. 200-400mg of Ibuprofen will only help with pain.  It is ok to take Tylenol with Ibuprofen or Aleve  Do not take more than:  Tylenol (acetaminophen)  1000 mg 3 times a day  Ibuprofen (Advil/Motrin) 600 4 times a day or 800 mg 3 times a day WITH FOOD  Aleve 220mg 2 pills twice a day WITH FOOD

## 2020-11-11 NOTE — PROGRESS NOTES
"Jenise Smith is a 45 year old female who is being evaluated via a billable video visit.      The patient has been notified of following:     \"This video visit will be conducted via a call between you and your physician/provider. We have found that certain health care needs can be provided without the need for an in-person physical exam.  This service lets us provide the care you need with a video conversation.  If a prescription is necessary we can send it directly to your pharmacy.  If lab work is needed we can place an order for that and you can then stop by our lab to have the test done at a later time.    Video visits are billed at different rates depending on your insurance coverage.  Please reach out to your insurance provider with any questions.    If during the course of the call the physician/provider feels a video visit is not appropriate, you will not be charged for this service.\"    Patient has given verbal consent for Video visit? Yes  How would you like to obtain your AVS? MyChart  If you are dropped from the video visit, the video invite should be resent to: Text to cell phone: [phone]  Will anyone else be joining your video visit? No    Subjective     Jenise Smith is a 45 year old female who presents today via video visit for the following health issues:    HPI     Severe Back pain  This morning shoveled and then picked up the salt bucket.  Didn't really hurt at the time, just felt slightly sore/tender  Several hours later got severe spasms where she had to just lay down   Pain is across the entire low back   Her L hip is starting to hurt   No paresthesias   Has seen PT in the past. Went to [place]. Had an epidural about 9 years ago   No B/B changes     Video Start Time: 5:00 PM    Past Medical History:   Diagnosis Date     Diabetes mellitus type 1 (H)      Dysmenorrhea      Excessive or frequent menstruation      Hypothyroid      Insulin pump in place      Lumbar back pain      Other " and unspecified hyperlipidemia      PONV (postoperative nausea and vomiting)      Family History   Problem Relation Age of Onset     Diabetes Father         type 1     Diabetes Sister         type 1     Thyroid Disease Sister      Diabetes Mother         type 2     Breast Cancer Paternal Grandmother      Cancer - colorectal Paternal Uncle 60     Past Surgical History:   Procedure Laterality Date     DILATION AND CURETTAGE, HYSTEROSCOPY, ABLATE ENDOMETRIUM NOVASURE, COMBINED  1/23/2014    Procedure: COMBINED DILATION AND CURETTAGE, HYSTEROSCOPY, ABLATE ENDOMETRIUM NOVASURE;  HYSTEROSCOPY, DILATION, CURETTAGE, WITH NOVASURE ABLATION, MYOSURE MORCELLATOR;  Surgeon: Swetha Queen MD;  Location: [facility]     LAPAROSCOPY       LEEP TX, CERVICAL       OPERATIVE HYSTEROSCOPY WITH MORCELLATOR  1/23/2014    Procedure: OPERATIVE HYSTEROSCOPY WITH MORCELLATOR;;  Surgeon: Swetha Queen MD;  Location: [facility]     wisdom teeth[       Social History     Tobacco Use     Smoking status: Never Smoker     Smokeless tobacco: Never Used   Substance Use Topics     Alcohol use: Yes     Alcohol/week: 0.0 standard drinks     Comment: 3 per week     Current Outpatient Medications   Medication Sig Dispense Refill     amoxicillin-clavulanate (AUGMENTIN) 875-125 MG tablet Take 1 tablet by mouth 2 times daily 20 tablet 0     blood glucose (ACCU-CHEK FASTCLIX) lancing device 1 Dose once daily.       blood glucose monitoring (FABRICIO CONTOUR NEXT) test strip 1 Dose 6 times daily. Test 6-7 times daily       blood glucose monitoring (NO BRAND SPECIFIED) test strip Use to test blood sugar 6 times daily or as directed. 550 strip 3     eszopiclone (LUNESTA) 2 MG tablet Take 1 tablet (2 mg) by mouth At Bedtime 30 tablet 1     FLUoxetine (PROZAC) 20 MG capsule TAKE 1 CAPSULE BY MOUTH  DAILY 90 capsule 1     glucagon (GLUCAGON EMERGENCY) 1 MG kit Inject 1 mg into the muscle once for 1 dose 1 mg 2     insulin glargine (LANTUS  "PEN) 100 UNIT/ML pen Inject 16 units subcutaneous daily as directed 15 mL 11     insulin lispro (HUMALOG KWIKPEN) 100 UNIT/ML (1 unit dial) KWIKPEN Inject 3-8 units subcutaneous with meals and correction doses as directed, total daily dose approx 25 units 15 mL 1     insulin lispro (HUMALOG VIAL) 100 UNIT/ML vial Use with insulin pump, total daily dose approx 70 units 70 mL 3     insulin pen needle (BD RYAN U/F) 32G X 4 MM miscellaneous Use 5 pen needles daily or as directed. 450 each 1     insulin syringe 31G X 5/16\" 0.5 ML MISC Use for insulin injections up to 4x daily 100 each 1     JARDIANCE 10 MG TABS tablet TAKE 1 TABLET BY MOUTH  DAILY 90 tablet 0     levothyroxine (SYNTHROID/LEVOTHROID) 50 MCG tablet Take 1 tablet (50 mcg) by mouth daily 90 tablet 3     metFORMIN (GLUCOPHAGE-XR) 500 MG 24 hr tablet Take 1 tablet (500 mg) by mouth daily (with dinner) 7 tablet 0     metroNIDAZOLE (METROGEL) 0.75 % vaginal gel Place 1 applicator (5 g) vaginally 2 times daily 70 g 1     Multiple Vitamins-Minerals (MULTI FOR HER PO)        VITAMIN D, CHOLECALCIFEROL, PO Take 1,000 Units by mouth       Allergies   Allergen Reactions     Keflex [Cephalexin]      nausea     Latex      Facial redness       Reviewed and updated as needed this visit by clinical staff and provider     Review of Systems   Detailed as above       Objective           Vitals:  No vitals were obtained today due to virtual visit.    Physical Exam     GENERAL: Healthy, alert and no distress  EYES: Eyes grossly normal to inspection.  No discharge or erythema, or obvious scleral/conjunctival abnormalities.  RESP: No audible wheeze, cough, or visible cyanosis.  No visible retractions or increased work of breathing.    SKIN: Visible skin clear. No significant rash, abnormal pigmentation or lesions.  NEURO: Cranial nerves grossly intact.  Mentation and speech appropriate for age.  PSYCH: Mentation appears normal, affect normal/bright, judgement and insight intact, " normal speech and appearance well-groomed.              Assessment & Plan     Acute bilateral low back pain without sciatica  Diffuse LBP. No red flags. Will start with PT, tyl/ibu and muscle relaxant. Continue OTC pain patch and heat. Call with concerns  - cyclobenzaprine (FLEXERIL) 5 MG tablet; Take 1-2 tablets (5-10 mg) by mouth 2 times daily as needed for muscle spasms  - [facility], AND CHIROPRACTIC REFERRAL; Future      No follow-ups on file.    NANCY Quiroga CNP  [facility]      Video-Visit Details    Type of service:  Video Visit    Video End Time:5:14 PM    Originating Location (pt. Location): Home    Distant Location (provider location):  [facility]     Platform used for Video Visit: [employer]

## 2020-11-12 DIAGNOSIS — E13.9 DIABETES MELLITUS TYPE 1.5, MANAGED AS TYPE 2 (H): ICD-10-CM

## 2020-11-16 RX ORDER — EMPAGLIFLOZIN 10 MG/1
TABLET, FILM COATED ORAL
Qty: 90 TABLET | Refills: 0 | Status: SHIPPED | OUTPATIENT
Start: 2020-11-16 | End: 2021-02-15

## 2020-11-18 ENCOUNTER — TRANSFERRED RECORDS (OUTPATIENT)
Dept: HEALTH INFORMATION MANAGEMENT | Facility: CLINIC | Age: 45
End: 2020-11-18

## 2020-12-21 ENCOUNTER — TRANSFERRED RECORDS (OUTPATIENT)
Dept: HEALTH INFORMATION MANAGEMENT | Facility: CLINIC | Age: 45
End: 2020-12-21

## 2021-01-08 DIAGNOSIS — F51.01 PRIMARY INSOMNIA: ICD-10-CM

## 2021-01-08 RX ORDER — ESZOPICLONE 2 MG/1
TABLET, FILM COATED ORAL
Qty: 30 TABLET | Refills: 1 | Status: SHIPPED | OUTPATIENT
Start: 2021-01-08 | End: 2021-06-22

## 2021-01-08 NOTE — TELEPHONE ENCOUNTER
Routing refill request to provider for review/approval because:  Drug not on the [facility] refill protocol     ALE MonkN, RN  [employer] [facility]

## 2021-01-19 ENCOUNTER — TELEPHONE (OUTPATIENT)
Dept: FAMILY MEDICINE | Facility: CLINIC | Age: 46
End: 2021-01-19

## 2021-01-19 ENCOUNTER — TRANSFERRED RECORDS (OUTPATIENT)
Dept: HEALTH INFORMATION MANAGEMENT | Facility: CLINIC | Age: 46
End: 2021-01-19

## 2021-01-19 LAB — RETINOPATHY: NEGATIVE

## 2021-01-19 NOTE — TELEPHONE ENCOUNTER
Prior Authorization Retail Medication Request    Medication/Dose: JARDIANCE 10 MG TABS tablet  ICD code (if different than what is on RX):  E13.9  Previously Tried and Failed:    Rationale:      Insurance Name:  [employer]  Insurance ID:  [id number]        Pharmacy Information (if different than what is on RX)  Name:  [facility]  Phone:  [phone]

## 2021-01-21 NOTE — TELEPHONE ENCOUNTER
Prior Authorization Approval    Authorization Effective Date: 1/21/2020  Authorization Expiration Date: 1/21/2022  Medication: JARDIANCE 10 MG-APPROVED  Approved Dose/Quantity:    Reference #:     Insurance Company: [employer] ([facility]) - Phone [phone] Fax [phone]  Expected CoPay:       CoPay Card Available:      Foundation Assistance Needed:    Which Pharmacy is filling the prescription (Not needed for infusion/clinic administered): [facility] - [address]  Pharmacy Notified: Yes  Patient Notified: Yes  **Instructed pharmacy to notify patient when script is ready to /ship.**

## 2021-01-21 NOTE — TELEPHONE ENCOUNTER
Central Prior Authorization Team   Phone: [phone]    PA Initiation    Medication: JARDIANCE 10 MG TABS tablet  Insurance Company: [employer] ([facility]) - Phone [phone] Fax [phone]  Pharmacy Filling the Rx: [facility] - [address]  Filling Pharmacy Phone: [phone]  Filling Pharmacy Fax: [phone]  Start Date: 1/21/2021

## 2021-01-30 DIAGNOSIS — E13.9 DIABETES MELLITUS TYPE 1.5, MANAGED AS TYPE 2 (H): ICD-10-CM

## 2021-02-02 ENCOUNTER — OFFICE VISIT (OUTPATIENT)
Dept: FAMILY MEDICINE | Facility: CLINIC | Age: 46
End: 2021-02-02
Payer: COMMERCIAL

## 2021-02-02 VITALS
DIASTOLIC BLOOD PRESSURE: 56 MMHG | HEART RATE: 62 BPM | TEMPERATURE: 96.8 F | WEIGHT: 150 LBS | OXYGEN SATURATION: 98 % | BODY MASS INDEX: 23.54 KG/M2 | SYSTOLIC BLOOD PRESSURE: 106 MMHG | HEIGHT: 67 IN

## 2021-02-02 DIAGNOSIS — E03.8 OTHER SPECIFIED HYPOTHYROIDISM: ICD-10-CM

## 2021-02-02 DIAGNOSIS — E78.5 HYPERLIPIDEMIA WITH TARGET LDL LESS THAN 100: ICD-10-CM

## 2021-02-02 DIAGNOSIS — F41.9 ANXIETY: ICD-10-CM

## 2021-02-02 DIAGNOSIS — E13.9 DIABETES MELLITUS TYPE 1.5, MANAGED AS TYPE 2 (H): ICD-10-CM

## 2021-02-02 DIAGNOSIS — Z00.00 ROUTINE GENERAL MEDICAL EXAMINATION AT A HEALTH CARE FACILITY: Primary | ICD-10-CM

## 2021-02-02 LAB
CHOLEST SERPL-MCNC: 147 MG/DL
GLUCOSE SERPL-MCNC: 186 MG/DL (ref 70–99)
HDLC SERPL-MCNC: 73 MG/DL
LDLC SERPL CALC-MCNC: 60 MG/DL
NONHDLC SERPL-MCNC: 74 MG/DL
TRIGL SERPL-MCNC: 72 MG/DL
TSH SERPL DL<=0.005 MIU/L-ACNC: 2.43 MU/L (ref 0.4–4)

## 2021-02-02 PROCEDURE — 36415 COLL VENOUS BLD VENIPUNCTURE: CPT | Performed by: PHYSICIAN ASSISTANT

## 2021-02-02 PROCEDURE — 84443 ASSAY THYROID STIM HORMONE: CPT | Performed by: PHYSICIAN ASSISTANT

## 2021-02-02 PROCEDURE — 80061 LIPID PANEL: CPT | Performed by: PHYSICIAN ASSISTANT

## 2021-02-02 PROCEDURE — 99396 PREV VISIT EST AGE 40-64: CPT | Performed by: PHYSICIAN ASSISTANT

## 2021-02-02 PROCEDURE — 82947 ASSAY GLUCOSE BLOOD QUANT: CPT | Performed by: PHYSICIAN ASSISTANT

## 2021-02-02 RX ORDER — FLUOXETINE 10 MG/1
10 TABLET, FILM COATED ORAL DAILY
Qty: 30 TABLET | Refills: 0 | Status: SHIPPED | OUTPATIENT
Start: 2021-02-02 | End: 2021-06-25

## 2021-02-02 ASSESSMENT — MIFFLIN-ST. JEOR: SCORE: 1368.03

## 2021-02-02 NOTE — PROGRESS NOTES
SUBJECTIVE:   CC: Jenise Smith is an 45 year old woman who presents for preventive health visit.       She has been monitoring her calories and hasn't lost weight so wants thyroid rechecked  She is exercising and wonders if she could taper off of prozac  She is lifting weight and has been able to reduce her insulin  She has an exercise room at home  Her goal weight is 135lbs    Followed by endo for IDDM    Only taking lunesta about twice per month    She is having L post neck pain starting in the past month  She uses a headset for work  Working on her posture    Patient has been advised of split billing requirements and indicates understanding: Yes  Healthy Habits:     Getting at least 3 servings of Calcium per day:  Yes    Bi-annual eye exam:  Yes    Dental care twice a year:  Yes    Sleep apnea or symptoms of sleep apnea:  None    Diet:  Diabetic    Frequency of exercise:  6-7 days/week    Duration of exercise:  45-60 minutes    Taking medications regularly:  Yes    Barriers to taking medications:  None    Medication side effects:  None    PHQ-2 Total Score: 0    Additional concerns today:  Yes      Today's PHQ-2 Score:   PHQ-2 ( 1999 Pfizer) 1/30/2021   Q1: Little interest or pleasure in doing things 0   Q2: Feeling down, depressed or hopeless 0   PHQ-2 Score 0   Q1: Little interest or pleasure in doing things Not at all   Q2: Feeling down, depressed or hopeless Not at all   PHQ-2 Score 0       Abuse: Current or Past (Physical, Sexual or Emotional) - No  Do you feel safe in your environment? Yes        Social History     Tobacco Use     Smoking status: Never Smoker     Smokeless tobacco: Never Used   Substance Use Topics     Alcohol use: Yes     Alcohol/week: 0.0 standard drinks     Comment: 3 per week     If you drink alcohol do you typically have >3 drinks per day or >7 drinks per week? No    Alcohol Use 1/30/2021   Prescreen: >3 drinks/day or >7 drinks/week? No   Prescreen: >3 drinks/day or >7  drinks/week? -   No flowsheet data found.    History of abnormal Pap smear: NO - age 30- 65 PAP every 3 years recommended  PAP / HPV Latest Ref Rng & Units 1/22/2019   PAP - NIL   HPV 16 DNA NEG:Negative Negative   HPV 18 DNA NEG:Negative Negative   OTHER HR HPV NEG:Negative Negative     Reviewed and updated as needed this visit by clinical staff  Tobacco  Allergies  Meds              Reviewed and updated as needed this visit by Provider                Past Medical History:   Diagnosis Date     Diabetes mellitus type 1 (H)      Dysmenorrhea      Excessive or frequent menstruation      Hypothyroid      Insulin pump in place      Lumbar back pain      Other and unspecified hyperlipidemia      PONV (postoperative nausea and vomiting)      Past Surgical History:   Procedure Laterality Date     DILATION AND CURETTAGE, HYSTEROSCOPY, ABLATE ENDOMETRIUM NOVASURE, COMBINED  1/23/2014    Procedure: COMBINED DILATION AND CURETTAGE, HYSTEROSCOPY, ABLATE ENDOMETRIUM NOVASURE;  HYSTEROSCOPY, DILATION, CURETTAGE, WITH NOVASURE ABLATION, MYOSURE MORCELLATOR;  Surgeon: Swetha Queen MD;  Location: [facility]     LAPAROSCOPY       LEEP TX, CERVICAL       OPERATIVE HYSTEROSCOPY WITH MORCELLATOR  1/23/2014    Procedure: OPERATIVE HYSTEROSCOPY WITH MORCELLATOR;;  Surgeon: Swetha Queen MD;  Location: [facility]     wisdom teeth[       Current Outpatient Medications   Medication Sig Dispense Refill     blood glucose (ACCU-CHEK FASTCLIX) lancing device 1 Dose once daily.       blood glucose monitoring (FABRICIO CONTOUR NEXT) test strip 1 Dose 6 times daily. Test 6-7 times daily       blood glucose monitoring (NO BRAND SPECIFIED) test strip Use to test blood sugar 6 times daily or as directed. 550 strip 3     eszopiclone (LUNESTA) 2 MG tablet TAKE 1 TABLET BY MOUTH AT  BEDTIME 30 tablet 1     FLUoxetine (PROZAC) 10 MG tablet Take 1 tablet (10 mg) by mouth daily 30 tablet 0     glucagon (GLUCAGON EMERGENCY) 1 MG  "kit Inject 1 mg into the muscle once for 1 dose 1 mg 2     insulin glargine (LANTUS PEN) 100 UNIT/ML pen Inject 16 units subcutaneous daily as directed 15 mL 11     insulin lispro (HUMALOG KWIKPEN) 100 UNIT/ML (1 unit dial) KWIKPEN Inject 3-8 units subcutaneous with meals and correction doses as directed, total daily dose approx 25 units 15 mL 1     insulin lispro (HUMALOG) 100 UNIT/ML vial USE VIA INSULIN PUMP A  TOTAL DAILY DOSE OF  APPROXIMATELY 70 UNITS 70 mL 0     JARDIANCE 10 MG TABS tablet TAKE 1 TABLET BY MOUTH  DAILY 90 tablet 0     levothyroxine (SYNTHROID/LEVOTHROID) 50 MCG tablet Take 1 tablet (50 mcg) by mouth daily 90 tablet 3     Multiple Vitamins-Minerals (MULTI FOR HER PO)        STATIN NOT PRESCRIBED (INTENTIONAL) Please choose reason not prescribed from choices below.       VITAMIN D, CHOLECALCIFEROL, PO Take 1,000 Units by mouth           Review of Systems  CONSTITUTIONAL: NEGATIVE for fever, chills, change in weight  INTEGUMENTARU/SKIN: NEGATIVE for worrisome rashes, moles or lesions  EYES: NEGATIVE for vision changes or irritation  ENT: NEGATIVE for ear, mouth and throat problems  RESP: NEGATIVE for significant cough or SOB  BREAST: NEGATIVE for masses, tenderness or discharge  CV: NEGATIVE for chest pain, palpitations or peripheral edema  GI: NEGATIVE for nausea, abdominal pain, heartburn, or change in bowel habits  : NEGATIVE for unusual urinary or vaginal symptoms. Periods are regular.  MUSCULOSKELETAL: NEGATIVE for significant arthralgias or myalgia  NEURO: NEGATIVE for weakness, dizziness or paresthesias  PSYCHIATRIC: NEGATIVE for changes in mood or affect     OBJECTIVE:   /56 (BP Location: Right arm, Patient Position: Chair, Cuff Size: Adult Regular)   Pulse 62   Temp 96.8  F (36  C) (Temporal)   Ht 1.702 m (5' 7\")   Wt 68 kg (150 lb)   LMP 01/31/2021   SpO2 98%   BMI 23.49 kg/m    Physical Exam  GENERAL: healthy, alert and no distress  EYES: Eyes grossly normal to " inspection, PERRL and conjunctivae and sclerae normal  HENT: ear canals and TM's normal, nose and mouth without ulcers or lesions  NECK: no adenopathy, no asymmetry, masses, or scars and thyroid normal to palpation  RESP: lungs clear to auscultation - no rales, rhonchi or wheezes  BREAST: normal without masses, tenderness or nipple discharge and no palpable axillary masses or adenopathy  CV: regular rate and rhythm, normal S1 S2, no S3 or S4, no murmur, click or rub, no peripheral edema and peripheral pulses strong  ABDOMEN: soft, nontender, no hepatosplenomegaly, no masses and bowel sounds normal  MS: no gross musculoskeletal defects noted, no edema  SKIN: no suspicious lesions or rashes  NEURO: Normal strength and tone, mentation intact and speech normal  PSYCH: mentation appears normal, affect normal/bright        ASSESSMENT/PLAN:   Assessment and Plan:     (Z00.00) Routine general medical examination at a health care facility  (primary encounter diagnosis)  Comment: mammogram is up to date. Pap is not due this year  Plan: Glucose            (F41.9) Anxiety  Comment: she would like to taper off of prozac so okay to taper from 20 to 10mg every day for a few weeks, then every other day for 2 weeks and then off.  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  Plan: TSH with free T4 reflex                Patient has been advised of split billing  "requirements and indicates understanding: Yes  COUNSELING:  Reviewed preventive health counseling, as reflected in patient instructions    Estimated body mass index is 23.49 kg/m  as calculated from the following:    Height as of this encounter: 1.702 m (5' 7\").    Weight as of this encounter: 68 kg (150 lb).        She reports that she has never smoked. She has never used smokeless tobacco.      Counseling Resources:  ATP IV Guidelines  Pooled Cohorts Equation Calculator  Breast Cancer Risk Calculator  BRCA-Related Cancer Risk Assessment: FHS-7 Tool  FRAX Risk Assessment  ICSI Preventive Guidelines  Dietary Guidelines for Americans, 2010  USDA's MyPlate  ASA Prophylaxis  Lung CA Screening    Beba Dailey PA-C  [facility]  "

## 2021-02-04 NOTE — RESULT ENCOUNTER NOTE
Jenise,    Your cholesterol profile looks great.  Your thyroid is in normal range.    Beba Dailey PA-C

## 2021-02-15 ENCOUNTER — VIRTUAL VISIT (OUTPATIENT)
Dept: ENDOCRINOLOGY | Facility: CLINIC | Age: 46
End: 2021-02-15
Payer: COMMERCIAL

## 2021-02-15 DIAGNOSIS — E06.3 HYPOTHYROIDISM DUE TO HASHIMOTO'S THYROIDITIS: ICD-10-CM

## 2021-02-15 DIAGNOSIS — Z96.41 INSULIN PUMP STATUS: Primary | ICD-10-CM

## 2021-02-15 DIAGNOSIS — E13.9 DIABETES MELLITUS TYPE 1.5, MANAGED AS TYPE 2 (H): ICD-10-CM

## 2021-02-15 PROCEDURE — 99214 OFFICE O/P EST MOD 30 MIN: CPT | Mod: 95 | Performed by: INTERNAL MEDICINE

## 2021-02-15 NOTE — PROGRESS NOTES
Jenise is a 46 year old who is being evaluated via a billable video visit.      How would you like to obtain your AVS? Reviewed verbally  If the video visit is dropped, the invitation should be resent by: Cell phone  Will anyone else be joining your video visit? No     Video Start Time: 8:00 am       Recent issues:  Diabetes and thyroid followup  Feeling well overall, no new health issues reported  Taking the low dose (off label) Jardiance medication, reports improvement with glucose control           Diabetes:  Previous diagnosis of IFG with high GAD65 Ab level  6/2011. Developed acute hyperglycemia and diagnosis of T1DM  Treatment with insulin medication, MDI plan  2/2013. Changed to OmniPod pump with Novolog insulin  Used DexcomG5 CGMS system  5/2017. Changed to Medtronic 630G pump  9/2017. Upgraded to Medtronic 670G pump and Guardian3 sensor, Humalog insulin  8/2018. Discontinued Medtronic pump and sensor, changed to MDI plan              Had taken Basaglar 17U at bedtime and mealtime Humalog  Had taken Basaglar 13U at bedtime, Humalog Luxura pen 1:7 at mealtime, ISF 60, goal target  mg/dl  12/2018. Restarted OmniPod pump              Humalog in pump              Jardiance 10 mg each morning     Using OmniPod Dash PDM  Previous pump settings:    Using DexcomG6 CGMS data:           Has reduced hypoglycemia awareness              Has chocolate lab (Juliet), trained to recognize hypoglycemia smell/symptoms  Using Contour Next Link BG meter, variable testing, tests 6x/day  Exercises with classes at health club- yoga, strength              Tried Temp Target with exercise, noticed higher glucose levels so not used  Previous [facility] labs include:  Lab Results   Component Value Date    A1C 5.9 (H) 10/13/2020     10/13/2020    POTASSIUM 4.1 10/13/2020    CHLORIDE 105 10/13/2020    CO2 26 10/13/2020    ANIONGAP 5 10/13/2020     (H) 02/02/2021    BUN 15 10/13/2020    CR 0.87 10/13/2020    GFRESTIMATED 81  10/13/2020    GFRESTBLACK >90 10/13/2020    VIJI 8.8 10/13/2020    CHOL 147 02/02/2021    TRIG 72 02/02/2021    HDL 73 02/02/2021    LDL 60 02/02/2021    NHDL 74 02/02/2021    UCRR 14 09/27/2019    MICROL <5 09/27/2019    UMALCR Unable to calculate due to low value 09/27/2019     Previous Lasik surgery at [facility] 2015  Last eye exam 1/2019 with Dr. Shultz, no DR reported  Plans to see practitioner at [facility] soon  DM Complications: none known        Thyroid:  2011. History of hypothyroidism  Takes levothyroxine medication  Recent [facility] labs include:  Lab Results   Component Value Date    TSH 2.43 02/02/2021    T4 0.89 10/13/2020     Eary 10/2018. Dose incease to levothyroxine 0.05 mg daily per GYN physician  Current dose:  Levothyroxine 0.05 mg daily        , lives in [place],  [place], daughter Myra and stepchildren Tisha Davalos  Has chocolate lab (Juliet), trained to recognize hypoglycemia smell/symptoms  Works for [employer] as   Now sees Beba Dailey PAC/[facility] for general medicine evaluations.  Also sees Dr. Ashford/[facility]        ROS: 10 point ROS neg other than the symptoms noted above in the HPI.     GENERAL: energy good, wt stable; denies fevers, chills, night sweats.   HEENT: no dysphagia, odonophagia, diplopia, neck pain  THYROID:  no apparent hyper or hypothyroid symptoms  CV: no chest pain, pressure, palpitations  LUNGS: no SOB, CLIFFORD, cough, wheezing   ABDOMEN: denies nausea, diarrhea, constipation, abdominal pain  EXTREMITIES: no rashes, ulcers, edema  NEUROLOGY: no headaches, denies changes in vision, tingling, extremitiy numbness   MSK: no muscle aches or pains, weakness  SKIN: no rashes or lesions  : regular menstrual cycles Q 28-30 days  PSYCH:  stable mood, no significant anxiety or depression  ENDOCRINE: no heat or cold intolerance     Physical Exam (visual exam)  VS:  no vital signs taken for video visit  CONSTITUTIONAL: healthy,  alert and NAD, well dressed, answering questions appropriately  ENT: no nose swelling or nasal discharge, mouth redness or gum changes.  EYES: eyes grossly normal to inspection, conjunctivae and sclerae normal, no exophthalmos or proptosis  THYROID:  no apparent nodules or goiter  LUNGS: no audible wheeze, cough or visible cyanosis, no visible retractions or increased work of breathing  ABDOMEN: abdomen not evaluated  EXTREMITIES: no hand tremors, limited exam  NEUROLOGY: CN grossly intact, mentation intact and speech normal   SKIN:  no apparent skin lesions, rash, or edema with visualized skin appearance  PSYCH: mentation appears normal, affect normal/bright, judgement and insight intact,   normal speech and appearance well groomed       LABS:     All pertinent notes, labs, and images personally reviewed by me.      A/P:          Encounter Diagnoses   Name       Type 1 diabetes mellitus without complication (H)       Insulin pump status       Hypothyroidism due to Hashimoto's thyroiditis        Comments:  Reviewed health history, diabetes and thyroid issues.  Recent glucose control good but some hypoglycemia trends  Reviewed and interpreted tests that I previously ordered. Ordered appropriate tests for the endocrinology disease management.    Management options discussed and implemented after shared medical decision making with the patient.     Plan:  Discussed general issues with the diabetes diagnosis and management  We discussed the hgbA1c test which reflects previous overall glucose levels or control  Discussed the importance of blood glucose (BG) testing to assess glucose trends  Provided general overview of the multiple daily injection (MDI) plan using rapid acting mealtime and longacting insulin medications  Reviewed recent Dexcom CGMS glucose sensor report, in detail     Recommend:  Continue the insulin pump and diabetes management  Discussed use of the off-label Jardiance medication.    Pump setting  "changes:   Basals:  2a 0.55 to 0.5, 10a 0.7 to 0.65 unit(s)/hr     Test BG levels premeal and bedtime and/or use Dexcom CGMS sensor   Noted her concerns about the CGMS interpretation billing and her expense  No lab testing needed at this time  Monitor for symptom changes  We have discussed her use of the \"sugar mate\" iPhone erinn with food diary, exercise, Dexcom trends  Advise having fasting lipid panel testing and dilated eye examination, at least annually  Continue current levothyroxine daily dose     Keep regular f/u PCP and GYN evaluations  Addressed patient questions today     Total time spent in with the patient evaluation:  25 min  Additional time spent reviewing pertinent lab tests and chart notes, and documentation:  5 min    Follow-up:  5/2021 or 6/2021    DARI Gaming MD, MS  Endocrinology  [facility]    CC:  ANA Dailey        Video-Visit Details    Type of service:  Video Visit    Video End Time:    Originating Location (pt. Location): 8:25 am    Distant Location (provider location):  [facility]/MD.Voice    Platform used for Video Visit: Katerine"

## 2021-03-05 ENCOUNTER — MYC MEDICAL ADVICE (OUTPATIENT)
Dept: ENDOCRINOLOGY | Facility: CLINIC | Age: 46
End: 2021-03-05

## 2021-03-21 ENCOUNTER — MEDICAL CORRESPONDENCE (OUTPATIENT)
Dept: HEALTH INFORMATION MANAGEMENT | Facility: CLINIC | Age: 46
End: 2021-03-21

## 2021-04-19 ENCOUNTER — OFFICE VISIT (OUTPATIENT)
Dept: OBGYN | Facility: CLINIC | Age: 46
End: 2021-04-19
Payer: COMMERCIAL

## 2021-04-19 VITALS
WEIGHT: 142.6 LBS | SYSTOLIC BLOOD PRESSURE: 118 MMHG | BODY MASS INDEX: 22.38 KG/M2 | DIASTOLIC BLOOD PRESSURE: 64 MMHG | HEIGHT: 67 IN

## 2021-04-19 DIAGNOSIS — N63.0 BREAST LUMP: Primary | ICD-10-CM

## 2021-04-19 PROCEDURE — 99213 OFFICE O/P EST LOW 20 MIN: CPT | Performed by: NURSE PRACTITIONER

## 2021-04-19 ASSESSMENT — MIFFLIN-ST. JEOR: SCORE: 1329.46

## 2021-04-19 NOTE — PROGRESS NOTES
SUBJECTIVE:                                                   Jenise Smith is a 46 year old female who presents to clinic today for the following health issue(s):  Patient presents with:  Breast Problem: right breast      Additional information: discovered right breast lump a week ago, no pain in the area.    HPI: Patient felt right breast lump about a week ago.  She is midcycle right now.  She has had cysts in right breast before.  She denies any pain.        Patient's last menstrual period was 2021 (exact date)..     Patient is sexually active, .  Using vasectomy for contraception.    reports that she has never smoked. She has never used smokeless tobacco.    STD testing offered?  Declined    Health maintenance updated:  yes    Today's PHQ-2 Score:   PHQ-2 (  [employer]) 2021   Q1: Little interest or pleasure in doing things 0   Q2: Feeling down, depressed or hopeless 0   PHQ-2 Score 0   Q1: Little interest or pleasure in doing things -   Q2: Feeling down, depressed or hopeless -   PHQ-2 Score -     Today's PHQ-9 Score:   PHQ-9 SCORE 1/10/2020   PHQ-9 Total Score MyChart -   PHQ-9 Total Score 1     Today's NEFTALY-7 Score:   NEFTALY-7 SCORE 1/10/2020   Total Score -   Total Score 1       Problem list and histories reviewed & adjusted, as indicated.  Additional history: as documented.    Patient Active Problem List   Diagnosis     Menorrhagia     Endometrial polyp     Hyperlipidemia with target LDL less than 100     Type 1 diabetes mellitus without complication (H)     Other specified hypothyroidism     Axillary hyperhidrosis     Anxiety     Dysmenorrhea     Mastalgia in female     Lump or mass in breast     Past Surgical History:   Procedure Laterality Date     DILATION AND CURETTAGE, HYSTEROSCOPY, ABLATE ENDOMETRIUM NOVASURE, COMBINED  2014    Procedure: COMBINED DILATION AND CURETTAGE, HYSTEROSCOPY, ABLATE ENDOMETRIUM NOVASURE;  HYSTEROSCOPY, DILATION, CURETTAGE, WITH NOVASURE ABLATION,  MYOSURE MORCELLATOR;  Surgeon: Swetha Queen MD;  Location: [facility]     LAPAROSCOPY       LEEP TX, CERVICAL       OPERATIVE HYSTEROSCOPY WITH MORCELLATOR  1/23/2014    Procedure: OPERATIVE HYSTEROSCOPY WITH MORCELLATOR;;  Surgeon: Swetha Queen MD;  Location: [facility]     wisdom teeth[        Social History     Tobacco Use     Smoking status: Never Smoker     Smokeless tobacco: Never Used   Substance Use Topics     Alcohol use: Yes     Alcohol/week: 0.0 standard drinks     Comment: 3 per week      Problem (# of Occurrences) Relation (Name,Age of Onset)    Breast Cancer (1) Paternal Grandmother    Cancer - colorectal (1) Paternal Uncle (60)    Diabetes (3) Father: type 1, Sister: type 1, Mother: type 2    Thyroid Disease (1) Sister            Current Outpatient Medications   Medication Sig     blood glucose (ACCU-CHEK FASTCLIX) lancing device 1 Dose once daily.     blood glucose monitoring (FABRICIO CONTOUR NEXT) test strip 1 Dose 6 times daily. Test 6-7 times daily     blood glucose monitoring (NO BRAND SPECIFIED) test strip Use to test blood sugar 6 times daily or as directed.     empagliflozin (JARDIANCE) 10 MG TABS tablet Take 1 tablet (10 mg) by mouth daily     eszopiclone (LUNESTA) 2 MG tablet TAKE 1 TABLET BY MOUTH AT  BEDTIME     FLUoxetine (PROZAC) 10 MG tablet Take 1 tablet (10 mg) by mouth daily     insulin glargine (LANTUS PEN) 100 UNIT/ML pen Inject 16 units subcutaneous daily as directed     insulin lispro (HUMALOG KWIKPEN) 100 UNIT/ML (1 unit dial) KWIKPEN Inject 3-8 units subcutaneous with meals and correction doses as directed, total daily dose approx 25 units     insulin lispro (HUMALOG) 100 UNIT/ML vial USE VIA INSULIN PUMP A  TOTAL DAILY DOSE OF  APPROXIMATELY 70 UNITS     levothyroxine (SYNTHROID/LEVOTHROID) 50 MCG tablet TAKE 1 TABLET BY MOUTH  DAILY     Multiple Vitamins-Minerals (MULTI FOR HER PO)      VITAMIN D, CHOLECALCIFEROL, PO Take 1,000 Units by mouth      "glucagon (GLUCAGON EMERGENCY) 1 MG kit Inject 1 mg into the muscle once for 1 dose     STATIN NOT PRESCRIBED (INTENTIONAL) Please choose reason not prescribed from choices below. (Patient not taking: Reported on 4/19/2021)     No current facility-administered medications for this visit.      Allergies   Allergen Reactions     Keflex [Cephalexin]      nausea     Latex      Facial redness       ROS:  12 point review of systems negative other than symptoms noted below or in the HPI.  Normal menstrual cycles      OBJECTIVE:     /64   Ht 1.702 m (5' 7\")   Wt 64.7 kg (142 lb 9.6 oz)   LMP 03/30/2021 (Exact Date)   Breastfeeding No   BMI 22.33 kg/m    Body mass index is 22.33 kg/m .    Exam:  Constitutional:  Appearance: Well nourished, well developed alert, in no acute distress  Neurologic:  Mental Status:  Oriented X3.  Normal strength and tone, sensory exam grossly normal, mentation intact and speech normal.    Psychiatric:  Mentation appears normal and affect normal/bright.   Right breast  2-3 very noticeable firm lumps in upper outer quadrant . Moveable, no tenderness noted.  Axilla negative.  No discharge or discoloration noted.  Left breast fibrocystic feeling, no tenderness, discoloration or discharge noted.  Axilla negative.    In-Clinic Test Results:  No results found for this or any previous visit (from the past 24 hour(s)).    ASSESSMENT/PLAN:                                                        ICD-10-CM    1. Breast lump  N63.0 US Breast Right Complete 4 Quadrants         Patient sent to  breast center for breast US and  Further work up needed.  Return prn.    NANCY Kemp CNP  [facility]  "

## 2021-04-22 ENCOUNTER — HOSPITAL ENCOUNTER (OUTPATIENT)
Dept: MAMMOGRAPHY | Facility: CLINIC | Age: 46
End: 2021-04-22
Attending: NURSE PRACTITIONER
Payer: COMMERCIAL

## 2021-04-22 DIAGNOSIS — N63.0 BREAST LUMP: ICD-10-CM

## 2021-04-22 PROCEDURE — G0279 TOMOSYNTHESIS, MAMMO: HCPCS

## 2021-04-22 PROCEDURE — 76642 ULTRASOUND BREAST LIMITED: CPT | Mod: RT

## 2021-05-08 DIAGNOSIS — F41.9 ANXIETY: Primary | ICD-10-CM

## 2021-05-17 ENCOUNTER — OFFICE VISIT (OUTPATIENT)
Dept: ENDOCRINOLOGY | Facility: CLINIC | Age: 46
End: 2021-05-17
Payer: COMMERCIAL

## 2021-05-17 ENCOUNTER — HOSPITAL ENCOUNTER (OUTPATIENT)
Facility: CLINIC | Age: 46
Discharge: HOME | End: 2021-05-17
Attending: INTERNAL MEDICINE | Admitting: PHYSICIAN ASSISTANT
Payer: COMMERCIAL

## 2021-05-17 VITALS
WEIGHT: 141.8 LBS | HEIGHT: 67 IN | SYSTOLIC BLOOD PRESSURE: 102 MMHG | DIASTOLIC BLOOD PRESSURE: 60 MMHG | BODY MASS INDEX: 22.26 KG/M2

## 2021-05-17 DIAGNOSIS — E06.3 HYPOTHYROIDISM DUE TO HASHIMOTO'S THYROIDITIS: ICD-10-CM

## 2021-05-17 DIAGNOSIS — E13.9 DIABETES MELLITUS TYPE 1.5, MANAGED AS TYPE 2 (H): ICD-10-CM

## 2021-05-17 DIAGNOSIS — Z96.41 INSULIN PUMP STATUS: ICD-10-CM

## 2021-05-17 DIAGNOSIS — E13.9 DIABETES MELLITUS TYPE 1.5, MANAGED AS TYPE 2 (H): Primary | ICD-10-CM

## 2021-05-17 LAB
ANION GAP SERPL CALCULATED.3IONS-SCNC: 4 MMOL/L (ref 3–14)
BUN SERPL-MCNC: 22 MG/DL (ref 7–30)
CALCIUM SERPL-MCNC: 9.3 MG/DL (ref 8.5–10.1)
CHLORIDE SERPL-SCNC: 105 MMOL/L (ref 94–109)
CO2 SERPL-SCNC: 27 MMOL/L (ref 20–32)
CREAT SERPL-MCNC: 1.03 MG/DL (ref 0.52–1.04)
GFR SERPL CREATININE-BSD FRML MDRD: 66 ML/MIN/{1.73_M2}
GLUCOSE SERPL-MCNC: 129 MG/DL (ref 70–99)
HBA1C MFR BLD: 6.1 % (ref 0–5.6)
POTASSIUM SERPL-SCNC: 4.7 MMOL/L (ref 3.4–5.3)
SODIUM SERPL-SCNC: 136 MMOL/L (ref 133–144)
T4 FREE SERPL-MCNC: 0.85 NG/DL (ref 0.76–1.46)
TSH SERPL DL<=0.005 MIU/L-ACNC: 1.72 MU/L (ref 0.4–4)

## 2021-05-17 PROCEDURE — 95251 CONT GLUC MNTR ANALYSIS I&R: CPT | Performed by: INTERNAL MEDICINE

## 2021-05-17 PROCEDURE — 82043 UR ALBUMIN QUANTITATIVE: CPT

## 2021-05-17 PROCEDURE — 83036 HEMOGLOBIN GLYCOSYLATED A1C: CPT

## 2021-05-17 PROCEDURE — 80048 BASIC METABOLIC PNL TOTAL CA: CPT | Performed by: INTERNAL MEDICINE

## 2021-05-17 PROCEDURE — 99214 OFFICE O/P EST MOD 30 MIN: CPT | Performed by: INTERNAL MEDICINE

## 2021-05-17 PROCEDURE — 84439 ASSAY OF FREE THYROXINE: CPT

## 2021-05-17 PROCEDURE — 36415 COLL VENOUS BLD VENIPUNCTURE: CPT | Performed by: INTERNAL MEDICINE

## 2021-05-17 PROCEDURE — 84443 ASSAY THYROID STIM HORMONE: CPT

## 2021-05-17 ASSESSMENT — MIFFLIN-ST. JEOR: SCORE: 1325.83

## 2021-05-17 NOTE — PROGRESS NOTES
Recent issues:  Diabetes and thyroid followup  Feeling well overall, no new health issues reported  Taking the low dose (off label) Jardiance medication, reports improvement with glucose control           Diabetes:  Previous diagnosis of IFG with high GAD65 Ab level  6/2011. Developed acute hyperglycemia and diagnosis of T1DM  Treatment with insulin medication, MDI plan  2/2013. Changed to OmniPod pump with Novolog insulin  Used DexcomG5 CGMS system  5/2017. Changed to Medtronic 630G pump  9/2017. Upgraded to Medtronic 670G pump and Guardian3 sensor, Humalog insulin  8/2018. Discontinued Medtronic pump and sensor, changed to MDI plan              Had taken Basaglar 17U at bedtime and mealtime Humalog  Had taken Basaglar 13U at bedtime, Humalog Luxura pen 1:7 at mealtime, ISF 60, goal target  mg/dl  12/2018. Restarted OmniPod pump  Using OmniPod pump with Dash PDM              Humalog in pump              Jardiance 10 mg each morning     Using OmniPod Dash PDM  Previous pump settings:    Using DexcomG6 CGMS data:        Has reduced hypoglycemia awareness              Has chocolate lab (Juliet), trained to recognize hypoglycemia smell/symptoms  Using Contour Next Link BG meter, variable testing, tests 6x/day  Exercises with classes at health club- yoga, strength              Tried Temp Target with exercise, noticed higher glucose levels so not used  Previous [facility] labs include:  Lab Results   Component Value Date    A1C 5.9 (H) 10/13/2020     10/13/2020    POTASSIUM 4.1 10/13/2020    CHLORIDE 105 10/13/2020    CO2 26 10/13/2020    ANIONGAP 5 10/13/2020     (H) 02/02/2021    BUN 15 10/13/2020    CR 0.87 10/13/2020    GFRESTIMATED 81 10/13/2020    GFRESTBLACK >90 10/13/2020    VIJI 8.8 10/13/2020    CHOL 147 02/02/2021    TRIG 72 02/02/2021    HDL 73 02/02/2021    LDL 60 02/02/2021    NHDL 74 02/02/2021    UCRR 14 09/27/2019    MICROL <5 09/27/2019    UMALCR Unable to calculate due to low value  "09/27/2019     Previous Lasik surgery at [facility] 2015  Last eye exam 1/2021 with Dr. Diaz, no DR reported  Plans to see practitioner at [facility] soon  DM Complications: none known        Thyroid:  2011. History of hypothyroidism  Takes levothyroxine medication  Recent  labs include:  Lab Results   Component Value Date    TSH 2.43 02/02/2021    T4 0.89 10/13/2020     Eary 10/2018. Dose incease to levothyroxine 0.05 mg daily per GYN physician  Current dose:  Levothyroxine 0.05 mg daily        , lives in [place],  [place], daughter Myra and stepchildren Jag (formerly Tisha) + Anoop  Has chocolate lab (Juliet), trained to recognize hypoglycemia smell/symptoms  Works for [employer] as   Now sees Beba Dailey PAC/[facility] for general medicine evaluations.  Also sees Dr. Ashford/[facility]        ROS: 10 point ROS neg other than the symptoms noted above in the HPI.     GENERAL: energy good, wt stable; denies fevers, chills, night sweats.   HEENT: no dysphagia, odonophagia, diplopia, neck pain  THYROID:  no apparent hyper or hypothyroid symptoms  CV: no chest pain, pressure, palpitations  LUNGS: no SOB, CLIFFORD, cough, wheezing   ABDOMEN: denies nausea, diarrhea, constipation, abdominal pain  EXTREMITIES: no rashes, ulcers, edema  NEUROLOGY: no headaches, denies changes in vision, tingling, extremitiy numbness   MSK: no muscle aches or pains, weakness  SKIN: no rashes or lesions  : regular menstrual cycles Q 28-30 days  PSYCH:  stable mood, no significant anxiety or depression  ENDOCRINE: no heat or cold intolerance      Physical Exam   VS: /60   Ht 1.702 m (5' 7\")   Wt 64.3 kg (141 lb 12.8 oz)   BMI 22.21 kg/m    GENERAL: AXOX3, NAD, well dressed, answering questions appropriately, appears stated age.  ENT: no nose swelling or nasal discharge, mouth redness or gum changes.  EYES: eyes grossly normal to inspection, conjunctivae and sclerae normal, no " exophthalmos or proptosis  THYROID:  no apparent nodules or goiter  CV:  RRR without murmurs  LUNGS: no audible wheeze, cough or visible cyanosis, no visible retractions or increased work of breathing  ABDOMEN: abdomen slender size  EXTREMITIES: no edema  NEUROLOGY: CN grossly intact, no tremors  MSK: grossly intact  SKIN:  no apparent skin lesions, rash, or edema with visualized skin appearance  PSYCH: mentation appears normal, affect normal/bright, judgement and insight intact,   normal speech and appearance well groomed     LABS:     All pertinent notes, labs, and images personally reviewed by me.      A/P:          Encounter Diagnoses   Name       Type 1 diabetes mellitus without complication (H)       Insulin pump status       Hypothyroidism due to Hashimoto's thyroiditis        Comments:  Reviewed health history, diabetes and thyroid issues.  Recent glucose control good but some hypoglycemia trends  Reviewed and interpreted tests that I previously ordered.   Ordered appropriate tests for the endocrinology disease management.    Management options discussed and implemented after shared medical decision making with the patient.  T1DM problem is chronic-stable    Plan:  Discussed general issues with the diabetes diagnosis and management  We discussed the hgbA1c test which reflects previous overall glucose levels or control  Discussed the importance of blood glucose (BG) testing to assess glucose trends  Provided general overview of the multiple daily injection (MDI) plan using rapid acting mealtime and longacting insulin medications  Reviewed recent Dexcom CGMS glucose sensor report, in detail     Recommend:  Continue the insulin pump and diabetes management  Discussed use of the off-label Jardiance medication.    Pump setting changes:   Bolus ICR add 11a 9.5 to 10  For exercise, use the lower temp basal 50% for 30-min but change post exercise 9pm -15% from 6 hrs to 10 hrs duration    Test BG levels premeal and  bedtime and/or use Dexcom CGMS sensor  Check labs today, orders placed  Monitor for symptom changes  Advise having fasting lipid panel testing and dilated eye examination, at least annually  Continue current levothyroxine daily dose     Keep regular f/u PCP and GYN evaluations  Addressed patient questions today    Patient Instructions   Continue the insulin pump and diabetes management  Continue use of the off-label Jardiance medication.    Pump setting changes:              Bolus carb ratio:  add 11a 9.5 to 10  For exercise, use the lower temp basal 50% for 30-min but change post exercise 9pm -15% from 6 hrs to 10 hrs duration  Test BG levels premeal and bedtime and/or use Dexcom CGMS sensor    Arrange a follow-up clinic appointment with me in late 8/2021  My current office located at the [facility]  Address:  [address] [place] [address], [address], [place], [place]  Endocrinology appointments on Mon, Tues, Wed, and Fridays  Appointment types:  Face-to-face clinic or virtual visit appointments- mornings,   Virtual appointments- afternoons     Office phone:    [phone]  Scheduling number:   [phone].           Future labs ordered today:   Orders Placed This Encounter   Procedures     GLUCOSE MONITOR, 72 HOUR, PHYS INTERP     Hemoglobin A1c     Basic metabolic panel     Albumin Random Urine Quantitative with Creat Ratio     TSH     T4 free     Radiology/Consults ordered today: None    Total time spent in with the patient evaluation:  25 min  Additional time spent reviewing pertinent lab tests and chart notes, and documentation:  10 min    Follow-up:  Late 8/2021    DARI Gaming MD, MS  Endocrinology  [facility]    CC:  Ashlie, L

## 2021-05-17 NOTE — PATIENT INSTRUCTIONS
Continue the insulin pump and diabetes management  Continue use of the off-label Jardiance medication.    Pump setting changes:              Bolus carb ratio:  add 11a 9.5 to 10  For exercise, use the lower temp basal 50% for 30-min but change post exercise 9pm -15% from 6 hrs to 10 hrs duration  Test BG levels premeal and bedtime and/or use Dexcom CGMS sensor    Arrange a follow-up clinic appointment with me in late 8/2021  My current office located at the [facility]  Address:  [address] [place] [address], [address], [place], [place]  Endocrinology appointments on Mon, Tues, Wed, and Fridays  Appointment types:  Face-to-face clinic or virtual visit appointments- mornings,   Virtual appointments- afternoons     Office phone:    [phone]  Scheduling number:   [phone].

## 2021-05-18 LAB
CREAT UR-MCNC: 150 MG/DL
MICROALBUMIN UR-MCNC: 7 MG/L
MICROALBUMIN/CREAT UR: 4.74 MG/G CR (ref 0–25)

## 2021-06-15 ENCOUNTER — TELEPHONE (OUTPATIENT)
Dept: ENDOCRINOLOGY | Facility: CLINIC | Age: 46
End: 2021-06-15

## 2021-06-15 NOTE — TELEPHONE ENCOUNTER
Received form(s) from [employer] - Certificate of Medical Necessity   Placed form(s) in/on TL's incoming basket.  Forms need to be filled out and signed and faxed to number listed on form.     Copy needs to be sent for scanning after completion: Yes     Sophy Mclaughlin MA

## 2021-06-17 ENCOUNTER — MEDICAL CORRESPONDENCE (OUTPATIENT)
Dept: HEALTH INFORMATION MANAGEMENT | Facility: CLINIC | Age: 46
End: 2021-06-17

## 2021-06-21 ENCOUNTER — MYC MEDICAL ADVICE (OUTPATIENT)
Dept: FAMILY MEDICINE | Facility: CLINIC | Age: 46
End: 2021-06-21

## 2021-06-21 DIAGNOSIS — F51.01 PRIMARY INSOMNIA: ICD-10-CM

## 2021-06-22 ENCOUNTER — TELEPHONE (OUTPATIENT)
Dept: ENDOCRINOLOGY | Facility: CLINIC | Age: 46
End: 2021-06-22

## 2021-06-22 RX ORDER — ESZOPICLONE 2 MG/1
TABLET, FILM COATED ORAL
Qty: 30 TABLET | Refills: 0 | Status: SHIPPED | OUTPATIENT
Start: 2021-06-22 | End: 2021-07-20

## 2021-06-22 NOTE — TELEPHONE ENCOUNTER
Routing refill request to provider for review/approval because:  Drug not on the [facility] refill protocol     Pending Prescriptions:                       Disp   Refills    eszopiclone (LUNESTA) 2 MG tablet [Pharma*30 tab*             Sig: TAKE 1 TABLET BY MOUTH AT  BEDTIME    Last Written Prescription Date:  1/8/2021  Last Fill Quantity: 30,  # refills: 1   Last office visit: 2/2/2021 with prescribing provider:  Ashlie Young Office Visit:   Next 5 appointments (look out 90 days)    Jun 25, 2021 12:15 PM  CONSULT with Peri Mccauley MD  [facility] ([facility]) [address], [address]  [facility] [address] [phone]         Zara Croft RN  [facility]

## 2021-06-22 NOTE — TELEPHONE ENCOUNTER
[facility] Call Center    Phone Message    May a detailed message be left on voicemail: yes     Reason for Call: Form or Letter   Type or form/letter needing completion:  Is wanting to get a call back in regards to getting an update on the Certificate of Medical Necessity for [employer] and also the clinical notes, they have received the RX just not the clinical notes. Please advise    Provider: Mekhi    Date form needed: ASAP    Once completed: Fax form to: [phone]      Action Taken: Message routed to:  [facility] Endo    Travel Screening: Not Applicable

## 2021-06-23 DIAGNOSIS — F51.01 PRIMARY INSOMNIA: ICD-10-CM

## 2021-06-24 RX ORDER — ESZOPICLONE 2 MG/1
TABLET, FILM COATED ORAL
Qty: 30 TABLET | OUTPATIENT
Start: 2021-06-24

## 2021-06-24 NOTE — TELEPHONE ENCOUNTER
2ND NEW FORM    Received form(s) from [employer] - PRESCRIPTION   Placed form(s) in/on TL's incoming basket.  Forms need to be filled out and signed and faxed to number listed on form.     Copy needs to be sent for scanning after completion: Yes     Sophy Mclaughlin MA

## 2021-06-24 NOTE — TELEPHONE ENCOUNTER
Duplicate - please deny this request    eszopiclone (LUNESTA) 2 MG tablet 30 tablet 0 6/22/2021  No   Sig: TAKE 1 TABLET BY MOUTH AT  BEDTIME   Sent to pharmacy as: Eszopiclone 2 MG Oral Tablet (LUNESTA)   Class: E-Prescribe   Order: [id number]   E-Prescribing Status: Receipt confirmed by pharmacy (6/22/2021  8:53 AM CDT)   Printout Tracking    External Result Report   Pharmacy    [facility] - [address], [address]     RT [place] (R)    
Refused; duplicate.     Tisha Toney RN    
Alert-The patient is alert, awake and responds to voice. The patient is oriented to time, place, and person. The triage nurse is able to obtain subjective information.

## 2021-06-24 NOTE — PROGRESS NOTES
[facility] Breast Surgery Consultation    HPI:   Jenise Smith is a 46 year old female who is seen in consultation at the request of Beba Dailey  for evaluation of a palpable right breast lump.     She had a diagnostic mammogram and US on 4/22/2021 which revealed a benign appearing cyst at 10:00, 8cm FN measuring 1.9cm. no other areas of concern, BIRADS 2- benign.     She reports she felt a lump in her right upper outer breast in mid April. She had the imaging as above. She has had some tenderness at the site. She also has had left breast tenderness that is not cyclical. No nipple discharge. She believes she had a breast MRI in the past at [facility] due to extreme breast density.     Hormonal history:   menarche 14,  1 children, 1st at age 29, pre-menopausal, 5 years OCP use, no HRT, no fertility treatment.     Family history of breast cancer: Yes - paternal grandmother 80, paternal 1st cousin 35  Family history of ovarian cancer:  No  Family history of colon cancer: Yes - paternal uncle  Family history of prostate cancer: Yes - maternal and paternal grandfathers      Past Medical History:   has a past medical history of Diabetes mellitus type 1 (H), Dysmenorrhea, Excessive or frequent menstruation, Hypothyroid, Insulin pump in place, Lumbar back pain, Other and unspecified hyperlipidemia, and PONV (postoperative nausea and vomiting).      Current Outpatient Medications:      blood glucose (ACCU-CHEK FASTCLIX) lancing device, 1 Dose once daily., Disp: , Rfl:      blood glucose monitoring (FABRICIO CONTOUR NEXT) test strip, 1 Dose 6 times daily. Test 6-7 times daily, Disp: , Rfl:      blood glucose monitoring (NO BRAND SPECIFIED) test strip, Use to test blood sugar 6 times daily or as directed., Disp: 550 strip, Rfl: 3     empagliflozin (JARDIANCE) 10 MG TABS tablet, Take 1 tablet (10 mg) by mouth daily, Disp: 90 tablet, Rfl: 3     eszopiclone (LUNESTA) 2 MG tablet, TAKE 1 TABLET BY MOUTH AT  BEDTIME,  Disp: 30 tablet, Rfl: 0     FLUoxetine (PROZAC) 10 MG tablet, Take 1 tablet (10 mg) by mouth daily, Disp: 30 tablet, Rfl: 0     FLUoxetine (PROZAC) 20 MG capsule, TAKE 1 CAPSULE BY MOUTH  DAILY, Disp: 90 capsule, Rfl: 3     glucagon (GLUCAGON EMERGENCY) 1 MG kit, Inject 1 mg into the muscle once for 1 dose, Disp: 1 mg, Rfl: 2     insulin glargine (LANTUS PEN) 100 UNIT/ML pen, Inject 16 units subcutaneous daily as directed, Disp: 15 mL, Rfl: 11     insulin lispro (HUMALOG KWIKPEN) 100 UNIT/ML (1 unit dial) KWIKPEN, Inject 3-8 units subcutaneous with meals and correction doses as directed, total daily dose approx 25 units, Disp: 15 mL, Rfl: 1     insulin lispro (HUMALOG) 100 UNIT/ML vial, USE VIA INSULIN PUMP A  TOTAL DAILY DOSE OF  APPROXIMATELY 70 UNITS, Disp: 70 mL, Rfl: 3     levothyroxine (SYNTHROID/LEVOTHROID) 50 MCG tablet, TAKE 1 TABLET BY MOUTH  DAILY, Disp: 90 tablet, Rfl: 3     Multiple Vitamins-Minerals (MULTI FOR HER PO), , Disp: , Rfl:      STATIN NOT PRESCRIBED (INTENTIONAL), Please choose reason not prescribed from choices below. (Patient not taking: Reported on 4/19/2021), Disp:  , Rfl:      VITAMIN D, CHOLECALCIFEROL, PO, Take 1,000 Units by mouth, Disp: , Rfl:     Past Surgical History:  Past Surgical History:   Procedure Laterality Date     DILATION AND CURETTAGE, HYSTEROSCOPY, ABLATE ENDOMETRIUM NOVASURE, COMBINED  1/23/2014    Procedure: COMBINED DILATION AND CURETTAGE, HYSTEROSCOPY, ABLATE ENDOMETRIUM NOVASURE;  HYSTEROSCOPY, DILATION, CURETTAGE, WITH NOVASURE ABLATION, MYOSURE MORCELLATOR;  Surgeon: Swetha Queen MD;  Location: [facility]     LAPAROSCOPY       LEEP TX, CERVICAL       OPERATIVE HYSTEROSCOPY WITH MORCELLATOR  1/23/2014    Procedure: OPERATIVE HYSTEROSCOPY WITH MORCELLATOR;;  Surgeon: Swetha Queen MD;  Location: [facility]     wisdom teeth[             Allergies   Allergen Reactions     Keflex [Cephalexin]      nausea     Latex      Facial redness  "        Social History:  Social History     Socioeconomic History     Marital status:      Spouse name: Not on file     Number of children: Not on file     Years of education: Not on file     Highest education level: Not on file   Occupational History     Employer: [employer]   Social Needs     Financial resource strain: Not on file     Food insecurity     Worry: Not on file     Inability: Not on file     Transportation needs     Medical: Not on file     Non-medical: Not on file   Tobacco Use     Smoking status: Never Smoker     Smokeless tobacco: Never Used   Substance and Sexual Activity     Alcohol use: Yes     Alcohol/week: 0.0 standard drinks     Comment: 3 per week     Drug use: No     Sexual activity: Yes     Partners: Male     Birth control/protection: Male Surgical   Lifestyle     Physical activity     Days per week: Not on file     Minutes per session: Not on file     Stress: Not on file   Relationships     Social connections     Talks on phone: Not on file     Gets together: Not on file     Attends Buddhist service: Not on file     Active member of club or organization: Not on file     Attends meetings of clubs or organizations: Not on file     Relationship status: Not on file     Intimate partner violence     Fear of current or ex partner: Not on file     Emotionally abused: Not on file     Physically abused: Not on file     Forced sexual activity: Not on file   Other Topics Concern     Parent/sibling w/ CABG, MI or angioplasty before 65F 55M? Not Asked   Social History Narrative     in May 2014    2 step children (20 and 18) and one biol dtr age 15.    Works for [employer]--works from home        ROS:  The 10 point review of systems is negative other than noted in the HPI and above.    PE:  Vitals: /64 (BP Location: Right arm, Patient Position: Sitting, Cuff Size: Adult Regular)   Pulse 69   Ht 1.702 m (5' 7\")   Wt 64 kg (141 lb)   SpO2 99%   BMI 22.08 kg/m  "   General appearance: well-nourished, sitting comfortably, no apparent distress  Psych: normal affect, pleasant  HEENT:  Head normocephalic and atraumatic, pupils equal and round, conjunctivae clear, mucous membranes moist, external ears and nose normal  Neck: Supple without thyromegaly or masses  Lungs: Respirations unlabored  Lymphatic: No cervical, or supraclavicular lymphadenopathy  Extremities: Without edema  Musculoskeletal:  Normal station and gait  Neurologic: nonfocal, grossly intact times four extremities, alert and oriented times three  Psychiatric: Mood and affect are appropriate  Skin: Without lesions or rashes    Breast:  A bilateral breast exam was performed in the supine position.. Bilateral breasts were palpated in a circumferential clockwise fashion including the supraclavicular and axillary areas.   Breasts are symmetric, nipples everted bilaterally, skin is normal. On the right upper outer breast is a 2cm easily palpable mass which is well circumscribed and slightly tender with pressure. Surrounding tissue is very dense. On the left breast there are two small mass at 4:00 laterally which are also well circumscribed and tender. Significant density in bilateral upper outer breasts and centrally.       Lymph:       No supraclavicular/infraclavicular adenopathy.   Axillary adenopathy: none    Assessment/Plan: Jenise Smith is a 46 year old who presents with a right breast simple appearing cyst and left breast tender masses with significant density on mammogram and tenderness bilaterally. She is also at increased lifetime risk of breast cancer. Her Brendan-Black lifetime risk is calculated to be 21.7%. Given her lifetime risk, tenderness and palpable small lesions on the left breast, I would recommend a breast MRI for additional workup to ensure no malignancy. We then discussed if benign, it would be reasonable to aspirate the 2cm cyst on the right upper outer breast if it continues to be  symptomatic. She will find out from her cousin if her genetic testing was positive and if so, a referral would be placed for genetic counseling. She does qualify for high risk screening with a lifetime risk of breast cancer >20% and will consider alternating mammogram and breast MRI Q6 months going forward. I will call her with her MRI results.       30 minutes total time spent on the date of this encounter doing: chart review, review of test results, patient visit, physical exam, education, counseling, developing plan of care, and documenting.    Peri Mccauley MD      Please route or send letter to:  Primary Care Provider (PCP) and Referring Provider

## 2021-06-25 ENCOUNTER — OFFICE VISIT (OUTPATIENT)
Dept: SURGERY | Facility: CLINIC | Age: 46
End: 2021-06-25
Payer: COMMERCIAL

## 2021-06-25 VITALS
BODY MASS INDEX: 22.13 KG/M2 | SYSTOLIC BLOOD PRESSURE: 102 MMHG | OXYGEN SATURATION: 99 % | WEIGHT: 141 LBS | HEART RATE: 69 BPM | HEIGHT: 67 IN | DIASTOLIC BLOOD PRESSURE: 64 MMHG

## 2021-06-25 DIAGNOSIS — N64.4 BREAST PAIN: Primary | ICD-10-CM

## 2021-06-25 PROCEDURE — 99214 OFFICE O/P EST MOD 30 MIN: CPT | Performed by: SURGERY

## 2021-06-25 ASSESSMENT — MIFFLIN-ST. JEOR: SCORE: 1322.2

## 2021-06-25 NOTE — NURSING NOTE
Breast Patients    BREAST PATIENTS (ALL)    1-Do you have any of the following symptoms? Breast Pain and Lump(s) or Mass(es)  2-In which breast are you having the symptoms? both  3-Have you had a Mammogram? [place]  4-Have you ever had a breast cyst drained? No  5-Have you ever had a breast biopsy? No  6-Have you ever had a Breast Cancer? No   7-Is there a history of Breast Cancer in your family? Yes   Relationship to you:    Grandmother  Cousin  8-Have you ever had Ovarian Cancer? No  9-Is there a history of Ovarian Cancer in your family? No  10-Summarize your caffeine intake (i.e. coffee, tea, chocolate, soda etc.): coffee 2 cups a day and chocolate    BREAST PATIENTS (FEMALE)    11-What age did your periods begin? 14  12-Date your last menstrual period began? 6/10/21  13-Number of full-term pregnancies: 1  14-Your age when your first child was born? 29  15-Did you nurse your children? No  16-Are you pregnant now? No  17-Have you begun menopause? No  18-Have you had either ovary removed?No  19-Do you have breast implants? No   20-Do you use hormone replacement therapy?  No  21-Have you taken oral contraceptive pills?  Yes, For how many years?  5  22-Have you had an intrauterine device (IUD) placed?  No  23-What is your current bra size?  32C

## 2021-06-28 ENCOUNTER — MEDICAL CORRESPONDENCE (OUTPATIENT)
Dept: HEALTH INFORMATION MANAGEMENT | Facility: CLINIC | Age: 46
End: 2021-06-28

## 2021-07-05 ENCOUNTER — MYC MEDICAL ADVICE (OUTPATIENT)
Dept: SURGERY | Facility: CLINIC | Age: 46
End: 2021-07-05

## 2021-07-05 DIAGNOSIS — Z91.89 AT HIGH RISK FOR BREAST CANCER: Primary | ICD-10-CM

## 2021-07-07 ENCOUNTER — HOSPITAL ENCOUNTER (OUTPATIENT)
Dept: MRI IMAGING | Facility: CLINIC | Age: 46
Discharge: HOME | End: 2021-07-07
Attending: SURGERY | Admitting: SURGERY
Payer: COMMERCIAL

## 2021-07-07 ENCOUNTER — TELEPHONE (OUTPATIENT)
Dept: SURGERY | Facility: PHYSICIAN GROUP | Age: 46
End: 2021-07-07

## 2021-07-07 DIAGNOSIS — N64.4 BREAST PAIN: ICD-10-CM

## 2021-07-07 LAB
CREAT BLD-MCNC: 1 MG/DL (ref 0.52–1.04)
GFR SERPL CREATININE-BSD FRML MDRD: 60 ML/MIN/{1.73_M2}

## 2021-07-07 PROCEDURE — 82565 ASSAY OF CREATININE: CPT

## 2021-07-07 PROCEDURE — 255N000002 HC RX 255 OP 636: Performed by: SURGERY

## 2021-07-07 PROCEDURE — 77049 MRI BREAST C-+ W/CAD BI: CPT

## 2021-07-07 PROCEDURE — A9585 GADOBUTROL INJECTION: HCPCS | Performed by: SURGERY

## 2021-07-07 RX ORDER — GADOBUTROL 604.72 MG/ML
7 INJECTION INTRAVENOUS ONCE
Status: COMPLETED | OUTPATIENT
Start: 2021-07-07 | End: 2021-07-07

## 2021-07-07 RX ADMIN — GADOBUTROL 7 ML: 604.72 INJECTION INTRAVENOUS at 07:26

## 2021-07-07 NOTE — TELEPHONE ENCOUNTER
I called Jenise and discussed her breast MRI results. There are no areas of concern. Bilateral simple cysts present. We discussed going forward she could consider alternating mammogram with breast MRI as her lifetime risk is >20%. She will let us know how she would like to proceed. She is meeting with genetics at the end of the month as well.     Peri Mccauley MD  [facility]  [phone]

## 2021-07-20 ENCOUNTER — MYC REFILL (OUTPATIENT)
Dept: FAMILY MEDICINE | Facility: CLINIC | Age: 46
End: 2021-07-20

## 2021-07-20 DIAGNOSIS — F51.01 PRIMARY INSOMNIA: ICD-10-CM

## 2021-07-21 ENCOUNTER — VIRTUAL VISIT (OUTPATIENT)
Dept: FAMILY MEDICINE | Facility: CLINIC | Age: 46
End: 2021-07-21
Payer: COMMERCIAL

## 2021-07-21 DIAGNOSIS — L60.8 TOENAIL DEFORMITY: Primary | ICD-10-CM

## 2021-07-21 PROCEDURE — 99213 OFFICE O/P EST LOW 20 MIN: CPT | Mod: GT | Performed by: INTERNAL MEDICINE

## 2021-07-21 NOTE — PROGRESS NOTES
Jenise is a 46 year old who is being evaluated via a billable video visit.      How would you like to obtain your AVS? Mail a copy  If the video visit is dropped, the invitation should be resent by: My Chart    Will anyone else be joining your video visit? No    Video Start Time: 1:41 PM    Assessment & Plan     Toenail deformity  Ddx includes onychomycosis vs toenail deformity vs other. Limited evaluation on virtual visit. After discussion regarding various approaches to treatment, she opts to follow up with podiatry. Referral provided.  - Orthopedic  Referral          Return if symptoms worsen or fail to improve.    Archana Wyman,   [facility]UJA    Subjective   Jenise is a 46 year old who presents for the following health issues     HPI     PCP: Ashlie Burden presents for virtual video visit:    Toenail issue. States toenails have some discoloration/deformity. Hard to tell how long as she usually has nail polish on. She is a type 1 diabetic. Does not see a podiatrist currently    Review of Systems   Constitutional, HEENT, cardiovascular, pulmonary, gi and gu systems are negative, except as otherwise noted.      Objective           Vitals:  No vitals were obtained today due to virtual visit.    Physical Exam   GEN: No acute distress  RESP: No audible increased work of breathing. Patient speaking in full sentences without distress.  PSYCH: pleasant  Exam otherwise limited due to virtual platform                  Video-Visit Details    Type of service:  Video Visit    Video End Time:1:52 PM    Originating Location (pt. Location): Home    Distant Location (provider location):  [facility]     Platform used for Video Visit: [employer]

## 2021-07-22 RX ORDER — ESZOPICLONE 2 MG/1
2 TABLET, FILM COATED ORAL AT BEDTIME
Qty: 30 TABLET | Refills: 0 | Status: SHIPPED | OUTPATIENT
Start: 2021-07-22 | End: 2021-08-25

## 2021-07-22 NOTE — TELEPHONE ENCOUNTER
Routing refill request to provider for review/approval because:  Drug not on the [facility] refill protocol   No Ash RN  [facility] Triage Nurse

## 2021-08-10 ENCOUNTER — OFFICE VISIT (OUTPATIENT)
Dept: PODIATRY | Facility: CLINIC | Age: 46
End: 2021-08-10
Attending: INTERNAL MEDICINE
Payer: COMMERCIAL

## 2021-08-10 VITALS
BODY MASS INDEX: 22.76 KG/M2 | HEIGHT: 67 IN | DIASTOLIC BLOOD PRESSURE: 60 MMHG | WEIGHT: 145 LBS | SYSTOLIC BLOOD PRESSURE: 106 MMHG

## 2021-08-10 DIAGNOSIS — Q66.70 PES CAVUS: ICD-10-CM

## 2021-08-10 DIAGNOSIS — E10.9 TYPE 1 DIABETES MELLITUS WITHOUT COMPLICATION (H): Primary | ICD-10-CM

## 2021-08-10 DIAGNOSIS — L60.8 TOENAIL DEFORMITY: ICD-10-CM

## 2021-08-10 PROCEDURE — 99203 OFFICE O/P NEW LOW 30 MIN: CPT | Performed by: PODIATRIST

## 2021-08-10 ASSESSMENT — MIFFLIN-ST. JEOR: SCORE: 1340.35

## 2021-08-10 NOTE — LETTER
8/10/2021         RE: Jenise Smith  [address] [facility] [id number]        Dear Colleague,    Thank you for referring your patient, Jenise Smith, to the [facility]. Please see a copy of my visit note below.    PATIENT HISTORY:  Dr. Wyman requested I see this patient for their foot issue.  Jenise Smith is a 46 year old female who presents to clinic for concern to both great toenails.  She is wondering if she is getting nail fungus.  She does not have any pain.  Notes started about 2 months ago.  She is a very active walker and does like some.  Denies specific injury.  She is just concerned because she is a diabetic and wants to make sure that there is no issues.    Review of Systems:  Patient denies fever, chills, rash, wound, stiffness, limping, numbness, weakness, heart burn, blood in stool, chest pain with activity, calf pain when walking, shortness of breath with activity, chronic cough, easy bleeding/bruising, swelling of ankles, excessive thirst, fatigue, depression, anxiety.       PAST MEDICAL HISTORY:   Past Medical History:   Diagnosis Date     Diabetes mellitus type 1 (H)      Dysmenorrhea      Excessive or frequent menstruation      Hypothyroid      Insulin pump in place      Lumbar back pain      Other and unspecified hyperlipidemia      PONV (postoperative nausea and vomiting)         PAST SURGICAL HISTORY:   Past Surgical History:   Procedure Laterality Date     DILATION AND CURETTAGE, HYSTEROSCOPY, ABLATE ENDOMETRIUM NOVASURE, COMBINED  1/23/2014    Procedure: COMBINED DILATION AND CURETTAGE, HYSTEROSCOPY, ABLATE ENDOMETRIUM NOVASURE;  HYSTEROSCOPY, DILATION, CURETTAGE, WITH NOVASURE ABLATION, MYOSURE MORCELLATOR;  Surgeon: Swetha Queen MD;  Location: [facility]     LAPAROSCOPY       LEEP TX, CERVICAL       OPERATIVE HYSTEROSCOPY WITH MORCELLATOR  1/23/2014    Procedure: OPERATIVE HYSTEROSCOPY WITH MORCELLATOR;;  Surgeon: Garrett  Swetha Bean MD;  Location: [facility]     wisdom teeth[          MEDICATIONS:   Current Outpatient Medications:      blood glucose (ACCU-CHEK FASTCLIX) lancing device, 1 Dose once daily., Disp: , Rfl:      blood glucose monitoring (FABRICIO CONTOUR NEXT) test strip, 1 Dose 6 times daily. Test 6-7 times daily, Disp: , Rfl:      blood glucose monitoring (NO BRAND SPECIFIED) test strip, Use to test blood sugar 6 times daily or as directed., Disp: 550 strip, Rfl: 3     empagliflozin (JARDIANCE) 10 MG TABS tablet, Take 1 tablet (10 mg) by mouth daily, Disp: 90 tablet, Rfl: 3     eszopiclone (LUNESTA) 2 MG tablet, Take 1 tablet (2 mg) by mouth At Bedtime, Disp: 30 tablet, Rfl: 0     FLUoxetine (PROZAC) 20 MG capsule, TAKE 1 CAPSULE BY MOUTH  DAILY, Disp: 90 capsule, Rfl: 3     glucagon (GLUCAGON EMERGENCY) 1 MG kit, Inject 1 mg into the muscle once for 1 dose, Disp: 1 mg, Rfl: 2     insulin glargine (LANTUS PEN) 100 UNIT/ML pen, Inject 16 units subcutaneous daily as directed, Disp: 15 mL, Rfl: 11     insulin lispro (HUMALOG KWIKPEN) 100 UNIT/ML (1 unit dial) KWIKPEN, Inject 3-8 units subcutaneous with meals and correction doses as directed, total daily dose approx 25 units, Disp: 15 mL, Rfl: 1     insulin lispro (HUMALOG) 100 UNIT/ML vial, USE VIA INSULIN PUMP A  TOTAL DAILY DOSE OF  APPROXIMATELY 70 UNITS, Disp: 70 mL, Rfl: 3     levothyroxine (SYNTHROID/LEVOTHROID) 50 MCG tablet, TAKE 1 TABLET BY MOUTH  DAILY, Disp: 90 tablet, Rfl: 3     Multiple Vitamins-Minerals (MULTI FOR HER PO), , Disp: , Rfl:      STATIN NOT PRESCRIBED (INTENTIONAL), Please choose reason not prescribed from choices below., Disp:  , Rfl:      VITAMIN D, CHOLECALCIFEROL, PO, Take 1,000 Units by mouth, Disp: , Rfl:      ALLERGIES:    Allergies   Allergen Reactions     Keflex [Cephalexin]      nausea     Latex      Facial redness        SOCIAL HISTORY:   Social History     Socioeconomic History     Marital status:      Spouse name: Not  on file     Number of children: Not on file     Years of education: Not on file     Highest education level: Not on file   Occupational History     Employer: [employer]   Tobacco Use     Smoking status: Never Smoker     Smokeless tobacco: Never Used   Substance and Sexual Activity     Alcohol use: Yes     Alcohol/week: 0.0 standard drinks     Comment: 3 per week     Drug use: No     Sexual activity: Yes     Partners: Male     Birth control/protection: Male Surgical   Other Topics Concern     Parent/sibling w/ CABG, MI or angioplasty before 65F 55M? Not Asked   Social History Narrative     in May 2014    2 step children (20 and 18) and one biol dtr age 15.    Works for [employer]--works from home     Social Determinants of Health     Financial Resource Strain:      Difficulty of Paying Living Expenses:    Food Insecurity:      Worried About Running Out of Food in the Last Year:      Ran Out of Food in the Last Year:    Transportation Needs:      Lack of Transportation (Medical):      Lack of Transportation (Non-Medical):    Physical Activity:      Days of Exercise per Week:      Minutes of Exercise per Session:    Stress:      Feeling of Stress :    Social Connections:      Frequency of Communication with Friends and Family:      Frequency of Social Gatherings with Friends and Family:      Attends Yazidism Services:      Active Member of Clubs or Organizations:      Attends Club or Organization Meetings:      Marital Status:    Intimate Partner Violence:      Fear of Current or Ex-Partner:      Emotionally Abused:      Physically Abused:      Sexually Abused:         FAMILY HISTORY:   Family History   Problem Relation Age of Onset     Diabetes Father         type 1     Diabetes Sister         type 1     Thyroid Disease Sister      Diabetes Mother         type 2     Breast Cancer Paternal Grandmother      Cancer - colorectal Paternal Uncle 60        EXAM:Vitals: /60   Ht 1.702 m (5'  "7\")   Wt 65.8 kg (145 lb)   BMI 22.71 kg/m      A1C: 6.1 (5/2021)    General appearance: Patient is alert and fully cooperative with history & exam.  No sign of distress is noted during the visit.     Psychiatric: Affect is pleasant & appropriate.  Patient appears motivated to improve health.     Respiratory: Breathing is regular & unlabored while sitting.     HEENT: Hearing is intact to spoken word.  Speech is clear.  No gross evidence of visual impairment that would impact ambulation.     Dermatologic: Distal dorsal aspect of both great toenails on her white and flaking.  This usually indicates trauma to the nail such as rubbing in the shoe or scraped the toe.  No signs of nail fungus noted.  No open lesions or signs of infection noted..     Vascular: DP & PT pulses are intact & regular bilaterally.  No significant edema or varicosities noted.  CFT and skin temperature is normal to both lower extremities.     Neurologic: Lower extremity sensation is intact to light touch.  No evidence of weakness or contracture in the lower extremities.  No evidence of neuropathy.     Musculoskeletal: Patient is ambulatory without assistive device or brace.  Increased arch..     ASSESSMENT:    Toenail deformity  Type 1 diabetes mellitus without complication (H)  Pes cavus     Medical Decision Making/Plan:  Reviewed patient's chart in epic.  Talked about diabetes and explained proper foot care.  Discussed that I do not feel that the nails have nail fungus at this time.  He looks more red, from rubbing in the shoe.  She can put over-the-counter Lamisil on the toenail once a night for the next 3 months to see if that does help him to try to prevent nail infection.  Talked about checking her feet multiple times a day and if she starts to develop calluses those are things we want to know about this.  Pressure areas like to ulcers.  Currently she does not have any numbness or neuropathy.  All questions were answered to patient's " satisfaction she will call for the pressure concerns.    Patient risk factor: Patient is at low risk for infection.        Richelle Marquis DPM, Podiatry/Foot and Ankle Surgery        Again, thank you for allowing me to participate in the care of your patient.        Sincerely,        Richelle Marquis DPM, Podiatry/Foot and Ankle Surgery

## 2021-08-10 NOTE — PATIENT INSTRUCTIONS
"Thank you for choosing [facility] Podiatry / Foot & Ankle Surgery!    DR. COSME'S CLINIC:  [facility]   [address]   [phone]   [place] [address]   [phone]  [phone]      SCHEDULE SURGERY: [phone]   BILLING QUESTIONS: [phone]   APPOINTMENTS: [phone]   CONSUMER PRICE LINE: [phone]      DIABETES AND YOUR FEET  Diabetes can result in several problems in the feet including ulcers (open sores) and amputations. Two of the most important reasons why people develop foot problems when they have diabetes is : 1. Neuropathy (loss of feeling)  2. Vascular disease (loss or decrease of blood flow).    Neuropathy is a term used to describe a loss of nerve function.  Patients with diabetes are at risk of developing neuropathy if their sugars continue to run high and are above the normal value. One theory for neuropathy is that the \"extra\" sugar in the body enters the nerves and is broken down. These by-products build up in the nerve causing it to swell and impairing nerve function. Often times, this can be prevented by controlling your sugars, dieting and exercise.    When a person develops neuropathy, they usually begin to feel numbness or tingling in their feet and sometime in their legs.  Other symptoms may include painful burning or hot feet, tingling or feeling like insects or ants are crawling on your feet or legs.  If the diabetes is sever and the sugars run high for long periods of time, neuropathy can also occur in the hands.    Vascular disease  is a term used to describe a loss or decrease in circulation (blood flow). There is a problem in getting blood and oxygen to areas that need it. Similar to neuropathy, sugars can build up in the walls of the arteries (blood vessels) and cause them to become swollen, thickened and hardened. This decreases the amount of blood that can go to an area that needs it. Though this is common in the legs of diabetic patients, it can " also affect other arteries (blood vessels) in the body such as in the heart and eyes.    In the legs, vascular disease usually results in cramping. Patients who develop leg cramps after walking the same distance every time (i.e. One block, half a mile, ect.) need to let their doctors know so that their circulation may be checked. Cramps causing severe pain in the feet and/or legs while sleeping and the cramps go away when you stand or hang your legs off the side of the bed, may also be a sign of poor blood circulation.  Occasional cramping in cold weather or on rare occasions with activity may not be due to poor circulation, but you should inform your doctor.    PREVENTION OF THESE DISEASES  The key to prevention is good blood sugar control. Poor blood sugar control is a big reason many of these problems start. Physical activity (exercise) is a very good way to help decrease your blood sugars. Exercise can lower your blood sugar, blood pressure, and cholesterol. It also reduces your risk for heart disease and stroke, relieves stress, and strengthens your heart, muscles and bones.  In addition, regular activity helps insulin work better, improves your blood circulation, and keeps your joints flexible. If you're trying to lose weight, a combination of exercise and wise food choices can help you reach your target weight and maintain it.      PAIN MANAGEMENT (**Please speak with your primary doctor about any medications**)  1.Blood Sugar Control - Most important  2. Medications such as:  Amytriptylline, duloxetine, gabapentin, lyrica, tramadol (talk with your primary care doctor about this).     NUTRITION:  Nutrition is also important to help with healing. If your body does not have what it needs, it can't heal.   1. Increasing your protein intake is important.  With wounds you need 60-90gm of protein a day to help with healing. Over the counter protein shakes such as Daquan, Glucerna, Ensure, ect... can help to  "supplement your daily protein intake.   2. It is also important to take Vitamins to help with healing.  Vitamins such as B12, B6 and Vitamin D3 are important for healing. These can be gotten over the counter at pharmacies or at stores like [employer] or the [employer].    I can also prescribe a dietary supplement called \"Rheumate\" that has a lot of essential vitamins in one capsule.  This may not be covered by insurance though.     FOOT CARE RECOMMENDATIONS   1. Wash your feet with lukewarm water and a mild soap and then dry them thoroughly, especially between the toes.     2. Examine your feet daily looking for cuts, corns, blisters, cracks, ect, especially after wearing new shoes. Make sure to look between your toes. If you cannot see the bottom of your feet, set a mirror on the floor and hold your foot over it, or ask a spouse, friend or family member to examine your feet for you. Contact your doctor immediately if new problems are noted or if sores are not healing.     3. Immediately apply moisturizer to the tops and bottoms of your feet, avoiding areas between the toes. Hand lotion (Intesive Care, Naima, Eucerin, Neutrogena, Curel, ect) is sufficient unless your doctor prescribes a medicated lotion. Apply sunscreen to your feet when going swimming outside.     4. Use clean comfortable shoes, wear white socks (if you have any bleeding or drainage, you will see it on white socks). Socks should not have thick seams or cut off the circulation around the leg. Break in new shoes slowly and rotate with older shoes until broken in. Check the inside of your shoes with your hand to look for areas of irritation or objects that may have fallen into your shoes.       5. Keep slippers by the side of your bed for use during the night.     6.  Shoes should be fitted by a professional and should not cause areas of irritation.  Check your feet regularly when wearing a new pair of shoes and replace them as needed.     7.  Talk to " your doctor about proper exercise. Exercise and stretching stimulate blood flow to your feet and maintain proper glucose levels.     8.  Monitor your blood glucose level as instructed by your doctor. Notify your doctor immediately if your blood sugar is abnormally high or low.    9. Cut your nails straight across, but then gently round any sharp edges with a cardboard nail file. If you have neuropathy, peripheral vascular disease or cannot see that well to trim your own toenails contact [employer] ([phone]) or [employer] ([phone]).      THINGS TO AVOID DOING   1.  Do not soak your feet if you have an open sore. Use only lukewarm water and always check the temperature with your hand as hot water can easily burn your feet.       2.  Never use a hot water bottle or heating pad on your feet. Also do not apply cold compresses to your feet. With decreased sensation, you could burn or freeze your feet.       3.  Do not apply any of these to your feet:    -  Over the counter medicine for corns or warts    -  Harsh chemicals like boric acid    -  Do not self-treat corns, cuts, blisters or infections. Always consult your doctor.       4.  Do not wear sandals, slippers or walk barefoot, especially on hot sand or concrete or other harsh surfaces.     5.  If you smoke, stop!!!

## 2021-08-10 NOTE — PROGRESS NOTES
PATIENT HISTORY:  Dr. Wyman requested I see this patient for their foot issue.  Jenise Smith is a 46 year old female who presents to clinic for concern to both great toenails.  She is wondering if she is getting nail fungus.  She does not have any pain.  Notes started about 2 months ago.  She is a very active walker and does like some.  Denies specific injury.  She is just concerned because she is a diabetic and wants to make sure that there is no issues.    Review of Systems:  Patient denies fever, chills, rash, wound, stiffness, limping, numbness, weakness, heart burn, blood in stool, chest pain with activity, calf pain when walking, shortness of breath with activity, chronic cough, easy bleeding/bruising, swelling of ankles, excessive thirst, fatigue, depression, anxiety.       PAST MEDICAL HISTORY:   Past Medical History:   Diagnosis Date     Diabetes mellitus type 1 (H)      Dysmenorrhea      Excessive or frequent menstruation      Hypothyroid      Insulin pump in place      Lumbar back pain      Other and unspecified hyperlipidemia      PONV (postoperative nausea and vomiting)         PAST SURGICAL HISTORY:   Past Surgical History:   Procedure Laterality Date     DILATION AND CURETTAGE, HYSTEROSCOPY, ABLATE ENDOMETRIUM NOVASURE, COMBINED  1/23/2014    Procedure: COMBINED DILATION AND CURETTAGE, HYSTEROSCOPY, ABLATE ENDOMETRIUM NOVASURE;  HYSTEROSCOPY, DILATION, CURETTAGE, WITH NOVASURE ABLATION, MYOSURE MORCELLATOR;  Surgeon: Swetha Queen MD;  Location: [facility]     LAPAROSCOPY       LEEP TX, CERVICAL       OPERATIVE HYSTEROSCOPY WITH MORCELLATOR  1/23/2014    Procedure: OPERATIVE HYSTEROSCOPY WITH MORCELLATOR;;  Surgeon: Swetha Queen MD;  Location: [facility]     wisdom teeth[          MEDICATIONS:   Current Outpatient Medications:      blood glucose (ACCU-CHEK FASTCLIX) lancing device, 1 Dose once daily., Disp: , Rfl:      blood glucose monitoring (FABRICIO CONTOUR NEXT)  test strip, 1 Dose 6 times daily. Test 6-7 times daily, Disp: , Rfl:      blood glucose monitoring (NO BRAND SPECIFIED) test strip, Use to test blood sugar 6 times daily or as directed., Disp: 550 strip, Rfl: 3     empagliflozin (JARDIANCE) 10 MG TABS tablet, Take 1 tablet (10 mg) by mouth daily, Disp: 90 tablet, Rfl: 3     eszopiclone (LUNESTA) 2 MG tablet, Take 1 tablet (2 mg) by mouth At Bedtime, Disp: 30 tablet, Rfl: 0     FLUoxetine (PROZAC) 20 MG capsule, TAKE 1 CAPSULE BY MOUTH  DAILY, Disp: 90 capsule, Rfl: 3     glucagon (GLUCAGON EMERGENCY) 1 MG kit, Inject 1 mg into the muscle once for 1 dose, Disp: 1 mg, Rfl: 2     insulin glargine (LANTUS PEN) 100 UNIT/ML pen, Inject 16 units subcutaneous daily as directed, Disp: 15 mL, Rfl: 11     insulin lispro (HUMALOG KWIKPEN) 100 UNIT/ML (1 unit dial) KWIKPEN, Inject 3-8 units subcutaneous with meals and correction doses as directed, total daily dose approx 25 units, Disp: 15 mL, Rfl: 1     insulin lispro (HUMALOG) 100 UNIT/ML vial, USE VIA INSULIN PUMP A  TOTAL DAILY DOSE OF  APPROXIMATELY 70 UNITS, Disp: 70 mL, Rfl: 3     levothyroxine (SYNTHROID/LEVOTHROID) 50 MCG tablet, TAKE 1 TABLET BY MOUTH  DAILY, Disp: 90 tablet, Rfl: 3     Multiple Vitamins-Minerals (MULTI FOR HER PO), , Disp: , Rfl:      STATIN NOT PRESCRIBED (INTENTIONAL), Please choose reason not prescribed from choices below., Disp:  , Rfl:      VITAMIN D, CHOLECALCIFEROL, PO, Take 1,000 Units by mouth, Disp: , Rfl:      ALLERGIES:    Allergies   Allergen Reactions     Keflex [Cephalexin]      nausea     Latex      Facial redness        SOCIAL HISTORY:   Social History     Socioeconomic History     Marital status:      Spouse name: Not on file     Number of children: Not on file     Years of education: Not on file     Highest education level: Not on file   Occupational History     Employer: [employer]   Tobacco Use     Smoking status: Never Smoker     Smokeless tobacco: Never Used  "  Substance and Sexual Activity     Alcohol use: Yes     Alcohol/week: 0.0 standard drinks     Comment: 3 per week     Drug use: No     Sexual activity: Yes     Partners: Male     Birth control/protection: Male Surgical   Other Topics Concern     Parent/sibling w/ CABG, MI or angioplasty before 65F 55M? Not Asked   Social History Narrative     in May 2014    2 step children (20 and 18) and one biol dtr age 15.    Works for [employer]--works from home     Social Determinants of Health     Financial Resource Strain:      Difficulty of Paying Living Expenses:    Food Insecurity:      Worried About Running Out of Food in the Last Year:      Ran Out of Food in the Last Year:    Transportation Needs:      Lack of Transportation (Medical):      Lack of Transportation (Non-Medical):    Physical Activity:      Days of Exercise per Week:      Minutes of Exercise per Session:    Stress:      Feeling of Stress :    Social Connections:      Frequency of Communication with Friends and Family:      Frequency of Social Gatherings with Friends and Family:      Attends Scientologist Services:      Active Member of Clubs or Organizations:      Attends Club or Organization Meetings:      Marital Status:    Intimate Partner Violence:      Fear of Current or Ex-Partner:      Emotionally Abused:      Physically Abused:      Sexually Abused:         FAMILY HISTORY:   Family History   Problem Relation Age of Onset     Diabetes Father         type 1     Diabetes Sister         type 1     Thyroid Disease Sister      Diabetes Mother         type 2     Breast Cancer Paternal Grandmother      Cancer - colorectal Paternal Uncle 60        EXAM:Vitals: /60   Ht 1.702 m (5' 7\")   Wt 65.8 kg (145 lb)   BMI 22.71 kg/m      A1C: 6.1 (5/2021)    General appearance: Patient is alert and fully cooperative with history & exam.  No sign of distress is noted during the visit.     Psychiatric: Affect is pleasant & appropriate.  Patient " appears motivated to improve health.     Respiratory: Breathing is regular & unlabored while sitting.     HEENT: Hearing is intact to spoken word.  Speech is clear.  No gross evidence of visual impairment that would impact ambulation.     Dermatologic: Distal dorsal aspect of both great toenails on her white and flaking.  This usually indicates trauma to the nail such as rubbing in the shoe or scraped the toe.  No signs of nail fungus noted.  No open lesions or signs of infection noted..     Vascular: DP & PT pulses are intact & regular bilaterally.  No significant edema or varicosities noted.  CFT and skin temperature is normal to both lower extremities.     Neurologic: Lower extremity sensation is intact to light touch.  No evidence of weakness or contracture in the lower extremities.  No evidence of neuropathy.     Musculoskeletal: Patient is ambulatory without assistive device or brace.  Increased arch..     ASSESSMENT:    Toenail deformity  Type 1 diabetes mellitus without complication (H)  Pes cavus     Medical Decision Making/Plan:  Reviewed patient's chart in epic.  Talked about diabetes and explained proper foot care.  Discussed that I do not feel that the nails have nail fungus at this time.  He looks more red, from rubbing in the shoe.  She can put over-the-counter Lamisil on the toenail once a night for the next 3 months to see if that does help him to try to prevent nail infection.  Talked about checking her feet multiple times a day and if she starts to develop calluses those are things we want to know about this.  Pressure areas like to ulcers.  Currently she does not have any numbness or neuropathy.  All questions were answered to patient's satisfaction she will call for the pressure concerns.    Patient risk factor: Patient is at low risk for infection.        Richelle Marquis DPM, Podiatry/Foot and Ankle Surgery

## 2021-08-11 DIAGNOSIS — E13.9 DIABETES MELLITUS TYPE 1.5, MANAGED AS TYPE 2 (H): ICD-10-CM

## 2021-08-17 ENCOUNTER — VIRTUAL VISIT (OUTPATIENT)
Dept: ENDOCRINOLOGY | Facility: CLINIC | Age: 46
End: 2021-08-17
Payer: COMMERCIAL

## 2021-08-17 DIAGNOSIS — Z96.41 INSULIN PUMP STATUS: ICD-10-CM

## 2021-08-17 DIAGNOSIS — E06.3 HYPOTHYROIDISM DUE TO HASHIMOTO'S THYROIDITIS: ICD-10-CM

## 2021-08-17 DIAGNOSIS — E13.9 DIABETES MELLITUS TYPE 1.5, MANAGED AS TYPE 2 (H): Primary | ICD-10-CM

## 2021-08-17 PROCEDURE — 99214 OFFICE O/P EST MOD 30 MIN: CPT | Mod: GT | Performed by: INTERNAL MEDICINE

## 2021-08-17 PROCEDURE — 95251 CONT GLUC MNTR ANALYSIS I&R: CPT | Performed by: INTERNAL MEDICINE

## 2021-08-17 RX ORDER — LEVOTHYROXINE SODIUM 50 UG/1
50 TABLET ORAL DAILY
Qty: 90 TABLET | Refills: 3 | Status: SHIPPED | OUTPATIENT
Start: 2021-08-17 | End: 2021-12-13

## 2021-08-17 RX ORDER — GLUCAGON 3 MG/1
3 POWDER NASAL
Qty: 1 EACH | Refills: 3 | Status: SHIPPED | OUTPATIENT
Start: 2021-08-17 | End: 2023-04-25

## 2021-08-17 NOTE — PROGRESS NOTES
"Patient is being evaluated via a billable video visit.      How would you like to obtain your AVS? Reviewed verbally  If the video visit is dropped, the invitation should be resent by cell phone  Will anyone else be joining your video visit? no      Video Start Time:  8:05 am    Video-Visit Details    Type of service:  Video Visit    Video End Time:  8:20 am    Originating Location (pt. Location): home    Distant Location (provider location):  [facility] [place]    Platform used for Video Visit:  [facility]        Recent issues:  Diabetes and thyroid follow-up evaluation  Feeling well overall, but \"threw back out\" and less exercise recently  Taking the low dose (off label) Jardiance medication, reports improvement with glucose control   Her dog Juliet ill.           Diabetes:  Previous diagnosis of IFG with high GAD65 Ab level  6/2011. Developed acute hyperglycemia and diagnosis of T1DM  Treatment with insulin medication, MDI plan  2/2013. Changed to OmniPod pump with Novolog insulin  Used DexcomG5 CGMS system  5/2017. Changed to Medtronic 630G pump  9/2017. Upgraded to Medtronic 670G pump and Guardian3 sensor, Humalog insulin  8/2018. Discontinued Medtronic pump and sensor, changed to MDI plan              Had taken Basaglar 17U at bedtime and mealtime Humalog  Had taken Basaglar 13U at bedtime, Humalog Luxura pen 1:7 at mealtime, ISF 60, goal target  mg/dl  12/2018. Restarted OmniPod pump  Using OmniPod pump with Dash PDM              Humalog in pump              Jardiance 10 mg each morning     Using OmniPod Dash PDM  Current pump settings:      Recent DexcomG6 CGMS data:           Has reduced hypoglycemia awareness              Has chocolate lab (Juliet), trained to recognize hypoglycemia smell/symptoms  Using Contour Next Link BG meter, variable testing, tests 6x/day  Exercises with classes at health club- yoga, strength              Tried Temp Target with exercise, noticed higher " glucose levels so not used  Previous [facility] labs include:  Lab Results   Component Value Date    A1C 6.1 (H) 05/17/2021     05/17/2021    POTASSIUM 4.7 05/17/2021    CHLORIDE 105 05/17/2021    CO2 27 05/17/2021    ANIONGAP 4 05/17/2021     (H) 05/17/2021    BUN 22 05/17/2021    CR 1.03 05/17/2021    GFRESTIMATED 60 (L) 07/07/2021    GFRESTBLACK 73 07/07/2021    VIJI 9.3 05/17/2021    CHOL 147 02/02/2021    TRIG 72 02/02/2021    HDL 73 02/02/2021    LDL 60 02/02/2021    NHDL 74 02/02/2021    UCRR 150 05/17/2021    MICROL 7 05/17/2021    UMALCR 4.74 05/17/2021     Previous Lasik surgery at [facility] 2015  Last eye exam 1/2021 with Dr. Diaz, no DR reported  Plans to see practitioner at [facility] soon  DM Complications: none known        Thyroid:  2011. History of hypothyroidism  Takes levothyroxine medication  Eary 10/2018. Dose incease to levothyroxine 0.05 mg daily per GYN physician    Recent [facility] labs include:  Lab Results   Component Value Date    TSH 1.72 05/17/2021    T4 0.85 05/17/2021     Current dose:  Levothyroxine 0.05 mg daily        , lives in [place],  [place], daughter Myra and stepchildren Jag (formerly Tisha) + Anoop  Has chocolate lab (Juliet), trained to recognize hypoglycemia smell/symptoms  Works for [employer] as   Now sees Beba Dailey PAC/[facility] for general medicine evaluations.  Also sees Dr. Ashford/[facility]        ROS: 10 point ROS neg other than the symptoms noted above in the HPI.     GENERAL: energy good, wt stable; denies fevers, chills, night sweats.   HEENT: no dysphagia, odonophagia, diplopia, neck pain  THYROID:  no apparent hyper or hypothyroid symptoms  CV: no chest pain, pressure, palpitations  LUNGS: no SOB, CLIFFORD, cough, wheezing   ABDOMEN: denies nausea, diarrhea, constipation, abdominal pain  EXTREMITIES: no rashes, ulcers, edema  NEUROLOGY: no headaches, denies changes in vision, tingling, extremitiy numbness    MSK: some back pain; denies muscle weakness  SKIN: no rashes or lesions  : regular menstrual cycles Q 28-30 days  PSYCH:  stable mood, no significant anxiety or depression  ENDOCRINE: no heat or cold intolerance    Physical Exam (visual exam)  VS:  no vital signs taken for video visit  CONSTITUTIONAL: healthy, alert and NAD, well dressed, answering questions appropriately  ENT: no nose swelling or nasal discharge, mouth redness or gum changes.  EYES: eyes grossly normal to inspection, conjunctivae and sclerae normal, no exophthalmos or proptosis  THYROID:  no apparent nodules or goiter  LUNGS: no audible wheeze, cough or visible cyanosis, no visible retractions or increased work of breathing  ABDOMEN: abdomen not evaluated  EXTREMITIES: no hand tremors, limited exam  NEUROLOGY: CN grossly intact, mentation intact and speech normal   SKIN:  no apparent skin lesions, rash, or edema with visualized skin appearance  PSYCH: mentation appears normal, affect normal/bright, judgement and insight intact,   normal speech and appearance well groomed    LABS:     All pertinent notes, labs, and images personally reviewed by me.       A/P:  Encounter Diagnoses   Name Primary?     Diabetes mellitus type 1.5, managed as type 2 (H) Yes     Insulin pump status      Hypothyroidism due to Hashimoto's thyroiditis      Comments:  Reviewed health history, diabetes and thyroid issues.  Recent glucose control good but some hypoglycemia trends  Reviewed and interpreted tests that I previously ordered.   Ordered appropriate tests for the endocrinology disease management.    Management options discussed and implemented after shared medical decision making with the patient.  T1DM and hypothyroidism problems are chronic-stable    Plan:  Discussed general issues with the diabetes diagnosis and management  We discussed the hgbA1c test which reflects previous overall glucose levels or control  Discussed the importance of blood glucose (BG)  testing to assess glucose trends  Provided general overview of the multiple daily injection (MDI) plan using rapid acting mealtime and longacting insulin medications  Reviewed recent DexcomG6 CGM glucose trends, in detail     Recommend:  Continue the insulin pump and diabetes management  Discussed use of the off-label Jardiance medication.    Pump setting changes recommended:   Bolus ICR 5p 7.5 to 7.2    We have discussed the exercise routine with lower temp basal 50% for 30-min but change post exercise 9pm -15% from 6 hrs to 10 hrs duration  Discussed the future OmniPod 5 pump software option, likely available later this year  Updated several eRx's today, including Baqsimi.  Test BG levels premeal and bedtime and/or use Dexcom CGM sensor  No labs ordered at this time  Monitor for symptom changes  Advise having fasting lipid panel testing and dilated eye examination, at least annually  Continue current levothyroxine daily dose     Keep regular f/u PCP and GYN evaluations  Addressed patient questions today    There are no Patient Instructions on file for this visit.    Future labs ordered today:   Orders Placed This Encounter   Procedures     GLUCOSE MONITOR, 72 HOUR, PHYS INTERP     Radiology/Consults ordered today: None    Total time spent in with the patient evaluation:  15 min  Additional time spent reviewing pertinent lab tests and chart notes, and documentation:  15 min    Follow-up:  11/2021, Return    DARI Gaming MD, MS  Endocrinology  [facility] [place]    CC:  Ashlie, L

## 2021-08-17 NOTE — LETTER
"    8/17/2021         RE: Jenise Smith  [address] [id number]        Dear Colleague,    Thank you for referring your patient, Jenise Smith, to the [facility]. Please see a copy of my visit note below.    Patient is being evaluated via a billable video visit.      How would you like to obtain your AVS? Reviewed verbally  If the video visit is dropped, the invitation should be resent by cell phone  Will anyone else be joining your video visit? no      Video Start Time:  8:05 am    Video-Visit Details    Type of service:  Video Visit    Video End Time:  8:20 am    Originating Location (pt. Location): home    Distant Location (provider location):  [facility]/home    Platform used for Video Visit:  [employer]        Recent issues:  Diabetes and thyroid follow-up evaluation  Feeling well overall, but \"threw back out\" and less exercise recently  Taking the low dose (off label) Jardiance medication, reports improvement with glucose control   Her dog Juliet sarah.           Diabetes:  Previous diagnosis of IFG with high GAD65 Ab level  6/2011. Developed acute hyperglycemia and diagnosis of T1DM  Treatment with insulin medication, MDI plan  2/2013. Changed to OmniPod pump with Novolog insulin  Used DexcomG5 CGMS system  5/2017. Changed to Medtronic 630G pump  9/2017. Upgraded to Medtronic 670G pump and Guardian3 sensor, Humalog insulin  8/2018. Discontinued Medtronic pump and sensor, changed to MDI plan              Had taken Basaglar 17U at bedtime and mealtime Humalog  Had taken Basaglar 13U at bedtime, Humalog Luxura pen 1:7 at mealtime, ISF 60, goal target  mg/dl  12/2018. Restarted OmniPod pump  Using OmniPod pump with Dash PDM              Humalog in pump              Jardiance 10 mg each morning     Using OmniPod Dash PDM  Current pump settings:      Recent DexcomG6 CGMS data:           Has reduced hypoglycemia " alvaro              Has chocolate lab (Juliet), trained to recognize hypoglycemia smell/symptoms  Using Contour Next Link BG meter, variable testing, tests 6x/day  Exercises with classes at health club- yoga, strength              Tried Temp Target with exercise, noticed higher glucose levels so not used  Previous [facility] labs include:  Lab Results   Component Value Date    A1C 6.1 (H) 05/17/2021     05/17/2021    POTASSIUM 4.7 05/17/2021    CHLORIDE 105 05/17/2021    CO2 27 05/17/2021    ANIONGAP 4 05/17/2021     (H) 05/17/2021    BUN 22 05/17/2021    CR 1.03 05/17/2021    GFRESTIMATED 60 (L) 07/07/2021    GFRESTBLACK 73 07/07/2021    VIJI 9.3 05/17/2021    CHOL 147 02/02/2021    TRIG 72 02/02/2021    HDL 73 02/02/2021    LDL 60 02/02/2021    NHDL 74 02/02/2021    UCRR 150 05/17/2021    MICROL 7 05/17/2021    UMALCR 4.74 05/17/2021     Previous Lasik surgery at [facility] 2015  Last eye exam 1/2021 with Dr. Diaz, no DR reported  Plans to see practitioner at [facility] soon  DM Complications: none known        Thyroid:  2011. History of hypothyroidism  Takes levothyroxine medication  Eary 10/2018. Dose incease to levothyroxine 0.05 mg daily per GYN physician    Recent [facility] labs include:  Lab Results   Component Value Date    TSH 1.72 05/17/2021    T4 0.85 05/17/2021     Current dose:  Levothyroxine 0.05 mg daily        , lives in [place],  [place], daughter Myra and stepchildren Jag (formerly Tisha) + Anoop  Has chocolate lab (Juliet), trained to recognize hypoglycemia smell/symptoms  Works for [employer] as   Now sees Beba Dailey PAC[facility] [place] [facility] for general medicine evaluations.  Also sees Dr. Ashford/[facility]        ROS: 10 point ROS neg other than the symptoms noted above in the HPI.     GENERAL: energy good, wt stable; denies fevers, chills, night sweats.   HEENT: no dysphagia, odonophagia, diplopia, neck pain  THYROID:  no apparent hyper or  hypothyroid symptoms  CV: no chest pain, pressure, palpitations  LUNGS: no SOB, CLIFFORD, cough, wheezing   ABDOMEN: denies nausea, diarrhea, constipation, abdominal pain  EXTREMITIES: no rashes, ulcers, edema  NEUROLOGY: no headaches, denies changes in vision, tingling, extremitiy numbness   MSK: some back pain; denies muscle weakness  SKIN: no rashes or lesions  : regular menstrual cycles Q 28-30 days  PSYCH:  stable mood, no significant anxiety or depression  ENDOCRINE: no heat or cold intolerance    Physical Exam (visual exam)  VS:  no vital signs taken for video visit  CONSTITUTIONAL: healthy, alert and NAD, well dressed, answering questions appropriately  ENT: no nose swelling or nasal discharge, mouth redness or gum changes.  EYES: eyes grossly normal to inspection, conjunctivae and sclerae normal, no exophthalmos or proptosis  THYROID:  no apparent nodules or goiter  LUNGS: no audible wheeze, cough or visible cyanosis, no visible retractions or increased work of breathing  ABDOMEN: abdomen not evaluated  EXTREMITIES: no hand tremors, limited exam  NEUROLOGY: CN grossly intact, mentation intact and speech normal   SKIN:  no apparent skin lesions, rash, or edema with visualized skin appearance  PSYCH: mentation appears normal, affect normal/bright, judgement and insight intact,   normal speech and appearance well groomed    LABS:     All pertinent notes, labs, and images personally reviewed by me.       A/P:  Encounter Diagnoses   Name Primary?     Diabetes mellitus type 1.5, managed as type 2 (H) Yes     Insulin pump status      Hypothyroidism due to Hashimoto's thyroiditis      Comments:  Reviewed health history, diabetes and thyroid issues.  Recent glucose control good but some hypoglycemia trends  Reviewed and interpreted tests that I previously ordered.   Ordered appropriate tests for the endocrinology disease management.    Management options discussed and implemented after shared medical decision making  with the patient.  T1DM and hypothyroidism problems are chronic-stable    Plan:  Discussed general issues with the diabetes diagnosis and management  We discussed the hgbA1c test which reflects previous overall glucose levels or control  Discussed the importance of blood glucose (BG) testing to assess glucose trends  Provided general overview of the multiple daily injection (MDI) plan using rapid acting mealtime and longacting insulin medications  Reviewed recent DexcomG6 CGM glucose trends, in detail     Recommend:  Continue the insulin pump and diabetes management  Discussed use of the off-label Jardiance medication.    Pump setting changes recommended:   Bolus ICR 5p 7.5 to 7.2    We have discussed the exercise routine with lower temp basal 50% for 30-min but change post exercise 9pm -15% from 6 hrs to 10 hrs duration  Discussed the future OmniPod 5 pump software option, likely available later this year  Updated several eRx's today, including Baqsimi.  Test BG levels premeal and bedtime and/or use Dexcom CGM sensor  No labs ordered at this time  Monitor for symptom changes  Advise having fasting lipid panel testing and dilated eye examination, at least annually  Continue current levothyroxine daily dose     Keep regular f/u PCP and GYN evaluations  Addressed patient questions today    There are no Patient Instructions on file for this visit.    Future labs ordered today:   Orders Placed This Encounter   Procedures     GLUCOSE MONITOR, 72 HOUR, PHYS INTERP     Radiology/Consults ordered today: None    Total time spent in with the patient evaluation:  15 min  Additional time spent reviewing pertinent lab tests and chart notes, and documentation:  15 min    Follow-up:  11/2021, Return    DARI Gaming MD, MS  Endocrinology  [facility]    CC:  ANA Dailey              Again, thank you for allowing me to participate in the care of your patient.        Sincerely,        Lyndon Gaming MD

## 2021-08-18 ENCOUNTER — MYC MEDICAL ADVICE (OUTPATIENT)
Dept: ENDOCRINOLOGY | Facility: CLINIC | Age: 46
End: 2021-08-18

## 2021-08-19 ENCOUNTER — TELEPHONE (OUTPATIENT)
Dept: ENDOCRINOLOGY | Facility: CLINIC | Age: 46
End: 2021-08-19

## 2021-08-23 ENCOUNTER — TELEPHONE (OUTPATIENT)
Dept: ENDOCRINOLOGY | Facility: CLINIC | Age: 46
End: 2021-08-23

## 2021-08-23 DIAGNOSIS — F51.01 PRIMARY INSOMNIA: ICD-10-CM

## 2021-08-23 NOTE — TELEPHONE ENCOUNTER
Received form(s) from [employer]  Placed form(s) in/on TL's incoming basket.  Forms need to be filled out and signed and faxed to number listed on form.     Copy needs to be sent for scanning after completion: Yes     Sophy Mclaughlin MA

## 2021-08-23 NOTE — TELEPHONE ENCOUNTER
Received form(s) from [employer]  Placed form(s) in/on TL's incoming basket.  Forms need to be filled out and signed and faxed to number listed on form.     Copy needs to be sent for scanning after completion: Yes     Sophy Mclaughlin MA

## 2021-08-24 ENCOUNTER — MEDICAL CORRESPONDENCE (OUTPATIENT)
Dept: HEALTH INFORMATION MANAGEMENT | Facility: CLINIC | Age: 46
End: 2021-08-24

## 2021-08-25 RX ORDER — ESZOPICLONE 2 MG/1
TABLET, FILM COATED ORAL
Qty: 30 TABLET | Refills: 1 | Status: SHIPPED | OUTPATIENT
Start: 2021-08-25 | End: 2021-11-22

## 2021-08-25 NOTE — TELEPHONE ENCOUNTER
Routing refill request to provider for review/approval because:  Drug not on the [facility] refill protocol . Last refill 7/22/21 for 30 tablets.     Kerry Nguyen RN  [facility] Triage

## 2021-09-07 ENCOUNTER — MYC MEDICAL ADVICE (OUTPATIENT)
Dept: SURGERY | Facility: CLINIC | Age: 46
End: 2021-09-07

## 2021-09-08 NOTE — TELEPHONE ENCOUNTER
Message reviewed.  I have received and reviewed this CDC form, completed it today.  It will be faxed back ASA.    DARI Gaming MD, MS  Endocrinology  [facility] [place]

## 2021-09-12 ENCOUNTER — HEALTH MAINTENANCE LETTER (OUTPATIENT)
Age: 46
End: 2021-09-12

## 2021-10-31 ENCOUNTER — MYC MEDICAL ADVICE (OUTPATIENT)
Dept: FAMILY MEDICINE | Facility: CLINIC | Age: 46
End: 2021-10-31

## 2021-10-31 DIAGNOSIS — F41.9 ANXIETY: Primary | ICD-10-CM

## 2021-11-01 RX ORDER — FLUOXETINE 10 MG/1
10 TABLET, FILM COATED ORAL DAILY
Qty: 30 TABLET | Refills: 1 | Status: SHIPPED | OUTPATIENT
Start: 2021-11-01 | End: 2022-01-03

## 2021-11-01 NOTE — TELEPHONE ENCOUNTER
Please see patient's mychart:    Per OV note on 2/2/2021, patient requesting to taper off of prozac so okay to taper from 20 to 10mg every day for a few weeks, then every other day for 2 weeks and then off. Pended 10 mg script, please advise quantity/refills.     Please reply back to patient, route to triage follow up, or route to team coordinators to have patient schedule an appointment.     No Ash RN  [facility]

## 2021-11-01 NOTE — TELEPHONE ENCOUNTER
Please reassure the Prozac fluoxetine does not need that long taper dose [would not have significant withdrawal side effect] but agree with the weaning plan she has stated below., We did dispense #30 fluoxetine 10 mg dose with one refill.    Dr. Diaz covering for Beba Dailey PA-C

## 2021-11-04 DIAGNOSIS — F41.9 ANXIETY: Primary | ICD-10-CM

## 2021-11-04 RX ORDER — FLUOXETINE 10 MG/1
10 CAPSULE ORAL DAILY
Qty: 30 CAPSULE | Refills: 1 | Status: SHIPPED | OUTPATIENT
Start: 2021-11-04 | End: 2022-01-03

## 2021-11-10 ENCOUNTER — VIRTUAL VISIT (OUTPATIENT)
Dept: ENDOCRINOLOGY | Facility: CLINIC | Age: 46
End: 2021-11-10
Payer: COMMERCIAL

## 2021-11-10 DIAGNOSIS — Z96.41 INSULIN PUMP STATUS: ICD-10-CM

## 2021-11-10 DIAGNOSIS — E06.3 HYPOTHYROIDISM DUE TO HASHIMOTO'S THYROIDITIS: ICD-10-CM

## 2021-11-10 DIAGNOSIS — E13.9 DIABETES MELLITUS TYPE 1.5, MANAGED AS TYPE 2 (H): Primary | ICD-10-CM

## 2021-11-10 PROCEDURE — 99214 OFFICE O/P EST MOD 30 MIN: CPT | Mod: GT | Performed by: INTERNAL MEDICINE

## 2021-11-10 PROCEDURE — 95251 CONT GLUC MNTR ANALYSIS I&R: CPT | Performed by: INTERNAL MEDICINE

## 2021-11-10 NOTE — LETTER
11/10/2021         RE: Jenise Smith  [address] [address]        Dear Colleague,    Thank you for referring your patient, Jenise Smith, to the [facility]. Please see a copy of my visit note below.    Patient is being evaluated via a billable video visit.      How would you like to obtain your AVS? Reviewed verbally  If the video visit is dropped, the invitation should be resent by cell phone  Will anyone else be joining your video visit? no      Video Start Time:  8:15 am    Video-Visit Details     Type of service:  Video Visit    Video End Time:  8:35 pm    Originating Location (pt. Location): home    Distant Location (provider location):  [facility]/home    Platform used for Video Visit:  Software 2000        Recent issues:  Diabetes and thyroid follow-up evaluation  Feeling well overall, no new health issues reported  Taking the low dose (off label) Jardiance medication, reports improvement with glucose control  Doing home exercise video's with  Conrad pre-breakfasttime   Her dog Juliet , now approved for replacement Can-[employer]-Canine service dog           Diabetes:  Previous diagnosis of IFG with high GAD65 Ab level  2011. Developed acute hyperglycemia and diagnosis of T1DM  Treatment with insulin medication, MDI plan  2013. Changed to OmniPod pump with Novolog insulin  Used DexcomG5 CGMS system  2017. Changed to Medtronic 630G pump  2017. Upgraded to Medtronic 670G pump and Guardian3 sensor, Humalog insulin  2018. Discontinued Medtronic pump and sensor, changed to MDI plan              Had taken Basaglar 17U at bedtime and mealtime Humalog  Had taken Basaglar 13U at bedtime, Humalog Luxura pen 1:7 at mealtime, ISF 60, goal target  mg/dl  2018. Restarted OmniPod pump  ~Early 2021. Stopped the Jardiance medication  Using OmniPod pump with Dash PDM              Humalog in pump       Using OmniPod Dash  PDM  Previous pump settings:        Recent DexcomG6 CGMS data:            Has reduced hypoglycemia awareness              Previously had service dog (Juliet), trained to recognize hypoglycemia smell/symptoms  Using Contour Next Link BG meter, variable testing, tests 6x/day  Exercises with classes at health club- yoga, strength              Tried Temp Target with exercise, noticed higher glucose levels so not used  Previous [facility] labs include:  Lab Results   Component Value Date    A1C 6.1 (H) 05/17/2021     05/17/2021    POTASSIUM 4.7 05/17/2021    CHLORIDE 105 05/17/2021    CO2 27 05/17/2021    ANIONGAP 4 05/17/2021     (H) 05/17/2021    BUN 22 05/17/2021    CR 1.03 05/17/2021    GFRESTIMATED 60 (L) 07/07/2021    GFRESTBLACK 73 07/07/2021    VIJI 9.3 05/17/2021    CHOL 147 02/02/2021    TRIG 72 02/02/2021    HDL 73 02/02/2021    LDL 60 02/02/2021    NHDL 74 02/02/2021    UCRR 150 05/17/2021    MICROL 7 05/17/2021    UMALCR 4.74 05/17/2021     Previous Lasik surgery at [facility] 2015  Last eye exam 1/2021 with Dr. Diaz, no DR reported  Plans to see practitioner at [facility] soon  DM Complications: none known        Thyroid:  2011. History of hypothyroidism  Takes levothyroxine medication  Eary 10/2018. Dose incease to levothyroxine 0.05 mg daily per GYN physician    Recent [facility] labs include:  Lab Results   Component Value Date    TSH 1.72 05/17/2021    T4 0.85 05/17/2021     Current dose:  Levothyroxine 0.05 mg daily        , lives in [place],  Conrad, daughter Myra and stepchildren Jag (formerly Tisha) + Anoop  Has chocolate lab (Juliet), trained to recognize hypoglycemia smell/symptoms  Works for [employer] as   Now sees Beba Dailey PAC[facility] for general medicine evaluations.  Also sees Dr. Ashford[facility]       PMH/PSH:  Past Medical History:   Diagnosis Date     Diabetes mellitus type 1 (H)      Dysmenorrhea      Excessive or frequent  menstruation      Hypothyroid      Insulin pump in place      Lumbar back pain      Other and unspecified hyperlipidemia      PONV (postoperative nausea and vomiting)      Past Surgical History:   Procedure Laterality Date     DILATION AND CURETTAGE, HYSTEROSCOPY, ABLATE ENDOMETRIUM NOVASURE, COMBINED  1/23/2014    Procedure: COMBINED DILATION AND CURETTAGE, HYSTEROSCOPY, ABLATE ENDOMETRIUM NOVASURE;  HYSTEROSCOPY, DILATION, CURETTAGE, WITH NOVASURE ABLATION, MYOSURE MORCELLATOR;  Surgeon: Swetha Queen MD;  Location: [facility]     LAPAROSCOPY       LEEP TX, CERVICAL       OPERATIVE HYSTEROSCOPY WITH MORCELLATOR  1/23/2014    Procedure: OPERATIVE HYSTEROSCOPY WITH MORCELLATOR;;  Surgeon: Swetha Queen MD;  Location: [facility]     wisdom teeth[         Family Hx:  Family History   Problem Relation Age of Onset     Diabetes Father         type 1     Diabetes Sister         type 1     Thyroid Disease Sister      Diabetes Mother         type 2     Breast Cancer Paternal Grandmother      Cancer - colorectal Paternal Uncle 60         Social Hx:  Social History     Socioeconomic History     Marital status:      Spouse name: Not on file     Number of children: Not on file     Years of education: Not on file     Highest education level: Not on file   Occupational History     Employer: [employer]   Tobacco Use     Smoking status: Never Smoker     Smokeless tobacco: Never Used   Substance and Sexual Activity     Alcohol use: Yes     Alcohol/week: 0.0 standard drinks     Comment: 3 per week     Drug use: No     Sexual activity: Yes     Partners: Male     Birth control/protection: Male Surgical   Other Topics Concern     Parent/sibling w/ CABG, MI or angioplasty before 65F 55M? Not Asked   Social History Narrative     in May 2014    2 step children (20 and 18) and one biol dtr age 15.    Works for [employer]--works from home     Social Determinants of Health      Financial Resource Strain: Not on file   Food Insecurity: Not on file   Transportation Needs: Not on file   Physical Activity: Not on file   Stress: Not on file   Social Connections: Not on file   Intimate Partner Violence: Not on file   Housing Stability: Not on file          MEDICATIONS:  has a current medication list which includes the following prescription(s): eszopiclone, fluoxetine, insulin lispro, levothyroxine, multiple vitamins-minerals, cholecalciferol, blood glucose, blood glucose, blood glucose, fluoxetine, baqsimi two pack, glucagon, insulin glargine, insulin lispro, and statin not prescribed.       ROS: 10 point ROS neg other than the symptoms noted above in the HPI.     GENERAL: energy good, wt stable; denies fevers, chills, night sweats.   HEENT: no dysphagia, odonophagia, diplopia, neck pain  THYROID:  no apparent hyper or hypothyroid symptoms  CV: no chest pain, pressure, palpitations  LUNGS: no SOB, CLIFFORD, cough, wheezing   ABDOMEN: denies nausea, diarrhea, constipation, abdominal pain  EXTREMITIES: no rashes, ulcers, edema  NEUROLOGY: no headaches, denies changes in vision, tingling, extremitiy numbness   MSK: some back pain; denies muscle weakness  SKIN: no rashes or lesions  : regular menstrual cycles Q 28-30 days  PSYCH:  stable mood, no significant anxiety or depression  ENDOCRINE: no heat or cold intolerance    Physical Exam (visual exam)  VS:  no vital signs taken for video visit  CONSTITUTIONAL: healthy, alert and NAD, well dressed, answering questions appropriately  ENT: no nose swelling or nasal discharge, mouth redness or gum changes.  EYES: eyes grossly normal to inspection, conjunctivae and sclerae normal, no exophthalmos or proptosis  THYROID:  no apparent nodules or goiter  LUNGS: no audible wheeze, cough or visible cyanosis, no visible retractions or increased work of breathing  ABDOMEN: abdomen not evaluated  EXTREMITIES: no hand tremors, limited exam  NEUROLOGY: CN grossly  intact, mentation intact and speech normal   SKIN:  no apparent skin lesions, rash, or edema with visualized skin appearance  PSYCH: mentation appears normal, affect normal/bright, judgement and insight intact,   normal speech and appearance well groomed    LABS:     All pertinent notes, labs, and images personally reviewed by me.       A/P:  Encounter Diagnoses   Name Primary?     Diabetes mellitus type 1.5, managed as type 2 (H) Yes     Insulin pump status      Hypothyroidism due to Hashimoto's thyroiditis        Comments:  Reviewed health history, diabetes and thyroid issues.  Recent glucose control good but some hypoglycemia trends  Reviewed and interpreted tests that I previously ordered.   Ordered appropriate tests for the endocrinology disease management.    Management options discussed and implemented after shared medical decision making with the patient.  T1DM and hypothyroidism problems are chronic-stable    Plan:  Discussed general issues with the diabetes diagnosis and management  We discussed the hgbA1c test which reflects previous overall glucose levels or control  Discussed the importance of blood glucose (BG) testing to assess glucose trends  Provided general overview of the multiple daily injection (MDI) plan using rapid acting mealtime and longacting insulin medications  Reviewed recent DexcomG6 CGM glucose trends, in detail     Recommend:  Continue the OmniPod insulin pump and diabetes management  Would not resume Jardiance med at this time, but could use this off-label med again in future.    Pump setting changes:   Bolus ICR 5p 8 to 9   Basals: MN 0.5, 3a 0.5 to 0.45, 6a 0.7 to 0.65    We have discussed the exercise routine with lower temp basal 50% for 30-min but change post exercise 9pm -15% from 6 hrs to 10 hrs duration  Discussed the future OmniPod 4 pump PDM option  Continue use of DexcomG6 CGM glucose sensor  Discussed her plan to get new Can-Do-Canine service dog soon  Arrange non-fasting  lab tests soon   Discussed the nearby [facility] office   Lab orders placed  Monitor for symptom changes  Advise having fasting lipid panel testing and dilated eye examination, at least annually  Continue current levothyroxine daily dose     Keep regular f/u PCP and GYN evaluations  Addressed patient questions today    There are no Patient Instructions on file for this visit.    Future labs ordered today:   Orders Placed This Encounter   Procedures     GLUCOSE MONITOR, 72 HOUR, PHYS INTERP     TSH with free T4 reflex     Basic metabolic panel     Hemoglobin A1c     Radiology/Consults ordered today: None    Total time spent in with the patient evaluation:  20 min  Additional time spent reviewing pertinent lab tests and chart notes, and documentation:  15 min    Follow-up:  2/2022    DARI Gaming MD, MS  Endocrinology  [facility]    CC:  ANA Dailey              Again, thank you for allowing me to participate in the care of your patient.        Sincerely,        Lyndon Gaming MD

## 2021-11-10 NOTE — PROGRESS NOTES
Patient is being evaluated via a billable video visit.      How would you like to obtain your AVS? Reviewed verbally  If the video visit is dropped, the invitation should be resent by cell phone  Will anyone else be joining your video visit? no      Video Start Time:  8:15 am    Video-Visit Details     Type of service:  Video Visit    Video End Time:  8:35 pm    Originating Location (pt. Location): home    Distant Location (provider location):  [facility] [place]/home    Platform used for Video Visit:  [facility]        Recent issues:  Diabetes and thyroid follow-up evaluation  Feeling well overall, no new health issues reported  Taking the low dose (off label) Jardiance medication, reports improvement with glucose control  Doing home exercise video's with  Conrad pre-breakfasttime   Her dog Juliet , now approved for replacement Can-[employer]-Canine service dog           Diabetes:  Previous diagnosis of IFG with high GAD65 Ab level  2011. Developed acute hyperglycemia and diagnosis of T1DM  Treatment with insulin medication, MDI plan  2013. Changed to OmniPod pump with Novolog insulin  Used DexcomG5 CGMS system  2017. Changed to Medtronic 630G pump  2017. Upgraded to Medtronic 670G pump and Guardian3 sensor, Humalog insulin  2018. Discontinued Medtronic pump and sensor, changed to MDI plan              Had taken Basaglar 17U at bedtime and mealtime Humalog  Had taken Basaglar 13U at bedtime, Humalog Luxura pen 1:7 at mealtime, ISF 60, goal target  mg/dl  2018. Restarted OmniPod pump  ~Early 2021. Stopped the Jardiance medication  Using OmniPod pump with Dash PDM              Humalog in pump       Using OmniPod Dash PDM  Previous pump settings:        Recent DexcomG6 CGMS data:            Has reduced hypoglycemia awareness              Previously had service dog (Juliet), trained to recognize hypoglycemia smell/symptoms  Using Contour Next Link BG meter, variable testing,  tests 6x/day  Exercises with classes at health club- yoga, strength              Tried Temp Target with exercise, noticed higher glucose levels so not used  Previous [facility] labs include:  Lab Results   Component Value Date    A1C 6.1 (H) 05/17/2021     05/17/2021    POTASSIUM 4.7 05/17/2021    CHLORIDE 105 05/17/2021    CO2 27 05/17/2021    ANIONGAP 4 05/17/2021     (H) 05/17/2021    BUN 22 05/17/2021    CR 1.03 05/17/2021    GFRESTIMATED 60 (L) 07/07/2021    GFRESTBLACK 73 07/07/2021    VIJI 9.3 05/17/2021    CHOL 147 02/02/2021    TRIG 72 02/02/2021    HDL 73 02/02/2021    LDL 60 02/02/2021    NHDL 74 02/02/2021    UCRR 150 05/17/2021    MICROL 7 05/17/2021    UMALCR 4.74 05/17/2021     Previous Lasik surgery at [facility] 2015  Last eye exam 1/2021 with Dr. Diaz, no DR reported  Plans to see practitioner at [facility] soon  DM Complications: none known        Thyroid:  2011. History of hypothyroidism  Takes levothyroxine medication  Eary 10/2018. Dose incease to levothyroxine 0.05 mg daily per GYN physician    Recent [facility] labs include:  Lab Results   Component Value Date    TSH 1.72 05/17/2021    T4 0.85 05/17/2021     Current dose:  Levothyroxine 0.05 mg daily        , lives in [place],  Conrad, daughter Myra and stepchildren Jag (formerly Tisha) + Anoop  Has chocolate lab (Juliet), trained to recognize hypoglycemia smell/symptoms  Works for [employer] as   Now sees Beba Dailey PAC[facility] for general medicine evaluations.  Also sees Dr. Ashford[facility]       PMH/PSH:  Past Medical History:   Diagnosis Date     Diabetes mellitus type 1 (H)      Dysmenorrhea      Excessive or frequent menstruation      Hypothyroid      Insulin pump in place      Lumbar back pain      Other and unspecified hyperlipidemia      PONV (postoperative nausea and vomiting)      Past Surgical History:   Procedure Laterality Date     DILATION AND CURETTAGE, HYSTEROSCOPY,  ABLATE ENDOMETRIUM NOVASURE, COMBINED  1/23/2014    Procedure: COMBINED DILATION AND CURETTAGE, HYSTEROSCOPY, ABLATE ENDOMETRIUM NOVASURE;  HYSTEROSCOPY, DILATION, CURETTAGE, WITH NOVASURE ABLATION, MYOSURE MORCELLATOR;  Surgeon: Swetha Queen MD;  Location: [facility]     LAPAROSCOPY       LEEP TX, CERVICAL       OPERATIVE HYSTEROSCOPY WITH MORCELLATOR  1/23/2014    Procedure: OPERATIVE HYSTEROSCOPY WITH MORCELLATOR;;  Surgeon: Swetha Queen MD;  Location: [facility]     wisdom teeth[         Family Hx:  Family History   Problem Relation Age of Onset     Diabetes Father         type 1     Diabetes Sister         type 1     Thyroid Disease Sister      Diabetes Mother         type 2     Breast Cancer Paternal Grandmother      Cancer - colorectal Paternal Uncle 60         Social Hx:  Social History     Socioeconomic History     Marital status:      Spouse name: Not on file     Number of children: Not on file     Years of education: Not on file     Highest education level: Not on file   Occupational History     Employer: [employer]   Tobacco Use     Smoking status: Never Smoker     Smokeless tobacco: Never Used   Substance and Sexual Activity     Alcohol use: Yes     Alcohol/week: 0.0 standard drinks     Comment: 3 per week     Drug use: No     Sexual activity: Yes     Partners: Male     Birth control/protection: Male Surgical   Other Topics Concern     Parent/sibling w/ CABG, MI or angioplasty before 65F 55M? Not Asked   Social History Narrative     in May 2014    2 step children (20 and 18) and one biol dtr age 15.    Works for [employer]--works from home     Social Determinants of Health     Financial Resource Strain: Not on file   Food Insecurity: Not on file   Transportation Needs: Not on file   Physical Activity: Not on file   Stress: Not on file   Social Connections: Not on file   Intimate Partner Violence: Not on file   Housing Stability: Not on file           MEDICATIONS:  has a current medication list which includes the following prescription(s): eszopiclone, fluoxetine, insulin lispro, levothyroxine, multiple vitamins-minerals, cholecalciferol, blood glucose, blood glucose, blood glucose, fluoxetine, baqsimi two pack, glucagon, insulin glargine, insulin lispro, and statin not prescribed.       ROS: 10 point ROS neg other than the symptoms noted above in the HPI.     GENERAL: energy good, wt stable; denies fevers, chills, night sweats.   HEENT: no dysphagia, odonophagia, diplopia, neck pain  THYROID:  no apparent hyper or hypothyroid symptoms  CV: no chest pain, pressure, palpitations  LUNGS: no SOB, CLIFFORD, cough, wheezing   ABDOMEN: denies nausea, diarrhea, constipation, abdominal pain  EXTREMITIES: no rashes, ulcers, edema  NEUROLOGY: no headaches, denies changes in vision, tingling, extremitiy numbness   MSK: some back pain; denies muscle weakness  SKIN: no rashes or lesions  : regular menstrual cycles Q 28-30 days  PSYCH:  stable mood, no significant anxiety or depression  ENDOCRINE: no heat or cold intolerance    Physical Exam (visual exam)  VS:  no vital signs taken for video visit  CONSTITUTIONAL: healthy, alert and NAD, well dressed, answering questions appropriately  ENT: no nose swelling or nasal discharge, mouth redness or gum changes.  EYES: eyes grossly normal to inspection, conjunctivae and sclerae normal, no exophthalmos or proptosis  THYROID:  no apparent nodules or goiter  LUNGS: no audible wheeze, cough or visible cyanosis, no visible retractions or increased work of breathing  ABDOMEN: abdomen not evaluated  EXTREMITIES: no hand tremors, limited exam  NEUROLOGY: CN grossly intact, mentation intact and speech normal   SKIN:  no apparent skin lesions, rash, or edema with visualized skin appearance  PSYCH: mentation appears normal, affect normal/bright, judgement and insight intact,   normal speech and appearance well groomed    LABS:     All  pertinent notes, labs, and images personally reviewed by me.       A/P:  Encounter Diagnoses   Name Primary?     Diabetes mellitus type 1.5, managed as type 2 (H) Yes     Insulin pump status      Hypothyroidism due to Hashimoto's thyroiditis        Comments:  Reviewed health history, diabetes and thyroid issues.  Recent glucose control good but some hypoglycemia trends  Reviewed and interpreted tests that I previously ordered.   Ordered appropriate tests for the endocrinology disease management.    Management options discussed and implemented after shared medical decision making with the patient.  T1DM and hypothyroidism problems are chronic-stable    Plan:  Discussed general issues with the diabetes diagnosis and management  We discussed the hgbA1c test which reflects previous overall glucose levels or control  Discussed the importance of blood glucose (BG) testing to assess glucose trends  Provided general overview of the multiple daily injection (MDI) plan using rapid acting mealtime and longacting insulin medications  Reviewed recent DexcomG6 CGM glucose trends, in detail     Recommend:  Continue the OmniPod insulin pump and diabetes management  Would not resume Jardiance med at this time, but could use this off-label med again in future.    Pump setting changes:   Bolus ICR 5p 8 to 9   Basals: MN 0.5, 3a 0.5 to 0.45, 6a 0.7 to 0.65    We have discussed the exercise routine with lower temp basal 50% for 30-min but change post exercise 9pm -15% from 6 hrs to 10 hrs duration  Discussed the future OmniPod 4 pump PDM option  Continue use of DexcomG6 CGM glucose sensor  Discussed her plan to get new Can-[employer]-Canine service dog soon  Arrange non-fasting lab tests soon   Discussed the nearby [facility] office   Lab orders placed  Monitor for symptom changes  Advise having fasting lipid panel testing and dilated eye examination, at least annually  Continue current levothyroxine daily dose     Keep regular f/u PCP and GYN  evaluations  Addressed patient questions today    There are no Patient Instructions on file for this visit.    Future labs ordered today:   Orders Placed This Encounter   Procedures     GLUCOSE MONITOR, 72 HOUR, PHYS INTERP     TSH with free T4 reflex     Basic metabolic panel     Hemoglobin A1c     Radiology/Consults ordered today: None    Total time spent in with the patient evaluation:  20 min  Additional time spent reviewing pertinent lab tests and chart notes, and documentation:  15 min    Follow-up:  2/2022    DARI Gaming MD, MS  Endocrinology  [facility]    CC:  Ashlie, L

## 2021-11-17 ENCOUNTER — MYC MEDICAL ADVICE (OUTPATIENT)
Dept: ENDOCRINOLOGY | Facility: CLINIC | Age: 46
End: 2021-11-17
Payer: COMMERCIAL

## 2021-11-17 DIAGNOSIS — Z96.41 INSULIN PUMP STATUS: ICD-10-CM

## 2021-11-17 DIAGNOSIS — E13.9 DIABETES MELLITUS TYPE 1.5, MANAGED AS TYPE 2 (H): Primary | ICD-10-CM

## 2021-11-18 RX ORDER — INSULIN LISPRO-AABC 100 [IU]/ML
5-25 INJECTION, SOLUTION INTRAVENOUS; SUBCUTANEOUS CONTINUOUS
Qty: 70 ML | Refills: 3 | Status: SHIPPED | OUTPATIENT
Start: 2021-11-18 | End: 2021-11-23

## 2021-11-20 DIAGNOSIS — F51.01 PRIMARY INSOMNIA: ICD-10-CM

## 2021-11-22 RX ORDER — ESZOPICLONE 2 MG/1
TABLET, FILM COATED ORAL
Qty: 30 TABLET | Refills: 0 | Status: SHIPPED | OUTPATIENT
Start: 2021-11-22 | End: 2022-03-01

## 2021-11-22 NOTE — TELEPHONE ENCOUNTER
Routing refill request to provider for review/approval because:  Drug not on the [facility] refill protocol   Bel Back RN

## 2021-11-23 RX ORDER — INSULIN LISPRO-AABC 100 [IU]/ML
5-25 INJECTION, SOLUTION INTRAVENOUS; SUBCUTANEOUS CONTINUOUS
Qty: 70 ML | Refills: 3 | Status: SHIPPED | OUTPATIENT
Start: 2021-11-23 | End: 2021-12-13

## 2021-11-30 ENCOUNTER — VIRTUAL VISIT (OUTPATIENT)
Dept: EDUCATION SERVICES | Facility: CLINIC | Age: 46
End: 2021-11-30
Payer: COMMERCIAL

## 2021-11-30 ENCOUNTER — MYC MEDICAL ADVICE (OUTPATIENT)
Dept: ENDOCRINOLOGY | Facility: CLINIC | Age: 46
End: 2021-11-30

## 2021-11-30 DIAGNOSIS — E13.9 DIABETES MELLITUS TYPE 1.5, MANAGED AS TYPE 2 (H): ICD-10-CM

## 2021-11-30 DIAGNOSIS — Z96.41 INSULIN PUMP STATUS: ICD-10-CM

## 2021-11-30 PROCEDURE — G0108 DIAB MANAGE TRN  PER INDIV: HCPCS | Mod: GT | Performed by: DIETITIAN, REGISTERED

## 2021-11-30 NOTE — PROGRESS NOTES
"Diabetes Self-Management Education & Support    Presents for: Insulin Pump and CGM Review  Type of service:  Video Visit    If the video visit is dropped, the video visit invitation should be resent by: Send to e-mail at: [email or web address]    Originating Location (pt. Location): Home  Distant Location (provider location): Home  Mode of Communication:  Video Conference via Qumulo    Video Start Time: 8:35a  Video End Time (time video stopped): 9:35a    How would patient like to obtain AVS? Hervehart      SUBJECTIVE/OBJECTIVE:  Presents for: Insulin Pump and CGM Review  Accompanied by: Self  Diabetes education in the past 24mo: No  Focus of Visit: Insulin Pump,CGM  Type of Pump visit: Pump Review  Type of CGM visit: Personal CGM Follow-up  Diabetes type: Type 1  Disease course: Stable  Diabetes management related comments/concerns: My overnights have been so high, was thinking about switching to injections but doubt that would be better  Transportation concerns: No  Difficulty affording diabetes medication?: No  Difficulty affording diabetes testing supplies?: No  Other concerns:: None  Cultural Influences/Ethnic Background:  Choose not to answer    Diabetes Symptoms & Complications:     Complications assessed today?: No    Patient Problem List and Family Medical History reviewed for relevant medical history, current medical status, and diabetes risk factors.    Vitals:  There were no vitals taken for this visit.  Estimated body mass index is 22.71 kg/m  as calculated from the following:    Height as of 8/10/21: 1.702 m (5' 7\").    Weight as of 8/10/21: 65.8 kg (145 lb).   Last 3 BP:   BP Readings from Last 3 Encounters:   08/10/21 106/60   06/25/21 102/64   05/17/21 102/60       History   Smoking Status     Never Smoker   Smokeless Tobacco     Never Used       Labs:  Lab Results   Component Value Date    A1C 6.4 11/12/2021    A1C 6.1 05/17/2021     Lab Results   Component Value Date     11/12/2021 "     05/17/2021     Lab Results   Component Value Date    LDL 60 02/02/2021     HDL Cholesterol   Date Value Ref Range Status   02/02/2021 73 >49 mg/dL Final   ]  GFR Estimate   Date Value Ref Range Status   11/12/2021 90 >60 mL/min/1.73m2 Final     Comment:     As of July 11, 2021, eGFR is calculated by the CKD-EPI creatinine equation, without race adjustment. eGFR can be influenced by muscle mass, exercise, and diet. The reported eGFR is an estimation only and is only applicable if the renal function is stable.   07/07/2021 60 (L) >60 mL/min/[1.73_m2] Final     GFR Estimate If Black   Date Value Ref Range Status   07/07/2021 73 >60 mL/min/[1.73_m2] Final     Lab Results   Component Value Date    CR 0.80 11/12/2021    CR 1.03 05/17/2021     No results found for: MICROALBUMIN    Healthy Eating:  Healthy Eating Assessed Today: Yes  Cultural/Orthodox diet restrictions?: No  Meal planning/habits: Carb counting  Meals include: Breakfast,Lunch,Dinner,Evening Snack,Afternoon Snack  Breakfast: 8a - ezekial bread + 2 eggs, coffee & water  Lunch: noon - chicken breast, 1/2 sweet potato, blueberries & blackberries (1 cup), black bean brownies OR salad w/ peppers, kale, eggs, tomatoes, cheese, vinagrette, blackberries OR leftovers  Dinner: 6p - grilled chicken thigh, eggplant + parmesan, 2 glasses of wine OR pulled pork wrap, cheese, 2 glasses of wine OR cheese & meat plate, wi  Snacks: HS - dark chocolate energy ball; AM or PM - blueberries  Beverages: Alcohol,Water,Coffee  Has patient met with a dietitian in the past?: Yes    Being Active:  Being Active Assessed Today: Yes  Exercise:: Yes  How intense was your typical exercise? : Moderate (like brisk walking)  Barrier to exercise: None (Has adjusted workouts to the morning to avoid overnight lows)    Monitoring:  Monitoring Assessed Today: Yes  Blood Glucose Meter: CGM  Times checking blood sugar at home (number): 5+  Times checking blood sugar at home (per):  Day  Blood glucose trend: Fluctuating    See Clarity reports below     Taking Medications:  Diabetes Medication(s)     Diabetic Other       Glucagon (BAQSIMI TWO PACK) 3 MG/DOSE POWD    Spray 3 mg in nostril once as needed (severe hypoglycemia reaction, dose as directed)     glucagon (GLUCAGON EMERGENCY) 1 MG kit    Inject 1 mg into the muscle once for 1 dose    Insulin       insulin glargine (LANTUS SOLOSTAR) 100 UNIT/ML pen    INJECT SUBCUTANEOUSLY 16  UNITS DAILY AS DIRECTED     insulin lispro (HUMALOG KWIKPEN) 100 UNIT/ML (1 unit dial) KWIKPEN    Inject 3-8 units subcutaneous with meals and correction doses as directed, total daily dose approx 30 units     insulin lispro (HUMALOG) 100 UNIT/ML vial    USE VIA INSULIN PUMP A  TOTAL DAILY DOSE OF  APPROXIMATELY 70 UNITS     Insulin Lispro-aabc (LYUMJEV) 100 UNIT/ML SOLN    Inject 5-25 Units Subcutaneous continuous , total daily dose approx 70 units        No pump upload today - patient provided basal rates, patient states she just changed them this AM:   12-3a - 0.6  3-4a - 0.55  4-6a - 0.6  6a-10a - 0.7  10-2:30p - 0.4  2:30p-MN - 0.55    I:C ratios:  MN-11a - 12  11a-5p - 10  5p-MN - 9    Do not have updated ISF at this time    Taking Medication Assessed Today: Yes  Current Treatments: Insulin Pump  Problems taking diabetes medications regularly?: No  Diabetes medication side effects?: Yes    Problem Solving:  Problem Solving Assessed Today: Yes  Is the patient at risk for hypoglycemia?: Yes  Hypoglycemia Frequency: Weekly  Hypoglycemia Treatment: Other food (blueberries)    Reducing Risks:  Reducing Risks Assessed Today: No    Healthy Coping:  Healthy Coping Assessed Today: Yes  Emotional response to diabetes: Ready to learn,Confidence diabetes can be controlled,Anger,Concern for health and well-being  Informal Support system:: Family,Spouse  Stage of change: MAINTENANCE (Working to maintain change, with risk of relapse)  Support resources: None  Patient  Activation Measure Survey Score:  JOJO Score (Last Two) 7/21/2014   JOJO Raw Score 52   Activation Score 100   JOJO Level 4       Diabetes knowledge and skills assessment:   Patient is knowledgeable in diabetes management concepts related to: Healthy Eating, Being Active, Monitoring, Taking Medication, Problem Solving and Reducing Risks    Patient needs further education on the following diabetes management concepts: Monitoring and Taking Medication    Based on learning assessment above, most appropriate setting for further diabetes education would be: Group class or Individual setting.      INTERVENTIONS:    REPORTS:                                  Insulin Pump Information  Insulin Pump Brand: OmniPod  Does patient have an insulin multiple daily injection back-up plan?: Yes    Education provided today on:  AADE Self-Care Behaviors:  Being Active: precautions to take  Monitoring: frequency of monitoring  Taking Medication: when to take medications    Education specific to insulin pump provided today on:   importance of changing infusion set every 3 days, importance of bolusing before meals, importance of counting carbohydrates accurately and exercise recommendations    *Also reviewed Tandem t:slim with ControlIQ options as patient complains of pain, fat deposits beneath skin at site of Omnipod placements, as well as frustration with overnight blood sugars - reviewed pros & cons of each pump, insurance coverage for pump upgrades, Glooko data - DASH is not automatically uploaded pump data, will have Omnipod reach out to patient directly to fix this issue     Opportunities for ongoing education and support in diabetes-self management were discussed.    Pt verbalized understanding of concepts discussed and recommendations provided today.       Education Materials Provided:  No new materials provided today    Pump Education Materials: none    ASSESSMENT  Patient comes in today for help with insulin adjustments - having  high overnight blood sugars + some post-prandial highs as well. She is trying to make adjustments to her diet - more plant based, lower GI. Overall, feels comfortable with carb counting. Her blood sugars run significantly lower with high fiber meals & foods - discussed subtracting grams of fiber, or 1/2 grams fiber, from carb counts when bolusing for these high fiber foods. She is agreeable to this. Also, reviewed importance of site rotation - patient complains of significant pain at sites, some hard/fatty tissue. She is wondering if there are any alternatives that could help with this - she does change pod every 3 days consistently. Encouraged continued rotation for now, avoiding hard spots until they heal. Also patients has adjusted her own basal rates today - did not make any changes at this time to basal rates but did adjust I:C ratio due to some post-prandial hyperglycemia.     Reached out to [employer] [facility]  JEFFERSON Wang, due to issues with DASH not uploading. She will look into this and follow up with writer so that patient's DASH will automatically upload to [employer] in the future. Patient to reach out via [employer] for pump review after this has been done.     Glucose Patterns & Trends:  Hyperglycemia, weekend- postmeal and nocturnal and weekday- postmeal and nocturnal and Hypoglycemia, weekend- postmeal and weekday- postmeal - specifically with high fiber foods and surrounding exercise     Patient would benefit from increase in carbohydrate ratio.    Changes made to pump settings:  carb ratio:   11a-5p 10 --> 9  5p-MN 9 --> 8.5    Changes made to sensor settings:   none    PLAN  See Patient Instructions for co-developed, patient-stated behavior change goals.  AVS printed and provided to patient today. See Follow-Up section for recommended follow-up.    Amy Mcguire, IJEOMA, LD, [facility]   Time Spent: 60 minutes  Encounter Type: Individual    Any diabetes medication dose changes were made via the CDE  Protocol and Collaborative Practice Agreement with the patient's referring provider. A copy of this encounter was shared with the provider.

## 2021-11-30 NOTE — LETTER
"    11/30/2021         RE: Jenise Smith  [address] [address]        Dear Colleague,    Thank you for referring your patient, Jenise Smith, to the [facility] [place]. Please see a copy of my visit note below.    Diabetes Self-Management Education & Support    Presents for: Insulin Pump and CGM Review  Type of service:  Video Visit    If the video visit is dropped, the video visit invitation should be resent by: Send to e-mail at: [email or web address]    Originating Location (pt. Location): Home  Distant Location (provider location): Home  Mode of Communication:  Video Conference via SimpleHoney    Video Start Time: 8:35a  Video End Time (time video stopped): 9:35a    How would patient like to obtain AVS? Herveharmiriam      SUBJECTIVE/OBJECTIVE:  Presents for: Insulin Pump and CGM Review  Accompanied by: Self  Diabetes education in the past 24mo: No  Focus of Visit: Insulin Pump,CGM  Type of Pump visit: Pump Review  Type of CGM visit: Personal CGM Follow-up  Diabetes type: Type 1  Disease course: Stable  Diabetes management related comments/concerns: My overnights have been so high, was thinking about switching to injections but doubt that would be better  Transportation concerns: No  Difficulty affording diabetes medication?: No  Difficulty affording diabetes testing supplies?: No  Other concerns:: None  Cultural Influences/Ethnic Background:  Choose not to answer    Diabetes Symptoms & Complications:     Complications assessed today?: No    Patient Problem List and Family Medical History reviewed for relevant medical history, current medical status, and diabetes risk factors.    Vitals:  There were no vitals taken for this visit.  Estimated body mass index is 22.71 kg/m  as calculated from the following:    Height as of 8/10/21: 1.702 m (5' 7\").    Weight as of 8/10/21: 65.8 kg (145 lb).   Last 3 BP:   BP Readings from Last 3 Encounters:   08/10/21 106/60   06/25/21 " 102/64   05/17/21 102/60       History   Smoking Status     Never Smoker   Smokeless Tobacco     Never Used       Labs:  Lab Results   Component Value Date    A1C 6.4 11/12/2021    A1C 6.1 05/17/2021     Lab Results   Component Value Date     11/12/2021     05/17/2021     Lab Results   Component Value Date    LDL 60 02/02/2021     HDL Cholesterol   Date Value Ref Range Status   02/02/2021 73 >49 mg/dL Final   ]  GFR Estimate   Date Value Ref Range Status   11/12/2021 90 >60 mL/min/1.73m2 Final     Comment:     As of July 11, 2021, eGFR is calculated by the CKD-EPI creatinine equation, without race adjustment. eGFR can be influenced by muscle mass, exercise, and diet. The reported eGFR is an estimation only and is only applicable if the renal function is stable.   07/07/2021 60 (L) >60 mL/min/[1.73_m2] Final     GFR Estimate If Black   Date Value Ref Range Status   07/07/2021 73 >60 mL/min/[1.73_m2] Final     Lab Results   Component Value Date    CR 0.80 11/12/2021    CR 1.03 05/17/2021     No results found for: MICROALBUMIN    Healthy Eating:  Healthy Eating Assessed Today: Yes  Cultural/Caodaism diet restrictions?: No  Meal planning/habits: Carb counting  Meals include: Breakfast,Lunch,Dinner,Evening Snack,Afternoon Snack  Breakfast: 8a - ezekial bread + 2 eggs, coffee & water  Lunch: noon - chicken breast, 1/2 sweet potato, blueberries & blackberries (1 cup), black bean brownies OR salad w/ peppers, kale, eggs, tomatoes, cheese, vinagrette, blackberries OR leftovers  Dinner: 6p - grilled chicken thigh, eggplant + parmesan, 2 glasses of wine OR pulled pork wrap, cheese, 2 glasses of wine OR cheese & meat plate, wi  Snacks: HS - dark chocolate energy ball; AM or PM - blueberries  Beverages: Alcohol,Water,Coffee  Has patient met with a dietitian in the past?: Yes    Being Active:  Being Active Assessed Today: Yes  Exercise:: Yes  How intense was your typical exercise? : Moderate (like brisk  walking)  Barrier to exercise: None (Has adjusted workouts to the morning to avoid overnight lows)    Monitoring:  Monitoring Assessed Today: Yes  Blood Glucose Meter: CGM  Times checking blood sugar at home (number): 5+  Times checking blood sugar at home (per): Day  Blood glucose trend: Fluctuating    See Clarity reports below     Taking Medications:  Diabetes Medication(s)     Diabetic Other       Glucagon (BAQSIMI TWO PACK) 3 MG/DOSE POWD    Spray 3 mg in nostril once as needed (severe hypoglycemia reaction, dose as directed)     glucagon (GLUCAGON EMERGENCY) 1 MG kit    Inject 1 mg into the muscle once for 1 dose    Insulin       insulin glargine (LANTUS SOLOSTAR) 100 UNIT/ML pen    INJECT SUBCUTANEOUSLY 16  UNITS DAILY AS DIRECTED     insulin lispro (HUMALOG KWIKPEN) 100 UNIT/ML (1 unit dial) KWIKPEN    Inject 3-8 units subcutaneous with meals and correction doses as directed, total daily dose approx 30 units     insulin lispro (HUMALOG) 100 UNIT/ML vial    USE VIA INSULIN PUMP A  TOTAL DAILY DOSE OF  APPROXIMATELY 70 UNITS     Insulin Lispro-aabc (LYUMJEV) 100 UNIT/ML SOLN    Inject 5-25 Units Subcutaneous continuous , total daily dose approx 70 units        No pump upload today - patient provided basal rates, patient states she just changed them this AM:   12-3a - 0.6  3-4a - 0.55  4-6a - 0.6  6a-10a - 0.7  10-2:30p - 0.4  2:30p-MN - 0.55    I:C ratios:  MN-11a - 12  11a-5p - 10  5p-MN - 9    Do not have updated ISF at this time    Taking Medication Assessed Today: Yes  Current Treatments: Insulin Pump  Problems taking diabetes medications regularly?: No  Diabetes medication side effects?: Yes    Problem Solving:  Problem Solving Assessed Today: Yes  Is the patient at risk for hypoglycemia?: Yes  Hypoglycemia Frequency: Weekly  Hypoglycemia Treatment: Other food (blueberries)    Reducing Risks:  Reducing Risks Assessed Today: No    Healthy Coping:  Healthy Coping Assessed Today: Yes  Emotional response to  diabetes: Ready to learn,Confidence diabetes can be controlled,Anger,Concern for health and well-being  Informal Support system:: Family,Spouse  Stage of change: MAINTENANCE (Working to maintain change, with risk of relapse)  Support resources: None  Patient Activation Measure Survey Score:  JOJO Score (Last Two) 7/21/2014   JOJO Raw Score 52   Activation Score 100   JOJO Level 4       Diabetes knowledge and skills assessment:   Patient is knowledgeable in diabetes management concepts related to: Healthy Eating, Being Active, Monitoring, Taking Medication, Problem Solving and Reducing Risks    Patient needs further education on the following diabetes management concepts: Monitoring and Taking Medication    Based on learning assessment above, most appropriate setting for further diabetes education would be: Group class or Individual setting.      INTERVENTIONS:    REPORTS:                                  Insulin Pump Information  Insulin Pump Brand: OmniPod  Does patient have an insulin multiple daily injection back-up plan?: Yes    Education provided today on:  AADE Self-Care Behaviors:  Being Active: precautions to take  Monitoring: frequency of monitoring  Taking Medication: when to take medications    Education specific to insulin pump provided today on:   importance of changing infusion set every 3 days, importance of bolusing before meals, importance of counting carbohydrates accurately and exercise recommendations    *Also reviewed Tandem t:slim with ControlIQ options as patient complains of pain, fat deposits beneath skin at site of Omnipod placements, as well as frustration with overnight blood sugars - reviewed pros & cons of each pump, insurance coverage for pump upgrades, Glooko data - DASH is not automatically uploaded pump data, will have Omnipod reach out to patient directly to fix this issue     Opportunities for ongoing education and support in diabetes-self management were discussed.    Pt verbalized  understanding of concepts discussed and recommendations provided today.       Education Materials Provided:  No new materials provided today    Pump Education Materials: none    ASSESSMENT  Patient comes in today for help with insulin adjustments - having high overnight blood sugars + some post-prandial highs as well. She is trying to make adjustments to her diet - more plant based, lower GI. Overall, feels comfortable with carb counting. Her blood sugars run significantly lower with high fiber meals & foods - discussed subtracting grams of fiber, or 1/2 grams fiber, from carb counts when bolusing for these high fiber foods. She is agreeable to this. Also, reviewed importance of site rotation - patient complains of significant pain at sites, some hard/fatty tissue. She is wondering if there are any alternatives that could help with this - she does change pod every 3 days consistently. Encouraged continued rotation for now, avoiding hard spots until they heal. Also patients has adjusted her own basal rates today - did not make any changes at this time to basal rates but did adjust I:C ratio due to some post-prandial hyperglycemia.     Reached out to [facility] [employer] [facility]  Bart Cosme [facility], due to issues with DASH not uploading. She will look into this and follow up with writer so that patient's DASH will automatically upload to [employer] in the future. Patient to reach out via [employer] for pump review after this has been done.     Glucose Patterns & Trends:  Hyperglycemia, weekend- postmeal and nocturnal and weekday- postmeal and nocturnal and Hypoglycemia, weekend- postmeal and weekday- postmeal - specifically with high fiber foods and surrounding exercise     Patient would benefit from increase in carbohydrate ratio.    Changes made to pump settings:  carb ratio:   11a-5p 10 --> 9  5p-MN 9 --> 8.5    Changes made to sensor settings:   none    PLAN  See Patient Instructions for co-developed, patient-stated behavior  change goals.  AVS printed and provided to patient today. See Follow-Up section for recommended follow-up.    Amy Mcguire RD, LD, CDCES   Time Spent: 60 minutes  Encounter Type: Individual    Any diabetes medication dose changes were made via the CDE Protocol and Collaborative Practice Agreement with the patient's referring provider. A copy of this encounter was shared with the provider.

## 2021-12-02 ENCOUNTER — TELEPHONE (OUTPATIENT)
Dept: ENDOCRINOLOGY | Facility: CLINIC | Age: 46
End: 2021-12-02
Payer: COMMERCIAL

## 2021-12-02 ENCOUNTER — MEDICAL CORRESPONDENCE (OUTPATIENT)
Dept: HEALTH INFORMATION MANAGEMENT | Facility: CLINIC | Age: 46
End: 2021-12-02
Payer: COMMERCIAL

## 2021-12-02 NOTE — TELEPHONE ENCOUNTER
Received form(s) from [employer] - Statement of Medical Necessity and RX order/CHART NOTES PRINTED  Placed form(s) in/on TL's incoming basket.  Forms need to be filled out and signed and faxed to number listed on form.    AWAITING COMPLETION      Copy needs to be sent for scanning after completion: Yes     Sophy Mclaughlin MA

## 2021-12-03 ENCOUNTER — MYC MEDICAL ADVICE (OUTPATIENT)
Dept: EDUCATION SERVICES | Facility: CLINIC | Age: 46
End: 2021-12-03
Payer: COMMERCIAL

## 2021-12-03 DIAGNOSIS — E13.9 DIABETES MELLITUS TYPE 1.5, MANAGED AS TYPE 2 (H): Primary | ICD-10-CM

## 2021-12-03 NOTE — TELEPHONE ENCOUNTER
Diabetes Follow-up    Subjective/Objective:    Jenise Smith sent in blood glucose log for review. Last date of communication was: 11-30-21 virtual appt.    Diabetes is being managed with   Lifestyle (diet/activity), Diabetes Medications   Diabetes Medication(s)     Diabetic Other       Glucagon (BAQSIMI TWO PACK) 3 MG/DOSE POWD    Spray 3 mg in nostril once as needed (severe hypoglycemia reaction, dose as directed)     glucagon (GLUCAGON EMERGENCY) 1 MG kit    Inject 1 mg into the muscle once for 1 dose    Insulin       insulin glargine (LANTUS SOLOSTAR) 100 UNIT/ML pen    INJECT SUBCUTANEOUSLY 16  UNITS DAILY AS DIRECTED     insulin lispro (HUMALOG KWIKPEN) 100 UNIT/ML (1 unit dial) KWIKPEN    Inject 3-8 units subcutaneous with meals and correction doses as directed, total daily dose approx 30 units     insulin lispro (HUMALOG) 100 UNIT/ML vial    USE VIA INSULIN PUMP A  TOTAL DAILY DOSE OF  APPROXIMATELY 70 UNITS     Insulin Lispro-aabc (LYUMJEV) 100 UNIT/ML SOLN    Inject 5-25 Units Subcutaneous continuous , total daily dose approx 70 units          [employer]look[employer] Reports:                Assessment:    2 carb ratio adjustments made on 11/30/21. Pt did not have pump data to share at that time, but has since figured that issue out and asked us to review the pump data as she is still having higher BG's. She runs high overnight and the basically the rest of the day except the early morning hours. It also seem like her BG corrections are not bringing her into target range. Will recommend some basal rate and sensitivity increases.    Plan/Response:  Basal Rate increases(units/hr):  12am 0.6 -> 0.65  3am 0.55 -> 0.6  4am 0.6 -> 0.65  6am 0.7 -> No Change  10am 0.4 -> 0.45  1:30pm 0.6 -> 0.65     12am 65 -> 60  8am 60 ->55  9pm 65 -> 60    Change max BASAL RATE from 15 units/hr to 2 units/hr.    Follow-up as needed.      Justa Whaley RN, [facility]      Any diabetes medication dose changes were made via the CDE Protocol and  Collaborative Practice Agreement with the patient's endocrinology provider. A copy of this encounter was shared with the provider.

## 2021-12-03 NOTE — TELEPHONE ENCOUNTER
Form(s) have been faxed.  Faxed or mailed to: number listed on form.  Form(s) in accordion file for 1 month then to scan.    Sophy Mclaughlin MA

## 2021-12-13 ENCOUNTER — MYC MEDICAL ADVICE (OUTPATIENT)
Dept: ENDOCRINOLOGY | Facility: CLINIC | Age: 46
End: 2021-12-13
Payer: COMMERCIAL

## 2021-12-13 DIAGNOSIS — E13.9 DIABETES MELLITUS TYPE 1.5, MANAGED AS TYPE 2 (H): ICD-10-CM

## 2021-12-13 DIAGNOSIS — E06.3 HYPOTHYROIDISM DUE TO HASHIMOTO'S THYROIDITIS: ICD-10-CM

## 2021-12-13 DIAGNOSIS — Z96.41 INSULIN PUMP STATUS: ICD-10-CM

## 2021-12-13 RX ORDER — INSULIN LISPRO-AABC 100 [IU]/ML
5-25 INJECTION, SOLUTION INTRAVENOUS; SUBCUTANEOUS CONTINUOUS
Qty: 70 ML | Refills: 3 | Status: SHIPPED | OUTPATIENT
Start: 2021-12-13 | End: 2022-05-23

## 2021-12-13 RX ORDER — LEVOTHYROXINE SODIUM 50 UG/1
50 TABLET ORAL DAILY
Qty: 90 TABLET | Refills: 3 | Status: SHIPPED | OUTPATIENT
Start: 2021-12-13 | End: 2022-10-31

## 2021-12-14 ENCOUNTER — TELEPHONE (OUTPATIENT)
Dept: ENDOCRINOLOGY | Facility: CLINIC | Age: 46
End: 2021-12-14

## 2021-12-14 NOTE — TELEPHONE ENCOUNTER
Hold for [facility] with no answer to what is needed in regards to medications 45 min wait .Shantel Man RN

## 2021-12-14 NOTE — TELEPHONE ENCOUNTER
[facility] Call Center    Phone Message    May a detailed message be left on voicemail: yes     Reason for Call: Other: [facility] pharmacy called and spoke with writer regarding mutual patient, they state they will need a call back at [phone] to discuss medications.The refrence number is [id number].      Action Taken: Message routed to:  [facility] ([facility] Endo     Travel Screening: Not Applicable

## 2022-01-03 ENCOUNTER — MYC MEDICAL ADVICE (OUTPATIENT)
Dept: EDUCATION SERVICES | Facility: CLINIC | Age: 47
End: 2022-01-03

## 2022-01-03 ENCOUNTER — VIRTUAL VISIT (OUTPATIENT)
Dept: FAMILY MEDICINE | Facility: CLINIC | Age: 47
End: 2022-01-03
Payer: COMMERCIAL

## 2022-01-03 DIAGNOSIS — E78.5 HYPERLIPIDEMIA WITH TARGET LDL LESS THAN 100: ICD-10-CM

## 2022-01-03 DIAGNOSIS — F41.9 ANXIETY: ICD-10-CM

## 2022-01-03 DIAGNOSIS — E10.9 TYPE 1 DIABETES MELLITUS WITHOUT COMPLICATION (H): ICD-10-CM

## 2022-01-03 DIAGNOSIS — E03.8 OTHER SPECIFIED HYPOTHYROIDISM: ICD-10-CM

## 2022-01-03 DIAGNOSIS — Z76.0 ENCOUNTER FOR MEDICATION REFILL: Primary | ICD-10-CM

## 2022-01-03 PROCEDURE — 99214 OFFICE O/P EST MOD 30 MIN: CPT | Mod: TEL | Performed by: INTERNAL MEDICINE

## 2022-01-03 RX ORDER — FLUOXETINE 20 MG/1
20 TABLET, FILM COATED ORAL DAILY
Qty: 90 TABLET | Refills: 3 | Status: SHIPPED | OUTPATIENT
Start: 2022-01-03 | End: 2022-03-01

## 2022-01-03 NOTE — PROGRESS NOTES
Jenise is a 46 year old who is being evaluated via a billable telephone visit.      What phone number would you like to be contacted at? [phone]  How would you like to obtain your AVS? Olgamiriam Burden is a 46 year old who presents for the following health issues     HPI       Chief Complaint   Patient presents with     Recheck Medication       HPI:   Patient Jenise Smith is a very pleasant 46 year old female with history of anxiety, Type 1 Diabetes, hypothyroidism today for telephone visit for follow up of multiple concerns including anxiety medication refills. No chest pain, headaches, fever or chills at this time.          Current Medications:     Current Outpatient Medications   Medication Sig Dispense Refill     blood glucose (ACCU-CHEK FASTCLIX) lancing device 1 Dose once daily.       blood glucose monitoring (FABRICIO CONTOUR NEXT) test strip 1 Dose 6 times daily. Test 6-7 times daily       blood glucose monitoring (NO BRAND SPECIFIED) test strip Use to test blood sugar 6 times daily or as directed. 550 strip 3     eszopiclone (LUNESTA) 2 MG tablet TAKE 1 TABLET BY MOUTH AT  BEDTIME 30 tablet 0     FLUoxetine 20 MG tablet Take 1 tablet (20 mg) by mouth daily 90 tablet 3     Glucagon (BAQSIMI TWO PACK) 3 MG/DOSE POWD Spray 3 mg in nostril once as needed (severe hypoglycemia reaction, dose as directed) 1 each 3     insulin glargine (LANTUS SOLOSTAR) 100 UNIT/ML pen INJECT SUBCUTANEOUSLY 16  UNITS DAILY AS DIRECTED 15 mL 3     insulin lispro (HUMALOG KWIKPEN) 100 UNIT/ML (1 unit dial) KWIKPEN Inject 3-8 units subcutaneous with meals and correction doses as directed, total daily dose approx 30 units 15 mL 0     insulin lispro (HUMALOG) 100 UNIT/ML vial USE VIA INSULIN PUMP A  TOTAL DAILY DOSE OF  APPROXIMATELY 70 UNITS 70 mL 3     Insulin Lispro-aabc (LYUMJEV) 100 UNIT/ML SOLN Inject 5-25 Units Subcutaneous continuous , total daily dose approx 70 units 70 mL 3     INSULIN PUMP -  OUTPATIENT Date Last Updated: 12-3-21  Omnipod  BASAL RATES and times:  12   AM (midnight): 0.65 units/hour    3     AM: 0.60 units/hour   4     AM: 0.65 units/hour   6     AM: 0.70 units/hour   10   AM: 0.45 units/hour   1:30 PM: 0.65 units/hour     CARB RATIO and times:  12   AM (midnight): 12  11   AM: 9  5    PM:  8.5  Corection Factor (Sensitivity) and times:  12   AM (midnight): 60 mg/dL  8     AM: 55 mg/dL  9    PM: 60 mg/dL  BLOOD GLUCOSE TARGET/Correct Above and times:  12   AM (midnight): 100 - 130  6     AM:  90 - 120  9    PM:  100 - 140  Active Insulin Time: 3 hours    Sensor:  Yes: DexCom  Pump Website Username: Connected to [facility]  Pump Website Password: Connected to [facility]       levothyroxine (SYNTHROID/LEVOTHROID) 50 MCG tablet Take 1 tablet (50 mcg) by mouth daily 90 tablet 3     Multiple Vitamins-Minerals (MULTI FOR HER PO)        VITAMIN D, CHOLECALCIFEROL, PO Take 1,000 Units by mouth       glucagon (GLUCAGON EMERGENCY) 1 MG kit Inject 1 mg into the muscle once for 1 dose 1 mg 2     STATIN NOT PRESCRIBED (INTENTIONAL) Please choose reason not prescribed from choices below. (Patient not taking: Reported on 1/3/2022)           Allergies:      Allergies   Allergen Reactions     Keflex [Cephalexin]      nausea     Latex      Facial redness            Past Medical History:     Past Medical History:   Diagnosis Date     Diabetes mellitus type 1 (H)      Dysmenorrhea      Excessive or frequent menstruation      Hypothyroid      Insulin pump in place      Lumbar back pain      Other and unspecified hyperlipidemia      PONV (postoperative nausea and vomiting)          Past Surgical History:     Past Surgical History:   Procedure Laterality Date     DILATION AND CURETTAGE, HYSTEROSCOPY, ABLATE ENDOMETRIUM NOVASURE, COMBINED  1/23/2014    Procedure: COMBINED DILATION AND CURETTAGE, HYSTEROSCOPY, ABLATE ENDOMETRIUM NOVASURE;  HYSTEROSCOPY, DILATION, CURETTAGE, WITH NOVASURE ABLATION, MYOSURE  MORCELLATOR;  Surgeon: Swetha Queen MD;  Location: [facility]     LAPAROSCOPY       LEEP TX, CERVICAL       OPERATIVE HYSTEROSCOPY WITH MORCELLATOR  1/23/2014    Procedure: OPERATIVE HYSTEROSCOPY WITH MORCELLATOR;;  Surgeon: Swetha Queen MD;  Location: [facility]     wisdom teeth[           Family Medical History:     Family History   Problem Relation Age of Onset     Diabetes Father         type 1     Diabetes Sister         type 1     Thyroid Disease Sister      Diabetes Mother         type 2     Obesity Mother      Breast Cancer Paternal Grandmother      Cancer - colorectal Paternal Uncle 60     Diabetes Sister      Breast Cancer Cousin      Prostate Cancer Maternal Grandfather      Prostate Cancer Paternal Grandfather          Social History:     Social History     Socioeconomic History     Marital status:      Spouse name: Not on file     Number of children: Not on file     Years of education: Not on file     Highest education level: Not on file   Occupational History     Employer: [employer]   Tobacco Use     Smoking status: Never Smoker     Smokeless tobacco: Never Used   Substance and Sexual Activity     Alcohol use: Yes     Alcohol/week: 0.0 standard drinks     Comment: 3 per week     Drug use: No     Sexual activity: Yes     Partners: Male     Birth control/protection: Male Surgical   Other Topics Concern     Parent/sibling w/ CABG, MI or angioplasty before 65F 55M? No   Social History Narrative     in May 2014    2 step children (20 and 18) and one biol dtr age 15.    Works for [employer]--works from home     Social Determinants of Health     Financial Resource Strain: Not on file   Food Insecurity: Not on file   Transportation Needs: Not on file   Physical Activity: Not on file   Stress: Not on file   Social Connections: Not on file   Intimate Partner Violence: Not on file   Housing Stability: Not on file           Review of System:      Constitutional: Negative for fever or chills  Skin: Negative for rashes  Ears/Nose/Throat: Negative for nasal congestion, sore throat  Respiratory: No shortness of breath, dyspnea on exertion, cough, or hemoptysis  Cardiovascular: Negative for chest pain  Gastrointestinal: Negative for nausea, vomiting  Genitourinary: Negative for dysuria, hematuria  Musculoskeletal: Negative for myalgias  Neurologic: Negative for headaches  Psychiatric: positive for anxiety  Hematologic/Lymphatic/Immunologic: Negative  Endocrine: positive for Type 1 Diabetes, hypothyroidism  Behavioral: Negative for tobacco use       Physical Exam:   There were no vitals taken for this visit.    RESP: no cough over the phone   NEURO: Alert & Oriented x 3.   PSYCH: mentation appears normal, affect normal        Diagnostic Test Results:     Diagnostic Test Results:  Labs reviewed in Epic    ASSESSMENT/PLAN:       Jenise was seen today for recheck medication.    Diagnoses and all orders for this visit:    Encounter for medication refill    Anxiety  -     FLUoxetine 20 MG tablet; Take 1 tablet (20 mg) by mouth daily    Type 1 diabetes mellitus without complication (H)  - stable, continue current therapy    Hyperlipidemia with target LDL less than 100  - stable, continue current therapy    Other specified hypothyroidism  - stable, continue current therapy          Follow Up Plan:     Patient is instructed to return to Internal Medicine clinic for follow-up visit in 6 to 12 months.      Phone call duration: 30 minutes    Nikia Lunsford MD  Internal Medicine  [facility]

## 2022-01-04 NOTE — TELEPHONE ENCOUNTER
Diabetes Follow-up    Subjective/Objective:    Jenise Smith uploaded her pump for review. Last date of communication was: 12/3/21.    Diabetes is being managed with Tandem insulin pump now. Recently changed from Omnipod. Started her new pump 12/24/21 (self started).     Diabetes Medications   Diabetes Medication(s)     Diabetic Other       Glucagon (BAQSIMI TWO PACK) 3 MG/DOSE POWD    Spray 3 mg in nostril once as needed (severe hypoglycemia reaction, dose as directed)     glucagon (GLUCAGON EMERGENCY) 1 MG kit    Inject 1 mg into the muscle once for 1 dose    Insulin       insulin glargine (LANTUS SOLOSTAR) 100 UNIT/ML pen    INJECT SUBCUTANEOUSLY 16  UNITS DAILY AS DIRECTED     insulin lispro (HUMALOG KWIKPEN) 100 UNIT/ML (1 unit dial) KWIKPEN    Inject 3-8 units subcutaneous with meals and correction doses as directed, total daily dose approx 30 units     insulin lispro (HUMALOG) 100 UNIT/ML vial    USE VIA INSULIN PUMP A  TOTAL DAILY DOSE OF  APPROXIMATELY 70 UNITS     Insulin Lispro-aabc (LYUMJEV) 100 UNIT/ML SOLN    Inject 5-25 Units Subcutaneous continuous , total daily dose approx 70 units          Pump and Dexcom report:               Pump Settings:        Assessment:  Overall, her time in range has increased from 60% in Dec to 80% the past 2 weeks!    Pt had self started on her new pump. She did a modified training with Justine from [employer] the other day who helped her change a couple settings based off her PDM so they were all transferred correctly. It looks like her evening numbers have been better since talking to Justine.     A few of her highs are rebounds from lows most recently.  Would benefit from treating her lows with less carbs.     Her target BG is also set to 130 mg/dL. Could try lowering this to 110 or 120 mg/dL.     Plan/Response:  Recommend to treat lows with less carbs. Try 5-10 grams of carb and see how this goes.   Can lower BG target to 110 mg/dL or 120 mg/dL.   Have a follow up  scheduled in 3 weeks with her but she can let me know in 1-2 weeks how things are going if further questions/concerns before then.   [employer] message sent to pt with suggestions.     IJEOMA Larson CDE      Any diabetes medication dose changes were made via the CDE Protocol and Collaborative Practice Agreement with the patient's endocrinology provider. A copy of this encounter was shared with the provider.

## 2022-01-05 DIAGNOSIS — E10.9 TYPE 1 DIABETES MELLITUS WITHOUT COMPLICATION (H): ICD-10-CM

## 2022-01-05 NOTE — TELEPHONE ENCOUNTER
LOV 11- TL    Appointments in Next Year    Jan 27, 2022  8:00 AM  Video Visit with Chapis Posada RD  [facility] ([facility] ) [phone]   Feb 10, 2022  9:30 AM  Return Visit with Lyndon Gaming MD  [facility] ([facility] ) [phone]   Mar 01, 2022 11:00 AM  (Arrive by 10:40 AM)  PHYSICAL with Beba Dailey PA-C  [facility] [phone]        RT Modesta (R)

## 2022-01-06 RX ORDER — INSULIN LISPRO 100 [IU]/ML
INJECTION, SOLUTION INTRAVENOUS; SUBCUTANEOUS
Qty: 15 ML | Refills: 0 | Status: SHIPPED | OUTPATIENT
Start: 2022-01-06 | End: 2022-03-09

## 2022-01-27 ENCOUNTER — VIRTUAL VISIT (OUTPATIENT)
Dept: EDUCATION SERVICES | Facility: CLINIC | Age: 47
End: 2022-01-27
Payer: COMMERCIAL

## 2022-01-27 DIAGNOSIS — Z96.41 INSULIN PUMP STATUS: ICD-10-CM

## 2022-01-27 DIAGNOSIS — E13.9 DIABETES MELLITUS TYPE 1.5, MANAGED AS TYPE 2 (H): Primary | ICD-10-CM

## 2022-01-27 PROCEDURE — G0108 DIAB MANAGE TRN  PER INDIV: HCPCS | Performed by: DIETITIAN, REGISTERED

## 2022-01-27 NOTE — Clinical Note
PAIGE, her time in range has improved nicely and she likes the Tandem pump so far.  Changes made to pump settings:  basal rate: raise basal rate slightly at 12 am 0.60 --> 0.65 unit(s)/hr, 9 pm 0.50 --> 0.55, 10:30 pm 0.60 --> 0.65   carb ratio: breakfast 12 --> 11.5, lunch 9 --> 8.7    She has a follow up with you in a couple weeks.   Chapis Posada, RD LD CDE

## 2022-01-27 NOTE — PROGRESS NOTES
"    Type of service:  Video Visit    If the video visit is dropped, the video visit invitation should be resent by: Send to e-mail at: [email or web address]    Originating Location (pt. Location): Home  Distant Location (provider location): Home  Mode of Communication:  Video Conference via [employer]    Video Start Time: 8:05 am  Video End Time (time video stopped): 8:35 am    How would patient like to obtain AVS? [facility]     Diabetes Self-Management Education & Support    Presents for: Insulin Pump and CGM Review    SUBJECTIVE/OBJECTIVE:  Presents for: Insulin Pump and CGM Review  Accompanied by: Self  Diabetes education in the past 24mo: Yes  Focus of Visit: Insulin Pump,CGM  Type of Pump visit: Pump Review  Type of CGM visit: Personal CGM Follow-up  Diabetes type: Type 1  Disease course: Stable  How confident are you filling out medical forms by yourself:: Extremely  Diabetes management related comments/concerns: I want to make some tweaks. On vacation last week. Back now this week.   Transportation concerns: No  Difficulty affording diabetes medication?: No  Difficulty affording diabetes testing supplies?: No  Other concerns:: None  Cultural Influences/Ethnic Background:  Choose not to answer    Diabetes Symptoms & Complications:  Complications assessed today?: Yes  Autonomic neuropathy: No  CVA: No  Heart disease: No  Nephropathy: No  Peripheral neuropathy: No  Peripheral Vascular Disease: No  Retinopathy: No    Patient Problem List and Family Medical History reviewed for relevant medical history, current medical status, and diabetes risk factors.    Vitals:  There were no vitals taken for this visit.  Estimated body mass index is 22.71 kg/m  as calculated from the following:    Height as of 8/10/21: 1.702 m (5' 7\").    Weight as of 8/10/21: 65.8 kg (145 lb).     Last 3 BP:   BP Readings from Last 3 Encounters:   08/10/21 106/60   06/25/21 102/64   05/17/21 102/60       History   Smoking Status     Never " Smoker   Smokeless Tobacco     Never Used       Labs:  Lab Results   Component Value Date    A1C 6.4 11/12/2021    A1C 6.1 05/17/2021     Lab Results   Component Value Date     11/12/2021     05/17/2021     Lab Results   Component Value Date    LDL 60 02/02/2021     HDL Cholesterol   Date Value Ref Range Status   02/02/2021 73 >49 mg/dL Final   ]  GFR Estimate   Date Value Ref Range Status   11/12/2021 90 >60 mL/min/1.73m2 Final     Comment:     As of July 11, 2021, eGFR is calculated by the CKD-EPI creatinine equation, without race adjustment. eGFR can be influenced by muscle mass, exercise, and diet. The reported eGFR is an estimation only and is only applicable if the renal function is stable.   07/07/2021 60 (L) >60 mL/min/[1.73_m2] Final     GFR Estimate If Black   Date Value Ref Range Status   07/07/2021 73 >60 mL/min/[1.73_m2] Final     Lab Results   Component Value Date    CR 0.80 11/12/2021    CR 1.03 05/17/2021     No results found for: MICROALBUMIN    Healthy Eating:  Healthy Eating Assessed Today: No  Cultural/Yarsani diet restrictions?: No  Meal planning/habits: Carb counting  Meals include: Breakfast,Lunch,Dinner,Evening Snack,Afternoon Snack  Breakfast: 8a - ezekial bread + 2 eggs, coffee & water  Lunch: noon - chicken breast, 1/2 sweet potato, blueberries & blackberries (1 cup), black bean brownies OR salad w/ peppers, kale, eggs, tomatoes, cheese, vinagrette, blackberries OR leftovers  Dinner: 6p - grilled chicken thigh, eggplant + parmesan, 2 glasses of wine OR pulled pork wrap, cheese, 2 glasses of wine OR cheese & meat plate, wi  Snacks: HS - dark chocolate energy ball; AM or PM - blueberries  Beverages: Alcohol,Water,Coffee  Has patient met with a dietitian in the past?: Yes    Being Active:  Being Active Assessed Today: Yes  Exercise:: Yes  How intense was your typical exercise? : Moderate (like brisk walking)  Barrier to exercise: None (Has adjusted workouts to the morning to  avoid overnight lows)    Monitoring:  Monitoring Assessed Today: Yes  Blood Glucose Meter: CGM  Times checking blood sugar at home (number): 5+  Times checking blood sugar at home (per): Day  Blood glucose trend: Fluctuating    Taking Medications:  Diabetes Medication(s)     Diabetic Other       Glucagon (BAQSIMI TWO PACK) 3 MG/DOSE POWD    Spray 3 mg in nostril once as needed (severe hypoglycemia reaction, dose as directed)     glucagon (GLUCAGON EMERGENCY) 1 MG kit    Inject 1 mg into the muscle once for 1 dose    Insulin       insulin glargine (LANTUS SOLOSTAR) 100 UNIT/ML pen    INJECT SUBCUTANEOUSLY 16  UNITS DAILY AS DIRECTED     insulin lispro (HUMALOG KWIKPEN) 100 UNIT/ML (1 unit dial) KWIKPEN    Inject 3-8 units subcutaneous with meals and correction doses as directed, total daily dose approx 30 units     insulin lispro (HUMALOG) 100 UNIT/ML vial    USE VIA INSULIN PUMP A  TOTAL DAILY DOSE OF  APPROXIMATELY 70 UNITS     Insulin Lispro-aabc (LYUMJEV) 100 UNIT/ML SOLN    Inject 5-25 Units Subcutaneous continuous , total daily dose approx 70 units          Taking Medication Assessed Today: Yes  Current Treatments: Insulin Pump  Problems taking diabetes medications regularly?: No  Diabetes medication side effects?: Yes    Problem Solving:  Problem Solving Assessed Today: Yes  Is the patient at risk for hypoglycemia?: Yes  Hypoglycemia Frequency: Weekly  Hypoglycemia Treatment: Other food (blueberries)      Reducing Risks:  Reducing Risks Assessed Today: No    Healthy Coping:  Healthy Coping Assessed Today: Yes  Emotional response to diabetes: Ready to learn,Confidence diabetes can be controlled,Anger,Concern for health and well-being  Informal Support system:: Family,Spouse  Stage of change: MAINTENANCE (Working to maintain change, with risk of relapse)  Support resources: None  Patient Activation Measure Survey Score:  JOJO Score (Last Two) 7/21/2014   JOJO Raw Score 52   Activation Score 100   JOJO Level 4        Diabetes knowledge and skills assessment:   Patient is knowledgeable in diabetes management concepts related to: Healthy Eating, Being Active, Monitoring, Taking Medication, Problem Solving, Reducing Risks and Healthy Coping    Patient needs further education on the following diabetes management concepts: Insulin Pump Concepts Some questions on her pump today    Based on learning assessment above, most appropriate setting for further diabetes education would be: Individual setting.      INTERVENTIONS:    REPORTS:                  Insulin Pump Information  Insulin Pump Brand: Tandem  Infusion Set: Tandem  Does patient have an insulin multiple daily injection back-up plan?: Yes    Education provided today on:  AADE Self-Care Behaviors:  Problem Solving: praised her on trying to treat her lows with less carbs.     Education specific to insulin pump provided today on:   Reviewed her pump data with her and sensor data. Praised her on her time in range.  Focused on her concerns today with post meal highs after breakfast and lunch and some high readings in the evening.     Opportunities for ongoing education and support in diabetes-self management were discussed.    Pt verbalized understanding of concepts discussed and recommendations provided today.       Education Materials Provided:  No new materials provided today    Pump Education Materials: none    ASSESSMENT  Jenise still likes the Tandem pump but would like to make some adjustments today.  Discussed that I would recommend just slight changes to avoid making her go too low since her time in range is so good.     Glucose Patterns & Trends:  evening numbers trending up and some post meal highs after breakfast and lunch    Patient would benefit from increase in basal rate evening and strengthening her carb ratio at breakfast and lunch.     Changes made to pump settings:  basal rate: raise basal rate slightly at 12 am 0.60 --> 0.65 unit(s)/hr, 9 pm 0.50 -->  0.55, 10:30 pm 0.60 --> 0.65   carb ratio: breakfast 12 --> 11.5, lunch 9 --> 8.7    Changes made to sensor settings:   none    PLAN  See Patient Instructions for co-developed, patient-stated behavior change goals.  Follow up with endo scheduled 2/10.   Follow up with CDE as needed.   AVS printed and provided to patient today. See Follow-Up section for recommended follow-up.    IJEOMA Larson CDE    Time Spent: 30 minutes  Encounter Type: Individual    Any diabetes medication dose changes were made via the CDE Protocol and Collaborative Practice Agreement with the patient's endocrinology provider. A copy of this encounter was shared with the provider.

## 2022-01-27 NOTE — LETTER
1/27/2022         RE: Jenise Smith  [address] [facility] [[email or web address]]  [place] [address]        Dear Colleague,    Thank you for referring your patient, Jenise Smith, to the [facility] [place]. Please see a copy of my visit note below.        Type of service:  Video Visit    If the video visit is dropped, the video visit invitation should be resent by: Send to e-mail at: [email or web address]    Originating Location (pt. Location): Home  Distant Location (provider location): Home  Mode of Communication:  Video Conference via [employer] Start Time: 8:05 am  Video End Time (time video stopped): 8:35 am    How would patient like to obtain AVS? [facility]     Diabetes Self-Management Education & Support    Presents for: Insulin Pump and CGM Review    SUBJECTIVE/OBJECTIVE:  Presents for: Insulin Pump and CGM Review  Accompanied by: Self  Diabetes education in the past 24mo: Yes  Focus of Visit: Insulin Pump,CGM  Type of Pump visit: Pump Review  Type of CGM visit: Personal CGM Follow-up  Diabetes type: Type 1  Disease course: Stable  How confident are you filling out medical forms by yourself:: Extremely  Diabetes management related comments/concerns: I want to make some tweaks. On vacation last week. Back now this week.   Transportation concerns: No  Difficulty affording diabetes medication?: No  Difficulty affording diabetes testing supplies?: No  Other concerns:: None  Cultural Influences/Ethnic Background:  Choose not to answer    Diabetes Symptoms & Complications:  Complications assessed today?: Yes  Autonomic neuropathy: No  CVA: No  Heart disease: No  Nephropathy: No  Peripheral neuropathy: No  Peripheral Vascular Disease: No  Retinopathy: No    Patient Problem List and Family Medical History reviewed for relevant medical history, current medical status, and diabetes risk factors.    Vitals:  There were no vitals taken for this visit.  Estimated body mass index is 22.71 kg/m  as  "calculated from the following:    Height as of 8/10/21: 1.702 m (5' 7\").    Weight as of 8/10/21: 65.8 kg (145 lb).     Last 3 BP:   BP Readings from Last 3 Encounters:   08/10/21 106/60   06/25/21 102/64   05/17/21 102/60       History   Smoking Status     Never Smoker   Smokeless Tobacco     Never Used       Labs:  Lab Results   Component Value Date    A1C 6.4 11/12/2021    A1C 6.1 05/17/2021     Lab Results   Component Value Date     11/12/2021     05/17/2021     Lab Results   Component Value Date    LDL 60 02/02/2021     HDL Cholesterol   Date Value Ref Range Status   02/02/2021 73 >49 mg/dL Final   ]  GFR Estimate   Date Value Ref Range Status   11/12/2021 90 >60 mL/min/1.73m2 Final     Comment:     As of July 11, 2021, eGFR is calculated by the CKD-EPI creatinine equation, without race adjustment. eGFR can be influenced by muscle mass, exercise, and diet. The reported eGFR is an estimation only and is only applicable if the renal function is stable.   07/07/2021 60 (L) >60 mL/min/[1.73_m2] Final     GFR Estimate If Black   Date Value Ref Range Status   07/07/2021 73 >60 mL/min/[1.73_m2] Final     Lab Results   Component Value Date    CR 0.80 11/12/2021    CR 1.03 05/17/2021     No results found for: MICROALBUMIN    Healthy Eating:  Healthy Eating Assessed Today: No  Cultural/Yazidi diet restrictions?: No  Meal planning/habits: Carb counting  Meals include: Breakfast,Lunch,Dinner,Evening Snack,Afternoon Snack  Breakfast: 8a - ezekial bread + 2 eggs, coffee & water  Lunch: noon - chicken breast, 1/2 sweet potato, blueberries & blackberries (1 cup), black bean brownies OR salad w/ peppers, kale, eggs, tomatoes, cheese, vinagrette, blackberries OR leftovers  Dinner: 6p - grilled chicken thigh, eggplant + parmesan, 2 glasses of wine OR pulled pork wrap, cheese, 2 glasses of wine OR cheese & meat plate, wi  Snacks: HS - dark chocolate energy ball; AM or PM - blueberries  Beverages: " Alcohol,Water,Coffee  Has patient met with a dietitian in the past?: Yes    Being Active:  Being Active Assessed Today: Yes  Exercise:: Yes  How intense was your typical exercise? : Moderate (like brisk walking)  Barrier to exercise: None (Has adjusted workouts to the morning to avoid overnight lows)    Monitoring:  Monitoring Assessed Today: Yes  Blood Glucose Meter: CGM  Times checking blood sugar at home (number): 5+  Times checking blood sugar at home (per): Day  Blood glucose trend: Fluctuating    Taking Medications:  Diabetes Medication(s)     Diabetic Other       Glucagon (BAQSIMI TWO PACK) 3 MG/DOSE POWD    Spray 3 mg in nostril once as needed (severe hypoglycemia reaction, dose as directed)     glucagon (GLUCAGON EMERGENCY) 1 MG kit    Inject 1 mg into the muscle once for 1 dose    Insulin       insulin glargine (LANTUS SOLOSTAR) 100 UNIT/ML pen    INJECT SUBCUTANEOUSLY 16  UNITS DAILY AS DIRECTED     insulin lispro (HUMALOG KWIKPEN) 100 UNIT/ML (1 unit dial) KWIKPEN    Inject 3-8 units subcutaneous with meals and correction doses as directed, total daily dose approx 30 units     insulin lispro (HUMALOG) 100 UNIT/ML vial    USE VIA INSULIN PUMP A  TOTAL DAILY DOSE OF  APPROXIMATELY 70 UNITS     Insulin Lispro-aabc (LYUMJEV) 100 UNIT/ML SOLN    Inject 5-25 Units Subcutaneous continuous , total daily dose approx 70 units          Taking Medication Assessed Today: Yes  Current Treatments: Insulin Pump  Problems taking diabetes medications regularly?: No  Diabetes medication side effects?: Yes    Problem Solving:  Problem Solving Assessed Today: Yes  Is the patient at risk for hypoglycemia?: Yes  Hypoglycemia Frequency: Weekly  Hypoglycemia Treatment: Other food (blueberries)      Reducing Risks:  Reducing Risks Assessed Today: No    Healthy Coping:  Healthy Coping Assessed Today: Yes  Emotional response to diabetes: Ready to learn,Confidence diabetes can be controlled,Anger,Concern for health and  well-being  Informal Support system:: Family,Spouse  Stage of change: MAINTENANCE (Working to maintain change, with risk of relapse)  Support resources: None  Patient Activation Measure Survey Score:  JOJO Score (Last Two) 7/21/2014   JOJO Raw Score 52   Activation Score 100   JOJO Level 4       Diabetes knowledge and skills assessment:   Patient is knowledgeable in diabetes management concepts related to: Healthy Eating, Being Active, Monitoring, Taking Medication, Problem Solving, Reducing Risks and Healthy Coping    Patient needs further education on the following diabetes management concepts: Insulin Pump Concepts Some questions on her pump today    Based on learning assessment above, most appropriate setting for further diabetes education would be: Individual setting.      INTERVENTIONS:    REPORTS:                  Insulin Pump Information  Insulin Pump Brand: Tandem  Infusion Set: Tandem  Does patient have an insulin multiple daily injection back-up plan?: Yes    Education provided today on:  AADE Self-Care Behaviors:  Problem Solving: praised her on trying to treat her lows with less carbs.     Education specific to insulin pump provided today on:   Reviewed her pump data with her and sensor data. Praised her on her time in range.  Focused on her concerns today with post meal highs after breakfast and lunch and some high readings in the evening.     Opportunities for ongoing education and support in diabetes-self management were discussed.    Pt verbalized understanding of concepts discussed and recommendations provided today.       Education Materials Provided:  No new materials provided today    Pump Education Materials: none    ASSESSMENT  Jenise still likes the Tandem pump but would like to make some adjustments today.  Discussed that I would recommend just slight changes to avoid making her go too low since her time in range is so good.     Glucose Patterns & Trends:  evening numbers trending up and some  post meal highs after breakfast and lunch    Patient would benefit from increase in basal rate evening and strengthening her carb ratio at breakfast and lunch.     Changes made to pump settings:  basal rate: raise basal rate slightly at 12 am 0.60 --> 0.65 unit(s)/hr, 9 pm 0.50 --> 0.55, 10:30 pm 0.60 --> 0.65   carb ratio: breakfast 12 --> 11.5, lunch 9 --> 8.7    Changes made to sensor settings:   none    PLAN  See Patient Instructions for co-developed, patient-stated behavior change goals.  Follow up with endo scheduled 2/10.   Follow up with CDE as needed.   AVS printed and provided to patient today. See Follow-Up section for recommended follow-up.    IJEOMA Larson CDE    Time Spent: 30 minutes  Encounter Type: Individual    Any diabetes medication dose changes were made via the CDE Protocol and Collaborative Practice Agreement with the patient's endocrinology provider. A copy of this encounter was shared with the provider.

## 2022-02-03 ENCOUNTER — E-VISIT (OUTPATIENT)
Dept: URGENT CARE | Facility: URGENT CARE | Age: 47
End: 2022-02-03
Payer: COMMERCIAL

## 2022-02-03 DIAGNOSIS — N76.0 ACUTE VAGINITIS: Primary | ICD-10-CM

## 2022-02-03 PROCEDURE — 99421 OL DIG E/M SVC 5-10 MIN: CPT | Performed by: NURSE PRACTITIONER

## 2022-02-03 NOTE — PATIENT INSTRUCTIONS
Thank you for choosing us for your care. Given your symptoms, I would like you to do a lab-only visit to determine what is causing them.  I have placed the orders.  Please schedule an appointment with the lab right here in [facility], or call [phone].  I will let you know when the results are back and next steps to take.    Preventing Vaginitis     Use mild, unscented soap when you bathe or shower to avoid irritating your vagina.    Vaginitis is irritation or infection of the vagina or the outside opening of it (vulva). Vaginitis can be caused by bacteria, viruses, parasites, or yeast. Chemicals such as in perfumes or soaps or in spermicides can sometimes be a cause. Vaginitis can be caused by hormone changes in pregnancy or with menopause. You can help prevent vaginitis. Follow the tips below. And see your healthcare provider if you have any symptoms.   Hygiene    Stay away from chemicals. Don't use vaginal sprays. Don't use scented toilet paper or tampons that are scented. Sprays and scents have chemicals that can irritate your vagina.    Don't douche unless you are told to by your healthcare provider. Douching is rarely needed. And it upsets the normal balance in the vagina.    Wash yourself well. Wash the outer vaginal area (vulva) every day with mild, unscented soap. Keep it as dry as possible.    Wipe correctly. Make sure to wipe from front to back after a bowel movement. This helps keep from spreading bacteria from your anus to your vagina.    Change your tampon often. During your period, make sure to change your tampon as often as directed on the package. This allows the normal flow of vaginal discharge and blood.    Lifestyle    Limit your number of sexual partners. The more partners you have, the greater your risk of infection. Using condoms helps reduce your risk.    Get enough sleep. Sleep helps keep your body s immune system healthy. This helps you fight infection.    Lose weight, if needed. Excess  weight can reduce air circulation around your vagina. This can increase your risk of infection.    Exercise regularly. Regular activity helps keep your body healthy.    Take antibiotics only as directed. Antibiotics can change the normal chemical balance in the vagina.      Clothing    Don t sit in wet clothes. Yeast thrives when it s warm and damp.    Don t wear tight pants. And don t wear tights, leggings, or hose without a cotton crotch. These types of clothing trap warmth and moisture.    Wear cotton underwear. Cotton lets air circulate around the vagina.    Symptoms of vaginitis    Irritation, swelling, or itching of the genital area    Vaginal discharge    Bad vaginal odor    Pain or burning during urination    StayWell last reviewed this educational content on 11/1/2019 2000-2021 [employer]. All rights reserved. This information is not intended as a substitute for professional medical care. Always follow your healthcare professional's instructions.

## 2022-02-04 ENCOUNTER — LAB (OUTPATIENT)
Dept: LAB | Facility: CLINIC | Age: 47
End: 2022-02-04
Payer: COMMERCIAL

## 2022-02-04 DIAGNOSIS — N76.0 ACUTE VAGINITIS: ICD-10-CM

## 2022-02-04 LAB
ALBUMIN UR-MCNC: NEGATIVE MG/DL
APPEARANCE UR: CLEAR
BILIRUB UR QL STRIP: NEGATIVE
CLUE CELLS: ABNORMAL
COLOR UR AUTO: YELLOW
GLUCOSE UR STRIP-MCNC: NEGATIVE MG/DL
HGB UR QL STRIP: NEGATIVE
KETONES UR STRIP-MCNC: NEGATIVE MG/DL
LEUKOCYTE ESTERASE UR QL STRIP: NEGATIVE
NITRATE UR QL: NEGATIVE
PH UR STRIP: 6 [PH] (ref 5–7)
SP GR UR STRIP: 1.01 (ref 1–1.03)
TRICHOMONAS, WET PREP: ABNORMAL
UROBILINOGEN UR STRIP-ACNC: 0.2 E.U./DL
WBC'S/HIGH POWER FIELD, WET PREP: ABNORMAL
YEAST, WET PREP: ABNORMAL

## 2022-02-04 PROCEDURE — 87210 SMEAR WET MOUNT SALINE/INK: CPT | Performed by: NURSE PRACTITIONER

## 2022-02-04 PROCEDURE — 81003 URINALYSIS AUTO W/O SCOPE: CPT

## 2022-02-08 ENCOUNTER — TRANSFERRED RECORDS (OUTPATIENT)
Dept: HEALTH INFORMATION MANAGEMENT | Facility: CLINIC | Age: 47
End: 2022-02-08
Payer: COMMERCIAL

## 2022-02-08 LAB — RETINOPATHY: NEGATIVE

## 2022-02-10 ENCOUNTER — OFFICE VISIT (OUTPATIENT)
Dept: ENDOCRINOLOGY | Facility: CLINIC | Age: 47
End: 2022-02-10
Payer: COMMERCIAL

## 2022-02-10 VITALS
SYSTOLIC BLOOD PRESSURE: 124 MMHG | HEIGHT: 67 IN | BODY MASS INDEX: 24.47 KG/M2 | WEIGHT: 155.9 LBS | DIASTOLIC BLOOD PRESSURE: 74 MMHG | HEART RATE: 57 BPM

## 2022-02-10 DIAGNOSIS — Z96.41 INSULIN PUMP STATUS: ICD-10-CM

## 2022-02-10 DIAGNOSIS — E10.9 TYPE 1 DIABETES MELLITUS WITHOUT COMPLICATION (H): Primary | ICD-10-CM

## 2022-02-10 DIAGNOSIS — E06.3 HYPOTHYROIDISM DUE TO HASHIMOTO'S THYROIDITIS: ICD-10-CM

## 2022-02-10 PROCEDURE — 95251 CONT GLUC MNTR ANALYSIS I&R: CPT | Performed by: INTERNAL MEDICINE

## 2022-02-10 PROCEDURE — 99214 OFFICE O/P EST MOD 30 MIN: CPT | Performed by: INTERNAL MEDICINE

## 2022-02-10 ASSESSMENT — MIFFLIN-ST. JEOR: SCORE: 1384.79

## 2022-02-10 NOTE — PROGRESS NOTES
Recent issues:  Diabetes and thyroid follow-up evaluation  Switched from OmniPod to Tandem pump, use with DexcomG6 CGM  Feeling well overall, no new health issues reported  New adopted black lab mix dog named Josue             Diabetes:  Previous diagnosis of IFG with high GAD65 Ab level  6/2011. Developed acute hyperglycemia and diagnosis of T1DM  Treatment with insulin medication, MDI plan  2/2013. Changed to OmniPod pump with Novolog insulin  Used DexcomG5 CGMS system  5/2017. Changed to Medtronic 630G pump  9/2017. Upgraded to Medtronic 670G pump and Guardian3 sensor, Humalog insulin  8/2018. Discontinued Medtronic pump and sensor, changed to MDI plan              Had taken Basaglar 17U at bedtime and mealtime Humalog  Had taken Basaglar 13U at bedtime, Humalog Luxura pen 1:7 at mealtime, ISF 60, goal target  mg/dl  12/2018. Restarted OmniPod pump  ~Early 11/2021. Stopped the Jardiance medication  Using OmniPod pump with Dash PDM, with DexcomG6 CGM    12/24/21. Switched to Tandem TSlim pump              Humalog in pump    Current Tandem pump settings:        Recent Tandem pump data:          Recent DexcomG6 CGM data:      Has reduced hypoglycemia awareness              Previously had service dog (Juliet), trained to recognize hypoglycemia smell/symptoms  Using Contour Next Link BG meter, variable testing, tests 6x/day    Exercises with classes at health club- yoga, strength              Tried Temp Target with exercise, noticed higher glucose levels so not used  Previous [facility] labs include:  Lab Results   Component Value Date    A1C 6.4 (H) 11/12/2021     11/12/2021    POTASSIUM 4.0 11/12/2021    CHLORIDE 104 11/12/2021    CO2 30 11/12/2021    ANIONGAP 2 (L) 11/12/2021     (H) 11/12/2021    BUN 12 11/12/2021    CR 0.80 11/12/2021    GFRESTIMATED 90 11/12/2021    GFRESTBLACK 73 07/07/2021    VIJI 8.7 11/12/2021    CHOL 147 02/02/2021    TRIG 72 02/02/2021    HDL 73 02/02/2021    LDL 60 02/02/2021     NHDL 74 02/02/2021    UCRR 150 05/17/2021    MICROL 7 05/17/2021    UMALCR 4.74 05/17/2021     Previous Lasik surgery at [facility] 2015  Last eye exam 2/8/22 with Dr. Diaz/[facility], no DR reported  DM Complications: none known        Thyroid:  2011. History of hypothyroidism  Takes levothyroxine medication  Eary 10/2018. Dose incease to levothyroxine 0.05 mg daily per GYN physician    Recent [facility] labs include:  Lab Results   Component Value Date    TSH 1.51 11/12/2021    T4 0.85 05/17/2021     Current dose:  Levothyroxine 0.05 mg daily        , lives in [place],  [place], daughter Myra and stepchildren Jag (formerly Tisha) + Anoop  Has chocolate lab (Juliet), trained to recognize hypoglycemia smell/symptoms  Works for [employer] as   Now sees Beba Dailey PAC/[facility] for general medicine evaluations.  Also sees Dr. Ashford/[facility]       PMH/PSH:  Past Medical History:   Diagnosis Date     Diabetes mellitus type 1 (H)      Dysmenorrhea      Excessive or frequent menstruation      Hypothyroid      Insulin pump in place      Lumbar back pain      Other and unspecified hyperlipidemia      PONV (postoperative nausea and vomiting)      Past Surgical History:   Procedure Laterality Date     DILATION AND CURETTAGE, HYSTEROSCOPY, ABLATE ENDOMETRIUM NOVASURE, COMBINED  1/23/2014    Procedure: COMBINED DILATION AND CURETTAGE, HYSTEROSCOPY, ABLATE ENDOMETRIUM NOVASURE;  HYSTEROSCOPY, DILATION, CURETTAGE, WITH NOVASURE ABLATION, MYOSURE MORCELLATOR;  Surgeon: Swetha Queen MD;  Location: [facility]     LAPAROSCOPY       LEEP TX, CERVICAL       OPERATIVE HYSTEROSCOPY WITH MORCELLATOR  1/23/2014    Procedure: OPERATIVE HYSTEROSCOPY WITH MORCELLATOR;;  Surgeon: Swetha Queen MD;  Location: [facility]     wisdom teeth[         Family Hx:  Family History   Problem Relation Age of Onset     Diabetes Father         type 1     Diabetes Sister         type  1     Thyroid Disease Sister      Diabetes Mother         type 2     Obesity Mother      Breast Cancer Paternal Grandmother      Cancer - colorectal Paternal Uncle 60     Diabetes Sister      Breast Cancer Cousin      Prostate Cancer Maternal Grandfather      Prostate Cancer Paternal Grandfather          Social Hx:  Social History     Socioeconomic History     Marital status:      Spouse name: Not on file     Number of children: Not on file     Years of education: Not on file     Highest education level: Not on file   Occupational History     Employer: [employer]   Tobacco Use     Smoking status: Never Smoker     Smokeless tobacco: Never Used   Substance and Sexual Activity     Alcohol use: Yes     Alcohol/week: 0.0 standard drinks     Comment: 3 per week     Drug use: No     Sexual activity: Yes     Partners: Male     Birth control/protection: Male Surgical   Other Topics Concern     Parent/sibling w/ CABG, MI or angioplasty before 65F 55M? No   Social History Narrative     in May 2014    2 step children (20 and 18) and one biol dtr age 15.    Works for [employer]--works from home     Social Determinants of Health     Financial Resource Strain: Not on file   Food Insecurity: Not on file   Transportation Needs: Not on file   Physical Activity: Not on file   Stress: Not on file   Social Connections: Not on file   Intimate Partner Violence: Not on file   Housing Stability: Not on file          MEDICATIONS:  has a current medication list which includes the following prescription(s): eszopiclone, fluoxetine, insulin lispro, insulin pump, levothyroxine, multiple vitamins-minerals, cholecalciferol, blood glucose, blood glucose, blood glucose, fluoxetine, baqsimi two pack, glucagon, insulin glargine, insulin lispro, lyumjev, and statin not prescribed.       ROS: 10 point ROS neg other than the symptoms noted above in the HPI.     GENERAL: energy good, wt stable; denies fevers, chills, night  "sweats.   HEENT: no dysphagia, odonophagia, diplopia, neck pain  THYROID:  no apparent hyper or hypothyroid symptoms  CV: no chest pain, pressure, palpitations  LUNGS: no SOB, CLIFFORD, cough, wheezing   ABDOMEN: denies nausea, diarrhea, constipation, abdominal pain  EXTREMITIES: no rashes, ulcers, edema  NEUROLOGY: no headaches, denies changes in vision, tingling, extremitiy numbness   MSK: some back pain; denies muscle weakness  SKIN: no rashes or lesions  : regular menstrual cycles  PSYCH:  stable mood, no significant anxiety or depression  ENDOCRINE: no heat or cold intolerance      Physical Exam   VS: /74   Pulse 57   Ht 1.702 m (5' 7\")   Wt 70.7 kg (155 lb 14.4 oz)   BMI 24.42 kg/m    GENERAL: AXOX3, NAD, slender, well dressed, answering questions appropriately, appears stated age.  ENT: no nose swelling or nasal discharge, mouth redness or gum changes.  EYES: eyes grossly normal to inspection, conjunctivae and sclerae normal, no exophthalmos or proptosis  THYROID:  no apparent nodules or goiter  CV:  RRR without murmurs  LUNGS: CTA posteriorly  ABDOMEN: abdomen normal size  EXTREMITIES: no edema noted  NEUROLOGY: CN grossly intact, no tremors  MSK: grossly intact  SKIN:  no apparent skin lesions, rash, or edema with visualized skin appearance  PSYCH: mentation appears normal, affect normal/bright, judgement and insight intact,   normal speech and appearance well groomed      LABS:     All pertinent notes, labs, and images personally reviewed by me.       A/P:  Encounter Diagnoses   Name Primary?     Type 1 diabetes mellitus without complication (H) Yes     Insulin pump status      Hypothyroidism due to Hashimoto's thyroiditis        Comments:  Reviewed health history, diabetes and thyroid issues.  Recent glucose control good but some hypoglycemia trends  Reviewed and interpreted tests that I previously ordered.   Ordered appropriate tests for the endocrinology disease management.    Management options " discussed and implemented after shared medical decision making with the patient.  T1DM and hypothyroidism problems are chronic-stable    Plan:  Discussed general issues with the diabetes diagnosis and management  We discussed the hgbA1c test which reflects previous overall glucose levels or control  Discussed the importance of blood glucose (BG) testing to assess glucose trends  Reviewed recent Tandem pump report and pump settings  Reviewed recent DexcomG6 CGM glucose trends, in detail     Recommend:  Continue the Tandem TSlim insulin pump and diabetes management    Pump setting changes:   Basals:  130p 0.7 to 0.65 unit(s)/hr      I agree with the recent lowering of the early morning basals [place] and 2a from 0.65 to 0.6 unit(s)/hr  Would not resume Jardiance med at this time, but could use this off-label med again in future.  Use the Tandem exercise mode for aerobic exercise  Discussed idea of silencing one set of alarms to minimize alarm sounds  Continue use of DexcomG6 CGM glucose sensor  Arrange fasting lab tests at next PCP appt   Discussed the nearby [facility] office   Lab orders placed  Monitor for symptom changes  Advise having fasting lipid panel testing and dilated eye examination, at least annually  Continue current levothyroxine daily dose     Keep regular f/u PCP and GYN evaluations  Addressed patient questions today    There are no Patient Instructions on file for this visit.    Future labs ordered today:   Orders Placed This Encounter   Procedures     GLUCOSE MONITOR, 72 HOUR, PHYS INTERP     Albumin Random Urine Quantitative with Creat Ratio     Basic metabolic panel     Hemoglobin A1c     Lipid panel reflex to direct LDL Fasting     Radiology/Consults ordered today: None    Total time spent in with the patient evaluation:  30 min  Additional time spent reviewing pertinent lab tests and chart notes, and documentation:  8 min    Follow-up:  6/2022    DARI Gaming MD, MS  Endocrinology  [facility]  [place]    CC:  Ashlie, L

## 2022-02-10 NOTE — LETTER
2/10/2022         RE: Jenise Smith  [address]  [place] [address]        Dear Colleague,    Thank you for referring your patient, Jenise Smith, to the [facility] [place]. Please see a copy of my visit note below.      Recent issues:  Diabetes and thyroid follow-up evaluation  Switched from OmniPod to Tandem pump, use with DexcomG6 CGM  Feeling well overall, no new health issues reported  New adopted black lab mix dog named Josue             Diabetes:  Previous diagnosis of IFG with high GAD65 Ab level  6/2011. Developed acute hyperglycemia and diagnosis of T1DM  Treatment with insulin medication, MDI plan  2/2013. Changed to OmniPod pump with Novolog insulin  Used DexcomG5 CGMS system  5/2017. Changed to Medtronic 630G pump  9/2017. Upgraded to Medtronic 670G pump and Guardian3 sensor, Humalog insulin  8/2018. Discontinued Medtronic pump and sensor, changed to MDI plan              Had taken Basaglar 17U at bedtime and mealtime Humalog  Had taken Basaglar 13U at bedtime, Humalog Luxura pen 1:7 at mealtime, ISF 60, goal target  mg/dl  12/2018. Restarted OmniPod pump  ~Early 11/2021. Stopped the Jardiance medication  Using OmniPod pump with Dash PDM, with DexcomG6 CGM    12/24/21. Switched to Tandem TSlim pump              Humalog in pump    Current Tandem pump settings:        Recent Tandem pump data:          Recent DexcomG6 CGM data:      Has reduced hypoglycemia awareness              Previously had service dog (Juliet), trained to recognize hypoglycemia smell/symptoms  Using Contour Next Link BG meter, variable testing, tests 6x/day    Exercises with classes at health club- yoga, strength              Tried Temp Target with exercise, noticed higher glucose levels so not used  Previous [facility] labs include:  Lab Results   Component Value Date    A1C 6.4 (H) 11/12/2021     11/12/2021    POTASSIUM 4.0 11/12/2021    CHLORIDE 104 11/12/2021    CO2 30 11/12/2021     ANIONGAP 2 (L) 11/12/2021     (H) 11/12/2021    BUN 12 11/12/2021    CR 0.80 11/12/2021    GFRESTIMATED 90 11/12/2021    GFRESTBLACK 73 07/07/2021    VIJI 8.7 11/12/2021    CHOL 147 02/02/2021    TRIG 72 02/02/2021    HDL 73 02/02/2021    LDL 60 02/02/2021    NHDL 74 02/02/2021    UCRR 150 05/17/2021    MICROL 7 05/17/2021    UMALCR 4.74 05/17/2021     Previous Lasik surgery at [facility] [place] 2015  Last eye exam 2/8/22 with Dr. Diaz/[facility], no DR reported  DM Complications: none known        Thyroid:  2011. History of hypothyroidism  Takes levothyroxine medication  [place] 10/2018. Dose incease to levothyroxine 0.05 mg daily per GYN physician    Recent [facility] labs include:  Lab Results   Component Value Date    TSH 1.51 11/12/2021    T4 0.85 05/17/2021     Current dose:  Levothyroxine 0.05 mg daily        , lives in [place],  [place], daughter Myra and stepchildren Jag (formerly Tisha) + Anoop  Has chocolate lab (Juliet), trained to recognize hypoglycemia smell/symptoms  Works for [employer] as   Now sees Beba Dailey PAC/[facility] [place] [facility] for general medicine evaluations.  Also sees Dr. Ashford/[facility]       PMH/PSH:  Past Medical History:   Diagnosis Date     Diabetes mellitus type 1 (H)      Dysmenorrhea      Excessive or frequent menstruation      Hypothyroid      Insulin pump in place      Lumbar back pain      Other and unspecified hyperlipidemia      PONV (postoperative nausea and vomiting)      Past Surgical History:   Procedure Laterality Date     DILATION AND CURETTAGE, HYSTEROSCOPY, ABLATE ENDOMETRIUM NOVASURE, COMBINED  1/23/2014    Procedure: COMBINED DILATION AND CURETTAGE, HYSTEROSCOPY, ABLATE ENDOMETRIUM NOVASURE;  HYSTEROSCOPY, DILATION, CURETTAGE, WITH NOVASURE ABLATION, MYOSURE MORCELLATOR;  Surgeon: Swetha Queen MD;  [facility]     LAPAROSCOPY       LEEP TX, CERVICAL       OPERATIVE HYSTEROSCOPY WITH MORCELLATOR  1/23/2014     Procedure: OPERATIVE HYSTEROSCOPY WITH MORCELLATOR;;  Surgeon: Swetha Queen MD;  Location:  [facility]     wisdom teeth[         Family Hx:  Family History   Problem Relation Age of Onset     Diabetes Father         type 1     Diabetes Sister         type 1     Thyroid Disease Sister      Diabetes Mother         type 2     Obesity Mother      Breast Cancer Paternal Grandmother      Cancer - colorectal Paternal Uncle 60     Diabetes Sister      Breast Cancer Cousin      Prostate Cancer Maternal Grandfather      Prostate Cancer Paternal Grandfather          Social Hx:  Social History     Socioeconomic History     Marital status:      Spouse name: Not on file     Number of children: Not on file     Years of education: Not on file     Highest education level: Not on file   Occupational History     Employer: [employer]   Tobacco Use     Smoking status: Never Smoker     Smokeless tobacco: Never Used   Substance and Sexual Activity     Alcohol use: Yes     Alcohol/week: 0.0 standard drinks     Comment: 3 per week     Drug use: No     Sexual activity: Yes     Partners: Male     Birth control/protection: Male Surgical   Other Topics Concern     Parent/sibling w/ CABG, MI or angioplasty before 65F 55M? No   Social History Narrative     in May 2014    2 step children (20 and 18) and one biol dtr age 15.    Works for [employer]--works from home     Social Determinants of Health     Financial Resource Strain: Not on file   Food Insecurity: Not on file   Transportation Needs: Not on file   Physical Activity: Not on file   Stress: Not on file   Social Connections: Not on file   Intimate Partner Violence: Not on file   Housing Stability: Not on file          MEDICATIONS:  has a current medication list which includes the following prescription(s): eszopiclone, fluoxetine, insulin lispro, insulin pump, levothyroxine, multiple vitamins-minerals, cholecalciferol, blood glucose, blood  "glucose, blood glucose, fluoxetine, baqsimi two pack, glucagon, insulin glargine, insulin lispro, lyumjev, and statin not prescribed.       ROS: 10 point ROS neg other than the symptoms noted above in the HPI.     GENERAL: energy good, wt stable; denies fevers, chills, night sweats.   HEENT: no dysphagia, odonophagia, diplopia, neck pain  THYROID:  no apparent hyper or hypothyroid symptoms  CV: no chest pain, pressure, palpitations  LUNGS: no SOB, CLIFFORD, cough, wheezing   ABDOMEN: denies nausea, diarrhea, constipation, abdominal pain  EXTREMITIES: no rashes, ulcers, edema  NEUROLOGY: no headaches, denies changes in vision, tingling, extremitiy numbness   MSK: some back pain; denies muscle weakness  SKIN: no rashes or lesions  : regular menstrual cycles  PSYCH:  stable mood, no significant anxiety or depression  ENDOCRINE: no heat or cold intolerance      Physical Exam   VS: /74   Pulse 57   Ht 1.702 m (5' 7\")   Wt 70.7 kg (155 lb 14.4 oz)   BMI 24.42 kg/m    GENERAL: AXOX3, NAD, slender, well dressed, answering questions appropriately, appears stated age.  ENT: no nose swelling or nasal discharge, mouth redness or gum changes.  EYES: eyes grossly normal to inspection, conjunctivae and sclerae normal, no exophthalmos or proptosis  THYROID:  no apparent nodules or goiter  CV:  RRR without murmurs  LUNGS: CTA posteriorly  ABDOMEN: abdomen normal size  EXTREMITIES: no edema noted  NEUROLOGY: CN grossly intact, no tremors  MSK: grossly intact  SKIN:  no apparent skin lesions, rash, or edema with visualized skin appearance  PSYCH: mentation appears normal, affect normal/bright, judgement and insight intact,   normal speech and appearance well groomed      LABS:     All pertinent notes, labs, and images personally reviewed by me.       A/P:  Encounter Diagnoses   Name Primary?     Type 1 diabetes mellitus without complication (H) Yes     Insulin pump status      Hypothyroidism due to Hashimoto's thyroiditis  "       Comments:  Reviewed health history, diabetes and thyroid issues.  Recent glucose control good but some hypoglycemia trends  Reviewed and interpreted tests that I previously ordered.   Ordered appropriate tests for the endocrinology disease management.    Management options discussed and implemented after shared medical decision making with the patient.  T1DM and hypothyroidism problems are chronic-stable    Plan:  Discussed general issues with the diabetes diagnosis and management  We discussed the hgbA1c test which reflects previous overall glucose levels or control  Discussed the importance of blood glucose (BG) testing to assess glucose trends  Reviewed recent Tandem pump report and pump settings  Reviewed recent DexcomG6 CGM glucose trends, in detail     Recommend:  Continue the Tandem TSlim insulin pump and diabetes management    Pump setting changes:   Basals:  130p 0.7 to 0.65 unit(s)/hr      I agree with the recent lowering of the early morning basals [place] and 2a from 0.65 to 0.6 unit(s)/hr  Would not resume Jardiance med at this time, but could use this off-label med again in future.  Use the Tandem exercise mode for aerobic exercise  Discussed idea of silencing one set of alarms to minimize alarm sounds  Continue use of DexcomG6 CGM glucose sensor  Arrange fasting lab tests at next PCP appt   Discussed the nearby [facility] office   Lab orders placed  Monitor for symptom changes  Advise having fasting lipid panel testing and dilated eye examination, at least annually  Continue current levothyroxine daily dose     Keep regular f/u PCP and GYN evaluations  Addressed patient questions today    There are no Patient Instructions on file for this visit.    Future labs ordered today:   Orders Placed This Encounter   Procedures     GLUCOSE MONITOR, 72 HOUR, PHYS INTERP     Albumin Random Urine Quantitative with Creat Ratio     Basic metabolic panel     Hemoglobin A1c     Lipid panel reflex to direct LDL  Fasting     Radiology/Consults ordered today: None    Total time spent in with the patient evaluation:  30 min  Additional time spent reviewing pertinent lab tests and chart notes, and documentation:  8 min    Follow-up:  6/2022    DARI Gaming MD, MS  Endocrinology  [facility]    CC:  ANA Dailey              Again, thank you for allowing me to participate in the care of your patient.        Sincerely,        Lyndon Gaming MD

## 2022-02-26 ASSESSMENT — ENCOUNTER SYMPTOMS
ABDOMINAL PAIN: 0
HEADACHES: 0
NERVOUS/ANXIOUS: 0
COUGH: 0
MYALGIAS: 0
SHORTNESS OF BREATH: 0
DIARRHEA: 0
HEARTBURN: 0
HEMATURIA: 0
ARTHRALGIAS: 0
FREQUENCY: 0
JOINT SWELLING: 0
NAUSEA: 0
SORE THROAT: 0
BREAST MASS: 1
FEVER: 0
DYSURIA: 0
HEMATOCHEZIA: 0
CHILLS: 0
DIZZINESS: 0
WEAKNESS: 0
CONSTIPATION: 0
EYE PAIN: 0
PALPITATIONS: 0
PARESTHESIAS: 0

## 2022-03-01 ENCOUNTER — OFFICE VISIT (OUTPATIENT)
Dept: FAMILY MEDICINE | Facility: CLINIC | Age: 47
End: 2022-03-01
Payer: COMMERCIAL

## 2022-03-01 VITALS
DIASTOLIC BLOOD PRESSURE: 71 MMHG | OXYGEN SATURATION: 99 % | RESPIRATION RATE: 12 BRPM | SYSTOLIC BLOOD PRESSURE: 112 MMHG | TEMPERATURE: 97.3 F | HEIGHT: 67 IN | HEART RATE: 64 BPM | BODY MASS INDEX: 24.17 KG/M2 | WEIGHT: 154 LBS

## 2022-03-01 DIAGNOSIS — E03.8 OTHER SPECIFIED HYPOTHYROIDISM: ICD-10-CM

## 2022-03-01 DIAGNOSIS — E78.5 HYPERLIPIDEMIA WITH TARGET LDL LESS THAN 100: ICD-10-CM

## 2022-03-01 DIAGNOSIS — Z00.00 ROUTINE GENERAL MEDICAL EXAMINATION AT A HEALTH CARE FACILITY: Primary | ICD-10-CM

## 2022-03-01 DIAGNOSIS — F51.01 PRIMARY INSOMNIA: ICD-10-CM

## 2022-03-01 DIAGNOSIS — E55.9 VITAMIN D DEFICIENCY: ICD-10-CM

## 2022-03-01 DIAGNOSIS — E10.9 TYPE 1 DIABETES MELLITUS WITHOUT COMPLICATION (H): ICD-10-CM

## 2022-03-01 DIAGNOSIS — Z12.11 SCREEN FOR COLON CANCER: ICD-10-CM

## 2022-03-01 LAB — HBA1C MFR BLD: 6.5 % (ref 0–5.6)

## 2022-03-01 PROCEDURE — 80048 BASIC METABOLIC PNL TOTAL CA: CPT | Performed by: PHYSICIAN ASSISTANT

## 2022-03-01 PROCEDURE — 99207 PR FOOT EXAM NO CHARGE: CPT | Mod: 25 | Performed by: PHYSICIAN ASSISTANT

## 2022-03-01 PROCEDURE — 82306 VITAMIN D 25 HYDROXY: CPT | Performed by: PHYSICIAN ASSISTANT

## 2022-03-01 PROCEDURE — 80061 LIPID PANEL: CPT | Performed by: PHYSICIAN ASSISTANT

## 2022-03-01 PROCEDURE — 83036 HEMOGLOBIN GLYCOSYLATED A1C: CPT | Performed by: PHYSICIAN ASSISTANT

## 2022-03-01 PROCEDURE — 84443 ASSAY THYROID STIM HORMONE: CPT | Performed by: PHYSICIAN ASSISTANT

## 2022-03-01 PROCEDURE — 99396 PREV VISIT EST AGE 40-64: CPT | Performed by: PHYSICIAN ASSISTANT

## 2022-03-01 PROCEDURE — 82043 UR ALBUMIN QUANTITATIVE: CPT | Performed by: PHYSICIAN ASSISTANT

## 2022-03-01 PROCEDURE — 36415 COLL VENOUS BLD VENIPUNCTURE: CPT | Performed by: PHYSICIAN ASSISTANT

## 2022-03-01 RX ORDER — INSULIN GLARGINE 100 [IU]/ML
INJECTION, SOLUTION SUBCUTANEOUS
Qty: 15 ML | Refills: 3 | Status: SHIPPED | OUTPATIENT
Start: 2022-03-01

## 2022-03-01 RX ORDER — ESZOPICLONE 2 MG/1
2 TABLET, FILM COATED ORAL AT BEDTIME
Qty: 30 TABLET | Refills: 5 | Status: SHIPPED | OUTPATIENT
Start: 2022-03-01 | End: 2022-10-31

## 2022-03-01 ASSESSMENT — PAIN SCALES - GENERAL: PAINLEVEL: NO PAIN (0)

## 2022-03-01 NOTE — PROGRESS NOTES
SUBJECTIVE:   CC: Jenise Smith is an 47 year old woman who presents for preventive health visit.     Pt works from home for [employer] ()  She has a new puppy Josue so that is fun  Endo follows her for diabetes    Lab Results   Component Value Date    A1C 6.4 11/12/2021    A1C 6.1 05/17/2021    A1C 5.9 10/13/2020    A1C 6.1 12/30/2019    A1C 6.1 09/27/2019    A1C 6.0 06/25/2019     She sees gyn annually and plans to make that appt.  Has her period today.  She has some vaginal discharge that comes and goes.    She is at high risk for breast ca and having mammogram alt with MRI  She had her eye exam last month  Will have labs today  Has biometric forms to be completed pending labs  Mood well controlled with prozac.  Has difficulty losing weight and would like TSH rechecked (Normal in Nov)        Patient has been advised of split billing requirements and indicates understanding: Yes  Healthy Habits:     Getting at least 3 servings of Calcium per day:  Yes    Bi-annual eye exam:  Yes    Dental care twice a year:  Yes    Sleep apnea or symptoms of sleep apnea:  None    Diet:  Diabetic    Frequency of exercise:  4-5 days/week    Duration of exercise:  30-45 minutes    Taking medications regularly:  Yes    Barriers to taking medications:  None    Medication side effects:  Not applicable    PHQ-2 Total Score: 0    Additional concerns today:  No      Today's PHQ-2 Score:   PHQ-2 ( 1999 Pfizer) 2/26/2022   Q1: Little interest or pleasure in doing things 0   Q2: Feeling down, depressed or hopeless 0   PHQ-2 Score 0   PHQ-2 Total Score (12-17 Years)- Positive if 3 or more points; Administer PHQ-A if positive -   Q1: Little interest or pleasure in doing things Not at all   Q2: Feeling down, depressed or hopeless Not at all   PHQ-2 Score 0       Abuse: Current or Past (Physical, Sexual or Emotional) - No  Do you feel safe in your environment? Yes    Have you ever done Advance Care Planning? (For example, a  Health Directive, POLST, or a discussion with a medical provider or your loved ones about your wishes): Yes, patient states has an Advance Care Planning document and will bring a copy to the clinic.    Social History     Tobacco Use     Smoking status: Never Smoker     Smokeless tobacco: Never Used   Substance Use Topics     Alcohol use: Yes     Alcohol/week: 0.0 standard drinks     Comment: 3 per week     If you drink alcohol do you typically have >3 drinks per day or >7 drinks per week? No    Reviewed orders with patient.  Reviewed health maintenance and updated orders accordingly - Yes    Pertinent mammograms are reviewed under the imaging tab.    History of abnormal Pap smear: NO - age 30- 65 PAP every 3 years recommended  PAP / HPV Latest Ref Rng & Units 1/22/2019   PAP (Historical) - NIL   HPV16 NEG:Negative Negative   HPV18 NEG:Negative Negative   HRHPV NEG:Negative Negative     Reviewed and updated as needed this visit by clinical staff   Tobacco  Allergies  Meds     Fam Hx          Reviewed and updated as needed this visit by Provider     Meds     [facility] Hx         Past Medical History:   Diagnosis Date     Diabetes mellitus type 1 (H)      Dysmenorrhea      Excessive or frequent menstruation      Hypothyroid      Insulin pump in place      Lumbar back pain      Other and unspecified hyperlipidemia      PONV (postoperative nausea and vomiting)      Past Surgical History:   Procedure Laterality Date     DILATION AND CURETTAGE, HYSTEROSCOPY, ABLATE ENDOMETRIUM NOVASURE, COMBINED  1/23/2014    Procedure: COMBINED DILATION AND CURETTAGE, HYSTEROSCOPY, ABLATE ENDOMETRIUM NOVASURE;  HYSTEROSCOPY, DILATION, CURETTAGE, WITH NOVASURE ABLATION, MYOSURE MORCELLATOR;  Surgeon: Swetha Queen MD;  Location: [facility]     LAPAROSCOPY       LEEP TX, CERVICAL       OPERATIVE HYSTEROSCOPY WITH MORCELLATOR  1/23/2014    Procedure: OPERATIVE HYSTEROSCOPY WITH MORCELLATOR;;  Surgeon: Swetha Queen  MD Stefanie;  Location: [facility]     wisdom teeth[       Current Outpatient Medications   Medication Sig Dispense Refill     eszopiclone (LUNESTA) 2 MG tablet Take 1 tablet (2 mg) by mouth At Bedtime 30 tablet 5     insulin glargine (LANTUS SOLOSTAR) 100 UNIT/ML pen INJECT SUBCUTANEOUSLY 16  UNITS DAILY AS DIRECTED 15 mL 3     blood glucose (ACCU-CHEK FASTCLIX) lancing device 1 Dose once daily.       blood glucose monitoring (FABRICIO CONTOUR NEXT) test strip 1 Dose 6 times daily. Test 6-7 times daily       blood glucose monitoring (NO BRAND SPECIFIED) test strip Use to test blood sugar 6 times daily or as directed. 550 strip 3     FLUoxetine (PROZAC) 20 MG capsule Take 1 capsule (20 mg) by mouth daily 90 capsule 3     Glucagon (BAQSIMI TWO PACK) 3 MG/DOSE POWD Spray 3 mg in nostril once as needed (severe hypoglycemia reaction, dose as directed) 1 each 3     glucagon (GLUCAGON EMERGENCY) 1 MG kit Inject 1 mg into the muscle once for 1 dose 1 mg 2     insulin lispro (HUMALOG KWIKPEN) 100 UNIT/ML (1 unit dial) KWIKPEN Inject 3-8 units subcutaneous with meals and correction doses as directed, total daily dose approx 30 units 15 mL 0     Insulin Lispro-aabc (LYUMJEV) 100 UNIT/ML SOLN Inject 5-25 Units Subcutaneous continuous , total daily dose approx 70 units 70 mL 3     INSULIN PUMP - OUTPATIENT Date Last Updated: 12-3-21  Omnipod  BASAL RATES and times:  12   AM (midnight): 0.65 units/hour    3     AM: 0.60 units/hour   4     AM: 0.65 units/hour   6     AM: 0.70 units/hour   10   AM: 0.45 units/hour   1:30 PM: 0.65 units/hour     CARB RATIO and times:  12   AM (midnight): 12  11   AM: 9  5    PM:  8.5  Corection Factor (Sensitivity) and times:  12   AM (midnight): 60 mg/dL  8     AM: 55 mg/dL  9    PM: 60 mg/dL  BLOOD GLUCOSE TARGET/Correct Above and times:  12   AM (midnight): 100 - 130  6     AM:  90 - 120  9    PM:  100 - 140  Active Insulin Time: 3 hours    Sensor:  Yes: DexCom  Pump Website Username: Connected to  "clinic glooko  Pump Website Password: Connected to clinic glooko       levothyroxine (SYNTHROID/LEVOTHROID) 50 MCG tablet Take 1 tablet (50 mcg) by mouth daily 90 tablet 3     Multiple Vitamins-Minerals (MULTI FOR HER PO)        STATIN NOT PRESCRIBED (INTENTIONAL) Please choose reason not prescribed from choices below. (Patient not taking: Reported on 1/3/2022)       VITAMIN D, CHOLECALCIFEROL, PO Take 1,000 Units by mouth           Review of Systems  CONSTITUTIONAL: NEGATIVE for fever, chills, change in weight  INTEGUMENTARU/SKIN: NEGATIVE for worrisome rashes, moles or lesions  EYES: NEGATIVE for vision changes or irritation  ENT: NEGATIVE for ear, mouth and throat problems  RESP: NEGATIVE for significant cough or SOB  BREAST: NEGATIVE for masses, tenderness or discharge  CV: NEGATIVE for chest pain, palpitations or peripheral edema  GI: NEGATIVE for nausea, abdominal pain, heartburn, or change in bowel habits  : NEGATIVE for unusual urinary or vaginal symptoms. Periods are regular.  MUSCULOSKELETAL: NEGATIVE for significant arthralgias or myalgia  NEURO: NEGATIVE for weakness, dizziness or paresthesias  PSYCHIATRIC: NEGATIVE for changes in mood or affect     OBJECTIVE:   /71 (BP Location: Left arm, Patient Position: Sitting, Cuff Size: Adult Regular)   Pulse 64   Temp 97.3  F (36.3  C) (Temporal)   Resp 12   Ht 1.702 m (5' 7\")   Wt 69.9 kg (154 lb)   LMP 02/26/2022   SpO2 99%   BMI 24.12 kg/m    Physical Exam  GENERAL APPEARANCE: healthy, alert and no distress  EYES: Eyes grossly normal to inspection, PERRL and conjunctivae and sclerae normal  HENT: ear canals and TM's normal, nose and mouth without ulcers or lesions, oropharynx clear and oral mucous membranes moist  NECK: no adenopathy, no asymmetry, masses, or scars and thyroid normal to palpation  RESP: lungs clear to auscultation - no rales, rhonchi or wheezes  BREAST:dense tissue, otherwise normal without masses, tenderness or nipple " discharge and no palpable axillary masses or adenopathy  CV: regular rate and rhythm, normal S1 S2, no S3 or S4, no murmur, click or rub, no peripheral edema and peripheral pulses strong  ABDOMEN: soft, nontender, no hepatosplenomegaly, no masses and bowel sounds normal  MS: no musculoskeletal defects are noted and gait is age appropriate without ataxia  SKIN: no suspicious lesions or rashes  NEURO: Normal strength and tone, sensory exam grossly normal, mentation intact and speech normal  PSYCH: mentation appears normal and affect normal/bright    Results for orders placed or performed in visit on 03/01/22   Albumin Random Urine Quantitative with Creat Ratio     Status: None   Result Value Ref Range    Creatinine Urine mg/dL 118 mg/dL    Albumin Urine mg/L <5 mg/L    Albumin Urine mg/g Cr     Basic metabolic panel     Status: Abnormal   Result Value Ref Range    Sodium 139 133 - 144 mmol/L    Potassium 4.0 3.4 - 5.3 mmol/L    Chloride 107 94 - 109 mmol/L    Carbon Dioxide (CO2) 25 20 - 32 mmol/L    Anion Gap 7 3 - 14 mmol/L    Urea Nitrogen 15 7 - 30 mg/dL    Creatinine 0.83 0.52 - 1.04 mg/dL    Calcium 8.7 8.5 - 10.1 mg/dL    Glucose 111 (H) 70 - 99 mg/dL    GFR Estimate 88 >60 mL/min/1.73m2   Hemoglobin A1c     Status: Abnormal   Result Value Ref Range    Hemoglobin A1C 6.5 (H) 0.0 - 5.6 %   Lipid panel reflex to direct LDL Fasting     Status: None   Result Value Ref Range    Cholesterol 158 <200 mg/dL    Triglycerides 50 <150 mg/dL    Direct Measure HDL 96 >=50 mg/dL    LDL Cholesterol Calculated 52 <=100 mg/dL    Non HDL Cholesterol 62 <130 mg/dL    Patient Fasting > 8hrs? Yes     Narrative    Cholesterol  Desirable:  <200 mg/dL    Triglycerides  Normal:  Less than 150 mg/dL  Borderline High:  150-199 mg/dL  High:  200-499 mg/dL  Very High:  Greater than or equal to 500 mg/dL    Direct Measure HDL  Female:  Greater than or equal to 50 mg/dL   Male:  Greater than or equal to 40 mg/dL    LDL  Cholesterol  Desirable:  <100mg/dL  Above Desirable:  100-129 mg/dL   Borderline High:  130-159 mg/dL   High:  160-189 mg/dL   Very High:  >= 190 mg/dL    Non HDL Cholesterol  Desirable:  130 mg/dL  Above Desirable:  130-159 mg/dL  Borderline High:  160-189 mg/dL  High:  190-219 mg/dL  Very High:  Greater than or equal to 220 mg/dL   Vitamin D Deficiency     Status: Normal   Result Value Ref Range    Vitamin D, Total (25-Hydroxy) 53 20 - 75 ug/L    Narrative    Season, race, dietary intake, and treatment affect the concentration of 25-hydroxy-Vitamin D. Values may decrease during winter months and increase during summer months. Values 20-29 ug/L may indicate Vitamin D insufficiency and values <20 ug/L may indicate Vitamin D deficiency.    Vitamin D determination is routinely performed by an immunoassay specific for 25 hydroxyvitamin D3.  If an individual is on vitamin D2(ergocalciferol) supplementation, please specify 25 OH vitamin D2 and D3 level determination by LCMSMS test VITD23.     TSH with free T4 reflex     Status: Normal   Result Value Ref Range    TSH 1.59 0.40 - 4.00 mU/L         ASSESSMENT/PLAN:   Assessment and Plan:     (Z00.00) Routine general medical examination at a health care facility  (primary encounter diagnosis)  Comment: pap not due this year. Ordered colonoscopy. Immun up to date  Plan: routine labs, breast MRI up to date. She alt with mammogram due to increased breast ca risk    (E10.9) Type 1 diabetes mellitus without complication (H)  Comment:   Plan: managed by Dr. Gaming.  A1c looks good at 6.5%    (E78.5) Hyperlipidemia with target LDL less than 100  Comment:   Plan: lipids at goal    (F51.01) Primary insomnia  Comment:   Plan: eszopiclone (LUNESTA) 2 MG tablet        refilled    (Z12.11) Screen for colon cancer  Comment: recd checking with insurance to make sure this covered since <51yo  Plan: Adult Gastro Ref - Procedure Only            (E55.9) Vitamin D deficiency  Comment:   Plan:  "Vitamin D level is normal            (E03.8) Other specified hypothyroidism  Comment:   Plan: TSH with free T4 reflex is normal                Patient has been advised of split billing requirements and indicates understanding: Yes    COUNSELING:  Reviewed preventive health counseling, as reflected in patient instructions    Estimated body mass index is 24.12 kg/m  as calculated from the following:    Height as of this encounter: 1.702 m (5' 7\").    Weight as of this encounter: 69.9 kg (154 lb).        She reports that she has never smoked. She has never used smokeless tobacco.      Counseling Resources:  ATP IV Guidelines  Pooled Cohorts Equation Calculator  Breast Cancer Risk Calculator  BRCA-Related Cancer Risk Assessment: FHS-7 Tool  FRAX Risk Assessment  ICSI Preventive Guidelines  Dietary Guidelines for Americans, 2010  USDA's MyPlate  ASA Prophylaxis  Lung CA Screening    Beba Dailey PA-C  [facility]  "

## 2022-03-01 NOTE — Clinical Note
Please abstract the following data from this visit with this patient into the appropriate field in Epic:    Tests that can be patient reported without a hard copy:    Eye exam with ophthalmology on this date: 02/08/2022 at [facility]

## 2022-03-02 ENCOUNTER — TELEPHONE (OUTPATIENT)
Dept: ENDOCRINOLOGY | Facility: CLINIC | Age: 47
End: 2022-03-02
Payer: COMMERCIAL

## 2022-03-02 LAB
ANION GAP SERPL CALCULATED.3IONS-SCNC: 7 MMOL/L (ref 3–14)
BUN SERPL-MCNC: 15 MG/DL (ref 7–30)
CALCIUM SERPL-MCNC: 8.7 MG/DL (ref 8.5–10.1)
CHLORIDE BLD-SCNC: 107 MMOL/L (ref 94–109)
CHOLEST SERPL-MCNC: 158 MG/DL
CO2 SERPL-SCNC: 25 MMOL/L (ref 20–32)
CREAT SERPL-MCNC: 0.83 MG/DL (ref 0.52–1.04)
CREAT UR-MCNC: 118 MG/DL
DEPRECATED CALCIDIOL+CALCIFEROL SERPL-MC: 53 UG/L (ref 20–75)
FASTING STATUS PATIENT QL REPORTED: YES
GFR SERPL CREATININE-BSD FRML MDRD: 88 ML/MIN/1.73M2
GLUCOSE BLD-MCNC: 111 MG/DL (ref 70–99)
HDLC SERPL-MCNC: 96 MG/DL
LDLC SERPL CALC-MCNC: 52 MG/DL
MICROALBUMIN UR-MCNC: <5 MG/L
MICROALBUMIN/CREAT UR: NORMAL MG/G{CREAT}
NONHDLC SERPL-MCNC: 62 MG/DL
POTASSIUM BLD-SCNC: 4 MMOL/L (ref 3.4–5.3)
SODIUM SERPL-SCNC: 139 MMOL/L (ref 133–144)
TRIGL SERPL-MCNC: 50 MG/DL
TSH SERPL DL<=0.005 MIU/L-ACNC: 1.59 MU/L (ref 0.4–4)

## 2022-03-03 ENCOUNTER — TELEPHONE (OUTPATIENT)
Dept: FAMILY MEDICINE | Facility: CLINIC | Age: 47
End: 2022-03-03
Payer: COMMERCIAL

## 2022-03-03 NOTE — RESULT ENCOUNTER NOTE
It was a pleasure seeing you for your physical examination.  I wanted to get back to you with your test results.  I have enclosed a copy for your review.      Your vitamin D level was normal.  Your thyroid was normal.    Beba Dailey PA-C

## 2022-03-03 NOTE — TELEPHONE ENCOUNTER
Completed [facility] Biometric form faxed.  Copy sent to [facility] and placed in accordion file-Pink Pod

## 2022-03-07 ENCOUNTER — HOSPITAL ENCOUNTER (OUTPATIENT)
Facility: CLINIC | Age: 47
End: 2022-03-07
Attending: COLON & RECTAL SURGERY | Admitting: COLON & RECTAL SURGERY
Payer: COMMERCIAL

## 2022-03-07 ENCOUNTER — TELEPHONE (OUTPATIENT)
Dept: GASTROENTEROLOGY | Facility: CLINIC | Age: 47
End: 2022-03-07
Payer: COMMERCIAL

## 2022-03-07 DIAGNOSIS — Z11.59 ENCOUNTER FOR SCREENING FOR OTHER VIRAL DISEASES: Primary | ICD-10-CM

## 2022-03-07 NOTE — TELEPHONE ENCOUNTER
Screening Questions  BlueKIND OF PREP RedLOCATION [review exclusion criteria] GreenSEDATION TYPE    1. Have you had a positive covid test in the last 90 days? N     2. Are you active on mychart? Y    3. What insurance is in the chart? [employer]     3.  Ordering/Referring Provider: Beba Dailey    4. BMI 24.3  [BMI OVER 40-EXTENDED PREP]  If greater than 40 review exclusion criteria [PAC APPT IF @ [facility]]    5.  Respiratory Screening :  [If yes to any of the following HOSPITAL setting only]     Do you use daily home oxygen? N    Do you have mod to severe Obstructive Sleep Apnea? N  [OKAY @ [facility] SH PH RI]   Do you have Pulmonary Hypertension? N     Do you have UNCONTROLLED asthma? N      6. Have you had a heart or lung transplant? N      7. Are you currently on dialysis? N [ If yes, G-PREP & HOSPITAL setting only]     8. Do you have chronic kidney disease? N [ If yes, G-PREP ]    9. Have you had a stroke or Transient ischemic attack (TIA) within 6 months? N (If yes, do not schedule at [facility])    10. In the past 6 months, have you had any heart related issues including cardiomyopathy or heart attack? N        If yes, did it require cardiac stenting or other implantable device? N      11. Do you have any implantable devices in your body (pacemaker, defib, LVAD)? N (If yes, schedule at [facility])    12. Do you take nitroglycerin? N   If yes, how often? N  (if yes, HOSPITAL setting ONLY)    13. Are you currently taking any blood thinners? N   [IF YES, INFORM PATIENT TO FOLLOW UP W/ ORDERING PROVIDER FOR BRIDGING INSTRUCTIONS]     14. Are you a diabetic? Y   [ If yes, G-PREP ]    15. [FEMALES] Are you currently pregnant? N    If yes, how many weeks? N    16. Are you taking any prescription pain medications on a routine schedule?  N  [ If yes, EXTENDED PREP.] [If yes, MAC]    17. Do you have any chemical dependencies such as alcohol, street drugs, or methadone?  N [If yes, MAC]    18. Do you have any history of  post-traumatic stress syndrome, severe anxiety or history of psychosis?  N  [If yes, MAC]    19. Do you transfer independently?  Y    20.  Do you have any issues with constipation?  N  [ If yes, EXTENDED PREP.]    21. Preferred LOCAL Pharmacy for Pre Prescription     [id number] - [place], [place] - [address] AT [address] [place] [address]    Scheduling Details      Caller : shelia  (Please ask for phone number if not scheduled by patient)    Type of Procedure Scheduled: Colon  Which Colonoscopy Prep was Sent?: G- Prep  KHORUTS CF PATIENTS & GROEN'S PATIENTS NEEDS EXTENDED PREP  Surgeon: Terrie  Date of Procedure: 4-13  Location:       Sedation Type: CS  Conscious Sedation- Needs  for 6 hours after the procedure  MAC/General-Needs  for 24 hours after procedure    Pre-op Required at [facility], [place], [place] and OR for MAC sedation:   (advise patient they will need a pre-op prior to procedure -)      Informed patient they will need an adult  Y  Cannot take any type of public or medical transportation alone    Pre-Procedure Covid test to be completed at [facility] or Externally: Y Bl urgent 4-9    Confirmed Nurse will call to complete assessment Y    Additional comments:

## 2022-03-08 ENCOUNTER — MYC MEDICAL ADVICE (OUTPATIENT)
Dept: ENDOCRINOLOGY | Facility: CLINIC | Age: 47
End: 2022-03-08
Payer: COMMERCIAL

## 2022-03-08 DIAGNOSIS — E10.9 TYPE 1 DIABETES MELLITUS WITHOUT COMPLICATION (H): ICD-10-CM

## 2022-03-08 DIAGNOSIS — E13.9 DIABETES MELLITUS TYPE 1.5, MANAGED AS TYPE 2 (H): ICD-10-CM

## 2022-03-09 RX ORDER — INSULIN LISPRO 100 [IU]/ML
INJECTION, SOLUTION INTRAVENOUS; SUBCUTANEOUS
Qty: 15 ML | Refills: 0 | Status: SHIPPED | OUTPATIENT
Start: 2022-03-09 | End: 2022-05-12

## 2022-03-25 ENCOUNTER — MEDICAL CORRESPONDENCE (OUTPATIENT)
Dept: HEALTH INFORMATION MANAGEMENT | Facility: CLINIC | Age: 47
End: 2022-03-25
Payer: COMMERCIAL

## 2022-04-05 NOTE — PROGRESS NOTES
SUBJECTIVE:                                                   Jenise Smith is a 47 year old female who presents to clinic today for the following health issue(s):  Patient presents with:  Gyn Exam: Pap and pelvic      Additional information: annual with PCP Beba Dailey on 3/1/2022    HPI:Patient here for just a pap and pelvic.  She had the rest of her annual with PCP in March.  She states her cycles are regular last 3-4 days, but can have some old blood discharge for a few days after.  She has never had that before.  Also noticing a vaginal odor more recently, no other symptoms tho.    Patient's last menstrual period was 2022..     Patient is sexually active, .  Using vasectomy for contraception.    reports that she has never smoked. She has never used smokeless tobacco.    STD testing offered?  Declined    Health maintenance updated:  yes    Today's PHQ-2 Score:   PHQ-2 (  [employer]) 2022   Q1: Little interest or pleasure in doing things 0   Q2: Feeling down, depressed or hopeless 0   PHQ-2 Score 0   PHQ-2 Total Score (12-17 Years)- Positive if 3 or more points; Administer PHQ-A if positive -   Q1: Little interest or pleasure in doing things Not at all   Q2: Feeling down, depressed or hopeless Not at all   PHQ-2 Score 0     Today's PHQ-9 Score:   PHQ-9 SCORE 1/10/2020   PHQ-9 Total Score MyChart -   PHQ-9 Total Score 1     Today's NETFALY-7 Score:   NEFTALY-7 SCORE 1/10/2020   Total Score -   Total Score 1       Problem list and histories reviewed & adjusted, as indicated.  Additional history: as documented.    Patient Active Problem List   Diagnosis     Menorrhagia     Endometrial polyp     Hyperlipidemia with target LDL less than 100     Type 1 diabetes mellitus without complication (H)     Other specified hypothyroidism     Axillary hyperhidrosis     Anxiety     Dysmenorrhea     Mastalgia in female     Lump or mass in breast     Past Surgical History:   Procedure Laterality Date     DILATION AND  CURETTAGE, HYSTEROSCOPY, ABLATE ENDOMETRIUM NOVASURE, COMBINED  1/23/2014    Procedure: COMBINED DILATION AND CURETTAGE, HYSTEROSCOPY, ABLATE ENDOMETRIUM NOVASURE;  HYSTEROSCOPY, DILATION, CURETTAGE, WITH NOVASURE ABLATION, MYOSURE MORCELLATOR;  Surgeon: Swetha Queen MD;  Location: [facility]     LAPAROSCOPY       LEEP TX, CERVICAL       OPERATIVE HYSTEROSCOPY WITH MORCELLATOR  1/23/2014    Procedure: OPERATIVE HYSTEROSCOPY WITH MORCELLATOR;;  Surgeon: Swetha Queen MD;  Location: [facility]     wisdom teeth[        Social History     Tobacco Use     Smoking status: Never Smoker     Smokeless tobacco: Never Used   Substance Use Topics     Alcohol use: Yes     Alcohol/week: 0.0 standard drinks     Comment: 3 per week      Problem (# of Occurrences) Relation (Name,Age of Onset)    Breast Cancer (2) Paternal Grandmother (Grandmother), Cousin (Nazanin Leger)    Cancer - colorectal (1) Paternal Uncle (60)    Diabetes (4) Mother (Jamie): type 2, Father (Bart): type 1, Sister: type 1, Sister (Michelle)    Obesity (1) Mother (Jamie)    Prostate Cancer (2) Maternal Grandfather (Grandfather), Paternal Grandfather (Grandpa)    Thyroid Disease (1) Sister            Current Outpatient Medications   Medication Sig     blood glucose (ACCU-CHEK FASTCLIX) lancing device 1 Dose once daily.     blood glucose monitoring (FABRICIO CONTOUR NEXT) test strip 1 Dose 6 times daily. Test 6-7 times daily     blood glucose monitoring (NO BRAND SPECIFIED) test strip Use to test blood sugar 6 times daily or as directed.     eszopiclone (LUNESTA) 2 MG tablet Take 1 tablet (2 mg) by mouth At Bedtime     FLUoxetine (PROZAC) 20 MG capsule Take 1 capsule (20 mg) by mouth daily     Glucagon (BAQSIMI TWO PACK) 3 MG/DOSE POWD Spray 3 mg in nostril once as needed (severe hypoglycemia reaction, dose as directed)     insulin glargine (LANTUS SOLOSTAR) 100 UNIT/ML pen INJECT SUBCUTANEOUSLY 16  UNITS DAILY AS DIRECTED     insulin lispro  "(HUMALOG KWIKPEN) 100 UNIT/ML (1 unit dial) KWIKPEN Inject 3-8 units subcutaneous with meals and correction doses as directed, total daily dose approx 30 units     insulin lispro (HUMALOG VIAL) 100 UNIT/ML vial Use with insulin pump, total daily dose approx 70 units     Insulin Lispro-aabc (LYUMJEV) 100 UNIT/ML SOLN Inject 5-25 Units Subcutaneous continuous , total daily dose approx 70 units     INSULIN PUMP - OUTPATIENT Date Last Updated: 12-3-21  Omnipod  BASAL RATES and times:  12   AM (midnight): 0.65 units/hour    3     AM: 0.60 units/hour   4     AM: 0.65 units/hour   6     AM: 0.70 units/hour   10   AM: 0.45 units/hour   1:30 PM: 0.65 units/hour     CARB RATIO and times:  12   AM (midnight): 12  11   AM: 9  5    PM:  8.5  Corection Factor (Sensitivity) and times:  12   AM (midnight): 60 mg/dL  8     AM: 55 mg/dL  9    PM: 60 mg/dL  BLOOD GLUCOSE TARGET/Correct Above and times:  12   AM (midnight): 100 - 130  6     AM:  90 - 120  9    PM:  100 - 140  Active Insulin Time: 3 hours    Sensor:  Yes: DexCom  Pump Website Username: Connected to clinic glooko  Pump Website Password: Connected to clinic glooko     levothyroxine (SYNTHROID/LEVOTHROID) 50 MCG tablet Take 1 tablet (50 mcg) by mouth daily     Multiple Vitamins-Minerals (MULTI FOR HER PO)      STATIN NOT PRESCRIBED (INTENTIONAL) Please choose reason not prescribed from choices below.     VITAMIN D, CHOLECALCIFEROL, PO Take 1,000 Units by mouth     glucagon (GLUCAGON EMERGENCY) 1 MG kit Inject 1 mg into the muscle once for 1 dose     No current facility-administered medications for this visit.     Allergies   Allergen Reactions     Keflex [Cephalexin]      nausea     Latex      Facial redness       ROS:  12 point review of systems negative other than symptoms noted below or in the HPI.  No urinary frequency or dysuria, bladder or kidney problems, Normal menstrual cycles      OBJECTIVE:     /58   Pulse 64   Ht 1.702 m (5' 7\")   Wt 70.3 kg (155 lb)  "  LMP 03/28/2022   BMI 24.28 kg/m    Body mass index is 24.28 kg/m .    Exam:  Constitutional:  Appearance: Well nourished, well developed alert, in no acute distress  Neurologic:  Mental Status:  Oriented X3.  Normal strength and tone, sensory exam grossly normal, mentation intact and speech normal.    Psychiatric:  Mentation appears normal and affect normal/bright.  Pelvic Exam:  External Genitalia:     Normal appearance for age, no discharge present, no tenderness present, no inflammatory lesions present, color normal  Vagina:     Normal vaginal vault without central or paravaginal defects, small amount of white discharge present, no inflammatory lesions present, no masses present Vaginal culture collected and sent.  Bladder:     Nontender to palpation  Urethra:   Urethral Body:  Urethra palpation normal, urethra structural support normal   Urethral Meatus:  No erythema or lesions present  Cervix:     Appearance healthy, no lesions present, nontender to palpation, no bleeding present  Pap smear done  Uterus:     Uterus: firm, normal sized and nontender, anteverted in position.   Adnexa:     No adnexal tenderness present, no adnexal masses present  Perineum:     Perineum within normal limits, no evidence of trauma, no rashes or skin lesions present  Anus:     Anus within normal limits, no hemorrhoids present  Inguinal Lymph Nodes:     No lymphadenopathy present  Pubic Hair:     Normal pubic hair distribution for age  Genitalia and Groin:     No rashes present, no lesions present, no areas of discoloration, no masses present       In-Clinic Test Results:  No results found for this or any previous visit (from the past 24 hour(s)).    ASSESSMENT/PLAN:                                                        ICD-10-CM    1. Vaginal odor  N89.8 Bacterial Vaginosis Smear   2. Cervical cancer screening  Z12.4 Pap screen with HPV - recommended age 30 - 65 years         Normal Pelvic exam.  Will notify patient with lab  results when available.  Monitor the old blood discharge, if continues or not happening with cycle, return for pelvic US.    NANCY Kemp CNP  [facility] [place]

## 2022-04-06 ENCOUNTER — OFFICE VISIT (OUTPATIENT)
Dept: OBGYN | Facility: CLINIC | Age: 47
End: 2022-04-06
Payer: COMMERCIAL

## 2022-04-06 VITALS
DIASTOLIC BLOOD PRESSURE: 58 MMHG | WEIGHT: 155 LBS | HEART RATE: 64 BPM | HEIGHT: 67 IN | SYSTOLIC BLOOD PRESSURE: 102 MMHG | BODY MASS INDEX: 24.33 KG/M2

## 2022-04-06 DIAGNOSIS — Z12.4 CERVICAL CANCER SCREENING: ICD-10-CM

## 2022-04-06 DIAGNOSIS — N89.8 VAGINAL ODOR: Primary | ICD-10-CM

## 2022-04-06 LAB
NUGENT SCORE: 0
WHITE BLOOD CELLS: NORMAL

## 2022-04-06 PROCEDURE — 99213 OFFICE O/P EST LOW 20 MIN: CPT | Performed by: NURSE PRACTITIONER

## 2022-04-06 PROCEDURE — G0145 SCR C/V CYTO,THINLAYER,RESCR: HCPCS | Performed by: NURSE PRACTITIONER

## 2022-04-06 PROCEDURE — 87624 HPV HI-RISK TYP POOLED RSLT: CPT | Performed by: NURSE PRACTITIONER

## 2022-04-06 PROCEDURE — 87205 SMEAR GRAM STAIN: CPT | Performed by: NURSE PRACTITIONER

## 2022-04-07 NOTE — RESULT ENCOUNTER NOTE
Jenise      Your bacterial vaginal culture results are negative.  If further questions please give me a call.    Shirley Peterson RNC

## 2022-04-11 LAB
BKR LAB AP GYN ADEQUACY: NORMAL
BKR LAB AP GYN INTERPRETATION: NORMAL
BKR LAB AP HPV REFLEX: NORMAL
BKR LAB AP PREVIOUS ABNORMAL: NORMAL
PATH REPORT.COMMENTS IMP SPEC: NORMAL
PATH REPORT.COMMENTS IMP SPEC: NORMAL
PATH REPORT.RELEVANT HX SPEC: NORMAL

## 2022-04-13 LAB
HUMAN PAPILLOMA VIRUS 16 DNA: NEGATIVE
HUMAN PAPILLOMA VIRUS 18 DNA: NEGATIVE
HUMAN PAPILLOMA VIRUS FINAL DIAGNOSIS: NORMAL
HUMAN PAPILLOMA VIRUS OTHER HR: NEGATIVE

## 2022-04-22 ENCOUNTER — MEDICAL CORRESPONDENCE (OUTPATIENT)
Dept: HEALTH INFORMATION MANAGEMENT | Facility: CLINIC | Age: 47
End: 2022-04-22
Payer: COMMERCIAL

## 2022-04-27 ENCOUNTER — HOSPITAL ENCOUNTER (OUTPATIENT)
Dept: MAMMOGRAPHY | Facility: CLINIC | Age: 47
Discharge: HOME | End: 2022-04-27
Attending: NURSE PRACTITIONER | Admitting: NURSE PRACTITIONER
Payer: COMMERCIAL

## 2022-04-27 DIAGNOSIS — Z12.31 BREAST CANCER SCREENING BY MAMMOGRAM: ICD-10-CM

## 2022-04-27 PROCEDURE — 77067 SCR MAMMO BI INCL CAD: CPT

## 2022-05-12 DIAGNOSIS — E10.9 TYPE 1 DIABETES MELLITUS WITHOUT COMPLICATION (H): ICD-10-CM

## 2022-05-12 RX ORDER — INSULIN LISPRO 100 [IU]/ML
INJECTION, SOLUTION INTRAVENOUS; SUBCUTANEOUS
Qty: 15 ML | Refills: 0 | Status: SHIPPED | OUTPATIENT
Start: 2022-05-12 | End: 2023-01-06

## 2022-05-14 ENCOUNTER — MYC MEDICAL ADVICE (OUTPATIENT)
Dept: ENDOCRINOLOGY | Facility: CLINIC | Age: 47
End: 2022-05-14
Payer: COMMERCIAL

## 2022-05-14 DIAGNOSIS — E10.9 TYPE 1 DIABETES MELLITUS WITHOUT COMPLICATION (H): Primary | ICD-10-CM

## 2022-05-20 DIAGNOSIS — E10.9 TYPE 1 DIABETES MELLITUS WITHOUT COMPLICATION (H): ICD-10-CM

## 2022-05-22 ENCOUNTER — MYC MEDICAL ADVICE (OUTPATIENT)
Dept: ENDOCRINOLOGY | Facility: CLINIC | Age: 47
End: 2022-05-22
Payer: COMMERCIAL

## 2022-05-22 DIAGNOSIS — E13.9 DIABETES MELLITUS TYPE 1.5, MANAGED AS TYPE 2 (H): ICD-10-CM

## 2022-05-22 DIAGNOSIS — Z96.41 INSULIN PUMP STATUS: ICD-10-CM

## 2022-05-23 RX ORDER — INSULIN LISPRO-AABC 100 [IU]/ML
INJECTION, SOLUTION INTRAVENOUS; SUBCUTANEOUS
Qty: 70 ML | Refills: 3 | Status: SHIPPED | OUTPATIENT
Start: 2022-05-23 | End: 2022-07-18

## 2022-05-23 NOTE — TELEPHONE ENCOUNTER
Last Written Prescription Date:  12/13/21  Last Fill Quantity: 70ml,  # refills: 3   Last office visit: 2/10/2022 with prescribing provider:  Dr. Gaming    Future Office Visit:

## 2022-05-23 NOTE — TELEPHONE ENCOUNTER
Last Written Prescription Date:  3/17/2022  Last Fill Quantity: 70ML,  # refills: 3   Last office visit: 2/10/2022 with prescribing provider:  Dr. Gaming    Future Office Visit:

## 2022-05-23 NOTE — TELEPHONE ENCOUNTER
See duplicate encounter - message sent back to pt to clarify what dosing she is requesting - pharmacy request different from dosing on med list    Mari RATLIFF RN     Strong peripheral pulses

## 2022-06-03 ENCOUNTER — VIRTUAL VISIT (OUTPATIENT)
Dept: FAMILY MEDICINE | Facility: CLINIC | Age: 47
End: 2022-06-03
Payer: COMMERCIAL

## 2022-06-03 DIAGNOSIS — F41.9 ANXIETY: Primary | ICD-10-CM

## 2022-06-03 PROCEDURE — 99213 OFFICE O/P EST LOW 20 MIN: CPT | Mod: GT | Performed by: FAMILY MEDICINE

## 2022-06-03 RX ORDER — FLUOXETINE 10 MG/1
CAPSULE ORAL
Qty: 15 CAPSULE | Refills: 0 | Status: SHIPPED | OUTPATIENT
Start: 2022-06-03 | End: 2022-08-24

## 2022-06-03 NOTE — PROGRESS NOTES
Jenise is a 47 year old who is being evaluated via a billable video visit.      How would you like to obtain your AVS? MyChart  If the video visit is dropped, the invitation should be resent by: Text to cell phone: [phone]  Will anyone else be joining your video visit? No      Video Start Time: 10:22 AM    -------------------------    Assessment/Plan:    Jenise Smith is a 47 year old female presenting for:    Anxiety  Discussed the wean from Prozac.  Discussed 10 mg daily for 10 days and then 10 mg every other day for 10 days.    We also did discuss a longer wean where she will take 10 mg daily for 30 days and then decrease to every other day.  Patient prefers to do this quickly.  Warned her that it would likely take 4 to 6 weeks for it to get out of her system and to be very cognizant around that time about how she is feeling.  She will let me know if there are any questions or concerns.  - FLUoxetine (PROZAC) 10 MG capsule  Dispense: 15 capsule; Refill: 0        Medications Discontinued During This Encounter   Medication Reason     blood glucose monitoring (NO BRAND SPECIFIED) test strip      glucagon (GLUCAGON EMERGENCY) 1 MG kit      STATIN NOT PRESCRIBED (INTENTIONAL)            Chief Complaint:  Recheck Medication          Subjective:   Jenise Smith is a pleasant 47 year old female being evaluated via video visit today for the following concern/s:     Anxiety: Patient has a past medical history significant for anxiety.  She is currently on Prozac.  She states that she had been on Prozac for several years.  At one point she was able to wean off of it but then restarted.  She is unsure why she restarted.    At this point she is taking her Prozac is making her anxiety bit worse.  She would like to wean off of this again.  Last time when she weaned she took 10 mg daily for several weeks and then every other day for a few weeks.  She is wondering about weaning a bit faster at this time.  She  states that she tolerated this well last time.    She is not having panic attacks.  She does have a history of diabetes and follows with endocrinology and would like to minimize her medications if possible.      12 point review of systems completed and negative except for what has been described above    History   Smoking Status     Never Smoker   Smokeless Tobacco     Never Used         Current Outpatient Medications:      blood glucose (ACCU-CHEK FASTCLIX) lancing device, 1 Dose once daily., Disp: , Rfl:      CONTOUR NEXT TEST test strip, USE 1 STRIP 6 TIMES DAILY, Disp: 600 strip, Rfl: 3     eszopiclone (LUNESTA) 2 MG tablet, Take 1 tablet (2 mg) by mouth At Bedtime, Disp: 30 tablet, Rfl: 5     FLUoxetine (PROZAC) 10 MG capsule, Take 1 capsule (10 mg) by mouth daily for 10 days, THEN 1 capsule (10 mg) every other day for 10 days., Disp: 15 capsule, Rfl: 0     FLUoxetine (PROZAC) 20 MG capsule, Take 1 capsule (20 mg) by mouth daily, Disp: 90 capsule, Rfl: 3     Glucagon (BAQSIMI TWO PACK) 3 MG/DOSE POWD, Spray 3 mg in nostril once as needed (severe hypoglycemia reaction, dose as directed), Disp: 1 each, Rfl: 3     insulin glargine (LANTUS SOLOSTAR) 100 UNIT/ML pen, INJECT SUBCUTANEOUSLY 16  UNITS DAILY AS DIRECTED, Disp: 15 mL, Rfl: 3     insulin lispro (HUMALOG KWIKPEN) 100 UNIT/ML (1 unit dial) KWIKPEN, INJECT 3 TO 8 UNITS  SUBCUTANEOUSLY WITH MEALS  AND CORRECTION DOSES AS  DIRECTED, TOTAL DAILY DOSE  APPROXIMATELY 30 UNITS if the pump fails, Disp: 15 mL, Rfl: 0     insulin lispro (HUMALOG VIAL) 100 UNIT/ML vial, Use with insulin pump, total daily dose approx 70 units, Disp: 70 mL, Rfl: 3     INSULIN PUMP - OUTPATIENT, Date Last Updated: 12-3-21 Omnipod BASAL RATES and times: 12   AM (midnight): 0.65 units/hour   3     AM: 0.60 units/hour  4     AM: 0.65 units/hour  6     AM: 0.70 units/hour  10   AM: 0.45 units/hour  1:30 PM: 0.65 units/hour   CARB RATIO and times: 12   AM (midnight): 12 11   AM: 9 5    PM:   8.5 Corection Factor (Sensitivity) and times: 12   AM (midnight): 60 mg/dL 8     AM: 55 mg/dL 9    PM: 60 mg/dL BLOOD GLUCOSE TARGET/Correct Above and times: 12   AM (midnight): 100 - 130 6     AM:  90 - 120 9    PM:  100 - 140 Active Insulin Time: 3 hours  Sensor:  Yes: DexCom Pump Website Username: Connected to clinic [facility] Pump Website Password: Connected to clinic gl[facility], Disp: , Rfl:      levothyroxine (SYNTHROID/LEVOTHROID) 50 MCG tablet, Take 1 tablet (50 mcg) by mouth daily, Disp: 90 tablet, Rfl: 3     LYUMJEV 100 UNIT/ML SOLN, INJECT 5 TO 25 UNITS UNDER THE SKIN CONTINUOUS AS DIRECTED. TOTAL DAILY DOSE APPROX 70 UNITS., Disp: 70 mL, Rfl: 3     Multiple Vitamins-Minerals (MULTI FOR HER PO), , Disp: , Rfl:      VITAMIN D, CHOLECALCIFEROL, PO, Take 1,000 Units by mouth, Disp: , Rfl:         Objective:  No vitals were done due to the nature of this visit  No flowsheet data found.            General: No acute distress  Psych: Appropriate affect  HEENT: moist mucous membranes  Pulmonary: Breathing comfortably, speaking in complete sentences  Extremities: warm and well perfused with no edema  Skin: warm and dry with no rash         This note has been dictated and transcribed using voice recognition software.   Any errors in transcription are unintentional and inherent to the software.      Video-Visit Details    Type of service:  Video Visit    Video End Time:10:30 AM    Originating Location (pt. Location): Home    Distant Location (provider location):  [facility]     Platform used for Video Visit: Micki

## 2022-06-10 ENCOUNTER — MYC MEDICAL ADVICE (OUTPATIENT)
Dept: ENDOCRINOLOGY | Facility: CLINIC | Age: 47
End: 2022-06-10
Payer: COMMERCIAL

## 2022-06-10 DIAGNOSIS — Z96.41 INSULIN PUMP STATUS: ICD-10-CM

## 2022-06-10 DIAGNOSIS — E13.9 DIABETES MELLITUS TYPE 1.5, MANAGED AS TYPE 2 (H): Primary | ICD-10-CM

## 2022-06-15 RX ORDER — INSULIN PMP CART,AUT,G6/7,CNTR
1 EACH SUBCUTANEOUS CONTINUOUS PRN
Qty: 1 KIT | Refills: 0 | Status: SHIPPED | OUTPATIENT
Start: 2022-06-15

## 2022-06-15 RX ORDER — INSULIN PMP CART,AUT,G6/7,CNTR
1 EACH SUBCUTANEOUS CONTINUOUS PRN
Qty: 2 EACH | Refills: 11 | Status: SHIPPED | OUTPATIENT
Start: 2022-06-15 | End: 2023-01-06

## 2022-06-24 ENCOUNTER — TRANSFERRED RECORDS (OUTPATIENT)
Dept: HEALTH INFORMATION MANAGEMENT | Facility: CLINIC | Age: 47
End: 2022-06-24

## 2022-07-07 ENCOUNTER — MYC MEDICAL ADVICE (OUTPATIENT)
Dept: SURGERY | Facility: CLINIC | Age: 47
End: 2022-07-07

## 2022-07-07 ENCOUNTER — PATIENT OUTREACH (OUTPATIENT)
Dept: ONCOLOGY | Facility: CLINIC | Age: 47
End: 2022-07-07

## 2022-07-07 DIAGNOSIS — Z91.89 AT HIGH RISK FOR BREAST CANCER: Primary | ICD-10-CM

## 2022-07-07 NOTE — Clinical Note
FYI Surgeon:  Froylan Urban MD



Assistant:  Ms. Liudmila Luis. 



I will plan to admit Mr. Card for GI workup.  His last hemoglobin was 7.8.  It was 13 about 2 mon
ths ago.  GI consultation will be obtained.



Admitted to my service on 07/07/2022.  He was brought to the cath lab as an outpatient.  He underwent
 left heart catheterization, selective coronary arteriogram, LIMA injection, ROSALIND injection, common f
emoral artery angiogram.



Indication:  CAD, unstable angina, history of valve replacement, bioprosthetic.  History of atrial fi
brillation, on Coumadin.  Coumadin has been held for 5 days.  He was found to also have new onset ane
kathi.



Procedure In Detail:  In the cath lab, he was prepped and draped in routine sterile fashion.  Given V
ersed and fentanyl for sedation.  6-Welsh sheath introduced in the right common femoral artery succe
ssfully using the Seldinger technique and 10 cc of xylocaine.  Angiography in the common femoral seng
ry revealed a 50% right common iliac artery stenosis.  StarClose was used to close the case.  A Judki
ns catheter, JL4 was used to cannulate the left main.  He was he was noted to have 100% LAD, normal l
eft main, normal circumflex.  The JR4 catheter was used to cannulate the RCA, which was completely oc
cluded with a 99% ostial stenosis.  The JR4 was then used to cannulate the ROSALIND, which was completely
 patent and attached to the RCA.  The LIMA was also completely patent and attached to the LAD.  There
 were no complications.



Blood Loss:  5 mL.



Postoperative Diagnosis:  Moderate coronary artery disease, patent left internal mammary artery and r
ight internal mammary artery.



Plan:  For medical therapy.  Patient will be at bedrest for 2 hours after the StarClose.  Total consc
ious sedation was 60 minutes.





NB/MODL

DD:  07/07/2022 11:25:01Voice ID:  [id number]

DT:  07/07/2022 21:57:26Report ID:  [id number]

## 2022-07-08 NOTE — PROGRESS NOTES
Writer received referral, reviewed for appropriate plan, and sent to New Patient Scheduling for completion.    Zoe Kaba, BSN, RN, PHN, OCN  Hematology/Oncology Nurse Navigator  [facility]  [phone]

## 2022-07-18 ENCOUNTER — VIRTUAL VISIT (OUTPATIENT)
Dept: ENDOCRINOLOGY | Facility: CLINIC | Age: 47
End: 2022-07-18
Payer: COMMERCIAL

## 2022-07-18 DIAGNOSIS — E10.9 TYPE 1 DIABETES MELLITUS WITHOUT COMPLICATION (H): Primary | ICD-10-CM

## 2022-07-18 DIAGNOSIS — E06.3 HYPOTHYROIDISM DUE TO HASHIMOTO'S THYROIDITIS: ICD-10-CM

## 2022-07-18 DIAGNOSIS — Z96.41 INSULIN PUMP STATUS: ICD-10-CM

## 2022-07-18 PROCEDURE — 99214 OFFICE O/P EST MOD 30 MIN: CPT | Mod: GT | Performed by: INTERNAL MEDICINE

## 2022-07-18 PROCEDURE — 95251 CONT GLUC MNTR ANALYSIS I&R: CPT | Performed by: INTERNAL MEDICINE

## 2022-07-18 NOTE — LETTER
7/18/2022         RE: Jenise Smith  [address] [address]        Dear Colleague,    Thank you for referring your patient, Jenise Smith, to the [facility]TY [facility]. Please see a copy of my visit note below.    Patient is being evaluated via a billable video visit.      How would you like to obtain your AVS? Reviewed verbally  If the video visit is dropped, the invitation should be resent by cell phone  Will anyone else be joining your video visit? no      Video Start Time:  8:05 am    Video-Visit Details    Type of service:  Video Visit    Video End Time: 8:30 am    Originating Location (pt. Location): home    Distant Location (provider location):  [facility]/home    Platform used for Video Visit:  [place]          Recent issues:  Diabetes and thyroid follow-up evaluation  Had used OmniPod Dash, changed to Tandem pump... then Omnipod-5 in 6/2022  Feeling well overall, no new health issues reported  Has seen nutritionist (Radha Turcios?) at [facility], [facility]          Diabetes:  Previous diagnosis of IFG with high GAD65 Ab level  6/2011. Developed acute hyperglycemia and diagnosis of T1DM  Treatment with insulin medication, MDI plan  2/2013. Changed to OmniPod pump with Novolog insulin  Used DexcomG5 CGMS system  5/2017. Changed to Medtronic 630G pump  9/2017. Upgraded to Medtronic 670G pump and Guardian3 sensor, Humalog insulin  8/2018. Discontinued Medtronic pump and sensor, changed to MDI plan              Had taken Basaglar 17U at bedtime and mealtime Humalog  Had taken Basaglar 13U at bedtime, Humalog Luxura pen 1:7 at mealtime, ISF 60, goal target  mg/dl  12/2018. Restarted OmniPod pump  ~Early 11/2021. Stopped the Jardiance medication  Using OmniPod pump with Dash PDM, with DexcomG6 CGM    12/24/21. Switched to Tandem TSlim pump, Humalog insulin  6/2022. Switched to Omnipod 5 with DexcomG6              Humalog in pump    Current  Omnipod 5 pump settings:  `      Recent Omnipod 5 pump data:  Information reviewed    Recent DexcomG6 CGM data:        Has reduced hypoglycemia awareness              Previously had service dog (Juliet), trained to recognize hypoglycemia smell/symptoms  Using Contour Next Link BG meter, variable testing, tests 6x/day    Exercises with home Pellaton, also classes at health club- yoga, strength              Uses Activity Mode for exercise  Previous [facility] labs include:  Lab Results   Component Value Date    A1C 6.5 (H) 03/01/2022     03/01/2022    POTASSIUM 4.0 03/01/2022    CHLORIDE 107 03/01/2022    CO2 25 03/01/2022    ANIONGAP 7 03/01/2022     (H) 03/01/2022    BUN 15 03/01/2022    CR 0.83 03/01/2022    GFRESTIMATED 88 03/01/2022    GFRESTBLACK 73 07/07/2021    VIJI 8.7 03/01/2022    CHOL 158 03/01/2022    TRIG 50 03/01/2022    HDL 96 03/01/2022    LDL 52 03/01/2022    NHDL 62 03/01/2022    UCRR 118 03/01/2022    MICROL <5 03/01/2022    UMALCR  03/01/2022      Comment:      Unable to calculate:  Urine creatinine or albumin value below detectable level     Previous Lasik surgery at [facility] 2015  Last eye exam 2/8/22 with Dr. Diaz/[facility], no DR reported  DM Complications: none known        Thyroid:  2011. History of hypothyroidism  Takes levothyroxine medication  Eary 10/2018. Dose incease to levothyroxine 0.05 mg daily per GYN physician    Labs done at [facility]  6/29/22:  TSH 2.69    Recent [facility] labs include:  Lab Results   Component Value Date    TSH 1.59 03/01/2022    T4 0.85 05/17/2021     Current dose:  Levothyroxine 0.05 mg daily          , lives in [place],  [place], daughter Myra and stepchildren Jag (formerly Tisha) + Anoop  Has adopted black lab mix dog named Josue  Works for [employer] as   Now sees Beba Dailey PAC/[facility] clinic for general medicine evaluations.  Also sees Dr. Ashford[facility]       PMH/PSH:  Past Medical History:   Diagnosis  Date     Diabetes mellitus type 1 (H)      Dysmenorrhea      Excessive or frequent menstruation      Hypothyroid      Insulin pump in place      Lumbar back pain      Other and unspecified hyperlipidemia      PONV (postoperative nausea and vomiting)      Past Surgical History:   Procedure Laterality Date     DILATION AND CURETTAGE, HYSTEROSCOPY, ABLATE ENDOMETRIUM NOVASURE, COMBINED  1/23/2014    Procedure: COMBINED DILATION AND CURETTAGE, HYSTEROSCOPY, ABLATE ENDOMETRIUM NOVASURE;  HYSTEROSCOPY, DILATION, CURETTAGE, WITH NOVASURE ABLATION, MYOSURE MORCELLATOR;  Surgeon: Swetha Queen MD;  Location: [facility]     LAPAROSCOPY       LEEP TX, CERVICAL       OPERATIVE HYSTEROSCOPY WITH MORCELLATOR  1/23/2014    Procedure: OPERATIVE HYSTEROSCOPY WITH MORCELLATOR;;  Surgeon: Swetha Queen MD;  Location: [facility]     wisdom teeth[         Family Hx:  Family History   Problem Relation Age of Onset     Diabetes Mother         type 2     Obesity Mother      Diabetes Father         type 1     Diabetes Sister         type 1     Thyroid Disease Sister      Diabetes Sister      Prostate Cancer Maternal Grandfather      Breast Cancer Paternal Grandmother      Prostate Cancer Paternal Grandfather      Cancer - colorectal Paternal Uncle 60     Breast Cancer Cousin          Social Hx:  Social History     Socioeconomic History     Marital status:      Spouse name: Not on file     Number of children: Not on file     Years of education: Not on file     Highest education level: Not on file   Occupational History     Employer: [employer]   Tobacco Use     Smoking status: Never Smoker     Smokeless tobacco: Never Used   Substance and Sexual Activity     Alcohol use: Yes     Alcohol/week: 0.0 standard drinks     Comment: 3 per week     Drug use: No     Sexual activity: Yes     Partners: Male     Birth control/protection: Male Surgical   Other Topics Concern     Parent/sibling w/ CABG, MI or  angioplasty before 65F 55M? No   Social History Narrative     in May 2014    2 step children (20 and 18) and one biol dtr age 15.    Works for [employer]--works from home     Social Determinants of Health     Financial Resource Strain: Not on file   Food Insecurity: Not on file   Transportation Needs: Not on file   Physical Activity: Not on file   Stress: Not on file   Social Connections: Not on file   Intimate Partner Violence: Not on file   Housing Stability: Not on file          MEDICATIONS:  has a current medication list which includes the following prescription(s): eszopiclone, baqsimi two pack, omnipod 5 g6 intro (gen 5), insulin lispro, insulin lispro, insulin pump, levothyroxine, multiple vitamins-minerals, cholecalciferol, blood glucose, contour next test, fluoxetine, fluoxetine, omnipod 5 g6 pod (gen 5), and lantus solostar.       ROS: 10 point ROS neg other than the symptoms noted above in the HPI.     GENERAL: energy good, wt stable; denies fevers, chills, night sweats.   HEENT: no dysphagia, odonophagia, diplopia, neck pain  THYROID:  no apparent hyper or hypothyroid symptoms  CV: no chest pain, pressure, palpitations  LUNGS: no SOB, CLIFFORD, cough, wheezing   ABDOMEN: denies nausea, diarrhea, constipation, abdominal pain  EXTREMITIES: no rashes, ulcers, edema  NEUROLOGY: no headaches, denies changes in vision, tingling, extremitiy numbness   MSK: some back pain; denies muscle weakness  SKIN: no rashes or lesions  : regular menstrual cycles  PSYCH:  stable mood, no significant anxiety or depression  ENDOCRINE: no heat or cold intolerance    Physical Exam (visual exam)  VS:  no vital signs taken for video visit  CONSTITUTIONAL: healthy, alert and NAD, well dressed, answering questions appropriately  ENT: no nose swelling or nasal discharge, mouth redness or gum changes.  EYES: eyes grossly normal to inspection, conjunctivae and sclerae normal, no exophthalmos or proptosis  THYROID:  no  apparent nodules or goiter  LUNGS: no audible wheeze, cough or visible cyanosis, no visible retractions or increased work of breathing  ABDOMEN: abdomen not evaluated  EXTREMITIES: no hand tremors, limited exam  NEUROLOGY: CN grossly intact, mentation intact and speech normal   SKIN:  no apparent skin lesions, rash, or edema with visualized skin appearance  PSYCH: mentation appears normal, affect normal/bright, judgement and insight intact,   normal speech and appearance well groomed      LABS:     All pertinent notes, labs, and images personally reviewed by me.       A/P:  Encounter Diagnoses   Name Primary?     Type 1 diabetes mellitus without complication (H) Yes     Insulin pump status      Hypothyroidism due to Hashimoto's thyroiditis        Comments:  Reviewed health history, diabetes and thyroid issues.  Recent glucose control good overall, and she made ICR changes several days ago  Reviewed and interpreted tests that I previously ordered.   Ordered appropriate tests for the endocrinology disease management.    Management options discussed and implemented after shared medical decision making with the patient.  T1DM and hypothyroidism problems are chronic-stable    Plan:  Discussed general issues with the diabetes diagnosis and management  We discussed the hgbA1c test which reflects previous overall glucose levels or control  Discussed the importance of blood glucose (BG) testing to assess glucose trends  Reviewed recent Omnipod 5  pump report and pump settings  Reviewed recent DexcomG6 CGM glucose trends, in detail     Recommend:  Continue the Omnipod 5 insulin pump and diabetes management    No pump setting changes at this time    Would not resume Jardiance med at this time, but could use this off-label med again in future.  Use the pump Activity (exercise) mode for aerobic exercise  We have discussed idea of silencing one set of alarms to minimize alarm sounds  Continue use of DexcomG6 CGM glucose  sensor  Arrange non-fasting lab tests in 9/2022   Discussed the nearby [facility] office   Lab orders placed  Consider followup diabetes education appt with one of our [facility] CDE's  Monitor for symptom changes  Advise having fasting lipid panel testing and dilated eye examination, at least annually  Continue current levothyroxine daily dose     Keep regular f/u PCP and GYN evaluations  Addressed patient questions today    There are no Patient Instructions on file for this visit.    Future labs ordered today:   Orders Placed This Encounter   Procedures     GLUCOSE MONITOR, 72 HOUR, PHYS INTERP     TSH     Hemoglobin A1c     Basic metabolic panel     Radiology/Consults ordered today: None    Total time spent in with the patient evaluation:  25 min  Additional time spent reviewing pertinent lab tests and chart notes, and documentation:  11 min    Follow-up:  10/2022, Shannon Gaming MD, MS  Endocrinology  [facility]                    Again, thank you for allowing me to participate in the care of your patient.        Sincerely,        Lyndon Gaming MD

## 2022-07-18 NOTE — PROGRESS NOTES
Patient is being evaluated via a billable video visit.      How would you like to obtain your AVS? Reviewed verbally  If the video visit is dropped, the invitation should be resent by cell phone  Will anyone else be joining your video visit? no      Video Start Time:  8:05 am    Video-Visit Details    Type of service:  Video Visit    Video End Time: 8:30 am    Originating Location (pt. Location): home    Distant Location (provider location):  [facility] [place]/home    [facility] Visit[facility]  [facility]          Recent issues:  Diabetes and thyroid follow-up evaluation  Had used OmniPod Dash, changed to Tandem pump... then Omnipod-5 in 6/2022  Feeling well overall, no new health issues reported  Has seen nutritionist (Radha Turcios?) at [facility], [facility]          Diabetes:  Previous diagnosis of IFG with high GAD65 Ab level  6/2011. Developed acute hyperglycemia and diagnosis of T1DM  Treatment with insulin medication, MDI plan  2/2013. Changed to OmniPod pump with Novolog insulin  Used DexcomG5 CGMS system  5/2017. Changed to Medtronic 630G pump  9/2017. Upgraded to Medtronic 670G pump and Guardian3 sensor, Humalog insulin  8/2018. Discontinued Medtronic pump and sensor, changed to MDI plan              Had taken Basaglar 17U at bedtime and mealtime Humalog  Had taken Basaglar 13U at bedtime, Humalog Luxura pen 1:7 at mealtime, ISF 60, goal target  mg/dl  12/2018. Restarted OmniPod pump  ~Early 11/2021. Stopped the Jardiance medication  Using OmniPod pump with Dash PDM, with DexcomG6 CGM    12/24/21. Switched to Tandem TSlim pump, Humalog insulin  6/2022. Switched to Omnipod 5 with DexcomG6              Humalog in pump    Current Omnipod 5 pump settings:  `      Recent Omnipod 5 pump data:  Information reviewed    Recent DexcomG6 CGM data:        Has reduced hypoglycemia awareness              Previously had service alvarado (Juliet), trained to recognize hypoglycemia smell/symptoms  Using  Contour Next Link BG meter, variable testing, tests 6x/day    Exercises with home Pellaton, also classes at health club- yoga, strength              Uses Activity Mode for exercise  Previous [facility] labs include:  Lab Results   Component Value Date    A1C 6.5 (H) 03/01/2022     03/01/2022    POTASSIUM 4.0 03/01/2022    CHLORIDE 107 03/01/2022    CO2 25 03/01/2022    ANIONGAP 7 03/01/2022     (H) 03/01/2022    BUN 15 03/01/2022    CR 0.83 03/01/2022    GFRESTIMATED 88 03/01/2022    GFRESTBLACK 73 07/07/2021    VIJI 8.7 03/01/2022    CHOL 158 03/01/2022    TRIG 50 03/01/2022    HDL 96 03/01/2022    LDL 52 03/01/2022    NHDL 62 03/01/2022    UCRR 118 03/01/2022    MICROL <5 03/01/2022    UMALCR  03/01/2022      Comment:      Unable to calculate:  Urine creatinine or albumin value below detectable level     Previous Lasik surgery at [facility] 2015  Last eye exam 2/8/22 with Dr. Diaz/[facility], no DR reported  DM Complications: none known        Thyroid:  2011. History of hypothyroidism  Takes levothyroxine medication  Eary 10/2018. Dose incease to levothyroxine 0.05 mg daily per GYN physician    Labs done at [facility]  6/29/22:  TSH 2.69    Recent [facility] labs include:  Lab Results   Component Value Date    TSH 1.59 03/01/2022    T4 0.85 05/17/2021     Current dose:  Levothyroxine 0.05 mg daily          , lives in [place],  [place], daughter Myra and stepchildren Jag (formerly Tisha) + Anoop  Has adopted black lab mix dog named Josue  Works for [employer] as   Now sees Beba Dailey PAC/[facility] [place] clinic for general medicine evaluations.  Also sees Dr. Ashford[facility]       PMH/PSH:  Past Medical History:   Diagnosis Date     Diabetes mellitus type 1 (H)      Dysmenorrhea      Excessive or frequent menstruation      Hypothyroid      Insulin pump in place      Lumbar back pain      Other and unspecified hyperlipidemia      PONV (postoperative nausea and vomiting)      Past  Surgical History:   Procedure Laterality Date     DILATION AND CURETTAGE, HYSTEROSCOPY, ABLATE ENDOMETRIUM NOVASURE, COMBINED  1/23/2014    Procedure: COMBINED DILATION AND CURETTAGE, HYSTEROSCOPY, ABLATE ENDOMETRIUM NOVASURE;  HYSTEROSCOPY, DILATION, CURETTAGE, WITH NOVASURE ABLATION, MYOSURE MORCELLATOR;  Surgeon: Swetha Queen MD;  Location: [facility]     LAPAROSCOPY       LEEP TX, CERVICAL       OPERATIVE HYSTEROSCOPY WITH MORCELLATOR  1/23/2014    Procedure: OPERATIVE HYSTEROSCOPY WITH MORCELLATOR;;  Surgeon: Sewtha Queen MD;  Location: [facility]     wisdom teeth[         Family Hx:  Family History   Problem Relation Age of Onset     Diabetes Mother         type 2     Obesity Mother      Diabetes Father         type 1     Diabetes Sister         type 1     Thyroid Disease Sister      Diabetes Sister      Prostate Cancer Maternal Grandfather      Breast Cancer Paternal Grandmother      Prostate Cancer Paternal Grandfather      Cancer - colorectal Paternal Uncle 60     Breast Cancer Cousin          Social Hx:  Social History     Socioeconomic History     Marital status:      Spouse name: Not on file     Number of children: Not on file     Years of education: Not on file     Highest education level: Not on file   Occupational History     Employer: [employer]   Tobacco Use     Smoking status: Never Smoker     Smokeless tobacco: Never Used   Substance and Sexual Activity     Alcohol use: Yes     Alcohol/week: 0.0 standard drinks     Comment: 3 per week     Drug use: No     Sexual activity: Yes     Partners: Male     Birth control/protection: Male Surgical   Other Topics Concern     Parent/sibling w/ CABG, MI or angioplasty before 65F 55M? No   Social History Narrative     in May 2014    2 step children (20 and 18) and one biol dtr age 15.    Works for [employer]--works from home     Social Determinants of Health     Financial Resource Strain: Not on file    Food Insecurity: Not on file   Transportation Needs: Not on file   Physical Activity: Not on file   Stress: Not on file   Social Connections: Not on file   Intimate Partner Violence: Not on file   Housing Stability: Not on file          MEDICATIONS:  has a current medication list which includes the following prescription(s): eszopiclone, baqsimi two pack, omnipod 5 g6 intro (gen 5), insulin lispro, insulin lispro, insulin pump, levothyroxine, multiple vitamins-minerals, cholecalciferol, blood glucose, contour next test, fluoxetine, fluoxetine, omnipod 5 g6 pod (gen 5), and lantus solostar.       ROS: 10 point ROS neg other than the symptoms noted above in the HPI.     GENERAL: energy good, wt stable; denies fevers, chills, night sweats.   HEENT: no dysphagia, odonophagia, diplopia, neck pain  THYROID:  no apparent hyper or hypothyroid symptoms  CV: no chest pain, pressure, palpitations  LUNGS: no SOB, CLIFFORD, cough, wheezing   ABDOMEN: denies nausea, diarrhea, constipation, abdominal pain  EXTREMITIES: no rashes, ulcers, edema  NEUROLOGY: no headaches, denies changes in vision, tingling, extremitiy numbness   MSK: some back pain; denies muscle weakness  SKIN: no rashes or lesions  : regular menstrual cycles  PSYCH:  stable mood, no significant anxiety or depression  ENDOCRINE: no heat or cold intolerance    Physical Exam (visual exam)  VS:  no vital signs taken for video visit  CONSTITUTIONAL: healthy, alert and NAD, well dressed, answering questions appropriately  ENT: no nose swelling or nasal discharge, mouth redness or gum changes.  EYES: eyes grossly normal to inspection, conjunctivae and sclerae normal, no exophthalmos or proptosis  THYROID:  no apparent nodules or goiter  LUNGS: no audible wheeze, cough or visible cyanosis, no visible retractions or increased work of breathing  ABDOMEN: abdomen not evaluated  EXTREMITIES: no hand tremors, limited exam  NEUROLOGY: CN grossly intact, mentation intact and  speech normal   SKIN:  no apparent skin lesions, rash, or edema with visualized skin appearance  PSYCH: mentation appears normal, affect normal/bright, judgement and insight intact,   normal speech and appearance well groomed      LABS:     All pertinent notes, labs, and images personally reviewed by me.       A/P:  Encounter Diagnoses   Name Primary?     Type 1 diabetes mellitus without complication (H) Yes     Insulin pump status      Hypothyroidism due to Hashimoto's thyroiditis        Comments:  Reviewed health history, diabetes and thyroid issues.  Recent glucose control good overall, and she made ICR changes several days ago  Reviewed and interpreted tests that I previously ordered.   Ordered appropriate tests for the endocrinology disease management.    Management options discussed and implemented after shared medical decision making with the patient.  T1DM and hypothyroidism problems are chronic-stable    Plan:  Discussed general issues with the diabetes diagnosis and management  We discussed the hgbA1c test which reflects previous overall glucose levels or control  Discussed the importance of blood glucose (BG) testing to assess glucose trends  Reviewed recent Omnipod 5  pump report and pump settings  Reviewed recent DexcomG6 CGM glucose trends, in detail     Recommend:  Continue the Omnipod 5 insulin pump and diabetes management    No pump setting changes at this time    Would not resume Jardiance med at this time, but could use this off-label med again in future.  Use the pump Activity (exercise) mode for aerobic exercise  We have discussed idea of silencing one set of alarms to minimize alarm sounds  Continue use of DexcomG6 CGM glucose sensor  Arrange non-fasting lab tests in 9/2022   Discussed the nearby [facility] office   Lab orders placed  Consider followup diabetes education appt with one of our [facility] CDE's  Monitor for symptom changes  Advise having fasting lipid panel testing and dilated eye  examination, at least annually  Continue current levothyroxine daily dose     Keep regular f/u PCP and GYN evaluations  Addressed patient questions today    There are no Patient Instructions on file for this visit.    Future labs ordered today:   Orders Placed This Encounter   Procedures     GLUCOSE MONITOR, 72 HOUR, PHYS INTERP     TSH     Hemoglobin A1c     Basic metabolic panel     Radiology/Consults ordered today: None    Total time spent in with the patient evaluation:  25 min  Additional time spent reviewing pertinent lab tests and chart notes, and documentation:  11 min    Follow-up:  10/2022, Shannon Gaming MD, MS  Endocrinology  [facility]

## 2022-07-20 ENCOUNTER — ANCILLARY PROCEDURE (OUTPATIENT)
Dept: MRI IMAGING | Facility: CLINIC | Age: 47
End: 2022-07-20
Attending: SURGERY
Payer: COMMERCIAL

## 2022-07-20 DIAGNOSIS — Z91.89 AT HIGH RISK FOR BREAST CANCER: ICD-10-CM

## 2022-07-20 PROCEDURE — 255N000002 HC RX 255 OP 636: Performed by: SURGERY

## 2022-07-20 PROCEDURE — A9585 GADOBUTROL INJECTION: HCPCS | Performed by: SURGERY

## 2022-07-20 PROCEDURE — 77049 MRI BREAST C-+ W/CAD BI: CPT

## 2022-07-20 RX ORDER — GADOBUTROL 604.72 MG/ML
7.5 INJECTION INTRAVENOUS ONCE
Status: COMPLETED | OUTPATIENT
Start: 2022-07-20 | End: 2022-07-20

## 2022-07-20 RX ADMIN — GADOBUTROL 7.5 ML: 604.72 INJECTION INTRAVENOUS at 12:45

## 2022-07-21 ENCOUNTER — TELEPHONE (OUTPATIENT)
Dept: SURGERY | Facility: CLINIC | Age: 47
End: 2022-07-21

## 2022-07-21 DIAGNOSIS — Z91.89 AT HIGH RISK FOR BREAST CANCER: Primary | ICD-10-CM

## 2022-07-21 NOTE — TELEPHONE ENCOUNTER
Breast MRI:    I phoned Ms. Jenise Smith, confirmed her full name, date of birth and notified her of Bilateral Breast MRI (7/20/22) results below.    Informed patient that Dr. Mccauley would like her to have a follow up US and possible biopsy of this area. Orders in and pt will call to schedule.     Pt verbalized understanding and agreeable to plan.     Sarina Rothman RN BSN  Breast Care Nurse Coordinator  [facility]  [phone]    IMPRESSION:   Clumped nonmass enhancement in the upper inner left breast. While this may represent normal background parenchymal enhancement, it should be further characterized with ultrasound. If a suspicious ultrasound detected abnormality is found, biopsy could be   performed.  Right Breast: BI-RADS CATEGORY: 1 -  NEGATIVE.   Left Breast: BI-RADS CATEGORY: 0 - Need Additional Imaging Evaluation and/or Prior Mammograms for Comparison.      RECOMMENDATION: Ultrasound. If an Ultrasound correlate of the MRI finding is noted, biopsy will be performed

## 2022-07-28 ENCOUNTER — HOSPITAL ENCOUNTER (OUTPATIENT)
Dept: MAMMOGRAPHY | Facility: CLINIC | Age: 47
Discharge: HOME | End: 2022-07-28
Attending: SURGERY
Payer: COMMERCIAL

## 2022-07-28 DIAGNOSIS — Z91.89 AT HIGH RISK FOR BREAST CANCER: ICD-10-CM

## 2022-07-28 PROCEDURE — 999N000065 MA POST PROCEDURE LEFT

## 2022-07-28 PROCEDURE — 250N000009 HC RX 250: Performed by: SURGERY

## 2022-07-28 PROCEDURE — 19083 BX BREAST 1ST LESION US IMAG: CPT | Mod: LT

## 2022-07-28 PROCEDURE — 88305 TISSUE EXAM BY PATHOLOGIST: CPT | Mod: TC | Performed by: SURGERY

## 2022-07-28 PROCEDURE — 76642 ULTRASOUND BREAST LIMITED: CPT | Mod: LT

## 2022-07-28 RX ADMIN — LIDOCAINE HYDROCHLORIDE 5 ML: 10 INJECTION, SOLUTION INFILTRATION; PERINEURAL at 08:13

## 2022-07-28 NOTE — DISCHARGE INSTRUCTIONS

## 2022-07-29 ENCOUNTER — TELEPHONE (OUTPATIENT)
Dept: MAMMOGRAPHY | Facility: CLINIC | Age: 47
End: 2022-07-29

## 2022-07-29 LAB
PATH REPORT.COMMENTS IMP SPEC: NORMAL
PATH REPORT.FINAL DX SPEC: NORMAL
PATH REPORT.GROSS SPEC: NORMAL
PATH REPORT.MICROSCOPIC SPEC OTHER STN: NORMAL
PATH REPORT.RELEVANT HX SPEC: NORMAL
PHOTO IMAGE: NORMAL

## 2022-07-29 PROCEDURE — 88305 TISSUE EXAM BY PATHOLOGIST: CPT | Mod: 26 | Performed by: PATHOLOGY

## 2022-07-29 NOTE — TELEPHONE ENCOUNTER
Attempted to contact Jenise regarding pathology results from Left breast biopsy performed on 7/28/2022 revealing:    [facility]  Jenise Smith [id number]  F, 1975  Surgical Pathology Report (Final result) [id number]  Authorizing Provider: Peri Mccauley MD Ordering Provider: Peri Mccauley MD  Ordering Location: [facility]  Collected: 07/28/2022 08:10 AM  Pathologist: Lenard Albright MD Received: 07/28/2022 11:18 AM  .  Specimens  A Breast, Left  .  .  Final Diagnosis  Left breast, 11:00, 12 cm from nipple, ultrasound-guided needle biopsy:  --Benign breast parenchyma, see comment.  Electronically signed by Lenard Albright MD on 7/29/2022 at 10:19 AM       Per radiologist, Dr. Cornel Huffman, results are concordant with imaging findings.     Recommendation: Annual High Risk Screening     Results and Recommendations released into My Chart.  Left message for Jenise to call [phone] with any questions or concerns.      Shannon Barton RN BSN  Procedure Nurse  [facility]  [phone]

## 2022-08-24 ENCOUNTER — VIRTUAL VISIT (OUTPATIENT)
Dept: FAMILY MEDICINE | Facility: CLINIC | Age: 47
End: 2022-08-24
Payer: COMMERCIAL

## 2022-08-24 DIAGNOSIS — B35.1 ONYCHOMYCOSIS: ICD-10-CM

## 2022-08-24 DIAGNOSIS — E10.9 TYPE 1 DIABETES MELLITUS WITHOUT COMPLICATION (H): Primary | ICD-10-CM

## 2022-08-24 DIAGNOSIS — E03.8 OTHER SPECIFIED HYPOTHYROIDISM: ICD-10-CM

## 2022-08-24 DIAGNOSIS — E78.5 HYPERLIPIDEMIA WITH TARGET LDL LESS THAN 100: ICD-10-CM

## 2022-08-24 DIAGNOSIS — N63.0 LUMP OR MASS IN BREAST: ICD-10-CM

## 2022-08-24 PROCEDURE — 99213 OFFICE O/P EST LOW 20 MIN: CPT | Mod: GT | Performed by: INTERNAL MEDICINE

## 2022-08-24 RX ORDER — CICLOPIROX 80 MG/ML
SOLUTION TOPICAL
Qty: 6 ML | Refills: 3 | Status: SHIPPED | OUTPATIENT
Start: 2022-08-24 | End: 2022-10-05

## 2022-08-24 NOTE — PROGRESS NOTES
Jenise is a 47 year old who is being evaluated via a billable video visit.      How would you like to obtain your AVS? MyChart  If the video visit is dropped, the invitation should be resent by: Text to cell phone: [phone]  Will anyone else be joining your video visit? No          Assessment & Plan     Type 1 diabetes mellitus without complication (H)  Stable.     Other specified hypothyroidism  Stable. Continue medication    Lump or mass in breast  Benign.    Hyperlipidemia with target LDL less than 100  Stable.     Onychomycosis  Discussed with patient and she agrees with topical treatment at this time.   She has a follow up appointment in October will review at that time if no improvement will consider oral treatment.   - ciclopirox (PENLAC) 8 % external solution; Apply to adjacent skin and affected nails daily.  Remove with alcohol every 7 days, then repeat.     See Patient Instructions    No follow-ups on file.    PIEDAD MARTINEZ MD  [facility]    Kylie Burden is a 47 year old, presenting for the following health issues:  No chief complaint on file.    Jenise is a very pleasant 47 year old lady who had a video visit today.     She has type 1 DM - on continuous monitor and insulin pump.  She recently was found to have a lymp in breast which was biopsied and reported benign.     She has fungal toenail disease in both feet.       History of Present Illness       Reason for visit:  Toenail white spots  Symptom onset:  More than a month  Symptoms include:  Toenail white spots  Symptom intensity:  Moderate  Symptom progression:  Staying the same  Had these symptoms before:  No    She eats 2-3 servings of fruits and vegetables daily.She consumes 0 sweetened beverage(s) daily.She exercises with enough effort to increase her heart rate 30 to 60 minutes per day.  She exercises with enough effort to increase her heart rate 4 days per week.   She is taking medications regularly.      Review of Systems       Objective       Vitals:  No vitals were obtained today due to virtual visit.    Physical Exam   GEN: No acute distress  RESP: No audible increased work of breathing. Patient speaking in full sentences without distress.  PSYCH: pleasant  Exam otherwise limited due to virtual platform        Video-Visit Details    Video Start Time: 2:40 pm    Type of service:  Video Visit    Video End Time:2:55 pm    Originating Location (pt. Location): Home    Distant Location (provider location):  [facility]     Platform used for Video Visit: [email or web address]  Tania.

## 2022-08-26 ENCOUNTER — TELEPHONE (OUTPATIENT)
Dept: ENDOCRINOLOGY | Facility: CLINIC | Age: 47
End: 2022-08-26

## 2022-08-26 NOTE — TELEPHONE ENCOUNTER
Prior Authorization Retail Medication Request    Medication/Dose: Omnipod 5 G6 mis pods  ICD code (if different than what is on RX):  E13.9, Z96.41    Insurance Name:  [employer]  Insurance ID: [id number]        Pharmacy Information (if different than what is on RX)  [facility]  Phone:  [phone]

## 2022-08-26 NOTE — TELEPHONE ENCOUNTER
Central Prior Authorization Team - Phone: [phone]     PA Initiation    Medication: Omnipod 5 G6 mis pods- PA INITIATED  Insurance Company:    Pharmacy Filling the Rx: [facility]  ([facility]) - [place], [place] - [address]  Filling Pharmacy Phone: [phone]  Filling Pharmacy Fax:    Start Date: 8/26/2022

## 2022-08-26 NOTE — TELEPHONE ENCOUNTER
Central Prior Authorization Team - Phone: [phone]     Prior Authorization Approval    Authorization Effective Date: 8/26/2022  Authorization Expiration Date: 8/25/2023  Medication: Omnipod 5 G6 mis pods- [place] APPROVED  Approved Dose/Quantity: 30  Reference #:     Insurance Company:    Expected CoPay:       CoPay Card Available:      Foundation Assistance Needed:    Which Pharmacy is filling the prescription (Not needed for infusion/clinic administered): [facility]  ([facility]) - [place], [place] - [address] [place] [address]  Pharmacy Notified: Yes  Patient Notified: YesComment:  pharmacy is filling and will notify when ready for mailout/shipment

## 2022-08-30 ENCOUNTER — MYC MEDICAL ADVICE (OUTPATIENT)
Dept: FAMILY MEDICINE | Facility: CLINIC | Age: 47
End: 2022-08-30

## 2022-08-30 DIAGNOSIS — F41.9 ANXIETY: ICD-10-CM

## 2022-08-31 NOTE — TELEPHONE ENCOUNTER
Please see my chart message and advice.     Routing refill request to provider for review/approval because:  Medication is reported/historical

## 2022-09-12 ENCOUNTER — E-VISIT (OUTPATIENT)
Dept: URGENT CARE | Facility: URGENT CARE | Age: 47
End: 2022-09-12
Payer: COMMERCIAL

## 2022-09-12 DIAGNOSIS — N39.0 ACUTE UTI (URINARY TRACT INFECTION): Primary | ICD-10-CM

## 2022-09-12 PROCEDURE — 99421 OL DIG E/M SVC 5-10 MIN: CPT | Performed by: PHYSICIAN ASSISTANT

## 2022-09-12 RX ORDER — NITROFURANTOIN 25; 75 MG/1; MG/1
100 CAPSULE ORAL 2 TIMES DAILY
Qty: 10 CAPSULE | Refills: 0 | Status: SHIPPED | OUTPATIENT
Start: 2022-09-12 | End: 2022-09-17

## 2022-09-12 NOTE — PATIENT INSTRUCTIONS
Dear Jenise Smith    After reviewing your responses, I've been able to diagnose you with a urinary tract infection, which is a common infection of the bladder with bacteria.  This is not a sexually transmitted infection, though urinating immediately after intercourse can help prevent infections.  Drinking lots of fluids is also helpful to clear your current infection and prevent the next one.      I have sent a prescription for antibiotics to your pharmacy to treat this infection.    It is important that you take all of your prescribed medication even if your symptoms are improving after a few doses.  Taking all of your medicine helps prevent the symptoms from returning.     If your symptoms worsen, you develop pain in your back or stomach, develop fevers, or are not improving in 5 days, please contact your primary care provider for an appointment or visit any of our convenient Walk-in or Urgent Care Centers to be seen, which can be found on our website here.    Thanks again for choosing us as your health care partner,    Tera Booth, [facility], PA-C    Urinary Tract Infections in Women  Urinary tract infections (UTIs) are most often caused by bacteria. These bacteria enter the urinary tract. The bacteria may come from inside the body. Or they may travel from the skin outside the rectum or vagina into the urethra. Female anatomy makes it easy for bacteria from the bowel to enter a woman s urinary tract, which is the most common source of UTI. This means women develop UTIs more often than men. Pain in or around the urinary tract is a common UTI symptom. But the only way to know for sure if you have a UTI for the healthcare provider to test your urine. The two tests that may be done are the urinalysis and urine culture.     Types of UTIs    Cystitis. A bladder infection (cystitis) is the most common UTI in women. You may have urgent or frequent need to pee. You may also have pain, burning when you pee, and  bloody urine.    Urethritis. This is an inflamed urethra, which is the tube that carries urine from the bladder to outside the body. You may have lower stomach or back pain. You may also have urgent or frequent need to pee.    Pyelonephritis. This is a kidney infection. If not treated, it can be serious and damage your kidneys. In severe cases, you may need to stay in the hospital. You may have a fever and lower back pain.    Medicines to treat a UTI  Most UTIs are treated with antibiotics. These kill the bacteria. The length of time you need to take them depends on the type of infection. It may be as short as 3 days. If you have repeated UTIs, you may need a low-dose antibiotic for several months. Take antibiotics exactly as directed. Don t stop taking them until all of the medicine is gone. If you stop taking the antibiotic too soon, the infection may not go away. You may also develop a resistance to the antibiotic. This can make it much harder to treat.   Lifestyle changes to treat and prevent UTIs   The lifestyle changes below will help get rid of your UTI. They may also help prevent future UTIs.     Drink plenty of fluids. This includes water, juice, or other caffeine-free drinks. Fluids help flush bacteria out of your body.    Empty your bladder. Always empty your bladder when you feel the urge to pee. And always pee before going to sleep. Urine that stays in your bladder can lead to infection. Try to pee before and after sex as well.    Practice good personal hygiene. Wipe yourself from front to back after using the toilet. This helps keep bacteria from getting into the urethra.    Use condoms during sex. These help prevent UTIs caused by sexually transmitted bacteria. Also don't use spermicides during sex. These can increase the risk for UTIs. Choose other forms of birth control instead. For women who tend to get UTIs after sex, a low-dose of a preventive antibiotic may be used. Be sure to discuss this  option with your healthcare provider.    Follow up with your healthcare provider as directed. He or she may test to make sure the infection has cleared. If needed, more treatment may be started.  Donald last reviewed this educational content on 7/1/2019 2000-2021 [employer]. All rights reserved. This information is not intended as a substitute for professional medical care. Always follow your healthcare professional's instructions.

## 2022-09-22 ENCOUNTER — VIRTUAL VISIT (OUTPATIENT)
Dept: ONCOLOGY | Facility: CLINIC | Age: 47
End: 2022-09-22
Attending: SURGERY
Payer: COMMERCIAL

## 2022-09-22 DIAGNOSIS — Z80.0 FAMILY HISTORY OF MALIGNANT NEOPLASM OF COLON: ICD-10-CM

## 2022-09-22 DIAGNOSIS — Z80.42 FAMILY HISTORY OF MALIGNANT NEOPLASM OF PROSTATE: ICD-10-CM

## 2022-09-22 DIAGNOSIS — Z80.3 FAMILY HISTORY OF MALIGNANT NEOPLASM OF BREAST: Primary | ICD-10-CM

## 2022-09-22 PROCEDURE — 96040 HC GENETIC COUNSELING, EACH 30 MINUTES: CPT | Mod: GT,95 | Performed by: GENETIC COUNSELOR, MS

## 2022-09-22 NOTE — PROGRESS NOTES
2022    Referring Provider: Peri Mccauley MD    Presenting Information:   I met with Jenise for her video genetic counseling visit, through the Cancer Risk Management Program, to discuss her family history of breast, colon, small bowel, and prostate cancer. Today we reviewed this history, cancer screening recommendations, and available genetic testing options.    Personal History:  Jenise is a 47 year old year old female. She does not have any personal history of cancer. She had her first menstrual period at age 13, her first child at age 30, and is premenopausal. Jenise has her ovaries, fallopian tubes and uterus in place, and she has had no ovarian cancer screening to date. She reports that she has not used hormone replacement therapy.      She has annual clinical breast exams and mammograms; her most recent mammogram in 2022 was normal. A breast MRI was later performed in 2022 which found clustered fibroglandular tissue that was identified to be benign parenchyma (breast tissue). Jenise began having colonoscopies at the age of 47. Her most recent colonoscopy in 2022 found two 5-10 mm polyps, the larger of which was identified as a sessile serrated adenoma and the other was a hyperplastic polyp. Follow-up was recommended in 3 years. She does not regularly do any other cancer screening at this time.     Family History: (Please see scanned pedigree for detailed family history information)    A paternal uncle was diagnosed with and  from colon cancer in his 60's.    A paternal cousin was diagnosed with breast cancer in her late 30's. She is reported to have had negative genetic testing. A copy of her test report was not available for review.    Jenise's paternal grandmother was diagnosed with breast cancer at age 70 and  in her 80's.    Jenise's paternal grandfather  from brain cancer in his 80's. He also has a history of prostate cancer that was diagnosed at an unknown  age.    A maternal uncle was diagnosed with small bowel cancer in his 60's.    A maternal uncle was diagnosed with tonsil cancer in his 60's.    Jenise's maternal grandfather was diagnosed with prostate cancer in his 80's and  at age 87.    Her maternal ethnicity is [place]. Her paternal ethnicity is . There is no known Ashkenazi Taoism ancestry on either side of her family. There is no reported consanguinity.    Discussion:    Jenise's family history of breast, colon, small bowel, and prostate cancer is suggestive of a hereditary cancer syndrome.    We reviewed the features of sporadic, familial, and hereditary cancers. We discussed that mutations in either BRCA1 or BRCA2 could be possible hereditary explanations for her family history of cancer. Mutations in the BRCA1 or BRCA2 gene are known to cause Hereditary Breast and Ovarian Cancer Syndrome (HBOC). HBOC typically presents with multiple family members diagnosed with breast cancer before age 50 and/or ovarian cancer. Other cancer risks associated with HBOC include male breast cancer, prostate cancer, pancreatic cancer, and melanoma.     We discussed the natural history and genetics of hereditary cancer. A detailed handout regarding hereditary cancer, along with the other information we discussed, will be mailed to Jenise at the end of our appointment today and can be found in the after visit summary. Topics included: inheritance pattern, cancer risks, cancer screening recommendations, and also risks, benefits and limitations of testing.    Based on her personal and family history, Jenise does not meets current National Comprehensive Cancer Network (NCCN) criteria for genetic testing.    We discussed that there are additional genes that could cause increased risk for breast, colon, small bowel, and/or prostate cancer. As many of these genes present with overlapping features in a family and accurate cancer risk cannot always be established based  upon the pedigree analysis alone, it would be reasonable for Jenise to consider panel genetic testing to analyze multiple genes at once.    Genetic testing is available for BRCA1/2 as part of the patient's core panel. This will then be automatically reflexed to [facility]'s Common Hereditary Cancers panel.   Genetic testing is available for 47 genes associated with hereditary cancer: Common Hereditary Cancers panel (APC, NICK, AXIN2, BARD1, BMPR1A, BRCA1, BRCA2, BRIP1, CDH1, CDK4, CDKN2A, CHEK2, CTNNA1, DICER1, EPCAM, GREM1, HOXB13, KIT, MEN1, MLH1, MSH2, MSH3, MSH6, MUTYH, NBN, NF1, NTHL1, PALB2, PDGFRA, PMS2, POLD1, POLE, PTEN, RAD50, RAD51C, RAD51D, SDHA, SDHB, SDHC, SDHD, SMAD4, SMARCA4, STK11, TP53, TSC1, TSC2, and VHL).  We discussed that many of the genes in the Common Hereditary Cancers panel are associated with specific hereditary cancer syndromes and published management guidelines: Hereditary Breast and Ovarian Cancer syndrome (BRCA1, BRCA2), Lu syndrome (MLH1, MSH2, MSH6, PMS2, EPCAM), Familial Adenomatous Polyposis (APC), Hereditary Diffuse Gastric Cancer (CDH1), Familial Atypical Multiple Mole Melanoma syndrome (CDK4, CDKN2A), Juvenile Polyposis syndrome (BMPR1A, SMAD4), Cowden syndrome (PTEN), Li Fraumeni syndrome (TP53), Peutz-Jeghers syndrome (STK11), MUTYH Associated Polyposis (MUTYH), Tuberous Sclerosis complex (TSC1, TSC2), Neurofibromatosis type 1 (NF1), Multiple Endocrine Neoplasia type 1 (MEN1), Hereditary Paraganglioma and Pheochromocytoma (SDHA, SDHB, SDHC, SDHD), and von Hippel-Lindau (VHL).   The NICK, AXIN2, BRIP1, CHEK2, GREM1, MSH3, NBN, NTHL1, PALB2, POLD1, POLE, RAD51C, and RAD51D genes are associated with increased cancer risk and have published management guidelines for certain cancers.    The remaining genes (BARD1, CTNNA1, DICER1, HOXB13, KIT, PDGFRA, RAD50, and SMARCA4) are associated with increased cancer risk and may allow us to make medical recommendations when mutations are  identified.      Jenise would like to submit a blood sample for her genetic testing. She will go to her nearest [facility] at her earliest convenience to get her blood drawn for her genetic testing.     Verbal consent was given over video and written on the consent form. Turnaround time is approximately 4 weeks once the lab receives the sample.    Medical Management: For Jenise, we reviewed that the information from genetic testing may determine:    additional cancer screening for which Jenise may qualify (i.e. mammogram and breast MRI, more frequent colonoscopies, more frequent dermatologic exams, etc.),    options for risk reducing surgeries Jenise could consider (i.e. bilateral mastectomy, surgery to remove her ovaries and/or uterus, etc.),      and targeted chemotherapies if she were to develop certain cancers in the future (i.e. immunotherapy for individuals with Lu syndrome, PARP inhibitors, etc.).     These recommendations and possible targeted chemotherapies will be discussed in detail once genetic testing is completed.     Plan:  1) Today Jenise elected to proceed with BRCA1/2 with automatic reflex to [facility] Common Hereditary Cancers panel.  2) A copy of the consent form and the after visit summary will be mailed to Jenise.  3) This information should be available in approximately 4 weeks, once the lab receives the sample.  4) I will call Jenise with the results once they become available.    Time spent on video: 35 minutes    Richard Fernandez MS, [facility]  Licensed, Certified Genetic Counselor      Jenise is a 47 year old who is being evaluated via a billable video visit.      How would you like to obtain your AVS? MyChart  If the video visit is dropped, the invitation should be resent by: Text to cell phone: [phone]  Will anyone else be joining your video visit? No         Sis GOMEZ

## 2022-09-22 NOTE — PATIENT INSTRUCTIONS
Assessing Cancer Risk  Only about 5-10% of cancers are thought to be due to an inherited cancer susceptibility gene.    These families often have:  Several people with the same or related types of cancer  Cancers diagnosed at a young age (before age 50)  Individuals with more than one primary cancer  Multiple generations of the family affected with cancer    Some people may be candidates for genetic testing of more than one gene.  For these families, genetic testing using a cancer panel may be offered.  These panels will test different genes known to increase the risk for breast, ovarian, uterine, and/or other cancers. All of the genes discussed below have published clinical management guidelines for individuals who are found to carry a mutation. The purpose of this handout is to serve as a brief summary of the genes analyzed by the panels used to inquire about hereditary breast and gynecologic cancer:  APC, NICK, BMPR1A, BRCA1, BRCA2, BRIP1, CDH1, CHEK2, EPCAM, GREM1, MLH1, MSH2, MSH6, MUTYH, PMS2, POLD1, POLE, PTEN, PALB2, RAD51C, RAD51D, SMAD4, STK11, and TP53.   ______________________________________________________________________________  Hereditary Breast and Ovarian Cancer Syndrome   (BRCA1 and BRCA2)  A single mutation in one of the copies of BRCA1 or BRCA2 increases the risk for breast and ovarian cancer, among others.  The risk for pancreatic cancer and melanoma may also be slightly increased in some families.  The chart below shows the chance that someone with a BRCA mutation would develop cancer in his or her lifetime1,2,3,4.        A person s ethnic background is also important to consider, as individuals of Ashkenazi Mandaeism ancestry have a higher chance of having a BRCA gene mutation.  There are three BRCA mutations that occur more frequently in this population.    Lu Syndrome   (MLH1, MSH2, MSH6, PMS2, and EPCAM)  Currently five genes are known to cause Lu Syndrome: MLH1, MSH2, MSH6, PMS2,  and EPCAM.  A single mutation in one of the Lu Syndrome genes increases the risk for colon, endometrial, ovarian, and stomach cancers.  Other cancers that occur less commonly in Lu Syndrome include urinary tract, skin, and brain cancers.  The chart below shows the chance that a person with Lu syndrome would develop cancer in his or her lifetime5.      *Cancer risk varies depending on Lu syndrome gene found    Cowden Syndrome   (PTEN)  Cowden syndrome is a hereditary condition that increases the risk for breast, thyroid, endometrial, colon, and kidney cancer.  Cowden syndrome is caused by a mutation in the PTEN gene.  A single mutation in one of the copies of PTEN causes Cowden syndrome and increases cancer risk.  The chart below shows the chance that someone with a PTEN mutation would develop cancer in their lifetime6,7.  Other benign features seen in some individuals with Cowden syndrome include benign skin lesions (facial papules, keratoses, lipomas), learning disability, autism, thyroid nodules, colon polyps, and larger head size.      *One recent study found breast cancer risk to be increased to 85%    Li-Fraumeni Syndrome   (TP53)  Li-Fraumeni Syndrome (LFS) is a cancer predisposition syndrome caused by a mutation in the TP53 gene. A single mutation in one of the copies of TP53 increases the risk for multiple cancers. Individuals with LFS are at an increased risk for developing cancer at a young age. The lifetime risk for development of a LFS-associated cancer is 50% by age 30 and 90% by age 60.   Core Cancers: Sarcomas, Breast, Brain, Lung, Leukemias/Lymphomas, Adrenocortical carcinomas  Other Cancers: Gastrointestinal, Thyroid, Skin, Genitourinary    Hereditary Diffuse Gastric Cancer   (CDH1)  Currently, one gene is known to cause hereditary diffuse gastric cancer (HDGC): CDH1.  Individuals with HDGC are at increased risk for diffuse gastric cancer and lobular breast cancer. Of people diagnosed  with HDGC, 30-50% have a mutation in the CDH1 gene.  This suggests there are likely other genes that may cause HDGC that have not been identified yet.      Lifetime Cancer Risks    General Population HDGC    Diffuse Gastric  <1% ~80%   Breast 12% 39-52%       Familial Adenomatous Polyposis (FAP)  FAP is a hereditary cancer syndrome caused by mutations in the APC gene. The condition is known to cause hundreds to thousands of adenomatous polyps in the colon, creating a  carpet  of polyps. Some individuals have what is called attenuated FAP (AFAP), a milder form of FAP with fewer polyps and typically later onset. Individuals with an APC gene mutation are at an increased risk for colon, thyroid, and duodenal cancers, as well as several other types of cancer1.  Other features of this condition may include: osteomas, dental anomalies, benign skin lesions, CHRPE ( freckle  on the inside of the eye), and desmoid tumors.      Lifetime Cancer Risks     Cancer Type General Population FAP   Colon  5% near 100%   Thyroid (papillary) 1% 1-12%   Duodenal <1% 5%   Liver  <1% 1-2% before age 5   Pancreas <1% 1%   Stomach <1% 1%       Juvenile Polyposis Syndrome (JPS)  JPS is characterized by hamartomatous polyps, called juvenile polyps, in the gastrointestinal tract.  Juvenile  refers to the type of polyps seen in this hereditary cancer condition, not the age of onset. Currently, mutations in two genes are known to cause JPS: BMPR1A and SMAD4. Of individuals clinically diagnosed with JPS, only 40% have an identifiable mutation in one of these genes, suggesting there are other genes that cause JPS that have not been discovered yet. Individuals with JPS are at an increased risk for colon cancer and stomach cancer 2,3,4. Pancreatic and small bowel cancers have also been reported in JPS, but the actual risks are unknown.         Lifetime Cancer Risks    Cancer Type General Population JPS   Colon 5% 40-50%   Gastric/Duodenal <1% 10-21%      Some individuals with SMAD4 mutations have a condition called JPS/HHT (Juvenile Polyposis/Hereditary Hemorrhagic Telangiectasia) where in addition to JPS, individuals may have nose bleeds and clotting issues.       MUTYH-Associated Polyposis (MAP)  MAP is a hereditary cancer syndrome caused by mutations in the MUTYH gene. Unlike the other hereditary cancer genes discussed in this handout, two mutations in the MUTYH gene cause MAP and increase cancer risk. Those affected with MAP typically have between  adenomatous polyps. This syndrome also increases the risk for colon and duodenal cancer. Current research suggests that other cancers may be associated with MUTYH mutations, as well. The table below includes the risk that someone with two MUTYH gene mutations would develop cancer in their lifetime; of note, there is also an increased colon cancer risk for individuals who carry only one MUTYH gene mutation5,6,7.       Lifetime Cancer Risks    Cancer Type General Population MAP   Colon 5% %   Duodenal  <1% 5%       Peutz-Jeghers syndrome (PJS)  PJS is a hereditary cancer syndrome caused by mutations in the STK11 gene. This condition can be distinguished from other hereditary syndromes by the presence of hamartomatous polyps in the gastrointestinal tract and freckles present in unusual places such as the hands, feet, neck, and lips. Individuals with Peutz-Jeghers syndrome have an increased risk for colon, breast, pancreatic, and other cancers3.  Men are at risk for testicular tumors which can affect hormones in the body. Women are at risk for sex cord tumors of the ovaries and a rare aggressive type of cervical cancer.     Lifetime Cancer Risks    Cancer Type General Population PJS   Breast 12% 45-50%   Colon 5% 39%   Stomach <1% 29%   Pancreas 1.5% 11-36%   Small Intestine <1% 13%     Ovarian  2%  18%   Lung 6-7% 15-17%     Additional Genes Associated with Increased Colon Cancer Risk  CHEK2  CHEK2 is a  moderate-risk breast cancer gene.  Women who have a mutation in CHEK2 have around a 2-fold increased risk for breast cancer compared to the general population, and this risk may be higher depending upon family history.12,13,14.  Mutations in CHEK2 have also been shown to increase the risk of a number of other cancers, including colon and prostate, however these cancer risks are currently not well understood.     NICK  NICK is a moderate-risk breast cancer gene. Women who have a mutation in NICK can have between a 2-4 fold increased risk for breast cancer compared to the general population8. NICK mutations have also been associated with increased risk for pancreatic cancer, however an estimate of this cancer risk is not well understood9. Individuals who inherit two NICK mutations have a condition called ataxia-telangiectasia (AT).  This rare autosomal recessive condition affects the nervous system and immune system, and is associated with progressive cerebellar ataxia beginning in childhood.  Individuals with ataxia-telangiectasia often have a weakened immune system and have an increased risk for childhood cancers.    PALB2  Mutations in PALB2 have been shown to increase the risk of breast cancer up to 33-58% in some families; where individuals fall within this risk range is dependent upon family [id number]. PALB2 mutations have also been associated with increased risk for pancreatic cancer, although this risk has not been quantified yet.  Individuals who inherit two PALB2 mutations--one from their mother and one from their father--have a condition called Fanconi Anemia.  This rare autosomal recessive condition is associated with short stature, developmental delay, bone marrow failure, and increased risk for childhood cancers.    GREM1  GREM1 is a moderate-risk colon polyposis gene. Duplications of this gene are more commonly found in individuals with Ashkenazi Sikhism [id number]. Mutations in GREM1 are associated with  colon polyps and therefore an increased risk of colon cancer; however the estimated cancer risk is not well [id number].     POLD1 and POLE  POLD1 and POLE are moderate-risk colon cancer genes. Carriers of a mutation in one of these genes increases the lifetime risk of colorectal puddkg23,18,19,20. Mutations in these genes may also be associated with increased risk for other cancers including: endometrial cancer, duodenal adenomas and carcinomas, and brain tumors.    BRIP1, RAD51C and RAD51D  Mutations in BRIP1, RAD51C, and RAD51D have been shown to increase the risk of ovarian cancer and possibly female breast cancer as well14,15 .       Lifetime Cancer Risk    General Population BRIP1 RAD51C RAD51D   Ovarian 1-2% ~5-8% ~5-9% ~7-15%       Inheritance  All of the cancer syndromes reviewed above are inherited in an autosomal dominant pattern.  This means that if a parent has a mutation, each of his or her children will have a 50% chance of inheriting that same mutation.  Therefore, each child--male or female--would have a 50% chance of being at increased risk for developing cancer.      Image obtained from Genetics Home Reference, 2013     Mutations in some genes can occur de jose, which means that a person s mutation occurred for the first time in them and was not inherited from a parent.  Now that they have the mutation, however, it can be passed on to future generations.    Genetic Testing  Genetic testing involves a blood test and will look at the genetic information in the APC, NICK, BMPR1A, BRCA1, BRCA2, BRIP1, CDH1, CHEK2, EPCAM, GREM1, MLH1, MSH2, MSH6, MUTYH, PMS2, POLD1, POLE, PTEN, PALB2, RAD51C, RAD51D, SMAD4, STK11, and TP53.genes for any harmful mutations that are associated with increased cancer risk.  If possible, it is recommended that the person(s) who has had cancer be tested before other family members.  That person will give us the most useful information about whether or not a specific gene is  associated with the cancer in the family.    Results  There are three possible results of genetic testing:  Positive--a harmful mutation was identified in one or more of the genes  Negative--no mutation was identified in any of the genes on this panel  Variant of unknown significance--a variation in one of the genes was identified, but it is unclear how this impacts cancer risk in the family    Advantages and Disadvantages   There are advantages and disadvantages to genetic testing.    Advantages  May clarify your cancer risk  Can help you make medical decisions  May explain the cancers in your family  May give useful information to your family members (if you share your results)    Disadvantages  Possible negative emotional impact of learning about inherited cancer risk  Uncertainty in interpreting a negative test result in some situations  Possible genetic discrimination concerns (see below)    Genetic Information Nondiscrimination Act (JOSY)  JOSY is a federal law that protects individuals from health insurance or employment discrimination based on a genetic test result alone.  Although rare, there are currently no legal discrimination protections in terms of life insurance, long term care, or disability insurances.  Visit the National Human Genome Research [place] website to learn more.    Reducing Cancer Risk  All of the genes described above have nationally recognized cancer screening guidelines that would be recommended for individuals who test positive.  In addition to increased cancer screening, surgeries may be offered or recommended to reduce cancer risk.  Recommendations are based upon an individual s genetic test result as well as their personal and family history of cancer.    Questions to Think About Regarding Genetic Testing:  What effect will the test result have on me and my relationship with my family members if I have an inherited gene mutation?  If I don t have a gene mutation?  Should I share  my test results, and how will my family react to this news, which may also affect them?  Are my children ready to learn new information that may one day affect their own health?    Hereditary Cancer Resources    FORCE: Facing Our Risk of Cancer Empowered facingourrisk.org   Bright Pink bebrightpink.org   Li-Fraumeni Syndrome Association lfsassociation.org   PTEN World PTENworld.com   No stomach for cancer, Inc. nostomachforcancer.org   Stomach cancer relief network Scrnet.org   Collaborative Group of the Americas on Inherited Colorectal Cancer (CGA) cgaicc.com    Cancer Care cancercare.org   American Cancer Society (ACS) cancer.org   National Cancer [place] (NCI) cancer.gov     Please call us if you have any questions or concerns.   Cancer Risk Management Program [phone]-[phone]-CANCER ([phone])  Richard Fernandez, MS, [facility]  [phone]  Nesha Gooden, MS, [facility]  [phone]  Patricia Izaguirre, MS, [facility]  [phone]  Alesha Castorena, MS, [facility]  [phone]  Tisha Franks, MS, [facility]  [phone]  Renetta Byrd, MS, [facility]  [phone]  Luisa Ospina, MS, [facility]  [phone]    References  Jessica BAIN, Minda PDP, Oseas S, Estuardo HASSAN, Wesly JE, Pavel JL, Sharda N, Elias H, Kasey O, Hernán A, Susan B, Corey P, Joseph S, Morena DM, Hesham N, Padmini E, Adán H, Negrito E, Nato J, Elias J, Mundo B, Tulvalenciaus H, Thorlacius S, Eerola H, Elizabeth H, Sharan K, Nora OP. Average risks of breast and ovarian cancer associated with BRCA1 or BRCA2 mutations detected in case series unselected for family history: a combined analysis of 222 studies. Am J Hum Joaquina. 2003;72:1117-30.  Fady N, Selam M, Nell G.  BRCA1 and BRCA2 Hereditary Breast and Ovarian Cancer. Gene Reviews online. 2013.  Ronal YC, Joy S, Geeta G, Alegria S. Breast cancer risk among male BRCA1 and BRCA2 mutation carriers. J Natl Cancer Inst. 2007;99:1811-4.  Hansel BROUSSARD, Kailey I, Jaime J, Blaise E, Haider GOLD, Livia F. Risk of breast cancer in  male BRCA2 carriers. J Med Joaquina. 2010;47:710-1.  National Comprehensive Cancer Network. Clinical practice guidelines in oncology, colorectal cancer screening. Available online (registration required). 2015.  Mark MH, Noni J, Conor J, Giovanny LA, Sreekanth MS, Brandee C. Lifetime cancer risks in individuals with germline PTEN mutations. Clin Cancer Res. 2012;18:400-7.  Lisandra BELLE. Cowden Syndrome: A Critical Review of the Clinical Literature. J Joaquina . 2009:18:13-27.  Rajendra A, Harry D, Chris S, Sienna P, Fernie T, Leo M, Ned B, Jordan H, Chester R, Mckenna K, Giovani L, Hansel DG, Morena D, Jd DF, Igor MR, The Breast Cancer Susceptibility Collaboration ([place]) & Brayan PHILLIPS. NICK mutations that cause ataxia-telangiectasia are breast cancer susceptibility alleles. Nature Genetics. 2006;38:873-875  Ben N , Himanshu Y, Jo J, Adrianne L, Chad GM , Ban ML, Gallinger S, Dhaliwal AG, Syngal S, Himanshu ML, Carmelo J , Doyle R, Neha SZ, Eswellington JR, Toni VE, Traci M, Vogelstein B, Kole N, Coleman RH, Olimpia KW, and Arsenio AP. NICK mutations in patients with hereditary pancreatic cancer. Cancer Discover. 2012;2:41-46  Jessica KEENE., et al. Breast-Cancer Risk in Families with Mutations in PALB2. NEJM. 2014; 371(6):497-506.  CHEK2 Breast Cancer Case-Control Consortium. CHEK2*1100delC and susceptibility to breast cancer: A collaborative analysis involving 10,860 breast cancer cases and 9,065 controls from 10 studies. Am J Hum [place]ina, 74 (2004), pp. 3179-8577  Darron T, Jo S, Leonardo K, et al. Spectrum of Mutations in BRCA1, BRCA2, CHEK2, and TP53 in Families at High Risk of Breast Cancer. OSMAR. 2006;295(12):3494-9402.   Faith ROBLERO, Soledad MOLINA, Caroline BAIN, et al. Risk of breast cancer in women with a CHEK2 mutation with and without a family history of breast cancer. J Clin Oncol. 2011;29:8038-1556.  Ganesh H, Ysabel E, Cesilia FRYE, et al. Contribution of germline mutations in the RAD51B,  RAD51C, and RAD51D genes to ovarian cancer in the population. J Clin Oncol. 2015;33(26):9425-6905. Doi:10.1200/JCO.2015.61.2408.  Venkat DAUGHERTY, Zi DUCKWORTH, Oneyda P, et al. Mutations in BRIP1 confer high risk of ovarian cancer. Jennifer Joaquina. 2011;43(11):1932-7333. doi:10.1038/ng.955.  Paloma WAY, Alexey LEIVA, Tolu G, Issac E, Bernadette J, et al. The Prevalence of thyroid cancer and benign thyroid disease in patients with familial adenomatous polyposis may be higher than previously recognized. Clin Colorectal Cancer. 2012;11:304-308.  Janene L, Van Violeta A, Randal L, Tom ROBLERO, Lashawn K, et al. Risk of colorectal cancer in juvenile polyposis. Gut. 2007;56:965-967.  Deon FG, Dina MN, Swapnil CA. Colorectal cancer risk in hamartomatous polyposis syndromes. World Journal of Gastrointestinal Surgery. 2015;27:25-32  Joaquin MG. Guidance on gastrointestinal surveillance for hereditary non-polyposis colorectal cancer, familial adenomatous polypolis, juvenile polyposis, and Peutz-Jeghers syndrome. Gut. 2002;51:21-27.  Nathan AK, David TEOFILO, Milan LEIVAG et al. Risk of extracolonic cancers for people with biallelic and monoallelic mutations in MUTYH. Int J of Cancer. 2016;139:7497-8151.  Vika S, Esmelmarshal S, Gostephanie H, Monty K, Villegas M, et al. MUTYH-associated polyposis: 70 of 71 patients with biallelic mutations present with an attenuated or atypical phenotype. Int J of Cancer. 2006;119:807-814.  Giselle CASTILLO, Santy F, Jelani I, Natali M, Giselle H, et al. MUTYH mutation carriers have increased breast cancer risk. Cancer. 2012;1097-7111.  Mark MH, Noni J, Conor J, Giovanny LA, [place] MS, Eng C. Lifetime cancer risks in individuals with germline PTEN mutations. Clin Cancer Res. 2012;18:400-7.  Lisandra BELLE. Cowden Syndrome: A Critical Review of the Clinical Literature. J Joaquina . 2009:18:13-27.  National [facility] Cancer Network. Clinical practice guidelines in oncology, colorectal cancer screening.  Available online (registration required). 2013.  National Cancer [place]. SEER Cancer Stat Fact Sheets.  December 2013.  CHEK2 Breast Cancer Case-Control Consortium. CHEK2*1100delC and susceptibility to breast cancer: A collaborative analysis involving 10,860 breast cancer cases and 9,065 controls from 10 studies. Am J Hum [place]ina, 74 (2004), pp. 5146-2538  Darron T, Jo S, Leonardo K, et al. Spectrum of Mutations in BRCA1, BRCA2, CHEK2, and TP53 in Families at High Risk of Breast Cancer. OSMAR. 2006;295(12):9978-2347.  Faith C, Soledad D, Caroline A, et al. Risk of breast cancer in women with a CHEK2 mutation with and without a family history of breast cancer. J Clin Oncol. 2011;29:9557-2585.  Indy LEIVA, Janae E, Alban J, Gatito N, Ej M et al. Defining the polyposis/colorectal cancer phenotype associated with the GREM1 duplication: counseling and management guidelines. Joaquina .Res. 2016;98:1-5.  Ignacio E, Jorge S, Valdo A, Dipak Watson, et al. Hereditary mixed polyposis syndrome is caused by a 40kb upstream duplication that leads to increased and ectopic expression of the BMP antagonist GREM1. Jennifer Joaquina. 2015;44:699-703.  ANNIKA Alexandre. et al. Germline mutations affecting the proofreading domains of POLE and POLD1 predispose to colorectal adenomas and carcinomas. Jennifer. Joaquina. 45, 136-44 (2013).  DAHIANA Del Rio. et al. POLE and POLD1 mutations in 529 maxime with familial colorectal cancer and/or polyposis: review of reported cases and recommendations for genetic testing and surveillance. Joaquina. Med. (2015). doi:10.1038/gim.2015.75  ANA Lewis. et al. New insights into POLE and POLD1 germline mutations in familial colorectal cancer and polyposis. Hum. Mol. Joaquina. 23, 1081-12 (2014).  CRISTOFER Ramirez et al. Frequency and phenotypic spectrum of germline mutations in POLE and seven other polymerase genes in 266 patients with colorectal adenomas and carcinomas. Int. J. Cancer 137, 320-31 (2015).

## 2022-09-22 NOTE — LETTER
Cancer Risk Management  Program Locations    [facility]  [facility]  Mailing Address  Cancer Risk Management Program  [address]  [place] [address]    New patient appointments  [phone]  September 22, 2022    Jenise Smith  [address] [facility] [address] [address]      Dear Jenise,    It was a pleasure speaking with you over video on 9/22/2022. Here is a copy of the progress note from our discussion. If you have any additional questions, please feel free to call.    Referring Provider: Peri Mccauley MD    Presenting Information:   I met with Jenise for her video genetic counseling visit, through the Cancer Risk Management Program, to discuss her family history of breast, colon, small bowel, and prostate cancer. Today we reviewed this history, cancer screening recommendations, and available genetic testing options.    Personal History:  Jenise is a 47 year old year old female. She does not have any personal history of cancer. She had her first menstrual period at age 13, her first child at age 30, and is premenopausal. Jenise has her ovaries, fallopian tubes and uterus in place, and she has had no ovarian cancer screening to date. She reports that she has not used hormone replacement therapy.      She has annual clinical breast exams and mammograms; her most recent mammogram in April 2022 was normal. A breast MRI was later performed in July 2022 which found clustered fibroglandular tissue that was identified to be benign parenchyma (breast tissue). Jenise began having colonoscopies at the age of 47. Her most recent colonoscopy in June 2022 found two 5-10 mm polyps, the larger of which was identified as a sessile serrated adenoma and the other was a hyperplastic polyp. Follow-up was  recommended in 3 years. She does not regularly do any other cancer screening at this time.       Family History: (Please see scanned pedigree for detailed family history information)    A paternal uncle was diagnosed with and  from colon cancer in his 60's.    A paternal cousin was diagnosed with breast cancer in her late 30's. She is reported to have had negative genetic testing. A copy of her test report was not available for review.    Jenise's paternal grandmother was diagnosed with breast cancer at age 70 and  in her 80's.    Jenise's paternal grandfather  from brain cancer in his 80's. He also has a history of prostate cancer that was diagnosed at an unknown age.    A maternal uncle was diagnosed with small bowel cancer in his 60's.    A maternal uncle was diagnosed with tonsil cancer in his 60's.    Jenise's maternal grandfather was diagnosed with prostate cancer in his 80's and  at age 87.    Her maternal ethnicity is [place]. Her paternal ethnicity is . There is no known Ashkenazi Pentecostalism ancestry on either side of her family. There is no reported consanguinity.    Discussion:    Jenise's family history of breast, colon, small bowel, and prostate cancer is suggestive of a hereditary cancer syndrome.    We reviewed the features of sporadic, familial, and hereditary cancers. We discussed that mutations in either BRCA1 or BRCA2 could be possible hereditary explanations for her family history of cancer. Mutations in the BRCA1 or BRCA2 gene are known to cause Hereditary Breast and Ovarian Cancer Syndrome (HBOC). HBOC typically presents with multiple family members diagnosed with breast cancer before age 50 and/or ovarian cancer. Other cancer risks associated with HBOC include male breast cancer, prostate cancer, pancreatic cancer, and melanoma.     We discussed the natural history and genetics of hereditary cancer. A detailed handout regarding hereditary cancer, along with the other  information we discussed, will be mailed to Jenise at the end of our appointment today and can be found in the after visit summary. Topics included: inheritance pattern, cancer risks, cancer screening recommendations, and also risks, benefits and limitations of testing.    Based on her personal and family history, Jenise does not meets current National Comprehensive Cancer Network (NCCN) criteria for genetic testing.    We discussed that there are additional genes that could cause increased risk for breast, colon, small bowel, and/or prostate cancer. As many of these genes present with overlapping features in a family and accurate cancer risk cannot always be established based upon the pedigree analysis alone, it would be reasonable for Jenise to consider panel genetic testing to analyze multiple genes at once.    Genetic testing is available for BRCA1/2 as part of the patient's core panel. This will then be automatically reflex[facility] panel.   Genetic testing is available for 47 genes associated with hereditary cancer: Common Hereditary Cancers panel (APC, NICK, AXIN2, BARD1, BMPR1A, BRCA1, BRCA2, BRIP1, CDH1, CDK4, CDKN2A, CHEK2, CTNNA1, DICER1, EPCAM, GREM1, HOXB13, KIT, MEN1, MLH1, MSH2, MSH3, MSH6, MUTYH, NBN, NF1, NTHL1, PALB2, PDGFRA, PMS2, POLD1, POLE, PTEN, RAD50, RAD51C, RAD51D, SDHA, SDHB, SDHC, SDHD, SMAD4, SMARCA4, STK11, TP53, TSC1, TSC2, and VHL).  We discussed that many of the genes in the Common Hereditary Cancers panel are associated with specific hereditary cancer syndromes and published management guidelines: Hereditary Breast and Ovarian Cancer syndrome (BRCA1, BRCA2), Lu syndrome (MLH1, MSH2, MSH6, PMS2, EPCAM), Familial Adenomatous Polyposis (APC), Hereditary Diffuse Gastric Cancer (CDH1), Familial Atypical Multiple Mole Melanoma syndrome (CDK4, CDKN2A), Juvenile Polyposis syndrome (BMPR1A, SMAD4), Cowden syndrome (PTEN), Li Fraumeni syndrome (TP53),  Peutz-Jeghers syndrome (STK11), MUTYH Associated Polyposis (MUTYH), Tuberous Sclerosis complex (TSC1, TSC2), Neurofibromatosis type 1 (NF1), Multiple Endocrine Neoplasia type 1 (MEN1), Hereditary Paraganglioma and Pheochromocytoma (SDHA, SDHB, SDHC, SDHD), and von Hippel-Lindau (VHL).   The NICK, AXIN2, BRIP1, CHEK2, GREM1, MSH3, NBN, NTHL1, PALB2, POLD1, POLE, RAD51C, and RAD51D genes are associated with increased cancer risk and have published management guidelines for certain cancers.    The remaining genes (BARD1, CTNNA1, DICER1, HOXB13, KIT, PDGFRA, RAD50, and SMARCA4) are associated with increased cancer risk and may allow us to make medical recommendations when mutations are identified.      Jenise would like to submit a blood sample for her genetic testing. She will go to her nearest [facility] at her earliest convenience to get her blood drawn for her genetic testing.     Verbal consent was given over video and written on the consent form. Turnaround time is approximately 4 weeks once the lab receives the sample.    Medical Management: For Jenise, we reviewed that the information from genetic testing may determine:    additional cancer screening for which Jenise may qualify (i.e. mammogram and breast MRI, more frequent colonoscopies, more frequent dermatologic exams, etc.),    options for risk reducing surgeries Jenise could consider (i.e. bilateral mastectomy, surgery to remove her ovaries and/or uterus, etc.),      and targeted chemotherapies if she were to develop certain cancers in the future (i.e. immunotherapy for individuals with Lu syndrome, PARP inhibitors, etc.).     These recommendations and possible targeted chemotherapies will be discussed in detail once genetic testing is completed.     Plan:  1) Today Jenise elected to proceed with BRCA1/2 with automatic reflex to [facility]'s Common Hereditary Cancers panel.  2) A copy of the consent form and the after visit summary  will be mailed to Jenise.  3) This information should be available in approximately 4 weeks, once the lab receives the sample.  4) I will call Jenise with the results once they become available.    Time spent on video: 35 minutes    Richard Fernandez MS, [facility]  Licensed, Certified Genetic Counselor

## 2022-09-23 ENCOUNTER — LAB (OUTPATIENT)
Dept: LAB | Facility: CLINIC | Age: 47
End: 2022-09-23
Payer: COMMERCIAL

## 2022-09-23 DIAGNOSIS — Z80.3 FAMILY HISTORY OF MALIGNANT NEOPLASM OF BREAST: ICD-10-CM

## 2022-09-23 DIAGNOSIS — Z80.0 FAMILY HISTORY OF MALIGNANT NEOPLASM OF COLON: ICD-10-CM

## 2022-09-23 DIAGNOSIS — E10.9 TYPE 1 DIABETES MELLITUS WITHOUT COMPLICATION (H): ICD-10-CM

## 2022-09-23 DIAGNOSIS — Z80.42 FAMILY HISTORY OF MALIGNANT NEOPLASM OF PROSTATE: ICD-10-CM

## 2022-09-23 DIAGNOSIS — E06.3 HYPOTHYROIDISM DUE TO HASHIMOTO'S THYROIDITIS: ICD-10-CM

## 2022-09-23 LAB
ANION GAP SERPL CALCULATED.3IONS-SCNC: 9 MMOL/L (ref 3–14)
BUN SERPL-MCNC: 15 MG/DL (ref 7–30)
CALCIUM SERPL-MCNC: 8.7 MG/DL (ref 8.5–10.1)
CHLORIDE BLD-SCNC: 103 MMOL/L (ref 94–109)
CO2 SERPL-SCNC: 25 MMOL/L (ref 20–32)
CREAT SERPL-MCNC: 0.82 MG/DL (ref 0.52–1.04)
GFR SERPL CREATININE-BSD FRML MDRD: 89 ML/MIN/1.73M2
GLUCOSE BLD-MCNC: 135 MG/DL (ref 70–99)
HBA1C MFR BLD: 5.9 % (ref 0–5.6)
POTASSIUM BLD-SCNC: 3.9 MMOL/L (ref 3.4–5.3)
SODIUM SERPL-SCNC: 137 MMOL/L (ref 133–144)
TSH SERPL DL<=0.005 MIU/L-ACNC: 1.96 MU/L (ref 0.4–4)

## 2022-09-23 PROCEDURE — 80048 BASIC METABOLIC PNL TOTAL CA: CPT

## 2022-09-23 PROCEDURE — 83036 HEMOGLOBIN GLYCOSYLATED A1C: CPT

## 2022-09-23 PROCEDURE — 99000 SPECIMEN HANDLING OFFICE-LAB: CPT

## 2022-09-23 PROCEDURE — 84443 ASSAY THYROID STIM HORMONE: CPT

## 2022-09-23 PROCEDURE — 36415 COLL VENOUS BLD VENIPUNCTURE: CPT

## 2022-10-03 ENCOUNTER — DOCUMENTATION ONLY (OUTPATIENT)
Dept: OTHER | Facility: CLINIC | Age: 47
End: 2022-10-03

## 2022-10-03 LAB — SCANNED LAB RESULT: NORMAL

## 2022-10-06 NOTE — PROGRESS NOTES
"10/6/2022    Referring Provider: Peri Mccauley MD    Presenting Information:  I spoke to Jenise by video today to discuss her genetic testing results. Her blood was drawn on 9/23/22. The Common Hereditary Cancers panel was ordered from [facility]. This testing was done because of Jenise's family history of breast, colon, prostate, and small bowel cancer.    Genetic Testing Result: NEGATIVE  Jenise is negative for mutations in APC, NICK, AXIN2, BARD1, BMPR1A, BRCA1, BRCA2, BRIP1, CDH1, CDK4, CDKN2A, CHEK2, CTNNA1, DICER1, EPCAM, GREM1, HOXB13, KIT, MEN1, MLH1, MSH2, MSH3, MSH6, MUTYH, NBN, NF1, NTHL1, PALB2, PDGFRA, PMS2, POLD1, POLE, PTEN, RAD50, RAD51C, RAD51D, SDHA, SDHB, SDHC, SDHD, SMAD4, SMARCA4, STK11, TP53, TSC1, TSC2, and VHL. No mutations were found in any of the 47 genes analyzed. This test involved sequencing and deletion/duplication analysis of all genes with the exceptions of EPCAM and GREM1 (deletions/duplications only) and SDHA (sequencing only).     A copy of the test report can be found in the Laboratory tab, dated 9/23/22, and named \"LABORATORY MISCELLANEOUS ORDER\". The report is scanned in as a linked document.    Interpretation:  We discussed several different interpretations of this negative test result.    1. One explanation may be that there is a different gene or combination of genes and environment that are associated with the cancers in this family.  2. It is possible that a close relative with cancer did have a mutation in one of these genes and she did not inherit it.  3. There is also a small possibility that there is a mutation in one of these genes, and the testing laboratory could not find it with their current testing methods.       Screening:  Based on this negative test result, it is important for Jenise and her relatives to refer back to the family history for appropriate cancer screening.      Based on her personal and family history, Jenise has a 24.6% lifetime risk of " developing breast cancer based on the GONZALO model. As such, Jenise meets current National Comprehensive Cancer Network (NCCN) guidelines for high risk breast screening. This includes annual breast MRI in addition to annual mammogram. In addition, Jenise should be receiving clinical breast exams by her physician. We discussed that Jenise could participate in our Cancer Risk Management Program in which our nursing specialist provides an individual screening plan and assists with medical management. A referral was made to see JOSE Roger for this service.    Other population cancer screening options, such as those recommended by the American Cancer Society and the National Comprehensive Cancer Network (NCCN), are also appropriate for Jenise and her family. These screening recommendations may change if there are changes to Jenise's personal and/or family history of cancer. Final screening recommendations should be made by each individual's primary care provider.      Inheritance:  We reviewed autosomal dominant inheritance. We discussed that Jenise did not pass on an identifiable mutation in these genes to her son based on this test result. Mutations in these genes do not skip generations.      Summary:  We do not have an explanation for Jenise's family history of cancer. While no genetic changes were identified, Jenise may still be at risk for certain cancers due to family history, environmental factors, or other genetic causes not identified by this test. Because of that, it is important that she continue with cancer screening based on her personal and family history as discussed above.    Genetic testing is rapidly advancing, and new cancer susceptibility genes will most likely be identified in the future. Therefore, I encouraged Jenise to contact me annually or if there are changes in her personal or family history. This may change how we assess her cancer risk, screening, and the  testing we would offer.    Plan:  1.  A copy of the test results will be mailed to Shelia.  2. She plans to follow-up with her other providers.  3. She should contact me regularly, or sooner if her family history changes.  4. A referral was placed for Shelia to meet with JOSE Roger to discuss increased breast screening.    If Shelia has any further questions, I encouraged her to contact me via [employer].    Time spent on video: 5 minutes.    Richard Fernandez MS, [facility]  Licensed, Certified Genetic Counselor      Shelia is a 47 year old who is being evaluated via a billable video visit.      Pt is in [place]    How would you like to obtain your AVS? [employer]  If the video visit is dropped, the invitation should be resent by: Send to e-mail at: [email or web address]  Will anyone else be joining your video visit? Barbara Bush VF

## 2022-10-07 ENCOUNTER — VIRTUAL VISIT (OUTPATIENT)
Dept: ONCOLOGY | Facility: CLINIC | Age: 47
End: 2022-10-07
Attending: GENETIC COUNSELOR, MS
Payer: COMMERCIAL

## 2022-10-07 DIAGNOSIS — Z80.3 FAMILY HISTORY OF MALIGNANT NEOPLASM OF BREAST: Primary | ICD-10-CM

## 2022-10-07 DIAGNOSIS — Z80.0 FAMILY HISTORY OF MALIGNANT NEOPLASM OF COLON: ICD-10-CM

## 2022-10-07 DIAGNOSIS — Z80.42 FAMILY HISTORY OF MALIGNANT NEOPLASM OF PROSTATE: ICD-10-CM

## 2022-10-07 PROCEDURE — 999N000069 HC STATISTIC GENETIC COUNSELING, < 16 MIN: Mod: GT,95 | Performed by: GENETIC COUNSELOR, MS

## 2022-10-07 NOTE — Clinical Note
"Hello,    Please enclose a copy of the test report from the laboratory tab titled \"laboratory miscellaneous order\" dated 9/23/22 (Order [id number]) to send to the patient along with the letter.    Referring provider: please see below for summary of genetic test results for your reference.    Thank you,  Richard"

## 2022-10-07 NOTE — LETTER
"    Cancer Risk Management  Program Locations    [facility]  [facility]  Mailing Address  Cancer Risk Management Program  [address]  [place] [address]    New patient appointments  [phone]  October 7, 2022    Jenise Smith  [address] [facility] [address]      Dear Jenise,    It was a pleasure speaking with you over video on 10/6/2022. Here is a copy of the progress note from our discussion. If you have any additional questions, please feel free to call.    Referring Provider: Peri Mccauley MD    Presenting Information:  I spoke to Jenise by video today to discuss her genetic testing results. Her blood was drawn on 9/23/22. The Common Hereditary Cancers panel was ordered from [facility]. This testing was done because of Jenise's family history of breast, colon, prostate, and small bowel cancer.    Genetic Testing Result: NEGATIVE  Jenise is negative for mutations in APC, NICK, AXIN2, BARD1, BMPR1A, BRCA1, BRCA2, BRIP1, CDH1, CDK4, CDKN2A, CHEK2, CTNNA1, DICER1, EPCAM, GREM1, HOXB13, KIT, MEN1, MLH1, MSH2, MSH3, MSH6, MUTYH, NBN, NF1, NTHL1, PALB2, PDGFRA, PMS2, POLD1, POLE, PTEN, RAD50, RAD51C, RAD51D, SDHA, SDHB, SDHC, SDHD, SMAD4, SMARCA4, STK11, TP53, TSC1, TSC2, and VHL. No mutations were found in any of the 47 genes analyzed. This test involved sequencing and deletion/duplication analysis of all genes with the exceptions of EPCAM and GREM1 (deletions/duplications only) and SDHA (sequencing only).     A copy of the test report can be found in the Laboratory tab, dated 9/23/22, and named \"LABORATORY MISCELLANEOUS ORDER\". The report is scanned in as a linked document.    Interpretation:  We discussed several different interpretations of this negative test result.    1. One explanation may " be that there is a different gene or combination of genes and environment that are associated with the cancers in this family.  2. It is possible that a close relative with cancer did have a mutation in one of these genes and she did not inherit it.  3. There is also a small possibility that there is a mutation in one of these genes, and the testing laboratory could not find it with their current testing methods.     Screening:  Based on this negative test result, it is important for Jenise and her relatives to refer back to the family history for appropriate cancer screening.      Based on her personal and family history, Jenise has a 24.6% lifetime risk of developing breast cancer based on the GONZALO model. As such, Jenise meets current National Comprehensive Cancer Network (NCCN) guidelines for high risk breast screening. This includes annual breast MRI in addition to annual mammogram. In addition, Jenise should be receiving clinical breast exams by her physician. We discussed that Jenise could participate in our Cancer Risk Management Program in which our nursing specialist provides an individual screening plan and assists with medical management. A referral was made to see JOSE Roger for this service.    Other population cancer screening options, such as those recommended by the American Cancer Society and the National Comprehensive Cancer Network (NCCN), are also appropriate for Jenise and her family. These screening recommendations may change if there are changes to Jenise's personal and/or family history of cancer. Final screening recommendations should be made by each individual's primary care provider.    Inheritance:  We reviewed autosomal dominant inheritance. We discussed that Jenise did not pass on an identifiable mutation in these genes to her son based on this test result. Mutations in these genes do not skip generations.      Summary:  We do not have an explanation for  Jenise's family history of cancer. While no genetic changes were identified, Jenise may still be at risk for certain cancers due to family history, environmental factors, or other genetic causes not identified by this test. Because of that, it is important that she continue with cancer screening based on her personal and family history as discussed above.    Genetic testing is rapidly advancing, and new cancer susceptibility genes will most likely be identified in the future. Therefore, I encouraged Jenise to contact me annually or if there are changes in her personal or family history. This may change how we assess her cancer risk, screening, and the testing we would offer.    Plan:  1.  A copy of the test results will be mailed to Jenise.  2. She plans to follow-up with her other providers.  3. She should contact me regularly, or sooner if her family history changes.  4. A referral was placed for Jenise to meet with JOSE Roger to discuss increased breast screening.    If Jenise has any further questions, I encouraged her to contact me via [email or web address].    Time spent on video: 5 minutes.    Richard Fernandez MS, [facility]  Licensed, Certified Genetic Counselor

## 2022-10-18 NOTE — TELEPHONE ENCOUNTER
RECORDS STATUS - BREAST    RECORDS REQUESTED FROM: Epic   DATE REQUESTED: 10/17/22   NOTES DETAILS STATUS   OFFICE NOTE from referring provider Epic 10/07/22: Richard Fernandez GC   OFFICE NOTE from surgeon Epic 06/25/21: Dr. Peri Mccauley   OPERATIVE REPORT External: [place] 06/24/22: Colonoscopy   MEDICATION LIST [facility]    LABS     PATHOLOGY REPORTS  (Tissue diagnosis, Stage, ER/ME percentage positive and intensity of staining, HER2 IHC, FISH, and all biopsies from breast and any distant metastasis)                 Report in EPIC 07/28/22: [id number]   GENONOMIC TESTING     TYPE:   (Next Generation Sequencing, including Foundation One testing, and Oncotype score) External: [facility] 09/23/22: [id number]   IMAGING (NEED IMAGES & REPORT)     MRI PACS 07/20/22, 07/07/21: MR Breast   MAMMO PACS 07/28/22-03/24/15   ULTRASOUND PACS 07/28/22-03/24/15: US breast   NM PACS 08/06/15: NM MBI Breast

## 2022-10-19 ENCOUNTER — VIRTUAL VISIT (OUTPATIENT)
Dept: ENDOCRINOLOGY | Facility: CLINIC | Age: 47
End: 2022-10-19
Payer: COMMERCIAL

## 2022-10-19 ENCOUNTER — OFFICE VISIT (OUTPATIENT)
Dept: URGENT CARE | Facility: URGENT CARE | Age: 47
End: 2022-10-19
Payer: COMMERCIAL

## 2022-10-19 VITALS
TEMPERATURE: 98.3 F | SYSTOLIC BLOOD PRESSURE: 122 MMHG | OXYGEN SATURATION: 98 % | WEIGHT: 157 LBS | DIASTOLIC BLOOD PRESSURE: 76 MMHG | BODY MASS INDEX: 24.59 KG/M2 | HEART RATE: 60 BPM

## 2022-10-19 DIAGNOSIS — E06.3 HYPOTHYROIDISM DUE TO HASHIMOTO'S THYROIDITIS: ICD-10-CM

## 2022-10-19 DIAGNOSIS — E10.9 TYPE 1 DIABETES MELLITUS WITHOUT COMPLICATION (H): Primary | ICD-10-CM

## 2022-10-19 DIAGNOSIS — S91.112A LACERATION OF LEFT GREAT TOE WITHOUT DAMAGE TO NAIL, FOREIGN BODY PRESENCE UNSPECIFIED, INITIAL ENCOUNTER: Primary | ICD-10-CM

## 2022-10-19 DIAGNOSIS — Z96.41 INSULIN PUMP STATUS: ICD-10-CM

## 2022-10-19 PROCEDURE — 99214 OFFICE O/P EST MOD 30 MIN: CPT | Mod: GT | Performed by: INTERNAL MEDICINE

## 2022-10-19 PROCEDURE — 95251 CONT GLUC MNTR ANALYSIS I&R: CPT | Performed by: INTERNAL MEDICINE

## 2022-10-19 PROCEDURE — 99213 OFFICE O/P EST LOW 20 MIN: CPT

## 2022-10-19 RX ORDER — AMOXICILLIN 875 MG
875 TABLET ORAL 2 TIMES DAILY
Qty: 20 TABLET | Refills: 0 | Status: SHIPPED | OUTPATIENT
Start: 2022-10-19 | End: 2022-10-29

## 2022-10-19 NOTE — PROGRESS NOTES
Patient is being evaluated via a billable video visit.      How would you like to obtain your AVS? Verbally Reviewed  If the video visit is dropped, the invitation should be resent by: Cellphone  Will anyone else be joining your video visit? No        Video-Visit Details    Video Start Time:  2:32 pm    Type of service:  Video Visit    Video End Time:  2:52 pm    Originating Location (pt. Location):  Home    PROVIDER LOCATION On-site vs Off-site    Distant Location (provider location):  Home    Platform used for Video Visit: Katerine        Recent issues:  Diabetes and thyroid follow-up evaluation  Continues to use the Omnipod 5 pump with DexcomG6 since 2022  Ju Salas  in 2022, and they are now on waiting list with [facility] again    Recent trip to [place], had cut on bottom of left great toe while swimming          Diabetes:  Previous diagnosis of IFG with high GAD65 Ab level  2011. Developed acute hyperglycemia and diagnosis of T1DM  Treatment with insulin medication, MDI plan  2013. Changed to OmniPod pump with Novolog insulin  Used DexcomG5 CGMS system  2017. Changed to Medtronic 630G pump  2017. Upgraded to Medtronic 670G pump and Guardian3 sensor, Humalog insulin  2018. Discontinued Medtronic pump and sensor, changed to MDI plan              Had taken Basaglar 17U at bedtime and mealtime Humalog  Had taken Basaglar 13U at bedtime, Humalog Luxura pen 1:7 at mealtime, ISF 60, goal target  mg/dl  2018. Restarted OmniPod pump  ~Early 2021. Stopped the Jardiance medication  Using OmniPod pump with Dash PDM, with DexcomG6 CGM    21. Switched to Tandem TSlim pump, Humalog insulin  2022. Switched to Omnipod 5 with DexcomG6              Humalog in pump    Current Omnipod 5 pump settings:  `          Recent Omnipod 5 pump data:          Recent DexcomG6 CGM data:        Has reduced hypoglycemia awareness              Previously had service dog (Juliet), trained to recognize  hypoglycemia smell/symptoms  Using Contour Next Link BG meter, variable testing, tests 6x/day    Exercises with home Pellaton, also classes at health club- yoga, strength              Uses Activity Mode for exercise  Previous [facility] labs include:  Lab Results   Component Value Date    A1C 5.9 (H) 2022     2022    POTASSIUM 3.9 2022    CHLORIDE 103 2022    CO2 25 2022    ANIONGAP 9 2022     (H) 2022    BUN 15 2022    CR 0.82 2022    GFRESTIMATED 89 2022    GFRESTBLACK 73 2021    VIJI 8.7 2022    CHOL 158 2022    TRIG 50 2022    HDL 96 2022    LDL 52 2022    NHDL 62 2022    UCRR 118 2022    MICROL <5 2022    UMALCR  2022      Comment:      Unable to calculate:  Urine creatinine or albumin value below detectable level     Previous Las[facility] surgery at [facility]   Last eye exam 22 with Dr. Diaz/[facility], no DR reported  DM Complications: none known        Thyroid:  . History of hypothyroidism  Takes levothyroxine medication  Eary 10/2018. Dose incease to levothyroxine 0.05 mg daily per GYN physician    Labs done at [facility]  22:  TSH 2.69    Recent [facility] labs include:  Lab Results   Component Value Date    TSH 1.96 2022    T4 0.85 2021     Current dose:  Levothyroxine 0.05 mg daily          , lives in [place],  [place], daughter Myra and stepchildren Jag (formerly Tisha) + Anoop  Has adopted black lab mix dog named Josue, but  Brucelosis illness and  2022  Works for [employer] as   Sees Dr. Mirna Sheehan/[facility] for general medicine evaluations.  Also sees Dr. Ashford/[facility]  Has seen nutritionist (Radha Turcios?) at [facility], helpful       PMH/PSH:  Past Medical History:   Diagnosis Date     Diabetes mellitus type 1 (H)      Dysmenorrhea      Excessive or frequent menstruation      Hypothyroid       Insulin pump in place      Lumbar back pain      Other and unspecified hyperlipidemia      PONV (postoperative nausea and vomiting)      Past Surgical History:   Procedure Laterality Date     DILATION AND CURETTAGE, HYSTEROSCOPY, ABLATE ENDOMETRIUM NOVASURE, COMBINED  1/23/2014    Procedure: COMBINED DILATION AND CURETTAGE, HYSTEROSCOPY, ABLATE ENDOMETRIUM NOVASURE;  HYSTEROSCOPY, DILATION, CURETTAGE, WITH NOVASURE ABLATION, MYOSURE MORCELLATOR;  Surgeon: Swetha Queen MD;  Location: [facility]     LAPAROSCOPY       LEEP TX, CERVICAL       OPERATIVE HYSTEROSCOPY WITH MORCELLATOR  1/23/2014    Procedure: OPERATIVE HYSTEROSCOPY WITH MORCELLATOR;;  Surgeon: Swetha Queen MD;  Location: [facility]     wisdom teeth[         Family Hx:  Family History   Problem Relation Age of Onset     Diabetes Mother         type 2     Obesity Mother      Diabetes Father         type 1     Diabetes Sister         type 1     Thyroid Disease Sister      Diabetes Sister      Prostate Cancer Maternal Grandfather      Breast Cancer Paternal Grandmother      Prostate Cancer Paternal Grandfather      Cancer - colorectal Paternal Uncle 60     Breast Cancer Cousin          Social Hx:  Social History     Socioeconomic History     Marital status:      Spouse name: Not on file     Number of children: Not on file     Years of education: Not on file     Highest education level: Not on file   Occupational History     Employer: [employer]   Tobacco Use     Smoking status: Never     Smokeless tobacco: Never   Substance and Sexual Activity     Alcohol use: Yes     Alcohol/week: 0.0 standard drinks     Comment: 3 per week     Drug use: No     Sexual activity: Yes     Partners: Male     Birth control/protection: Male Surgical   Other Topics Concern     Parent/sibling w/ CABG, MI or angioplasty before 65F 55M? No   Social History Narrative     in May 2014    2 step children (20 and 18) and one biol dtr age  15.    Works for [employer]--works from home     Social Determinants of Health     Financial Resource Strain: Not on file   Food Insecurity: Not on file   Transportation Needs: Not on file   Physical Activity: Not on file   Stress: Not on file   Social Connections: Not on file   Intimate Partner Violence: Not on file   Housing Stability: Not on file          MEDICATIONS:  has a current medication list which includes the following prescription(s): eszopiclone, fluoxetine, omnipod 5 g6 intro (gen 5), omnipod 5 g6 pod (gen 5), lantus solostar, insulin lispro, insulin pump, levothyroxine, multiple vitamins-minerals, cholecalciferol, blood glucose, contour next test, baqsimi two pack, and insulin lispro.       ROS: 10 point ROS neg other than the symptoms noted above in the HPI.     GENERAL: energy good, wt stable; denies fevers, chills, night sweats.   HEENT: no dysphagia, odonophagia, diplopia, neck pain  THYROID:  no apparent hyper or hypothyroid symptoms  CV: no chest pain, pressure, palpitations  LUNGS: no SOB, CLIFFORD, cough, wheezing   ABDOMEN: denies nausea, diarrhea, constipation, abdominal pain  EXTREMITIES: no rashes, ulcers, edema  NEUROLOGY: no headaches, denies changes in vision, tingling, extremitiy numbness   MSK: some back pain; denies muscle weakness  SKIN: cut at bottom of left great toe; no rashes or lesions  : regular menstrual cycles  PSYCH:  stable mood, no significant anxiety or depression  ENDOCRINE: no heat or cold intolerance    Physical Exam (visual exam)  VS:  no vital signs taken for video visit  CONSTITUTIONAL: healthy, alert and NAD, well dressed, answering questions appropriately  ENT: no nose swelling or nasal discharge, mouth redness or gum changes.  EYES: eyes grossly normal to inspection, conjunctivae and sclerae normal, no exophthalmos or proptosis  THYROID:  no apparent nodules or goiter  LUNGS: no audible wheeze, cough or visible cyanosis, no visible retractions or increased  work of breathing  ABDOMEN: abdomen not evaluated  EXTREMITIES: no hand tremors, limited exam  NEUROLOGY: CN grossly intact, mentation intact and speech normal   SKIN:  no apparent skin lesions, rash, or edema with visualized skin appearance  PSYCH: mentation appears normal, affect normal/bright, judgement and insight intact,   normal speech and appearance well groomed      LABS:     All pertinent notes, labs, and images personally reviewed by me.       A/P:  Encounter Diagnoses   Name Primary?     Type 1 diabetes mellitus without complication (H) Yes     Insulin pump status      Hypothyroidism due to Hashimoto's thyroiditis        Comments:  Reviewed health history, diabetes and thyroid issues.  Recent glucose control good overall  Reviewed and interpreted tests that I previously ordered.   Ordered appropriate tests for the endocrinology disease management.    Management options discussed and implemented after shared medical decision making with the patient.  T1DM and hypothyroidism problems are chronic-stable    Plan:  Discussed general issues with the diabetes diagnosis and management  We discussed the hgbA1c test which reflects previous overall glucose levels or control  Discussed the importance of blood glucose (BG) testing to assess glucose trends  Reviewed recent Omnipod 5  pump report and pump settings  Reviewed recent DexcomG6 CGM glucose trends, in detail     Recommend:  Continue the Omnipod 5 insulin pump and diabetes management    No pump setting changes at this time    Would not resume Jardiance med at this time, but could use this off-label med again in future.  Sent updated Humalog Rx to [facility] today  Use the pump Activity (exercise) mode for aerobic exercise  Continue use of DexcomG6 CGM glucose sensor  No lab tests ordered at this time  Consider followup diabetes education appt with one of our [facility] CDE's  Monitor for symptom changes  Discussed her postmeal satiety and bloating symptoms  Continue  current levothyroxine daily dose  Advise having fasting lipid panel testing and dilated eye examination, at least annually    Arrange an appt at the [facility] for to [facility] ASAP  Keep regular f/u PCP and GYN evaluations  Addressed patient questions today    There are no Patient Instructions on file for this visit.    Future labs ordered today:   Orders Placed This Encounter   Procedures     GLUCOSE MONITOR, 72 HOUR, PHYS INTERP     Radiology/Consults ordered today: None    Total time spent in with the patient evaluation:  20 min  Additional time spent reviewing pertinent lab tests and chart notes, and documentation:  13 min    Follow-up:  2/2023    DRAI Gaming MD, MS  Endocrinology  [facility]    CC:  WILLOW Sheehan and ANA Dailey

## 2022-10-19 NOTE — Clinical Note
Has recent toe skin laceration that needs evaluation soon, she will be calling [facility] for appt.  Thanks.  TL

## 2022-10-19 NOTE — LETTER
10/19/2022         RE: Jenise Smith  [address]  [place] [address]        Dear Colleague,    Thank you for referring your patient, Jenise Smith, to the [facility] [place]. Please see a copy of my visit note below.    Patient is being evaluated via a billable video visit.      How would you like to obtain your AVS? Verbally Reviewed  If the video visit is dropped, the invitation should be resent by: Cellphone  Will anyone else be joining your video visit? No        Video-Visit Details    Video Start Time:  2:32 pm    Type of service:  Video Visit    Video End Time:  2:52 pm    Originating Location (pt. Location):  Home    PROVIDER LOCATION On-site vs Off-site    Distant Location (provider location):  Home    Platform used for Video Visit: Katerine        Recent issues:  Diabetes and thyroid follow-up evaluation  Continues to use the Omnipod 5 pump with DexcomG6 since 2022  Ju Salas  in 2022, and they are now on waiting list with [facility] again    Recent trip to [place], had cut on bottom of left great toe while swimming          Diabetes:  Previous diagnosis of IFG with high GAD65 Ab level  2011. Developed acute hyperglycemia and diagnosis of T1DM  Treatment with insulin medication, MDI plan  2013. Changed to OmniPod pump with Novolog insulin  Used DexcomG5 CGMS system  2017. Changed to Medtronic 630G pump  2017. Upgraded to Medtronic 670G pump and Guardian3 sensor, Humalog insulin  2018. Discontinued Medtronic pump and sensor, changed to MDI plan              Had taken Basaglar 17U at bedtime and mealtime Humalog  Had taken Basaglar 13U at bedtime, Humalog Luxura pen 1:7 at mealtime, ISF 60, goal target  mg/dl  2018. Restarted OmniPod pump  ~Early 2021. Stopped the Jardiance medication  Using OmniPod pump with Dash PDM, with DexcomG6 CGM    21. Switched to Tandem TSlim pump, Humalog insulin  2022. Switched to Omnipod 5 with  DexcomG6              Humalog in pump    Current Omnipod 5 pump settings:  `          Recent Omnipod 5 pump data:          Recent DexcomG6 CGM data:        Has reduced hypoglycemia awareness              Previously had service dog (Juliet), trained to recognize hypoglycemia smell/symptoms  Using Contour Next Link BG meter, variable testing, tests 6x/day    Exercises with home Pellaton, also classes at health club- yoga, strength              Uses Activity Mode for exercise  Previous [facility] labs include:  Lab Results   Component Value Date    A1C 5.9 (H) 2022     2022    POTASSIUM 3.9 2022    CHLORIDE 103 2022    CO2 25 2022    ANIONGAP 9 2022     (H) 2022    BUN 15 2022    CR 0.82 2022    GFRESTIMATED 89 2022    GFRESTBLACK 73 2021    VIJI 8.7 2022    CHOL 158 2022    TRIG 50 2022    HDL 96 2022    LDL 52 2022    NHDL 62 2022    UCRR 118 2022    MICROL <5 2022    UMALCR  2022      Comment:      Unable to calculate:  Urine creatinine or albumin value below detectable level     Previous Lasik surgery at [facility]   Last eye exam 22 with Dr. Diaz/[facility], no DR reported  DM Complications: none known        Thyroid:  . History of hypothyroidism  Takes levothyroxine medication  Eary 10/2018. Dose incease to levothyroxine 0.05 mg daily per GYN physician    Labs done at [facility]  22:  TSH 2.69    Recent [facility] labs include:  Lab Results   Component Value Date    TSH 1.96 2022    T4 0.85 2021     Current dose:  Levothyroxine 0.05 mg daily          , lives in [place],  [place], daughter Myra and stepchildren Jag (formerly [place]) + Anoop  Has adopted black lab mix dog named Josue, but  Brucelosis illness and  2022  Works for [employer] as   Sees Dr. Mirna Sheehan/[facility] for general medicine  evaluations.  Also sees Dr. Ashford/[facility]  Has seen nutritionist (Radha Turcios?) at [facility], helpful       PMH/PSH:  Past Medical History:   Diagnosis Date     Diabetes mellitus type 1 (H)      Dysmenorrhea      Excessive or frequent menstruation      Hypothyroid      Insulin pump in place      Lumbar back pain      Other and unspecified hyperlipidemia      PONV (postoperative nausea and vomiting)      Past Surgical History:   Procedure Laterality Date     DILATION AND CURETTAGE, HYSTEROSCOPY, ABLATE ENDOMETRIUM NOVASURE, COMBINED  1/23/2014    Procedure: COMBINED DILATION AND CURETTAGE, HYSTEROSCOPY, ABLATE ENDOMETRIUM NOVASURE;  HYSTEROSCOPY, DILATION, CURETTAGE, WITH NOVASURE ABLATION, MYOSURE MORCELLATOR;  Surgeon: Swetha Queen MD;  Location: [facility]     LAPAROSCOPY       LEEP TX, CERVICAL       OPERATIVE HYSTEROSCOPY WITH MORCELLATOR  1/23/2014    Procedure: OPERATIVE HYSTEROSCOPY WITH MORCELLATOR;;  Surgeon: Swetha Queen MD;  Location: [facility]     wisdom teeth[         Family Hx:  Family History   Problem Relation Age of Onset     Diabetes Mother         type 2     Obesity Mother      Diabetes Father         type 1     Diabetes Sister         type 1     Thyroid Disease Sister      Diabetes Sister      Prostate Cancer Maternal Grandfather      Breast Cancer Paternal Grandmother      Prostate Cancer Paternal Grandfather      Cancer - colorectal Paternal Uncle 60     Breast Cancer Cousin          Social Hx:  Social History     Socioeconomic History     Marital status:      Spouse name: Not on file     Number of children: Not on file     Years of education: Not on file     Highest education level: Not on file   Occupational History     Employer: [employer]   Tobacco Use     Smoking status: Never     Smokeless tobacco: Never   Substance and Sexual Activity     Alcohol use: Yes     Alcohol/week: 0.0 standard drinks     Comment: 3 per week     Drug use: No      Sexual activity: Yes     Partners: Male     Birth control/protection: Male Surgical   Other Topics Concern     Parent/sibling w/ CABG, MI or angioplasty before 65F 55M? No   Social History Narrative     in May 2014    2 step children (20 and 18) and one biol dtr age 15.    Works for [employer]--works from home     Social Determinants of Health     Financial Resource Strain: Not on file   Food Insecurity: Not on file   Transportation Needs: Not on file   Physical Activity: Not on file   Stress: Not on file   Social Connections: Not on file   Intimate Partner Violence: Not on file   Housing Stability: Not on file          MEDICATIONS:  has a current medication list which includes the following prescription(s): eszopiclone, fluoxetine, omnipod 5 g6 intro (gen 5), omnipod 5 g6 pod (gen 5), lantus solostar, insulin lispro, insulin pump, levothyroxine, multiple vitamins-minerals, cholecalciferol, blood glucose, contour next test, baqsimi two pack, and insulin lispro.       ROS: 10 point ROS neg other than the symptoms noted above in the HPI.     GENERAL: energy good, wt stable; denies fevers, chills, night sweats.   HEENT: no dysphagia, odonophagia, diplopia, neck pain  THYROID:  no apparent hyper or hypothyroid symptoms  CV: no chest pain, pressure, palpitations  LUNGS: no SOB, CLIFFORD, cough, wheezing   ABDOMEN: denies nausea, diarrhea, constipation, abdominal pain  EXTREMITIES: no rashes, ulcers, edema  NEUROLOGY: no headaches, denies changes in vision, tingling, extremitiy numbness   MSK: some back pain; denies muscle weakness  SKIN: cut at bottom of left great toe; no rashes or lesions  : regular menstrual cycles  PSYCH:  stable mood, no significant anxiety or depression  ENDOCRINE: no heat or cold intolerance    Physical Exam (visual exam)  VS:  no vital signs taken for video visit  CONSTITUTIONAL: healthy, alert and NAD, well dressed, answering questions appropriately  ENT: no nose swelling or nasal  discharge, mouth redness or gum changes.  EYES: eyes grossly normal to inspection, conjunctivae and sclerae normal, no exophthalmos or proptosis  THYROID:  no apparent nodules or goiter  LUNGS: no audible wheeze, cough or visible cyanosis, no visible retractions or increased work of breathing  ABDOMEN: abdomen not evaluated  EXTREMITIES: no hand tremors, limited exam  NEUROLOGY: CN grossly intact, mentation intact and speech normal   SKIN:  no apparent skin lesions, rash, or edema with visualized skin appearance  PSYCH: mentation appears normal, affect normal/bright, judgement and insight intact,   normal speech and appearance well groomed      LABS:     All pertinent notes, labs, and images personally reviewed by me.       A/P:  Encounter Diagnoses   Name Primary?     Type 1 diabetes mellitus without complication (H) Yes     Insulin pump status      Hypothyroidism due to Hashimoto's thyroiditis        Comments:  Reviewed health history, diabetes and thyroid issues.  Recent glucose control good overall  Reviewed and interpreted tests that I previously ordered.   Ordered appropriate tests for the endocrinology disease management.    Management options discussed and implemented after shared medical decision making with the patient.  T1DM and hypothyroidism problems are chronic-stable    Plan:  Discussed general issues with the diabetes diagnosis and management  We discussed the hgbA1c test which reflects previous overall glucose levels or control  Discussed the importance of blood glucose (BG) testing to assess glucose trends  Reviewed recent Omnipod 5  pump report and pump settings  Reviewed recent DexcomG6 CGM glucose trends, in detail     Recommend:  Continue the Omnipod 5 insulin pump and diabetes management    No pump setting changes at this time    Would not resume Jardiance med at this time, but could use this off-label med again in future.  Sent updated Humalog Rx to [facility] today  Use the pump Activity  (exercise) mode for aerobic exercise  Continue use of DexcomG6 CGM glucose sensor  No lab tests ordered at this time  Consider followup diabetes education appt with one of our [facility] CDE's  Monitor for symptom changes  Discussed her postmeal satiety and bloating symptoms  Continue current levothyroxine daily dose  Advise having fasting lipid panel testing and dilated eye examination, at least annually    Arrange an appt at the [facility] for to injury ASAP  Keep regular f/u PCP and GYN evaluations  Addressed patient questions today    There are no Patient Instructions on file for this visit.    Future labs ordered today:   Orders Placed This Encounter   Procedures     GLUCOSE MONITOR, 72 HOUR, PHYS INTERP     Radiology/Consults ordered today: None    Total time spent in with the patient evaluation:  20 min  Additional time spent reviewing pertinent lab tests and chart notes, and documentation:  13 min    Follow-up:  2/2023    DARI Gaming MD, MS  Endocrinology  [facility]    CC:  WILLOW Sheehan and ANA Dailey                      Again, thank you for allowing me to participate in the care of your patient.        Sincerely,        Lyndon Gaming MD

## 2022-10-20 ENCOUNTER — PRE VISIT (OUTPATIENT)
Dept: ONCOLOGY | Facility: CLINIC | Age: 47
End: 2022-10-20

## 2022-10-20 NOTE — PROGRESS NOTES
SUBJECTIVE:   47 year old female sustained laceration of left great toe 6 hours ago. Nature of injury: [place]  She felt no pain but noticed it was bleeding Tetanus vaccination status reviewed: tetanus re-vaccination not indicated.     OBJECTIVE:   Patient appears well, vitals are normal. Laceration 1 cm noted.  Description: clean wound edges, no foreign bodies. Neurovascular and tendon structures are intact.    ASSESSMENT:    Diagnosis Comments   1. Laceration of left great toe without damage to nail, foreign body presence unspecified, initial encounter  amoxicillin (AMOXIL) 875 MG tablet             PLAN:   Keep clean and wrapped this will heal secondarily  I will start amoxicillin due to open wound and diabetes

## 2022-10-30 DIAGNOSIS — E06.3 HYPOTHYROIDISM DUE TO HASHIMOTO'S THYROIDITIS: ICD-10-CM

## 2022-10-31 ENCOUNTER — MYC REFILL (OUTPATIENT)
Dept: FAMILY MEDICINE | Facility: CLINIC | Age: 47
End: 2022-10-31

## 2022-10-31 DIAGNOSIS — F51.01 PRIMARY INSOMNIA: ICD-10-CM

## 2022-10-31 RX ORDER — LEVOTHYROXINE SODIUM 50 UG/1
TABLET ORAL
Qty: 90 TABLET | Refills: 3 | Status: SHIPPED | OUTPATIENT
Start: 2022-10-31 | End: 2023-01-06

## 2022-10-31 NOTE — TELEPHONE ENCOUNTER
"Last Written Prescription Date: 12/13/21  Last Fill Quantity: 90,  # refills: 3   Last office visit: 7/18//2022 with prescribing provider:  Dr. Gaming   Future Office Visit:  2/16/23        Requested Prescriptions   Pending Prescriptions Disp Refills     levothyroxine (SYNTHROID/LEVOTHROID) 50 MCG tablet [Pharmacy Med Name: Levothyroxine Sodium 50 MCG Oral Tablet] 90 tablet 3     Sig: TAKE 1 TABLET BY MOUTH  DAILY       Thyroid Protocol Passed - 10/30/2022  8:32 PM        Passed - Patient is 12 years or older        Passed - Recent (12 mo) or future (30 days) visit within the authorizing provider's specialty     Patient has had an office visit with the authorizing provider or a provider within the authorizing providers department within the previous 12 mos or has a future within next 30 days. See \"Patient Info\" tab in inbasket, or \"Choose Columns\" in Meds & Orders section of the refill encounter.              Passed - Medication is active on med list        Passed - Normal TSH on file in past 12 months     Recent Labs   Lab Test 09/23/22  1132   TSH 1.96              Passed - No active pregnancy on record     If patient is pregnant or has had a positive pregnancy test, please check TSH.          Passed - No positive pregnancy test in past 12 months     If patient is pregnant or has had a positive pregnancy test, please check TSH.             Refill sent per protocol  Kae Anthony RN    "

## 2022-11-01 ENCOUNTER — E-VISIT (OUTPATIENT)
Dept: FAMILY MEDICINE | Facility: CLINIC | Age: 47
End: 2022-11-01
Payer: COMMERCIAL

## 2022-11-01 DIAGNOSIS — N76.0 BACTERIAL VAGINOSIS: Primary | ICD-10-CM

## 2022-11-01 DIAGNOSIS — B96.89 BACTERIAL VAGINOSIS: Primary | ICD-10-CM

## 2022-11-01 PROCEDURE — 99421 OL DIG E/M SVC 5-10 MIN: CPT | Performed by: PHYSICIAN ASSISTANT

## 2022-11-01 RX ORDER — METRONIDAZOLE 7.5 MG/G
1 GEL VAGINAL AT BEDTIME
Qty: 70 G | Refills: 0 | Status: SHIPPED | OUTPATIENT
Start: 2022-11-01 | End: 2022-11-06

## 2022-11-01 RX ORDER — ESZOPICLONE 2 MG/1
2 TABLET, FILM COATED ORAL
Qty: 30 TABLET | Refills: 0 | Status: CANCELLED | OUTPATIENT
Start: 2022-11-01

## 2022-11-01 NOTE — PATIENT INSTRUCTIONS
Thank you for choosing us for your care. I have placed an order for a prescription so that you can start treatment. View your full visit summary for details by clicking on the link below. Your pharmacist will able to address any questions you may have about the medication.     If you re not feeling better within 2-3 days, please schedule an appointment.  You can schedule an appointment right here in [facility], or call [phone]  If the visit is for the same symptoms as your eVisit, we ll refund the cost of your eVisit if seen within seven days.      Bacterial Vaginosis    You have a vaginal infection called bacterial vaginosis (BV). Both good and bad bacteria are present in a healthy vagina. BV occurs when these bacteria get out of balance. The number of bad bacteria increase. And the number of good bacteria decrease. BV is linked with sexual activity, but it's not a sexually transmitted infection (STI).   BV may or may not cause symptoms. If symptoms do occur, they can include:     Thin, gray, milky-white, or sometimes green discharge    Unpleasant odor or  fishy  smell    Itching, burning, or pain in or around the vagina  It is not known what causes BV, but certain factors can make the problem more likely. These can include:     Douching    Spermicides    Use of antibiotics    Change in hormone levels with pregnancy, breastfeeding, or menopause    Having sex with a new partner    Having sex with more than one partner  BV will sometimes go away on its own. But treatment is often advised. This is because untreated BV can raise the risk of more serious health problems such as:     Pelvic inflammatory disease (PID)     delivery (giving birth to a baby early if you re pregnant)    HIV and some other sexually transmitted infections (STIs)    Infection after surgery on the reproductive organs  Home care  General care    BV is most often treated with medicines called antibiotics. These may be given as pills or  as a vaginal cream. If antibiotics are prescribed, be sure to use them exactly as directed. And complete all of the medicine, even if your symptoms go away.    Don't douche or having sex during treatment.    If you have sex with a female partner, ask your healthcare provider if she should also be treated.  Prevention    Don't douche.    Don't have sex. If you do have sex, then take steps to lower your risk:  ? Use condoms when having sex.  ? Limit the number of sex partners you have.    Follow-up care  Follow up with your healthcare provider, or as advised.   When to get medical advice  Call your healthcare provider right away if:     You have a fever of 100.4 F (38 C) or higher, or as directed by your provider.    Your symptoms get worse, or they don t go away within a few days of starting treatment.    You have new pain in the lower belly or pelvic region.    You have side effects that bother you or a reaction to the pills or cream you re prescribed.    You or any of your sex partners have new symptoms, such as a rash, joint pain, or sores.  [employer] last reviewed this educational content on 6/1/2020 2000-2021 [employer]. All rights reserved. This information is not intended as a substitute for professional medical care. Always follow your healthcare professional's instructions.

## 2022-11-03 ENCOUNTER — VIRTUAL VISIT (OUTPATIENT)
Dept: ONCOLOGY | Facility: CLINIC | Age: 47
End: 2022-11-03
Attending: CLINICAL NURSE SPECIALIST
Payer: COMMERCIAL

## 2022-11-03 DIAGNOSIS — Z80.3 FAMILY HISTORY OF MALIGNANT NEOPLASM OF BREAST: ICD-10-CM

## 2022-11-03 DIAGNOSIS — R92.30 DENSE BREAST: ICD-10-CM

## 2022-11-03 DIAGNOSIS — Z12.39 BREAST CANCER SCREENING, HIGH RISK PATIENT: Primary | ICD-10-CM

## 2022-11-03 PROBLEM — K63.5 POLYP OF COLON: Status: ACTIVE | Noted: 2022-06-24

## 2022-11-03 PROBLEM — D12.2 BENIGN NEOPLASM OF ASCENDING COLON: Status: ACTIVE | Noted: 2022-06-28

## 2022-11-03 PROCEDURE — 99205 OFFICE O/P NEW HI 60 MIN: CPT | Mod: GT | Performed by: CLINICAL NURSE SPECIALIST

## 2022-11-03 NOTE — NURSING NOTE
Patient denies any changes since echeck-in regarding medication and allergies and states all information entered during echeck-in remains accurate.    Devonte Figueroa, Visit Facilitator/MA.

## 2022-11-03 NOTE — PATIENT INSTRUCTIONS
Individualized Surveillance Plan for women  With 20% or greater lifetime risk of breast cancer   Per NCCN Breast Cancer Screening and Diagnosis Guidelines Version 1.2021   Recommended screening Test or procedure Last done Next Scheduled    Clinical encounter Clinical exam every 6-12 months.   Refer to genetic counseling if not already done.  Consider risk reduction strategies. Exam deferred   April 2023   However, some family histories with breast cancers at a very young age, may warrant screening starting earlier.    *May begin at age 40 if breast cancers in the family occur at later ages.    Annual mammogram beginning 10 years younger than the earliest breast cancer in the family but not prior to age 30.    Recommend annual breast MRI to begin 10 years younger than the earliest breast cancer in the family but not prior to age 25.    Breast MRIs are preferably done on day 7-15 of the menstrual cycle in premenopausal women.   Biopsy July, left breast, benign    4/27/2022- Screening mammogram, BiRads1   April 2023- exam followed by mammogram (late April/early May    Breast MRI in October 2023   Breast screening for patients at high risk due to thoracic radiation between the ages of 10-30   Annual clinical exam beginning 8 years after radiation therapy.    Annual screening mammogram beginning at age 30 or 8 years after radiation therapy    Annual breast MRI, beginning at age 25 or 8 years after radiation therapy.       NA     NA   Women who have a lifetime risk of >20% based on history of LCIS or ADH/ALH Annual screening mammogram beginning at age of LCIS or ADH/ALH but not prior to age 30.    Consider annual MRI to begin at age of diagnosis of LCIS or ADH/ALH but not prior to age 25.    Consider risk reducing strategies.     NA     NA    Recommend risk reducing strategies for women with 1.7% 5 year risk of breast cancer.

## 2022-11-03 NOTE — LETTER
11/3/2022         RE: Jenise Smith  [address] [address]        Dear Colleague,    Thank you for referring your patient, Jenise Smith, to the [facility]. Please see a copy of my visit note below.      Oncology Risk Management Consultation:  Date on this visit: 11/3/2022    Jenise Smith  is referred by Richard Fernandez for an oncology risk management consultation. She requires high risk screening and surveillance to reduce her risk of cancer secondary to having a family history of breast cancer in her paternal grandmother in her 70s and paternal cousin at 38. She is considered to be at high risk for breast cancer and has a 24.6%  lifetime risk for breast cancer by the GONZALO model.     Primary Physician:  Mirna Sheehan MD    History Of Present Illness:  Ms. Smith is a 47 year old female who presents with a family history of breast cancer.    Pertinent history:  Menarche at age 13  First child at age 30  Premenopausal.   No hx of breastfeeding  Breast density: extremely dense  Ovaries, fallopian tubes and uterus in place  No hx of  hormone replacement therapy.    2022- Left breast biopsy, benign breast tissue     Hx of  Colonoscopies since the age of 47.   colonoscopy in 2022 found two 5-10 mm polyps, the larger of which was identified as a sessile serrated adenoma and the other was a hyperplastic polyp. Follow-up was recommended in 3 years, 2025.    Genetic testin22 -- Genetic Testing Result: NEGATIVE  Jenise is negative for mutations in APC, NICK, AXIN2, BARD1, BMPR1A, BRCA1, BRCA2, BRIP1, CDH1, CDK4, CDKN2A, CHEK2, CTNNA1, DICER1, EPCAM, GREM1, HOXB13, KIT, MEN1, MLH1, MSH2, MSH3, MSH6, MUTYH, NBN, NF1, NTHL1, PALB2, PDGFRA, PMS2, POLD1, POLE, PTEN, RAD50, RAD51C, RAD51D, SDHA, SDHB, SDHC, SDHD, SMAD4, SMARCA4, STK11, TP53, TSC1, TSC2, and VHL. No mutations were found in any of the 47 genes analyzed. This test involved  sequencing and deletion/duplication analysis of all genes with the exceptions of EPCAM and GREM1 (deletions/duplications only) and SDHA (sequencing only) identified using the Common Hereditary Cancers panel was ordered from [employer]. This testing was done because of Jenise's family history of breast, colon, prostate, and small bowel cancer.     Pertinent screening history:  4/27/2022- Screening tomosynthesis mammogram, BiRads1    7/20/2022- Breast MRI, BiRads0 for Clumped non mass enhancement in the upper inner left breast. While this may represent normal background parenchymal enhancement, it should be further characterized with ultrasound. If a suspicious ultrasound detected abnormality is found, biopsy could be   performed.  Right Breast: BI-RADS CATEGORY: 1 -  NEGATIVE.   Left Breast: BI-RADS CATEGORY: 0 - Need Additional Imaging Evaluation and/or Prior Mammograms for Comparison.     7/28/2022- Left breast US, In the left breast at 11:00 12 cm from nipple there is an island of  fibroglandular tissue corresponding to the MRI finding. There is no  sonographic evidence of suspicious mass or shadowing. However, given  the increased enhancement on recent MRI, ultrasound-guided biopsy of  this finding is recommended to ensure benignity. This was discussed  with patient, who understands and agrees with the plan. BI-RADS CATEGORY: 4 A- Suspicious Abnormality-Biopsy Should Be Considered RECOMMENDED FOLLOW-UP: Biopsy.    At this visit, she denies new fatigue, breast pain, asymmetry, masses, thickening, nipple discharge and skin changes in her breasts. She notes areas of lumps, which were previously identified as cysts.      Past Medical/Surgical History:  Past Medical History:   Diagnosis Date     Diabetes mellitus type 1 (H)      Dysmenorrhea      Excessive or frequent menstruation      Hypothyroid      Insulin pump in place      Lumbar back pain      Other and unspecified hyperlipidemia      PONV (postoperative nausea  and vomiting)      Past Surgical History:   Procedure Laterality Date     DILATION AND CURETTAGE, HYSTEROSCOPY, ABLATE ENDOMETRIUM NOVASURE, COMBINED  1/23/2014    Procedure: COMBINED DILATION AND CURETTAGE, HYSTEROSCOPY, ABLATE ENDOMETRIUM NOVASURE;  HYSTEROSCOPY, DILATION, CURETTAGE, WITH NOVASURE ABLATION, MYOSURE MORCELLATOR;  Surgeon: Swetha Queen MD;  Location: [facility]     LAPAROSCOPY       LEEP TX, CERVICAL       OPERATIVE HYSTEROSCOPY WITH MORCELLATOR  1/23/2014    Procedure: OPERATIVE HYSTEROSCOPY WITH MORCELLATOR;;  Surgeon: Swetha Queen MD;  Location: [facility]     wisdom teeth[         Allergies:  Allergies as of 11/03/2022 - Reviewed 11/03/2022   Allergen Reaction Noted     Keflex [cephalexin]  03/26/2020     Latex Rash 08/18/2008       Current Medications:  Current Outpatient Medications   Medication Sig Dispense Refill     blood glucose (ACCU-CHEK FASTCLIX) lancing device 1 Dose once daily.       CONTOUR NEXT TEST test strip USE 1 STRIP 6 TIMES DAILY 600 strip 3     eszopiclone (LUNESTA) 2 MG tablet Take 1 tablet (2 mg) by mouth nightly as needed for sleep 30 tablet 0     FLUoxetine (PROZAC) 20 MG capsule Take 1 capsule (20 mg) by mouth daily 90 capsule 1     Glucagon (BAQSIMI TWO PACK) 3 MG/DOSE POWD Spray 3 mg in nostril once as needed (severe hypoglycemia reaction, dose as directed) 1 each 3     Insulin Disposable Pump (OMNIPOD 5 G6 INTRO, GEN 5,) KIT 1 Device continuous prn (Use as Controller (PDM) device for Omnipod 5 pods) 1 kit 0     Insulin Disposable Pump (OMNIPOD 5 G6 POD, GEN 5,) MISC 1 Device continuous prn (Use for insulin delivery with Omnipod 5 Controller (PDM)) Changes pods every 3 days, needs 6 boxes of 5 pods every 90 days 2 each 11     insulin glargine (LANTUS SOLOSTAR) 100 UNIT/ML pen INJECT SUBCUTANEOUSLY 16  UNITS DAILY AS DIRECTED 15 mL 3     insulin lispro (HUMALOG KWIKPEN) 100 UNIT/ML (1 unit dial) KWIKPEN INJECT 3 TO 8 UNITS  SUBCUTANEOUSLY  WITH MEALS  AND CORRECTION DOSES AS  DIRECTED, TOTAL DAILY DOSE  APPROXIMATELY 30 UNITS if the pump fails 15 mL 0     insulin lispro (HUMALOG VIAL) 100 UNIT/ML vial Use with insulin pump, total daily dose approx 70 units 70 mL 3     INSULIN PUMP - OUTPATIENT Date Last Updated: 12-3-21  Omnipod  BASAL RATES and times:  12   AM (midnight): 0.65 units/hour    3     AM: 0.60 units/hour   4     AM: 0.65 units/hour   6     AM: 0.70 units/hour   10   AM: 0.45 units/hour   1:30 PM: 0.65 units/hour     CARB RATIO and times:  12   AM (midnight): 12  11   AM: 9  5    PM:  8.5  Corection Factor (Sensitivity) and times:  12   AM (midnight): 60 mg/dL  8     AM: 55 mg/dL  9    PM: 60 mg/dL  BLOOD GLUCOSE TARGET/Correct Above and times:  12   AM (midnight): 100 - 130  6     AM:  90 - 120  9    PM:  100 - 140  Active Insulin Time: 3 hours    Sensor:  Yes: DexCom  Pump Website Username: Connected to [facility]  Pump Website Password: Connected to [facility]       levothyroxine (SYNTHROID/LEVOTHROID) 50 MCG tablet TAKE 1 TABLET BY MOUTH  DAILY 90 tablet 3     metroNIDAZOLE (METROGEL) 0.75 % vaginal gel Place 1 applicator (5 g) vaginally At Bedtime for 5 days 70 g 0     Multiple Vitamins-Minerals (MULTI FOR HER PO)        VITAMIN D, CHOLECALCIFEROL, PO Take 1,000 Units by mouth          Family History:  Family History   Problem Relation Age of Onset     Diabetes Mother         type 2     Obesity Mother      Diabetes Father         type 1     Diabetes Sister         type 1     Thyroid Disease Sister      Diabetes Sister      Prostate Cancer Maternal Grandfather 80     Breast Cancer Paternal Grandmother 70         in 80s     Prostate Cancer Paternal Grandfather      Brain Cancer Paternal Grandfather 80     Cancer Maternal Uncle 60        small bowel     Cancer Maternal Uncle 60        tonsil     Cancer - colorectal Paternal Uncle 60     Breast Cancer Paternal Cousin 38       Social History:  Social History     Socioeconomic  History     Marital status:      Spouse name: Conrad     Number of children: 1     Years of education: Not on file     Highest education level: Not on file   Occupational History     Employer: [employer]   Tobacco Use     Smoking status: Never     Smokeless tobacco: Never   Substance and Sexual Activity     Alcohol use: Yes     Alcohol/week: 0.0 standard drinks     Comment: 3 per week     Drug use: No     Sexual activity: Yes     Partners: Male     Birth control/protection: Male Surgical   Other Topics Concern     Parent/sibling w/ CABG, MI or angioplasty before 65F 55M? No   Social History Narrative     in May 2014    2 step children (20 and 18) and one biol dtr age 15.    Works for [employer]--works from home     Social Determinants of Health     Financial Resource Strain: Not on file   Food Insecurity: Not on file   Transportation Needs: Not on file   Physical Activity: Not on file   Stress: Not on file   Social Connections: Not on file   Intimate Partner Violence: Not on file   Housing Stability: Not on file         Physical Exam:  There were no vitals taken for this visit.  GENERAL: Healthy, alert and no distress  EYES: Eyes grossly normal to inspection.  No discharge or erythema, or obvious scleral/conjunctival abnormalities.  RESP: No audible wheeze, cough, or visible cyanosis.  No visible retractions or increased work of breathing.    SKIN: Visible skin clear. No significant rash, abnormal pigmentation or lesions.  NEURO: Cranial nerves grossly intact.  Mentation and speech appropriate for age.  PSYCH: Mentation appears normal, affect normal/bright, judgement and insight intact, normal speech and appearance well-groomed.    Laboratory/Imaging Studies  No results found for any visits on 11/03/22.    ASSESSMENT  We discussed signs and symptoms to be watchful for in between visits.  She verbalized understanding of signs and symptoms to address with her health care providers in  between visits, including lumps, thickening, swelling, tenderness, nipple discharge or changes in skin of breasts.    We also discussed that a breast MRI would be recommended for her, with the understanding that insurance may or may not cover it entirely. The breast MRI in high-risk women has a higher sensitivity than mammography, and the combination of mammography and MRI in this population has the highest sensitivity. In a high-risk population, MRI and mammography combined have a higher sensitivity (92.7%) than US and mammography combined (52%) (Source: Jonah KOENIG, Kailash GLASS, Devin D, et al. Detection of breast cancer with addition of annual screening ultrasound or a single screening MRI to mammography in women with elevated breast cancer risk. OSMAR.2012;307(13):1680-1375. ) Therefore, in high-risk women for whom supplemental screening is indicated, MRI is recommended when possible. Screening high-risk women with breast MRI is cost-effective, and the cost-effectiveness of screening MRI increases with increasing breast cancer risk ( Ramakrishna et. al 2006 and Josephine et al. 2009). The National Comprehensive Cancer Society, American Cancer Society and American College of Radiologists recommend breast-screening MRI in high-risk women.    In her case, I have ordered a breast MRI (IMG 1045) using the diagnostic codes:  Breast Screening, high risk patient (Z12.39)  Dense Breasts (R92.2)  Family history of breast cancer (Z80.3)    She was advised to check with her insurance company and request a cost of care estimate using [employer]'s My Chart function.     We also discussed following  a healthy lifestyle plan recommended by both NCCN and the American Cancer Society that can reduce the risk of breast cancer:  1 Limit alcohol consumption to less than 1 drink per day (1 drink=5 oz.wine, 12 oz. Beer or 1.5 oz. 80-proof liquor).  2. Exercise per American Cancer Society guidelines of at least 150 minutes of moderate-intensity  activity or 75 minutes of vigorous activity each week. (Or a combination of both.) Exercise should be spread  out over the week. Regular recreational exercise has been shown to reduce the risk of cancer by 30%.   3. Maintain a healthy weight with a Body Mass Index between 19-24.9. A postmenopausal BMI of 30.7 has been shown to have increased the risk for breast cancer by 37% in some studies.  4. Do not use tobacco products and limit exposure to passive smoke.  5. Breastfeed if possible. Research shows that 16+ months of breastfeeding, over a lifetime, can reduce a woman's risk of breast cancer by up to 27%.    We discussed other prevention web sites including the American [employer] Research (aicr.org) and the Environmental Working Group (ewg.org.)    Individualized Surveillance Plan for women  With 20% or greater lifetime risk of breast cancer   Per NCCN Breast Cancer Screening and Diagnosis Guidelines Version 1.2021   Recommended screening Test or procedure Last done Next Scheduled    Clinical encounter Clinical exam every 6-12 months.   Refer to genetic counseling if not already done.  Consider risk reduction strategies. Exam deferred   April 2023   However, some family histories with breast cancers at a very young age, may warrant screening starting earlier.    *May begin at age 40 if breast cancers in the family occur at later ages.    Annual mammogram beginning 10 years younger than the earliest breast cancer in the family but not prior to age 30.    Recommend annual breast MRI to begin 10 years younger than the earliest breast cancer in the family but not prior to age 25.    Breast MRIs are preferably done on day 7-15 of the menstrual cycle in premenopausal women.   Biopsy July, left breast, benign    4/27/2022- Screening mammogram, BiRads1   April 2023- exam followed by mammogram (late April/early May    Breast MRI in October 2023   Breast screening for patients at high risk due to thoracic  radiation between the ages of 10-30   Annual clinical exam beginning 8 years after radiation therapy.    Annual screening mammogram beginning at age 30 or 8 years after radiation therapy    Annual breast MRI, beginning at age 25 or 8 years after radiation therapy.       NA     NA   Women who have a lifetime risk of >20% based on history of LCIS or ADH/ALH Annual screening mammogram beginning at age of LCIS or ADH/ALH but not prior to age 30.    Consider annual MRI to begin at age of diagnosis of LCIS or ADH/ALH but not prior to age 25.    Consider risk reducing strategies.     NA     NA    Recommend risk reducing strategies for women with 1.7% 5 year risk of breast cancer.       I spent a total of 56 minutes on the day of the visit. Please see the note for further information on patient assessment and treatment.    NANCY Bell-CNS, OCN, AGN-BC  Clinical Nurse Specialist  Cancer Risk Management Program  [facility]    CC: Mirna Sheehan MD

## 2022-11-03 NOTE — PROGRESS NOTES
Jenise is a 47 year old who is being evaluated via a billable video visit.      How would you like to obtain your AVS? MyChart  If the video visit is dropped, the invitation should be resent by: Send to e-mail at: [email or web address]  Will anyone else be joining your video visit? No    Devonte Figueroa, Visit Doris/MA.    Video-Visit Details    Video Start Time: 1403    Type of service:  Video Visit    Video End Time:1423    Originating Location (pt. Location): Home      Distant Location (provider location):  Off-site    Platform used for Video Visit: [facility]    Oncology Risk Management Consultation:  Date on this visit: 11/3/2022    Jenise Smith  is referred by Richard Fernandez for an oncology risk management consultation. She requires high risk screening and surveillance to reduce her risk of cancer secondary to having a family history of breast cancer in her paternal grandmother in her 70s and paternal cousin at 38. She is considered to be at high risk for breast cancer and has a 24.6%  lifetime risk for breast cancer by the GONZALO model.     Primary Physician:  Mirna Sheehan MD    History Of Present Illness:  Ms. Smith is a 47 year old female who presents with a family history of breast cancer.    Pertinent history:  Menarche at age 13  First child at age 30  Premenopausal.   No hx of breastfeeding  Breast density: extremely dense  Ovaries, fallopian tubes and uterus in place  No hx of  hormone replacement therapy.    2022- Left breast biopsy, benign breast tissue     Hx of  Colonoscopies since the age of 47.   colonoscopy in 2022 found two 5-10 mm polyps, the larger of which was identified as a sessile serrated adenoma and the other was a hyperplastic polyp. Follow-up was recommended in 3 years, 2025.    Genetic testin22 -- Genetic Testing Result: NEGATIVE  Jenise is negative for mutations in APC, NICK, AXIN2, BARD1, BMPR1A, BRCA1, BRCA2, BRIP1, CDH1, CDK4, CDKN2A, CHEK2,  CTNNA1, DICER1, EPCAM, GREM1, HOXB13, KIT, MEN1, MLH1, MSH2, MSH3, MSH6, MUTYH, NBN, NF1, NTHL1, PALB2, PDGFRA, PMS2, POLD1, POLE, PTEN, RAD50, RAD51C, RAD51D, SDHA, SDHB, SDHC, SDHD, SMAD4, SMARCA4, STK11, TP53, TSC1, TSC2, and VHL. No mutations were found in any of the 47 genes analyzed. This test involved sequencing and deletion/duplication analysis of all genes with the exceptions of EPCAM and GREM1 (deletions/duplications only) and SDHA (sequencing only) identified using the Common Hereditary Cancers panel was ordered from [employer]. This testing was done because of Jenise's family history of breast, colon, prostate, and small bowel cancer.     Pertinent screening history:  4/27/2022- Screening tomosynthesis mammogram, BiRads1    7/20/2022- Breast MRI, BiRads0 for Clumped non mass enhancement in the upper inner left breast. While this may represent normal background parenchymal enhancement, it should be further characterized with ultrasound. If a suspicious ultrasound detected abnormality is found, biopsy could be   performed.  Right Breast: BI-RADS CATEGORY: 1 -  NEGATIVE.   Left Breast: BI-RADS CATEGORY: 0 - Need Additional Imaging Evaluation and/or Prior Mammograms for Comparison.     7/28/2022- Left breast US, In the left breast at 11:00 12 cm from nipple there is an island of  fibroglandular tissue corresponding to the MRI finding. There is no  sonographic evidence of suspicious mass or shadowing. However, given  the increased enhancement on recent MRI, ultrasound-guided biopsy of  this finding is recommended to ensure benignity. This was discussed  with patient, who understands and agrees with the plan. BI-RADS CATEGORY: 4 A- Suspicious Abnormality-Biopsy Should Be Considered RECOMMENDED FOLLOW-UP: Biopsy.    At this visit, she denies new fatigue, breast pain, asymmetry, masses, thickening, nipple discharge and skin changes in her breasts. She notes areas of lumps, which were previously identified as  cysts.      Past Medical/Surgical History:  Past Medical History:   Diagnosis Date     Diabetes mellitus type 1 (H)      Dysmenorrhea      Excessive or frequent menstruation      Hypothyroid      Insulin pump in place      Lumbar back pain      Other and unspecified hyperlipidemia      PONV (postoperative nausea and vomiting)      Past Surgical History:   Procedure Laterality Date     DILATION AND CURETTAGE, HYSTEROSCOPY, ABLATE ENDOMETRIUM NOVASURE, COMBINED  1/23/2014    Procedure: COMBINED DILATION AND CURETTAGE, HYSTEROSCOPY, ABLATE ENDOMETRIUM NOVASURE;  HYSTEROSCOPY, DILATION, CURETTAGE, WITH NOVASURE ABLATION, MYOSURE MORCELLATOR;  Surgeon: Swetha Queen MD;  Location: [facility]     LAPAROSCOPY       LEEP TX, CERVICAL       OPERATIVE HYSTEROSCOPY WITH MORCELLATOR  1/23/2014    Procedure: OPERATIVE HYSTEROSCOPY WITH MORCELLATOR;;  Surgeon: Swetha Queen MD;  Location: [facility]     wisdom teeth[         Allergies:  Allergies as of 11/03/2022 - Reviewed 11/03/2022   Allergen Reaction Noted     Keflex [cephalexin]  03/26/2020     Latex Rash 08/18/2008       Current Medications:  Current Outpatient Medications   Medication Sig Dispense Refill     blood glucose (ACCU-CHEK FASTCLIX) lancing device 1 Dose once daily.       CONTOUR NEXT TEST test strip USE 1 STRIP 6 TIMES DAILY 600 strip 3     eszopiclone (LUNESTA) 2 MG tablet Take 1 tablet (2 mg) by mouth nightly as needed for sleep 30 tablet 0     FLUoxetine (PROZAC) 20 MG capsule Take 1 capsule (20 mg) by mouth daily 90 capsule 1     Glucagon (BAQSIMI TWO PACK) 3 MG/DOSE POWD Spray 3 mg in nostril once as needed (severe hypoglycemia reaction, dose as directed) 1 each 3     Insulin Disposable Pump (OMNIPOD 5 G6 INTRO, GEN 5,) KIT 1 Device continuous prn (Use as Controller (PDM) device for Omnipod 5 pods) 1 kit 0     Insulin Disposable Pump (OMNIPOD 5 G6 POD, GEN 5,) MISC 1 Device continuous prn (Use for insulin delivery with Omnipod 5  Controller (PDM)) Changes pods every 3 days, needs 6 boxes of 5 pods every 90 days 2 each 11     insulin glargine (LANTUS SOLOSTAR) 100 UNIT/ML pen INJECT SUBCUTANEOUSLY 16  UNITS DAILY AS DIRECTED 15 mL 3     insulin lispro (HUMALOG KWIKPEN) 100 UNIT/ML (1 unit dial) KWIKPEN INJECT 3 TO 8 UNITS  SUBCUTANEOUSLY WITH MEALS  AND CORRECTION DOSES AS  DIRECTED, TOTAL DAILY DOSE  APPROXIMATELY 30 UNITS if the pump fails 15 mL 0     insulin lispro (HUMALOG VIAL) 100 UNIT/ML vial Use with insulin pump, total daily dose approx 70 units 70 mL 3     INSULIN PUMP - OUTPATIENT Date Last Updated: 12-3-21  Omnipod  BASAL RATES and times:  12   AM (midnight): 0.65 units/hour    3     AM: 0.60 units/hour   4     AM: 0.65 units/hour   6     AM: 0.70 units/hour   10   AM: 0.45 units/hour   1:30 PM: 0.65 units/hour     CARB RATIO and times:  12   AM (midnight): 12  11   AM: 9  5    PM:  8.5  Corection Factor (Sensitivity) and times:  12   AM (midnight): 60 mg/dL  8     AM: 55 mg/dL  9    PM: 60 mg/dL  BLOOD GLUCOSE TARGET/Correct Above and times:  12   AM (midnight): 100 - 130  6     AM:  90 - 120  9    PM:  100 - 140  Active Insulin Time: 3 hours    Sensor:  Yes: DexCom  Pump Website Username: Connected to [facility]  Pump Website Password: [facility]cted to [facility]       levothyroxine (SYNTHROID/LEVOTHROID) 50 MCG tablet TAKE 1 TABLET BY MOUTH  DAILY 90 tablet 3     metroNIDAZOLE (METROGEL) 0.75 % vaginal gel Place 1 applicator (5 g) vaginally At Bedtime for 5 days 70 g 0     Multiple Vitamins-Minerals (MULTI FOR HER PO)        VITAMIN D, CHOLECALCIFEROL, PO Take 1,000 Units by mouth          Family History:  Family History   Problem Relation Age of Onset     Diabetes Mother         type 2     Obesity Mother      Diabetes Father         type 1     Diabetes Sister         type 1     Thyroid Disease Sister      Diabetes Sister      Prostate Cancer Maternal Grandfather 80     Breast Cancer Paternal Grandmother 70         in 80s      Prostate Cancer Paternal Grandfather      Brain Cancer Paternal Grandfather 80     Cancer Maternal Uncle 60        small bowel     Cancer Maternal Uncle 60        tonsil     Cancer - colorectal Paternal Uncle 60     Breast Cancer Paternal Cousin 38       Social History:  Social History     Socioeconomic History     Marital status:      Spouse name: Conrad     Number of children: 1     Years of education: Not on file     Highest education level: Not on file   Occupational History     Employer: [employer]   Tobacco Use     Smoking status: Never     Smokeless tobacco: Never   Substance and Sexual Activity     Alcohol use: Yes     Alcohol/week: 0.0 standard drinks     Comment: 3 per week     Drug use: No     Sexual activity: Yes     Partners: Male     Birth control/protection: Male Surgical   Other Topics Concern     Parent/sibling w/ CABG, MI or angioplasty before 65F 55M? No   Social History Narrative     in May 2014    2 step children (20 and 18) and one biol dtr age 15.    Works for [employer]--works from home     Social Determinants of Health     Financial Resource Strain: Not on file   Food Insecurity: Not on file   Transportation Needs: Not on file   Physical Activity: Not on file   Stress: Not on file   Social Connections: Not on file   Intimate Partner Violence: Not on file   Housing Stability: Not on file         Physical Exam:  There were no vitals taken for this visit.  GENERAL: Healthy, alert and no distress  EYES: Eyes grossly normal to inspection.  No discharge or erythema, or obvious scleral/conjunctival abnormalities.  RESP: No audible wheeze, cough, or visible cyanosis.  No visible retractions or increased work of breathing.    SKIN: Visible skin clear. No significant rash, abnormal pigmentation or lesions.  NEURO: Cranial nerves grossly intact.  Mentation and speech appropriate for age.  PSYCH: Mentation appears normal, affect normal/bright, judgement and insight  intact, normal speech and appearance well-groomed.    Laboratory/Imaging Studies  No results found for any visits on 11/03/22.    ASSESSMENT  We discussed signs and symptoms to be watchful for in between visits.  She verbalized understanding of signs and symptoms to address with her health care providers in between visits, including lumps, thickening, swelling, tenderness, nipple discharge or changes in skin of breasts.    We also discussed that a breast MRI would be recommended for her, with the understanding that insurance may or may not cover it entirely. The breast MRI in high-risk women has a higher sensitivity than mammography, and the combination of mammography and MRI in this population has the highest sensitivity. In a high-risk population, MRI and mammography combined have a higher sensitivity (92.7%) than US and mammography combined (52%) (Source: Jonah KOENIG, Kailash GLASS, Devin D, et al. Detection of breast cancer with addition of annual screening ultrasound or a single screening MRI to mammography in women with elevated breast cancer risk. OSMAR.2012;307(13):7802-5826. ) Therefore, in high-risk women for whom supplemental screening is indicated, MRI is recommended when possible. Screening high-risk women with breast MRI is cost-effective, and the cost-effectiveness of screening MRI increases with increasing breast cancer risk ( Austinitis et. al 2006 and Josephine et al. 2009). The National Comprehensive Cancer Society, American Cancer Society and American College of Radiologists recommend breast-screening MRI in high-risk women.    In her case, I have ordered a breast MRI (IMG 1045) using the diagnostic codes:  Breast Screening, high risk patient (Z12.39)  Dense Breasts (R92.2)  Family history of breast cancer (Z80.3)    She was advised to check with her insurance company and request a cost of care estimate using [employer]'s My Chart function.     We also discussed following  a healthy lifestyle plan recommended by  both NCCN and the American Cancer Society that can reduce the risk of breast cancer:  1 Limit alcohol consumption to less than 1 drink per day (1 drink=5 oz.wine, 12 oz. Beer or 1.5 oz. 80-proof liquor).  2. Exercise per American Cancer Society guidelines of at least 150 minutes of moderate-intensity activity or 75 minutes of vigorous activity each week. (Or a combination of both.) Exercise should be spread  out over the week. Regular recreational exercise has been shown to reduce the risk of cancer by 30%.   3. Maintain a healthy weight with a Body Mass Index between 19-24.9. A postmenopausal BMI of 30.7 has been shown to have increased the risk for breast cancer by 37% in some studies.  4. Do not use tobacco products and limit exposure to passive smoke.  5. Breastfeed if possible. Research shows that 16+ months of breastfeeding, over a lifetime, can reduce a woman's risk of breast cancer by up to 27%.    We discussed other prevention web sites including the American [place] for Cancer Research (aicr.org) and the Environmental Working Group (ewg.org.)    Individualized Surveillance Plan for women  With 20% or greater lifetime risk of breast cancer   Per NCCN Breast Cancer Screening and Diagnosis Guidelines Version 1.2021   Recommended screening Test or procedure Last done Next Scheduled    Clinical encounter Clinical exam every 6-12 months.   Refer to genetic counseling if not already done.  Consider risk reduction strategies. Exam deferred   April 2023   However, some family histories with breast cancers at a very young age, may warrant screening starting earlier.    *May begin at age 40 if breast cancers in the family occur at later ages.    Annual mammogram beginning 10 years younger than the earliest breast cancer in the family but not prior to age 30.    Recommend annual breast MRI to begin 10 years younger than the earliest breast cancer in the family but not prior to age 25.    Breast MRIs are preferably  done on day 7-15 of the menstrual cycle in premenopausal women.   Biopsy July, left breast, benign    4/27/2022- Screening mammogram, BiRads1   April 2023- exam followed by mammogram (late April/early May    Breast MRI in October 2023   Breast screening for patients at high risk due to thoracic radiation between the ages of 10-30   Annual clinical exam beginning 8 years after radiation therapy.    Annual screening mammogram beginning at age 30 or 8 years after radiation therapy    Annual breast MRI, beginning at age 25 or 8 years after radiation therapy.       NA     NA   Women who have a lifetime risk of >20% based on history of LCIS or ADH/ALH Annual screening mammogram beginning at age of LCIS or ADH/ALH but not prior to age 30.    Consider annual MRI to begin at age of diagnosis of LCIS or ADH/ALH but not prior to age 25.    Consider risk reducing strategies.     NA     NA    Recommend risk reducing strategies for women with 1.7% 5 year risk of breast cancer.       I spent a total of 56 minutes on the day of the visit. Please see the note for further information on patient assessment and treatment.    NANCY Bell-CNS, OCN, AGN-BC  Clinical Nurse Specialist  Cancer Risk Management Program  [facility] [place]    CC: Mirna Sheehan MD

## 2022-11-04 ENCOUNTER — OFFICE VISIT (OUTPATIENT)
Dept: FAMILY MEDICINE | Facility: CLINIC | Age: 47
End: 2022-11-04
Payer: COMMERCIAL

## 2022-11-04 ENCOUNTER — TELEPHONE (OUTPATIENT)
Dept: WOUND CARE | Facility: CLINIC | Age: 47
End: 2022-11-04

## 2022-11-04 VITALS
HEIGHT: 67 IN | OXYGEN SATURATION: 98 % | DIASTOLIC BLOOD PRESSURE: 60 MMHG | TEMPERATURE: 97.9 F | SYSTOLIC BLOOD PRESSURE: 113 MMHG | RESPIRATION RATE: 14 BRPM | BODY MASS INDEX: 24.25 KG/M2 | WEIGHT: 154.5 LBS | HEART RATE: 65 BPM

## 2022-11-04 DIAGNOSIS — L97.529 ULCER OF LEFT GREAT TOE DUE TO DIABETES MELLITUS (H): ICD-10-CM

## 2022-11-04 DIAGNOSIS — S91.109S OPEN TOE WOUND, SEQUELA: ICD-10-CM

## 2022-11-04 DIAGNOSIS — Z23 HIGH PRIORITY FOR 2019-NCOV VACCINE: ICD-10-CM

## 2022-11-04 DIAGNOSIS — M79.675 PAIN OF TOE OF LEFT FOOT: ICD-10-CM

## 2022-11-04 DIAGNOSIS — Z23 NEED FOR PROPHYLACTIC VACCINATION AND INOCULATION AGAINST INFLUENZA: ICD-10-CM

## 2022-11-04 DIAGNOSIS — E10.9 TYPE 1 DIABETES MELLITUS WITHOUT COMPLICATION (H): ICD-10-CM

## 2022-11-04 DIAGNOSIS — E10.9 TYPE 1 DIABETES MELLITUS WITHOUT COMPLICATION (H): Primary | ICD-10-CM

## 2022-11-04 DIAGNOSIS — S91.122D: Primary | ICD-10-CM

## 2022-11-04 DIAGNOSIS — E11.621 ULCER OF LEFT GREAT TOE DUE TO DIABETES MELLITUS (H): ICD-10-CM

## 2022-11-04 LAB
BASOPHILS # BLD AUTO: 0.1 10E3/UL (ref 0–0.2)
BASOPHILS NFR BLD AUTO: 1 %
CRP SERPL-MCNC: <3 MG/L
EOSINOPHIL # BLD AUTO: 0.2 10E3/UL (ref 0–0.7)
EOSINOPHIL NFR BLD AUTO: 3 %
ERYTHROCYTE [DISTWIDTH] IN BLOOD BY AUTOMATED COUNT: 12.3 % (ref 10–15)
HCT VFR BLD AUTO: 39.3 % (ref 35–47)
HGB BLD-MCNC: 13.2 G/DL (ref 11.7–15.7)
IMM GRANULOCYTES # BLD: 0 10E3/UL
IMM GRANULOCYTES NFR BLD: 0 %
LYMPHOCYTES # BLD AUTO: 1.3 10E3/UL (ref 0.8–5.3)
LYMPHOCYTES NFR BLD AUTO: 25 %
MCH RBC QN AUTO: 32.1 PG (ref 26.5–33)
MCHC RBC AUTO-ENTMCNC: 33.6 G/DL (ref 31.5–36.5)
MCV RBC AUTO: 96 FL (ref 78–100)
MONOCYTES # BLD AUTO: 0.8 10E3/UL (ref 0–1.3)
MONOCYTES NFR BLD AUTO: 15 %
NEUTROPHILS # BLD AUTO: 2.9 10E3/UL (ref 1.6–8.3)
NEUTROPHILS NFR BLD AUTO: 56 %
PLATELET # BLD AUTO: 183 10E3/UL (ref 150–450)
RBC # BLD AUTO: 4.11 10E6/UL (ref 3.8–5.2)
WBC # BLD AUTO: 5.1 10E3/UL (ref 4–11)

## 2022-11-04 PROCEDURE — 90686 IIV4 VACC NO PRSV 0.5 ML IM: CPT | Performed by: INTERNAL MEDICINE

## 2022-11-04 PROCEDURE — 36415 COLL VENOUS BLD VENIPUNCTURE: CPT | Performed by: INTERNAL MEDICINE

## 2022-11-04 PROCEDURE — 86140 C-REACTIVE PROTEIN: CPT | Performed by: INTERNAL MEDICINE

## 2022-11-04 PROCEDURE — 85025 COMPLETE CBC W/AUTO DIFF WBC: CPT | Performed by: INTERNAL MEDICINE

## 2022-11-04 PROCEDURE — 91312 COVID-19,PF,PFIZER BOOSTER BIVALENT: CPT | Performed by: INTERNAL MEDICINE

## 2022-11-04 PROCEDURE — 0124A COVID-19,PF,PFIZER BOOSTER BIVALENT: CPT | Performed by: INTERNAL MEDICINE

## 2022-11-04 PROCEDURE — 99214 OFFICE O/P EST MOD 30 MIN: CPT | Mod: 25 | Performed by: INTERNAL MEDICINE

## 2022-11-04 PROCEDURE — 90471 IMMUNIZATION ADMIN: CPT | Performed by: INTERNAL MEDICINE

## 2022-11-04 ASSESSMENT — PAIN SCALES - GENERAL: PAINLEVEL: NO PAIN (0)

## 2022-11-04 NOTE — PATIENT INSTRUCTIONS
Make appointment to get PCV 20  Flu shot and Covid booster today  Make appointment with podiatry  Make appointment with wound care clinic   Follow up with PCP  Seek sooner medical attention if there is any worsening of symptoms or problems.

## 2022-11-04 NOTE — TELEPHONE ENCOUNTER
Pt calling to schedule wound appt.   Please look at referral from Dr. Corbett and advise.   Thanks

## 2022-11-04 NOTE — TELEPHONE ENCOUNTER
Consult received via Phone from Dr. Anali Liu for wound of the Left great toe.    Please schedule with Dr. Marquis, Dr. Azevedo, Dr. Cat, or Norma BEAR at [facility] at next available appointment.    Patient has history of diabetes. Per Standing Order patient qualifies for JEFFY to be scheduled. JEFFY is preferred to be completed before visit with provider but not required.    Is patient a WILLI lift?  PLEASE INQUIRE WHEN MAKING THE APPOINTMENT AND PUT IN APPOINTMENT NOTES    Order and pend Bilateral JEFFY without exercise with note to be scheduled by [facility] staff only and include toe pressures.      Once the provider appointment is made please route to [facility] Appointment Scheduling Pool to schedule the JEFFY appointment.

## 2022-11-04 NOTE — PROGRESS NOTES
Assessment & Plan     Jenise was seen today for toe injury, imm/inj and imm/inj.    Diagnoses and all orders for this visit:    Laceration of left great toe with foreign body without damage to nail, subsequent encounter  -     CBC with platelets and differential; Future  -     CRP, inflammation; Future  -     Orthopedic  Referral; Future  -     Wound Care Referral; Future  -     CBC with platelets and differential  -     CRP, inflammation  Patient got injury [place] was [place]  She was seen in urgent care  She did not go to urgent care at that time when the injury happened  I looked at a picture of the wound  It would have healed faster if she would have gone to get his teaching done  It is healing very slowly  Recently she got antibiotic in urgent care which she finished  The wound is a still open  It is about 1 cm and few millimeter wide  No pus or drainage  To is a still slightly swollen  I referred her to wound care clinic as patient has type 1 diabetes  Referred her to podiatry  Continue to clean with soap and water  Use bacitracin    Open toe wound, sequela  -     Orthopedic  Referral; Future  -     Wound Care Referral; Future    Type 1 diabetes mellitus without complication (H)  -     Orthopedic  Referral; Future  -     Wound Care Referral; Future  She is on insulin pump  Her diabetes is under excellent control    Pain of toe of left foot  -     CBC with platelets and differential; Future  -     CRP, inflammation; Future  -     Orthopedic  Referral; Future  -     Wound Care Referral; Future  -     CBC with platelets and differential  -     CRP, inflammation    High priority for 2019-nCoV vaccine  -     COVID-19,PF,PFIZER BOOSTER BIVALENT 12+Yrs    Need for prophylactic vaccination and inoculation against influenza  -     INFLUENZA VACCINE IM > 6 MONTHS VALENT IIV4 (AFLURIA/FLUZONE)      Finished antibiotics   Was seen in UC     See Patient  "Instructions  Patient Instructions   Make appointment to get PCV 20  Flu shot and Covid booster today  Make appointment with podiatry  Make appointment with wound care clinic   Follow up with PCP  Seek sooner medical attention if there is any worsening of symptoms or problems.       No follow-ups on file.    Anali Liu MD  [facility] ANUJA Burden is a 47 year old, presenting for the following health issues:  Toe Injury, Imm/Inj (COVID-19 VACCINE), and Imm/Inj (Flu Shot)      History of Present Illness       Reason for visit:  Toe injury  Symptom onset:  3-4 weeks ago  Symptom intensity:  Moderate  Symptom progression:  Staying the same  Had these symptoms before:  No    She eats 2-3 servings of fruits and vegetables daily.She consumes 0 sweetened beverage(s) daily.She exercises with enough effort to increase her heart rate 30 to 60 minutes per day.  She exercises with enough effort to increase her heart rate 5 days per week.   She is taking medications regularly.       In [facility]  Injured   Cleaned hydrogen peroxide       Review of Systems   Constitutional, HEENT, cardiovascular, pulmonary, GI, , musculoskeletal, neuro, skin, endocrine and psych systems are negative, except as otherwise noted.      Objective    /60 (BP Location: Right arm, Patient Position: Sitting, Cuff Size: Adult Regular)   Pulse 65   Temp 97.9  F (36.6  C) (Oral)   Resp 14   Ht 1.702 m (5' 7.01\")   Wt 70.1 kg (154 lb 8 oz)   SpO2 98%   BMI 24.19 kg/m    Body mass index is 24.19 kg/m .  Physical Exam   She is very nice and pleasant.  She is comfortable and not in any kind of distress.  She is fully alert awake oriented.  Toe on left medial side has small laceration 1 cm  Slight wide  Toe is swollen  Healing slowly            "

## 2022-11-05 NOTE — RESULT ENCOUNTER NOTE
Chris Burden,    This is to inform you regarding your test result.    C-reactive protein which is test to check for inflammation is normal  CBC result which includes white count Hemoglobin and  Platelet Counts is normal.           Sincerely,      Dr.Nasima Alexa MD,FACP

## 2022-11-08 ENCOUNTER — OFFICE VISIT (OUTPATIENT)
Dept: PODIATRY | Facility: CLINIC | Age: 47
End: 2022-11-08
Attending: INTERNAL MEDICINE
Payer: COMMERCIAL

## 2022-11-08 VITALS — BODY MASS INDEX: 24.11 KG/M2 | WEIGHT: 154 LBS | SYSTOLIC BLOOD PRESSURE: 108 MMHG | DIASTOLIC BLOOD PRESSURE: 62 MMHG

## 2022-11-08 DIAGNOSIS — M79.675 PAIN OF TOE OF LEFT FOOT: ICD-10-CM

## 2022-11-08 DIAGNOSIS — E10.9 TYPE 1 DIABETES MELLITUS WITHOUT COMPLICATION (H): ICD-10-CM

## 2022-11-08 DIAGNOSIS — S91.109S OPEN TOE WOUND, SEQUELA: ICD-10-CM

## 2022-11-08 DIAGNOSIS — S91.122D: ICD-10-CM

## 2022-11-08 PROCEDURE — 99213 OFFICE O/P EST LOW 20 MIN: CPT | Performed by: PODIATRIST

## 2022-11-08 NOTE — PATIENT INSTRUCTIONS
Thank you for choosing [facility] Podiatry / Foot & Ankle Surgery!    DR COSME'S CLINIC:  [facility]   [address]   [place] [address]      TRIAGE LINE: [phone]  APPOINTMENTS: [phone]  RADIOLOGY: [phone]  SET UP SURGERY: [phone]  FAX NUMBER: [phone]  BILLING QUESTIONS: [phone]       Follow up: 3 weeks       FOOT ULCER (WOUND) EDUCATION  Ulceration ofthe foot involves a break or hole in the skin. Skin is our best protection against infection. Skin is quite durable, however, the underlying tissues are fragile. For this reason, the wound is likely to deepen rapidly. Deep wounds usually get infected and require amputation. Prompt healing is therefore essential to avoid limb loss.     Foot ulcers do not heal without intervention. Walking on the foot and living your normal life is not typically compatible with healing the sore. Successful healing will require several months of significant alteration of your daily activities.   Ulcer complications frequently develop. This primarily includes infection of skin, which then spreads deep into your joints, bones and tendons. Spreading infection may travel up your leg and into other parts of your body. Deep infection is usually treated with amputation ofpart ofyour foot or your leg. Signs of infection include fever, chills, nausea, vomiting, erratic blood sugars, local redness, pus, strong odor and localized warmth. Signs of infection should be taken seriously. Prompt evaluation in the clinic or hospital emergency room is required.   Ulcer treatment requires debridement or surgical removal of devitalized tissue. Your doctor will trim away callused, moistened, unhealthy tissue from the wound surface and margin. This helps to clean the wound and allows proper inspection. Debridement also stimulates healing even though the wound originally appears larger. Expect some bleeding with each debridement. You will be given  instruction regarding wound bandaging. This often includes ointment and gauze. Avoid tape directly on the skin. Hand washing is essential since most infections will come from your fingertips. Ulcer care requires a no touch technique. Your fingers should not touch the margin or base of the wound.    HELPFUL HEALING TIPS:  1. Debridement: Getting rid of bad tissue makes way for good tissue to promote healing  2. Addressing Foot Deformities: Hammertoes and bunions can cause increased pressure  3. Pressure Reduction: If pressure remains to the wound, it won't heal  4. Good Pulses: If bloodflow is not getting to the foot, the ulcer will not heal  5. Good Nutrition: If you are not getting proper nutrition your body can't heal.Protein! At least 90g a day.  Supplements are a good way to help get this, such as Daquan, Glucerna, Ensure. Also taking 5000units of Vitamin D a day.   6. Infection Control: Keep the ulcer clean with wound cleanser. DO NOT SOAK IT!  7. Moisture Control: Keep edema down and make sure that drainage is getting pulled away from the ulcer    IMPORTANCE OF DEBRIDEMENT   Reduces bioburden to help control or reduce infection. Even if an ulcer is not  infected,  the bacterial bioburden causes increased local inflammation.   Allows more accurate visualization of the wound base and edges, which allows for more precise staging.   Removes necrotic/non-viable tissue, which impedes wound healing, causes protein loss and can be a nidus for infection.   Stimulates new circulation (angiogenesis) and allows adequate oxygen delivery to the wound.   Removes undermining and tunneling, and may help reduce abscess formation.   Releases healing growth factors at the edge of the wound.   Prepares the wound bed by leaving only tissues that are capable of regenerating. DIABETES AND YOUR FEET  Diabetes can result in several problems in the feet including ulcers (open sores) and amputations. Two of the most important reasons why  "people develop foot problems when they have diabetes is : 1. Neuropathy (loss of feeling)  2. Vascular disease (loss or decrease of blood flow).    Neuropathy is a term used to describe a loss of nerve function.  Patients with diabetes are at risk of developing neuropathy if their sugars continue to run high and are above the normal value. One theory for neuropathy is that the \"extra\" sugar in the body enters the nerves and is broken down. These by-products build up in the nerve causing it to swell and impairing nerve function. Often times, this can be prevented by controlling your sugars, dieting and exercise.    When a person develops neuropathy, they usually begin to feel numbness or tingling in their feet and sometime in their legs.  Other symptoms may include painful burning or hot feet, tingling or feeling like insects or ants are crawling on your feet or legs.  If the diabetes is sever and the sugars run high for long periods of time, neuropathy can also occur in the hands.    Vascular disease  is a term used to describe a loss or decrease in circulation (blood flow). There is a problem in getting blood and oxygen to areas that need it. Similar to neuropathy, sugars can build up in the walls of the arteries (blood vessels) and cause them to become swollen, thickened and hardened. This decreases the amount of blood that can go to an area that needs it. Though this is common in the legs of diabetic patients, it can also affect other arteries (blood vessels) in the body such as in the heart and eyes.    In the legs, vascular disease usually results in cramping. Patients who develop leg cramps after walking the same distance every time (i.e. One block, half a mile, ect.) need to let their doctors know so that their circulation may be checked. Cramps causing severe pain in the feet and/or legs while sleeping and the cramps go away when you stand or hang your legs off the side of the bed, may also be a sign of poor " "blood circulation.  Occasional cramping in cold weather or on rare occasions with activity may not be due to poor circulation, but you should inform your doctor.    PREVENTION OF THESE DISEASES  The key to prevention is good blood sugar control. Poor blood sugar control is a big reason many of these problems start. Physical activity (exercise) is a very good way to help decrease your blood sugars. Exercise can lower your blood sugar, blood pressure, and cholesterol. It also reduces your risk for heart disease and stroke, relieves stress, and strengthens your heart, muscles and bones.  In addition, regular activity helps insulin work better, improves your blood circulation, and keeps your joints flexible. If you're trying to lose weight, a combination of exercise and wise food choices can help you reach your target weight and maintain it.      PAIN MANAGEMENT (**Please speak with your primary doctor about any medications**)  1.Blood Sugar Control - Most important  2. Medications such as:  Amytriptylline, duloxetine, gabapentin, lyrica, tramadol (talk with your primary care doctor about this).     NUTRITION:  Nutrition is also important to help with healing. If your body does not have what it needs, it can't heal.   Increasing your protein intake is important.  With wounds you need 60-90gm of protein a day to help with healing. Over the counter protein shakes such as Daquan, Glucerna, Ensure, ect... can help to supplement your daily protein intake.   It is also important to take Vitamins to help with healing.  Vitamins such as B12, B6 and Vitamin D3 are important for healing. These can be gotten over the counter at pharmacies or at stores like [employer] or the [employer].    I can also prescribe a dietary supplement called \"Rheumate\" that has a lot of essential vitamins in one capsule.  This may not be covered by insurance though.     FOOT CARE RECOMMENDATIONS   1. Wash your feet with lukewarm water and a mild soap and " then dry them thoroughly, especially between the toes.     2. Examine your feet daily looking for cuts, corns, blisters, cracks, ect, especially after wearing new shoes. Make sure to look between your toes. If you cannot see the bottom of your feet, set a mirror on the floor and hold your foot over it, or ask a spouse, friend or family member to examine your feet for you. Contact your doctor immediately if new problems are noted or if sores are not healing.     3. Immediately apply moisturizer to the tops and bottoms of your feet, avoiding areas between the toes. Hand lotion (Intesive Care, Naima, Eucerin, Neutrogena, Curel, ect) is sufficient unless your doctor prescribes a medicated lotion. Apply sunscreen to your feet when going swimming outside.     4. Use clean comfortable shoes, wear white socks (if you have any bleeding or drainage, you will see it on white socks). Socks should not have thick seams or cut off the circulation around the leg. Break in new shoes slowly and rotate with older shoes until broken in. Check the inside of your shoes with your hand to look for areas of irritation or objects that may have fallen into your shoes.       5. Keep slippers by the side of your bed for use during the night.     6.  Shoes should be fitted by a professional and should not cause areas of irritation.  Check your feet regularly when wearing a new pair of shoes and replace them as needed.     7.  Talk to your doctor about proper exercise. Exercise and stretching stimulate blood flow to your feet and maintain proper glucose levels.     8.  Monitor your blood glucose level as instructed by your doctor. Notify your doctor immediately if your blood sugar is abnormally high or low.    9. Cut your nails straight across, but then gently round any sharp edges with a cardboard nail file. If you have neuropathy, peripheral vascular disease or cannot see that well to trim your own toenails contact [employer] ([phone]) or  [facility] ([phone]).      THINGS TO AVOID DOING   1.  Do not soak your feet if you have an open sore. Use only lukewarm water and always check the temperature with your hand as hot water can easily burn your feet.       2.  Never use a hot water bottle or heating pad on your feet. Also do not apply cold compresses to your feet. With decreased sensation, you could burn or freeze your feet.       3.  Do not apply any of these to your feet:    -  Over the counter medicine for corns or warts    -  Harsh chemicals like boric acid    -  Do not self-treat corns, cuts, blisters or infections. Always consult your doctor.       4.  Do not wear sandals, slippers or walk barefoot, especially on hot sand or concrete or other harsh surfaces.     5.  If you smoke, stop!!!

## 2022-11-08 NOTE — PROGRESS NOTES
Podiatry / Foot and Ankle Surgery Progress Note    November 8, 2022    Subject: Was asked to be seen by Dr. Lui for left great toe laceration.  Patient states that about 3-1/2 weeks ago she was body boarding in [place] and scraped her foot on a rock.  She was not seen for this until she got back to the [facility].  Notes pain can be 2 out of 10 at its worst.  She does have type 1 diabetes and is concerned about an infection.  She did have antibiotics and discussed them with those.  She is wondering how long the wound will take to heal.    Objective:  Vitals: Wt 69.9 kg (154 lb)   BMI 24.11 kg/m    BMI= Body mass index is 24.11 kg/m .    A1C: 5.9 (9/23/2022)    General:  Patient is alert and orientated.  NAD.    Vascular:  DP and PT pulses are palpable.  No edema or varicosities noted.  CFT's < 3secs.  Skin temp is normal.    Neuro:  Light and gross touch sensation intact to digits, dorsum, and plantar aspects of the feet.    Derm:  Full thickness stage 3 Ulceration to the plantar left great toe. Measures 1.0cm x 0.7cm x 0.2cm. base is granular.  No purulent drainage or foreign bodies are noted.  Copious amounts of clear serous drainage is noted with maceration around the wound edges.    Musculoskeletal:  No foot deformity noted.      Assessment:    Laceration of left great toe with foreign body without damage to nail, subsequent encounter  Type 1 diabetes mellitus without complication (H)  Pain of toe of left foot  Open toe wound, sequela    Medical Decision Making/Plan: At this time we will have her apply Aquacel Ag to the ulcer daily with a large Band-Aid.  We also talked about getting super feet inserts green and putting them in her shoes was cut out under the ulcer to decrease pressure to that area.  Recommend not going barefoot or in socks or wearing the offloading shoe insert at all times to help keep pressure off of this to help with healing.  We will have her follow-up in 3 weeks for reassessment.  All  questions were answered to patient's satisfaction and she will call for the questions or concerns.      Patient Risk Factor:  Patient is a low risk factor for infection.     Richelle Marquis DPM, Podiatry/Foot and Ankle Surgery

## 2022-11-08 NOTE — LETTER
11/8/2022         RE: Jenise Smith  [address] [id number]        Dear Colleague,    Thank you for referring your patient, Jenise Smith, to the [facility] POD[facility]. Please see a copy of my visit note below.    Podiatry / Foot and Ankle Surgery Progress Note    November 8, 2022    Subject: Was asked to be seen by Dr. Liu for left great toe laceration.  Patient states that about 3-1/2 weeks ago she was body boarding in [place] and scraped her foot on a rock.  She was not seen for this until she got back to the [facility].  Notes pain can be 2 out of 10 at its worst.  She does have type 1 diabetes and is concerned about an infection.  She did have antibiotics and discussed them with those.  She is wondering how long the wound will take to heal.    Objective:  Vitals: Wt 69.9 kg (154 lb)   BMI 24.11 kg/m    BMI= Body mass index is 24.11 kg/m .    A1C: 5.9 (9/23/2022)    General:  Patient is alert and orientated.  NAD.    Vascular:  DP and PT pulses are palpable.  No edema or varicosities noted.  CFT's < 3secs.  Skin temp is normal.    Neuro:  Light and gross touch sensation intact to digits, dorsum, and plantar aspects of the feet.    Derm:  Full thickness stage 3 Ulceration to the plantar left great toe. Measures 1.0cm x 0.7cm x 0.2cm. base is granular.  No purulent drainage or foreign bodies are noted.  Copious amounts of clear serous drainage is noted with maceration around the wound edges.    Musculoskeletal:  No foot deformity noted.      Assessment:    Laceration of left great toe with foreign body without damage to nail, subsequent encounter  Type 1 diabetes mellitus without complication (H)  Pain of toe of left foot  Open toe wound, sequela    Medical Decision Making/Plan: At this time we will have her apply Aquacel Ag to the ulcer daily with a large Band-Aid.  We also talked about getting super feet inserts green and putting them in her shoes was cut out under  the ulcer to decrease pressure to that area.  Recommend not going barefoot or in socks or wearing the offloading shoe insert at all times to help keep pressure off of this to help with healing.  We will have her follow-up in 3 weeks for reassessment.  All questions were answered to patient's satisfaction and she will call for the questions or concerns.      Patient Risk Factor:  Patient is a low risk factor for infection.     Richelle Marquis DPM, Podiatry/Foot and Ankle Surgery        Again, thank you for allowing me to participate in the care of your patient.        Sincerely,        Richelle Marquis DPM, Podiatry/Foot and Ankle Surgery

## 2022-11-10 ENCOUNTER — OFFICE VISIT (OUTPATIENT)
Dept: OBGYN | Facility: CLINIC | Age: 47
End: 2022-11-10
Payer: COMMERCIAL

## 2022-11-10 VITALS
SYSTOLIC BLOOD PRESSURE: 118 MMHG | BODY MASS INDEX: 24.61 KG/M2 | HEIGHT: 67 IN | DIASTOLIC BLOOD PRESSURE: 70 MMHG | WEIGHT: 156.8 LBS

## 2022-11-10 DIAGNOSIS — N89.8 VAGINAL DISCHARGE: Primary | ICD-10-CM

## 2022-11-10 DIAGNOSIS — L73.9 FOLLICULITIS: ICD-10-CM

## 2022-11-10 LAB
CLUE CELLS: NORMAL
TRICHOMONAS, WET PREP: NORMAL
WBC'S/HIGH POWER FIELD, WET PREP: NORMAL
YEAST, WET PREP: NORMAL

## 2022-11-10 PROCEDURE — 87210 SMEAR WET MOUNT SALINE/INK: CPT | Performed by: NURSE PRACTITIONER

## 2022-11-10 PROCEDURE — 99213 OFFICE O/P EST LOW 20 MIN: CPT | Performed by: NURSE PRACTITIONER

## 2022-11-10 RX ORDER — CEFADROXIL 500 MG/1
500 CAPSULE ORAL 2 TIMES DAILY
Qty: 14 CAPSULE | Refills: 0 | Status: SHIPPED | OUTPATIENT
Start: 2022-11-10 | End: 2024-08-19

## 2022-11-10 RX ORDER — FLUCONAZOLE 150 MG/1
150 TABLET ORAL
Qty: 3 TABLET | Refills: 0 | Status: SHIPPED | OUTPATIENT
Start: 2022-11-10 | End: 2022-11-17

## 2022-11-10 RX ORDER — INSULIN LISPRO 100 [IU]/ML
INJECTION, SOLUTION INTRAVENOUS; SUBCUTANEOUS
COMMUNITY
Start: 2022-11-03 | End: 2024-01-30

## 2022-11-10 NOTE — PROGRESS NOTES
SUBJECTIVE:                                                   Jenise Smith is a 47 year old female who presents to clinic today for the following health issue(s):  Patient presents with:  Vaginal Problem: Boil      HPI:  Patient is present today for an acute visit. Reports having a painful vaginal boil. She has not tried using a hot compress over the affected area. Would also get yeast infection after taking antibiotics. Accepts a wet mount. Patient consents to student performing pelvic exam.      No LMP recorded..     Patient is sexually active, .  Using vasectomy for contraception.    reports that she has never smoked. She has never used smokeless tobacco.    STD testing offered?  Declined    Health maintenance updated:  yes    Today's PHQ-2 Score:   PHQ-2 (  [employer]) 2022   Q1: Little interest or pleasure in doing things 0   Q2: Feeling down, depressed or hopeless 0   PHQ-2 Score 0   PHQ-2 Total Score (12-17 Years)- Positive if 3 or more points; Administer PHQ-A if positive -   Q1: Little interest or pleasure in doing things -   Q2: Feeling down, depressed or hopeless -   PHQ-2 Score -     Today's PHQ-9 Score:   PHQ-9 SCORE 1/10/2020   PHQ-9 Total Score MyChart -   PHQ-9 Total Score 1     Today's NEFTALY-7 Score:   NEFTALY-7 SCORE 1/10/2020   Total Score -   Total Score 1       Problem list and histories reviewed & adjusted, as indicated.  Additional history: as documented.    Patient Active Problem List   Diagnosis     Menorrhagia     Endometrial polyp     Hyperlipidemia with target LDL less than 100     Type 1 diabetes mellitus without complication (H)     Other specified hypothyroidism     Axillary hyperhidrosis     Anxiety     Dysmenorrhea     Mastalgia in female     Lump or mass in breast     Onychomycosis     Benign neoplasm of ascending colon     Constipation     Irritable bowel syndrome     Polyp of colon     Past Surgical History:   Procedure Laterality Date     DILATION AND CURETTAGE,  HYSTEROSCOPY, ABLATE ENDOMETRIUM NOVASURE, COMBINED  2014    Procedure: COMBINED DILATION AND CURETTAGE, HYSTEROSCOPY, ABLATE ENDOMETRIUM NOVASURE;  HYSTEROSCOPY, DILATION, CURETTAGE, WITH NOVASURE ABLATION, MYOSURE MORCELLATOR;  Surgeon: Swetha Queen MD;  Location: [facility]     LAPAROSCOPY       LEEP TX, CERVICAL       OPERATIVE HYSTEROSCOPY WITH MORCELLATOR  2014    Procedure: OPERATIVE HYSTEROSCOPY WITH MORCELLATOR;;  Surgeon: Swetha Queen MD;  Location: [facility]     wisdom teeth[        Social History     Tobacco Use     Smoking status: Never     Smokeless tobacco: Never   Substance Use Topics     Alcohol use: Yes     Alcohol/week: 0.0 standard drinks     Comment: 3 per week      Problem (# of Occurrences) Relation (Name,Age of Onset)    Cancer (2) Maternal Uncle (60): small bowel, Maternal Uncle (60): tonsil    Diabetes (4) Mother (Jamie): type 2, Father (Bart): type 1, Sister: type 1, Sister (Michelle)    Thyroid Disease (1) Sister    Obesity (1) Mother (Jamie)    Breast Cancer (2) Paternal Grandmother (Grandmother, 70):  in 80s, Paternal Cousin (Nazanin Leger, 38)    Cancer - colorectal (1) Paternal Uncle (60)    Prostate Cancer (2) Maternal Grandfather (Grandfather, 80), Paternal Grandfather (Grandpa)    Brain Cancer (1) Paternal Grandfather (Grandpa, 80)            Current Outpatient Medications   Medication Sig     blood glucose (ACCU-CHEK FASTCLIX) lancing device 1 Dose once daily.     cefadroxil (DURICEF) 500 MG capsule Take 1 capsule (500 mg) by mouth 2 times daily     CONTOUR NEXT TEST test strip USE 1 STRIP 6 TIMES DAILY     eszopiclone (LUNESTA) 2 MG tablet Take 1 tablet (2 mg) by mouth nightly as needed for sleep     fluconazole (DIFLUCAN) 150 MG tablet Take 1 tablet (150 mg) by mouth every 3 days for 3 doses     FLUoxetine (PROZAC) 20 MG capsule Take 1 capsule (20 mg) by mouth daily     Glucagon (BAQSIMI TWO PACK) 3 MG/DOSE POWD Spray 3 mg in nostril once  as needed (severe hypoglycemia reaction, dose as directed)     HUMALOG 100 UNIT/ML injection      Insulin Disposable Pump (OMNIPOD 5 G6 INTRO, GEN 5,) KIT 1 Device continuous prn (Use as Controller (PDM) device for Omnipod 5 pods)     Insulin Disposable Pump (OMNIPOD 5 G6 POD, GEN 5,) MISC 1 Device continuous prn (Use for insulin delivery with Omnipod 5 Controller (PDM)) Changes pods every 3 days, needs 6 boxes of 5 pods every 90 days     insulin glargine (LANTUS SOLOSTAR) 100 UNIT/ML pen INJECT SUBCUTANEOUSLY 16  UNITS DAILY AS DIRECTED     insulin lispro (HUMALOG KWIKPEN) 100 UNIT/ML (1 unit dial) KWIKPEN INJECT 3 TO 8 UNITS  SUBCUTANEOUSLY WITH MEALS  AND CORRECTION DOSES AS  DIRECTED, TOTAL DAILY DOSE  APPROXIMATELY 30 UNITS if the pump fails     insulin lispro (HUMALOG VIAL) 100 UNIT/ML vial Use with insulin pump, total daily dose approx 70 units     INSULIN PUMP - OUTPATIENT Date Last Updated: 12-3-21  Omnipod  BASAL RATES and times:  12   AM (midnight): 0.65 units/hour    3     AM: 0.60 units/hour   4     AM: 0.65 units/hour   6     AM: 0.70 units/hour   10   AM: 0.45 units/hour   1:30 PM: 0.65 units/hour     CARB RATIO and times:  12   AM (midnight): 12  11   AM: 9  5    PM:  8.5  Corection Factor (Sensitivity) and times:  12   AM (midnight): 60 mg/dL  8     AM: 55 mg/dL  9    PM: 60 mg/dL  BLOOD GLUCOSE TARGET/Correct Above and times:  12   AM (midnight): 100 - 130  6     AM:  90 - 120  9    PM:  100 - 140  Active Insulin Time: 3 hours    Sensor:  Yes: DexCom  Pump Website Username: Connected to clinic glooko  Pump Website Password: Connected to clinic glooko     levothyroxine (SYNTHROID/LEVOTHROID) 50 MCG tablet TAKE 1 TABLET BY MOUTH  DAILY     Multiple Vitamins-Minerals (MULTI FOR HER PO)      VITAMIN D, CHOLECALCIFEROL, PO Take 1,000 Units by mouth     No current facility-administered medications for this visit.     Allergies   Allergen Reactions     Keflex [Cephalexin]      nausea     Latex Rash      "Facial redness       ROS:  12 point review of systems negative other than symptoms noted below or in the HPI.  No urinary frequency or dysuria, bladder or kidney problems      OBJECTIVE:     /70   Ht 1.702 m (5' 7\")   Wt 71.1 kg (156 lb 12.8 oz)   BMI 24.56 kg/m    Body mass index is 24.56 kg/m .    Exam:  Constitutional:  Appearance: Well nourished, well developed alert, in no acute distress  Neurologic:  Mental Status:  Oriented X3.  Normal strength and tone, sensory exam grossly normal, mentation intact and speech normal.    Psychiatric:  Mentation appears normal and affect normal/bright.  Pelvic Exam:  External Genitalia:     Normal appearance for age, no discharge present, no tenderness present, no inflammatory lesions present, color normal  Vagina:     Normal vaginal vault without central or paravaginal defects, no discharge present, no inflammatory lesions present, no masses present  Bladder:     Nontender to palpation  Urethra:   Urethral Body:  Urethra palpation normal, urethra structural support normal   Urethral Meatus:  No erythema or lesions present  Cervix:     Appearance healthy, no lesions present, nontender to palpation, no bleeding present  Uterus:     Exam deferred   Adnexa:     Exam deferred   Perineum:     Perineum within normal limits, no evidence of trauma, a 0.5 x 0.5 mm pink skin lesion present on left side of perineum that is painful on palpation per patient report  Anus:     Anus within normal limits, no hemorrhoids present  Inguinal Lymph Nodes:     No lymphadenopathy present  Pubic Hair:     Normal pubic hair distribution for age  Genitalia and Groin:     No rashes present, no lesions present, no areas of discoloration, no masses present       In-Clinic Test Results:  Results for orders placed or performed in visit on 11/10/22 (from the past 24 hour(s))   Wet prep - Clinic Collect    Specimen: Vagina; Swab   Result Value Ref Range    Trichomonas Absent Absent    Yeast Absent " Absent    Clue Cells Absent Absent    WBCs/high power field None None       ASSESSMENT/PLAN:                                                        ICD-10-CM    1. Vaginal discharge  N89.8 Wet prep - Clinic Collect     fluconazole (DIFLUCAN) 150 MG tablet      2. Folliculitis  L73.9 cefadroxil (DURICEF) 500 MG capsule          - Pelvic exam perform. Wet mount collected. Discussed negative wet mount with patient. Will send antibiotic and antifungal to pharmacy for vulvar skin lesion and prone to getting yeast infection after taking antibiotics. Provider verified with patient that she can take antibiotic due to having medication on allergy list. Recommended patient to use a hot compress 2-3 times a day and avoid popping the skin lesion. If symptoms worsens, patient is aware to contact clinic.    -Return as needed    Billy MOISEN, RN, DNP WHNP Student, completed the PFSH and ROS.    I was present with the student who participated in the service and in the documentation of the note. I have verified the history and medical decision-making.  Student participated in the annual exam and I can attest to being personally present for the entire exam. I agree with the assessment and plan of care as documented in the note. NANCY Kemp CNP, APRN CNP  [facility] [place]

## 2022-11-25 DIAGNOSIS — F41.9 ANXIETY: ICD-10-CM

## 2022-11-25 DIAGNOSIS — F51.01 PRIMARY INSOMNIA: ICD-10-CM

## 2022-11-25 RX ORDER — ESZOPICLONE 2 MG/1
TABLET, FILM COATED ORAL
Qty: 30 TABLET | Refills: 0 | Status: SHIPPED | OUTPATIENT
Start: 2022-11-25 | End: 2023-01-06

## 2022-11-29 NOTE — TELEPHONE ENCOUNTER
Prescription approved per [facility] Refill Protocol.    6 month supply sent on 8/31/22, re- ordered with original start date.    Shawn Morales RN  [facility]

## 2023-01-01 ENCOUNTER — DOCUMENTATION ONLY (OUTPATIENT)
Dept: ENDOCRINOLOGY | Facility: CLINIC | Age: 48
End: 2023-01-01

## 2023-01-01 NOTE — PROGRESS NOTES
Mrs ADONIS Smith has Type 1 diabetes mellitus and uses the Omnipod 5 insulin pump with the DexcomG6 CGM sensor.  She has hypoglycemia unawareness problems and needs the auto mode feature of this Omnipod 5 pump which alerts patient to low glucose trends and automatically shuts off the pump basal rate if hypoglycemia trends.    I received a fax letter from [employer] indicating her [employer] plan has denied insurance coverage for the Omnipod 5 pods.  This is very unusual, since she has previously had insurance coverage for the Omnipod 5 pump and pods.    Please submit a PA for the Omnipod 5 pods.  Thanks.    DARI Gaming MD, MS  Endocrinology  [facility]

## 2023-01-03 ENCOUNTER — TELEPHONE (OUTPATIENT)
Dept: ENDOCRINOLOGY | Facility: CLINIC | Age: 48
End: 2023-01-03

## 2023-01-03 NOTE — TELEPHONE ENCOUNTER
Prior Authorization Approval    Authorization Effective Date: 1/3/2023  Authorization Expiration Date: 1/3/2024  Medication: OMNIPOD 5 G6 POD- APPROVED   Approved Dose/Quantity:    Reference #:     Insurance Company: [employer] ([facility]) - Phone [phone] Fax [phone]  Expected CoPay:       CoPay Card Available:      Foundation Assistance Needed:    Which Pharmacy is filling the prescription (Not needed for infusion/clinic administered): [facility] - [place], [place] - [address] [place] [address]  Pharmacy Notified: Yes- rx is refilling too soon at the pharmacy. Last filled 3 mo supply on 11/29.  Patient Notified:

## 2023-01-03 NOTE — TELEPHONE ENCOUNTER
Central Prior Authorization Team  Phone: [phone]    PA Initiation    Medication: OMNIPOD 5 G6 POD  Insurance Company: [employer] ([facility]) - Phone [phone] Fax [phone]  Pharmacy Filling the Rx: [facility] - [place], [place] - [address] [facility]  Filling Pharmacy Phone: [phone]  Filling Pharmacy Fax:    Start Date: 1/3/2023

## 2023-01-05 ENCOUNTER — MYC MEDICAL ADVICE (OUTPATIENT)
Dept: FAMILY MEDICINE | Facility: CLINIC | Age: 48
End: 2023-01-05

## 2023-01-05 ENCOUNTER — MYC MEDICAL ADVICE (OUTPATIENT)
Dept: ENDOCRINOLOGY | Facility: CLINIC | Age: 48
End: 2023-01-05

## 2023-01-05 DIAGNOSIS — F51.01 PRIMARY INSOMNIA: ICD-10-CM

## 2023-01-05 DIAGNOSIS — E13.9 DIABETES MELLITUS TYPE 1.5, MANAGED AS TYPE 2 (H): ICD-10-CM

## 2023-01-05 DIAGNOSIS — E06.3 HYPOTHYROIDISM DUE TO HASHIMOTO'S THYROIDITIS: ICD-10-CM

## 2023-01-05 DIAGNOSIS — F41.9 ANXIETY: ICD-10-CM

## 2023-01-05 DIAGNOSIS — Z96.41 INSULIN PUMP STATUS: ICD-10-CM

## 2023-01-05 DIAGNOSIS — E10.9 TYPE 1 DIABETES MELLITUS WITHOUT COMPLICATION (H): ICD-10-CM

## 2023-01-06 ENCOUNTER — TELEPHONE (OUTPATIENT)
Dept: ENDOCRINOLOGY | Facility: CLINIC | Age: 48
End: 2023-01-06

## 2023-01-06 DIAGNOSIS — Z96.41 INSULIN PUMP STATUS: ICD-10-CM

## 2023-01-06 DIAGNOSIS — E13.9 DIABETES MELLITUS TYPE 1.5, MANAGED AS TYPE 2 (H): ICD-10-CM

## 2023-01-06 DIAGNOSIS — E10.9 TYPE 1 DIABETES MELLITUS WITHOUT COMPLICATION (H): ICD-10-CM

## 2023-01-06 DIAGNOSIS — E06.3 HYPOTHYROIDISM DUE TO HASHIMOTO'S THYROIDITIS: ICD-10-CM

## 2023-01-06 RX ORDER — PROCHLORPERAZINE 25 MG/1
SUPPOSITORY RECTAL
Qty: 1 EACH | Refills: 3 | Status: CANCELLED | OUTPATIENT
Start: 2023-01-06

## 2023-01-06 RX ORDER — INSULIN PMP CART,AUT,G6/7,CNTR
1 EACH SUBCUTANEOUS CONTINUOUS PRN
Qty: 6 EACH | Refills: 3 | Status: SHIPPED | OUTPATIENT
Start: 2023-01-06 | End: 2023-04-25

## 2023-01-06 RX ORDER — LEVOTHYROXINE SODIUM 50 UG/1
50 TABLET ORAL DAILY
Qty: 90 TABLET | Refills: 2 | Status: SHIPPED | OUTPATIENT
Start: 2023-01-06 | End: 2023-09-26

## 2023-01-06 RX ORDER — PROCHLORPERAZINE 25 MG/1
SUPPOSITORY RECTAL
Qty: 1 EACH | Refills: 0 | Status: CANCELLED | OUTPATIENT
Start: 2023-01-06

## 2023-01-06 RX ORDER — PROCHLORPERAZINE 25 MG/1
SUPPOSITORY RECTAL
Qty: 9 EACH | Refills: 3 | Status: SHIPPED | OUTPATIENT
Start: 2023-01-06 | End: 2023-06-16

## 2023-01-06 RX ORDER — PROCHLORPERAZINE 25 MG/1
SUPPOSITORY RECTAL
Qty: 1 EACH | Refills: 0 | Status: SHIPPED | OUTPATIENT
Start: 2023-01-06

## 2023-01-06 RX ORDER — PROCHLORPERAZINE 25 MG/1
SUPPOSITORY RECTAL
Qty: 3 EACH | Refills: 11 | Status: CANCELLED | OUTPATIENT
Start: 2023-01-06

## 2023-01-06 RX ORDER — ESZOPICLONE 2 MG/1
TABLET, FILM COATED ORAL
Qty: 30 TABLET | Refills: 0 | Status: SHIPPED | OUTPATIENT
Start: 2023-01-06 | End: 2023-04-13

## 2023-01-06 RX ORDER — INSULIN LISPRO 100 [IU]/ML
INJECTION, SOLUTION INTRAVENOUS; SUBCUTANEOUS
Qty: 15 ML | Refills: 1 | Status: CANCELLED | OUTPATIENT
Start: 2023-01-06

## 2023-01-06 RX ORDER — INSULIN LISPRO 100 [IU]/ML
INJECTION, SOLUTION INTRAVENOUS; SUBCUTANEOUS
Qty: 45 ML | Refills: 0 | Status: SHIPPED | OUTPATIENT
Start: 2023-01-06 | End: 2023-04-25

## 2023-01-06 RX ORDER — LEVOTHYROXINE SODIUM 50 UG/1
50 TABLET ORAL DAILY
Qty: 90 TABLET | Refills: 2 | Status: CANCELLED | OUTPATIENT
Start: 2023-01-06

## 2023-01-06 RX ORDER — INSULIN PMP CART,AUT,G6/7,CNTR
1 EACH SUBCUTANEOUS CONTINUOUS PRN
Qty: 2 EACH | Refills: 11 | Status: CANCELLED | OUTPATIENT
Start: 2023-01-06

## 2023-01-06 RX ORDER — PROCHLORPERAZINE 25 MG/1
SUPPOSITORY RECTAL
Qty: 1 EACH | Refills: 3 | Status: SHIPPED | OUTPATIENT
Start: 2023-01-06 | End: 2023-06-16

## 2023-01-06 NOTE — TELEPHONE ENCOUNTER
Last Written Prescription Date:  5/12, 5/23, 10/31, 2/11  Last Fill Quantity: 15/0, 70/3, 90/3, 2/11  Last office visit: 10/19/2022 with prescribing provider:  Dr. Gaming   Future Office Visit:  2/16/23        Requested Prescriptions   Pending Prescriptions Disp Refills     insulin lispro (HUMALOG VIAL) 100 UNIT/ML vial 70 mL 3     Sig: Use with insulin pump, total daily dose approx 70 units       There is no refill protocol information for this order        insulin lispro (HUMALOG KWIKPEN) 100 UNIT/ML (1 unit dial) KWIKPEN 15 mL 0     Sig: INJECT 3 TO 8 UNITS  SUBCUTANEOUSLY WITH MEALS  AND CORRECTION DOSES AS  DIRECTED, TOTAL DAILY DOSE  APPROXIMATELY 30 UNITS if the pump fails       There is no refill protocol information for this order        levothyroxine (SYNTHROID/LEVOTHROID) 50 MCG tablet 90 tablet 3     Sig: Take 1 tablet (50 mcg) by mouth daily       There is no refill protocol information for this order        Continuous Blood Gluc Transmit (DEXCOM G6 TRANSMITTER) MISC 1 each 3     Sig: Change every 3 months.       There is no refill protocol information for this order        Continuous Blood Gluc Sensor (DEXCOM G6 SENSOR) MISC 3 each 11     Sig: Change every 10 days.       There is no refill protocol information for this order        Continuous Blood Gluc  (DEXCOM G6 ) FRANCISCO 1 each 0     Sig: Use to read blood sugars as per 's instructions.       There is no refill protocol information for this order        Rx's sent to new pharmacy per pt request, and sent Dexcom and Omnipod to pa team.  Kae Anthony RN

## 2023-01-06 NOTE — TELEPHONE ENCOUNTER
Prior Authorization Retail Medication Request    Medication/Dose: Omnipod sensors and Dexcom supplies  ICD code (if different than what is on RX):    Previously Tried and Failed:   Rationale:     Insurance Name:   Insurance ID:       Pharmacy Information (if different than what is on RX)  Name:   Phone:      Kae Anthony RN

## 2023-01-10 NOTE — TELEPHONE ENCOUNTER
Central Prior Authorization Team - Phone: [phone]     *Omnipod approval on file til 01/03/2024    PA Initiation    Medication: Dexcom G6 Transmitter- PA INITIATED  Insurance Company:    Pharmacy Filling the Rx: [facility] - [place], [place] - [address]  Filling Pharmacy Phone: [phone]  Filling Pharmacy Fax:    Start Date: 1/10/2023

## 2023-01-10 NOTE — TELEPHONE ENCOUNTER
Central Prior Authorization Team - Phone: [phone]     Prior Authorization Not Needed per Insurance    Medication: Dexcom G6 Transmitter- PA NOT NEEDED PER INSURANCE  Insurance Company:    Expected CoPay:      Pharmacy Filling the Rx: [facility] - [place], [place] - [address] [place] [address]  Pharmacy Notified: Yes  Patient Notified: YesComment:  PHARMACY WILL NOTIFY WHEN READY

## 2023-01-24 ENCOUNTER — MYC MEDICAL ADVICE (OUTPATIENT)
Dept: ENDOCRINOLOGY | Facility: CLINIC | Age: 48
End: 2023-01-24
Payer: COMMERCIAL

## 2023-01-24 ENCOUNTER — TRANSFERRED RECORDS (OUTPATIENT)
Dept: HEALTH INFORMATION MANAGEMENT | Facility: CLINIC | Age: 48
End: 2023-01-24
Payer: COMMERCIAL

## 2023-01-24 DIAGNOSIS — E10.9 TYPE 1 DIABETES MELLITUS WITHOUT COMPLICATION (H): Primary | ICD-10-CM

## 2023-01-24 DIAGNOSIS — E06.3 HYPOTHYROIDISM DUE TO HASHIMOTO'S THYROIDITIS: ICD-10-CM

## 2023-01-24 LAB — RETINOPATHY: NEGATIVE

## 2023-01-25 ENCOUNTER — LAB (OUTPATIENT)
Dept: LAB | Facility: CLINIC | Age: 48
End: 2023-01-25
Payer: COMMERCIAL

## 2023-01-25 DIAGNOSIS — E10.9 TYPE 1 DIABETES MELLITUS WITHOUT COMPLICATION (H): ICD-10-CM

## 2023-01-25 DIAGNOSIS — E06.3 HYPOTHYROIDISM DUE TO HASHIMOTO'S THYROIDITIS: ICD-10-CM

## 2023-01-25 LAB
ANION GAP SERPL CALCULATED.3IONS-SCNC: 11 MMOL/L (ref 7–15)
BUN SERPL-MCNC: 21.3 MG/DL (ref 6–20)
CALCIUM SERPL-MCNC: 9.5 MG/DL (ref 8.6–10)
CHLORIDE SERPL-SCNC: 98 MMOL/L (ref 98–107)
CREAT SERPL-MCNC: 0.84 MG/DL (ref 0.51–0.95)
DEPRECATED HCO3 PLAS-SCNC: 26 MMOL/L (ref 22–29)
GFR SERPL CREATININE-BSD FRML MDRD: 87 ML/MIN/1.73M2
GLUCOSE SERPL-MCNC: 225 MG/DL (ref 70–99)
HBA1C MFR BLD: 6.1 % (ref 0–5.6)
POTASSIUM SERPL-SCNC: 4.7 MMOL/L (ref 3.4–5.3)
SODIUM SERPL-SCNC: 135 MMOL/L (ref 136–145)
TSH SERPL DL<=0.005 MIU/L-ACNC: 1.54 UIU/ML (ref 0.3–4.2)

## 2023-01-25 PROCEDURE — 80048 BASIC METABOLIC PNL TOTAL CA: CPT

## 2023-01-25 PROCEDURE — 36415 COLL VENOUS BLD VENIPUNCTURE: CPT

## 2023-01-25 PROCEDURE — 84443 ASSAY THYROID STIM HORMONE: CPT

## 2023-01-25 PROCEDURE — 82306 VITAMIN D 25 HYDROXY: CPT

## 2023-01-25 PROCEDURE — 83036 HEMOGLOBIN GLYCOSYLATED A1C: CPT

## 2023-01-26 LAB — DEPRECATED CALCIDIOL+CALCIFEROL SERPL-MC: 75 UG/L (ref 20–75)

## 2023-02-01 ENCOUNTER — TRANSFERRED RECORDS (OUTPATIENT)
Dept: MULTI SPECIALTY CLINIC | Facility: CLINIC | Age: 48
End: 2023-02-01

## 2023-02-01 ENCOUNTER — TELEPHONE (OUTPATIENT)
Dept: RADIATION ONCOLOGY | Facility: HOSPITAL | Age: 48
End: 2023-02-01
Payer: COMMERCIAL

## 2023-02-01 LAB — PAP SMEAR - HIM PATIENT REPORTED: NEGATIVE

## 2023-02-16 ENCOUNTER — VIRTUAL VISIT (OUTPATIENT)
Dept: ENDOCRINOLOGY | Facility: CLINIC | Age: 48
End: 2023-02-16
Payer: COMMERCIAL

## 2023-02-16 ENCOUNTER — TELEPHONE (OUTPATIENT)
Dept: ENDOCRINOLOGY | Facility: CLINIC | Age: 48
End: 2023-02-16

## 2023-02-16 DIAGNOSIS — Z96.41 INSULIN PUMP STATUS: ICD-10-CM

## 2023-02-16 DIAGNOSIS — E06.3 HYPOTHYROIDISM DUE TO HASHIMOTO'S THYROIDITIS: ICD-10-CM

## 2023-02-16 DIAGNOSIS — E10.9 TYPE 1 DIABETES MELLITUS WITHOUT COMPLICATION (H): Primary | ICD-10-CM

## 2023-02-16 PROCEDURE — 99214 OFFICE O/P EST MOD 30 MIN: CPT | Mod: VID | Performed by: INTERNAL MEDICINE

## 2023-02-16 PROCEDURE — 95251 CONT GLUC MNTR ANALYSIS I&R: CPT | Performed by: INTERNAL MEDICINE

## 2023-02-16 NOTE — LETTER
2/16/2023         RE: Jenise Smith  [address]  [place] [address]        Dear Colleague,    Thank you for referring your patient, Jenise Smith, to the [facility] [place]. Please see a copy of my visit note below.    Video-Visit Details    Type of service:  Video Visit    Video Start Time (time video started): 7:57 am    Video End Time (time video stopped): 8:28 am    Originating Location (pt. Location): Home        Distant Location (provider location):  Off-site    Mode of Communication:  Video Conference via [employer]        Recent issues:  Diabetes and thyroid follow-up evaluation  Continues to use the Omnipod 5 pump with DexcomG6 since 6/2022  No longer works for [employer], currently job hunting  On waiting list with [employer] again, expecting a dark, male lab soon  She's considering work as lifestyle  for T2DM            Diabetes:  Previous diagnosis of IFG with high GAD65 Ab level  6/2011. Developed acute hyperglycemia and diagnosis of T1DM  Treatment with insulin medication, MDI plan  2/2013. Changed to OmniPod pump with Novolog insulin  Used DexcomG5 CGMS system  5/2017. Changed to Medtronic 630G pump  9/2017. Upgraded to Medtronic 670G pump and Guardian3 sensor, Humalog insulin  8/2018. Discontinued Medtronic pump and sensor, changed to MDI plan              Had taken Basaglar 17U at bedtime and mealtime Humalog  Had taken Basaglar 13U at bedtime, Humalog Luxura pen 1:7 at mealtime, ISF 60, goal target  mg/dl  12/2018. Restarted OmniPod pump  ~Early 11/2021. Stopped the Jardiance medication  Using OmniPod pump with Dash PDM, with DexcomG6 CGM    12/24/21. Switched to Tandem TSlim pump, Humalog insulin  6/2022. Switched to Omnipod 5 with DexcomG6              Humalog in pump    Current Omnipod 5 pump settings:`          Recent Omnipod 5 pump data:            Recent DexcomG6 CGM data:        Has reduced hypoglycemia awareness              Previously had  service dog (Juliet), trained to recognize hypoglycemia smell/symptoms  Exercises with walking, strenght training, Pellaton treadmill/bike  Using Contour Next Link BG meter, variable testing, tests 6x/day    Exercises with home Pellaton, also classes at health club- yoga, strength              Uses Activity Mode for exercise  Previous [facility] labs include:  Lab Results   Component Value Date    A1C 6.1 (H) 01/25/2023     (L) 01/25/2023    POTASSIUM 4.7 01/25/2023    CHLORIDE 98 01/25/2023    CO2 26 01/25/2023    ANIONGAP 11 01/25/2023     (H) 01/25/2023    BUN 21.3 (H) 01/25/2023    CR 0.84 01/25/2023    GFRESTIMATED 87 01/25/2023    GFRESTBLACK 73 07/07/2021    VIJI 9.5 01/25/2023    CHOL 158 03/01/2022    TRIG 50 03/01/2022    HDL 96 03/01/2022    LDL 52 03/01/2022    NHDL 62 03/01/2022    UCRR 118 03/01/2022    MICROL <5 03/01/2022    UMALCR  03/01/2022      Comment:      Unable to calculate:  Urine creatinine or albumin value below detectable level     Lab Results   Component Value Date     (L) 01/25/2023    POTASSIUM 4.7 01/25/2023    CHLORIDE 98 01/25/2023    CO2 26 01/25/2023    ANIONGAP 11 01/25/2023     (H) 01/25/2023    BUN 21.3 (H) 01/25/2023    CR 0.84 01/25/2023    GFRESTIMATED 87 01/25/2023    GFRESTBLACK 73 07/07/2021    VIJI 9.5 01/25/2023    TSH 1.54 01/25/2023    VITDT 75 01/25/2023       Previous Lasik surgery at [facility] 2015  Last eye exam 2/8/22 with Dr. Diaz/[facility], no DR reported  DM Complications: none known        Thyroid:  2011. History of hypothyroidism  Takes levothyroxine medication  Eary 10/2018. Dose incease to levothyroxine 0.05 mg daily per GYN physician    Labs done at [facility]  6/29/22:  TSH 2.69    Recent [facility] labs include:  Lab Results   Component Value Date    TSH 1.54 01/25/2023    T4 0.85 05/17/2021     Current dose:  Levothyroxine 0.05 mg daily          , lives in [place],  Conrad, daughter Myra and stepchildren Jag  (formerly Tisha) + Anoop  Works for [employer] as   Sees Dr. Mirna Sheehan/[facility] for general medicine evaluations.  Also sees Dr. Ashford/[facility]  Has seen nutritionist (Radha Turcios?) at [facility], helpful       PMH/PSH:  Past Medical History:   Diagnosis Date     Diabetes mellitus type 1 (H)      Dysmenorrhea      Excessive or frequent menstruation      Hypothyroid      Insulin pump in place      Lumbar back pain      Other and unspecified hyperlipidemia      PONV (postoperative nausea and vomiting)      Past Surgical History:   Procedure Laterality Date     DILATION AND CURETTAGE, HYSTEROSCOPY, ABLATE ENDOMETRIUM NOVASURE, COMBINED  2014    Procedure: COMBINED DILATION AND CURETTAGE, HYSTEROSCOPY, ABLATE ENDOMETRIUM NOVASURE;  HYSTEROSCOPY, DILATION, CURETTAGE, WITH NOVASURE ABLATION, MYOSURE MORCELLATOR;  Surgeon: Swetha Queen MD;  Location: [facility]     LAPAROSCOPY       LEEP TX, CERVICAL       OPERATIVE HYSTEROSCOPY WITH MORCELLATOR  2014    Procedure: OPERATIVE HYSTEROSCOPY WITH MORCELLATOR;;  Surgeon: Swetha Queen MD;  Location: [facility]     wisdom teeth[         Family Hx:  Family History   Problem Relation Age of Onset     Diabetes Mother         type 2     Obesity Mother      Diabetes Father         type 1     Diabetes Sister         type 1     Thyroid Disease Sister      Diabetes Sister      Prostate Cancer Maternal Grandfather 80     Breast Cancer Paternal Grandmother 70         in 80s     Prostate Cancer Paternal Grandfather      Brain Cancer Paternal Grandfather 80     Cancer Maternal Uncle 60        small bowel     Cancer Maternal Uncle 60        tonsil     Cancer - colorectal Paternal Uncle 60     Breast Cancer Paternal Cousin 38         Social Hx:  Social History     Socioeconomic History     Marital status:      Spouse name: Conrad     Number of children: 1     Years of education: Not on file     Highest  education level: Not on file   Occupational History     Employer: [employer]   Tobacco Use     Smoking status: Never     Smokeless tobacco: Never   Substance and Sexual Activity     Alcohol use: Yes     Alcohol/week: 0.0 standard drinks     Comment: 3 per week     Drug use: No     Sexual activity: Yes     Partners: Male     Birth control/protection: Male Surgical   Other Topics Concern     Parent/sibling w/ CABG, MI or angioplasty before 65F 55M? No   Social History Narrative     in May 2014    2 step children (20 and 18) and one biol dtr age 15.    Works for [employer]--works from home     Social Determinants of Health     Financial Resource Strain: Not on file   Food Insecurity: Not on file   Transportation Needs: Not on file   Physical Activity: Not on file   Stress: Not on file   Social Connections: Not on file   Intimate Partner Violence: Not on file   Housing Stability: Not on file          MEDICATIONS:  has a current medication list which includes the following prescription(s): dexcom g6 , dexcom g6 sensor, eszopiclone, fluoxetine, omnipod 5 g6 intro (gen 5), insulin lispro, levothyroxine, multiple vitamins-minerals, cholecalciferol, blood glucose, cefadroxil, dexcom g6 transmitter, contour next test, baqsimi two pack, humalog, omnipod 5 g6 pod (gen 5), lantus solostar, insulin lispro, and insulin pump.       ROS: 10 point ROS neg other than the symptoms noted above in the HPI.     GENERAL: energy good, wt stable; denies fevers, chills, night sweats.   HEENT: no dysphagia, odonophagia, diplopia, neck pain  THYROID:  no apparent hyper or hypothyroid symptoms  CV: no chest pain, pressure, palpitations  LUNGS: no SOB, CLIFFORD, cough, wheezing   ABDOMEN: denies nausea, diarrhea, constipation, abdominal pain  EXTREMITIES: no rashes, ulcers, edema  NEUROLOGY: no headaches, denies changes in vision, tingling, extremitiy numbness   MSK: some back pain; denies muscle weakness  SKIN: cut at  bottom of left great toe; no rashes or lesions  : regular menstrual cycles  PSYCH:  stable mood, no significant anxiety or depression  ENDOCRINE: no heat or cold intolerance    Physical Exam (visual exam)  VS:  no vital signs taken for video visit  CONSTITUTIONAL: healthy, alert and NAD, well dressed, answering questions appropriately  ENT: no nose swelling or nasal discharge, mouth redness or gum changes.  EYES: eyes grossly normal to inspection, conjunctivae and sclerae normal, no exophthalmos or proptosis  THYROID:  no apparent nodules or goiter  LUNGS: no audible wheeze, cough or visible cyanosis, no visible retractions or increased work of breathing  ABDOMEN: abdomen not evaluated  EXTREMITIES: no hand tremors, limited exam  NEUROLOGY: CN grossly intact, mentation intact and speech normal   SKIN:  no apparent skin lesions, rash, or edema with visualized skin appearance  PSYCH: mentation appears normal, affect normal/bright, judgement and insight intact,   normal speech and appearance well groomed      LABS:     All pertinent notes, labs, and images personally reviewed by me.       A/P:  Encounter Diagnoses   Name Primary?     Type 1 diabetes mellitus without complication (H) Yes     Insulin pump status      Hypothyroidism due to Hashimoto's thyroiditis        Comments:  Reviewed health history, diabetes and thyroid issues.  Recent glucose control good overall  Reviewed the T2DM , assist dog, exercise topics also  Reviewed and interpreted tests that I previously ordered.   Ordered appropriate tests for the endocrinology disease management.    Management options discussed and implemented after shared medical decision making with the patient.  T1DM and hypothyroidism problems are chronic-stable    Plan:  Discussed general issues with the diabetes diagnosis and management  We discussed the hgbA1c test which reflects previous overall glucose levels or control  Discussed the importance of blood glucose  (BG) testing to assess glucose trends  Reviewed recent Omnipod 5  pump report and pump settings  Reviewed recent DexcomG6 CGM glucose trends, in detail     Recommend:  Continue the Omnipod 5 insulin pump and diabetes management    No pump setting changes at this time    Would not resume Jardiance med at this time, but could use this off-label med again in future.  Consider using pump Activity (exercise) mode for aerobic exercise  Continue use of DexcomG6 CGM glucose sensor  Monitor for symptom changes  Followup diabetes education appt with one of our [facility] CDE's  Agree with plan to lower the vit D supplement from 3 caps to 2 caps  Continue current levothyroxine daily dose  Plan repeat lab testing ~5/2023  Advise having fasting lipid panel testing and dilated eye examination, at least annually    Keep regular f/u PCP and GYN evaluations  Addressed patient questions today    There are no Patient Instructions on file for this visit.    Future labs ordered today:   Orders Placed This Encounter   Procedures     GLUCOSE MONITOR, 72 HOUR, PHYS INTERP     TSH with free T4 reflex     Hemoglobin A1c     Basic metabolic panel     Radiology/Consults ordered today: None    Total time spent on day of encounter:  35 min    Follow-up:  6/2023, Shannon Gaming MD, MS  Endocrinology  [facility]    CC:  WILLOW Sheehan                       Again, thank you for allowing me to participate in the care of your patient.        Sincerely,        Lyndon Gaming MD

## 2023-02-16 NOTE — PROGRESS NOTES
Video-Visit Details    Type of service:  Video Visit    Video Start Time (time video started): 7:57 am    Video End Time (time video stopped): 8:28 am    Originating Location (pt. Location): Home        Distant Location (provider location):  Off-site    Mode of Communication:  Video Conference via [employer]        Recent issues:  Diabetes and thyroid follow-up evaluation  Continues to use the Omnipod 5 pump with DexcomG6 since 6/2022  No longer works for [employer], currently job hunting  On waiting list with [employer] again, expecting a dark, male lab soon  She's considering work as lifestyle  for T2DM            Diabetes:  Previous diagnosis of IFG with high GAD65 Ab level  6/2011. Developed acute hyperglycemia and diagnosis of T1DM  Treatment with insulin medication, MDI plan  2/2013. Changed to OmniPod pump with Novolog insulin  Used DexcomG5 CGMS system  5/2017. Changed to Medtronic 630G pump  9/2017. Upgraded to Medtronic 670G pump and Guardian3 sensor, Humalog insulin  8/2018. Discontinued Medtronic pump and sensor, changed to MDI plan              Had taken Basaglar 17U at bedtime and mealtime Humalog  Had taken Basaglar 13U at bedtime, Humalog Luxura pen 1:7 at mealtime, ISF 60, goal target  mg/dl  12/2018. Restarted OmniPod pump  ~Early 11/2021. Stopped the Jardiance medication  Using OmniPod pump with Dash PDM, with DexcomG6 CGM    12/24/21. Switched to Tandem TSlim pump, Humalog insulin  6/2022. Switched to Omnipod 5 with DexcomG6              Humalog in pump    Current Omnipod 5 pump settings:`          Recent Omnipod 5 pump data:            Recent DexcomG6 CGM data:        Has reduced hypoglycemia awareness              Previously had service dog (Juliet), trained to recognize hypoglycemia smell/symptoms  Exercises with walking, strenght training, Pellaton treadmill/bike  Using Contour Next Link BG meter, variable testing, tests 6x/day    Exercises with home Pellaton, also classes at [employer]  club- yoga, strength              Uses Activity Mode for exercise  Previous [facility] labs include:  Lab Results   Component Value Date    A1C 6.1 (H) 01/25/2023     (L) 01/25/2023    POTASSIUM 4.7 01/25/2023    CHLORIDE 98 01/25/2023    CO2 26 01/25/2023    ANIONGAP 11 01/25/2023     (H) 01/25/2023    BUN 21.3 (H) 01/25/2023    CR 0.84 01/25/2023    GFRESTIMATED 87 01/25/2023    GFRESTBLACK 73 07/07/2021    VIJI 9.5 01/25/2023    CHOL 158 03/01/2022    TRIG 50 03/01/2022    HDL 96 03/01/2022    LDL 52 03/01/2022    NHDL 62 03/01/2022    UCRR 118 03/01/2022    MICROL <5 03/01/2022    UMALCR  03/01/2022      Comment:      Unable to calculate:  Urine creatinine or albumin value below detectable level     Lab Results   Component Value Date     (L) 01/25/2023    POTASSIUM 4.7 01/25/2023    CHLORIDE 98 01/25/2023    CO2 26 01/25/2023    ANIONGAP 11 01/25/2023     (H) 01/25/2023    BUN 21.3 (H) 01/25/2023    CR 0.84 01/25/2023    GFRESTIMATED 87 01/25/2023    GFRESTBLACK 73 07/07/2021    VIJI 9.5 01/25/2023    TSH 1.54 01/25/2023    VITDT 75 01/25/2023       Previous Lasik surgery at [facility] 2015  Last eye exam 2/8/22 with Dr. Diaz/[facility], no DR reported  DM Complications: none known        Thyroid:  2011. History of hypothyroidism  Takes levothyroxine medication  Eary 10/2018. Dose incease to levothyroxine 0.05 mg daily per GYN physician    Labs done at [facility]  6/29/22:  TSH 2.69    Recent [facility] labs include:  Lab Results   Component Value Date    TSH 1.54 01/25/2023    T4 0.85 05/17/2021     Current dose:  Levothyroxine 0.05 mg daily          , lives in [place],  Conrad, daughter Myra and stepchildren Jag (formerly Tisha) + Anoop  Works for [employer] as   Sees Dr. Mirna Sheehan/[facility] for general medicine evaluations.  Also sees Dr. Ashford/[facility]  Has seen nutritionist (Radha Turcios?) at [facility] helpful       PMH/PSH:  Past  Medical History:   Diagnosis Date     Diabetes mellitus type 1 (H)      Dysmenorrhea      Excessive or frequent menstruation      Hypothyroid      Insulin pump in place      Lumbar back pain      Other and unspecified hyperlipidemia      PONV (postoperative nausea and vomiting)      Past Surgical History:   Procedure Laterality Date     DILATION AND CURETTAGE, HYSTEROSCOPY, ABLATE ENDOMETRIUM NOVASURE, COMBINED  2014    Procedure: COMBINED DILATION AND CURETTAGE, HYSTEROSCOPY, ABLATE ENDOMETRIUM NOVASURE;  HYSTEROSCOPY, DILATION, CURETTAGE, WITH NOVASURE ABLATION, MYOSURE MORCELLATOR;  Surgeon: Swetha Queen MD;  Location: [facility]     LAPAROSCOPY       LEEP TX, CERVICAL       OPERATIVE HYSTEROSCOPY WITH MORCELLATOR  2014    Procedure: OPERATIVE HYSTEROSCOPY WITH MORCELLATOR;;  Surgeon: Swetha Queen MD;  Location: [facility]     wisdom teeth[         Family Hx:  Family History   Problem Relation Age of Onset     Diabetes Mother         type 2     Obesity Mother      Diabetes Father         type 1     Diabetes Sister         type 1     Thyroid Disease Sister      Diabetes Sister      Prostate Cancer Maternal Grandfather 80     Breast Cancer Paternal Grandmother 70         in 80s     Prostate Cancer Paternal Grandfather      Brain Cancer Paternal Grandfather 80     Cancer Maternal Uncle 60        small bowel     Cancer Maternal Uncle 60        tonsil     Cancer - colorectal Paternal Uncle 60     Breast Cancer Paternal Cousin 38         Social Hx:  Social History     Socioeconomic History     Marital status:      Spouse name: Conrad     Number of children: 1     Years of education: Not on file     Highest education level: Not on file   Occupational History     Employer: [employer]   Tobacco Use     Smoking status: Never     Smokeless tobacco: Never   Substance and Sexual Activity     Alcohol use: Yes     Alcohol/week: 0.0 standard drinks     Comment: 3 per week      Drug use: No     Sexual activity: Yes     Partners: Male     Birth control/protection: Male Surgical   Other Topics Concern     Parent/sibling w/ CABG, MI or angioplasty before 65F 55M? No   Social History Narrative     in May 2014    2 step children (20 and 18) and one biol dtr age 15.    Works for [employer]--works from home     Social Determinants of Health     Financial Resource Strain: Not on file   Food Insecurity: Not on file   Transportation Needs: Not on file   Physical Activity: Not on file   Stress: Not on file   Social Connections: Not on file   Intimate Partner Violence: Not on file   Housing Stability: Not on file          MEDICATIONS:  has a current medication list which includes the following prescription(s): dexcom g6 , dexcom g6 sensor, eszopiclone, fluoxetine, omnipod 5 g6 intro (gen 5), insulin lispro, levothyroxine, multiple vitamins-minerals, cholecalciferol, blood glucose, cefadroxil, dexcom g6 transmitter, contour next test, baqsimi two pack, humalog, omnipod 5 g6 pod (gen 5), lantus solostar, insulin lispro, and insulin pump.       ROS: 10 point ROS neg other than the symptoms noted above in the HPI.     GENERAL: energy good, wt stable; denies fevers, chills, night sweats.   HEENT: no dysphagia, odonophagia, diplopia, neck pain  THYROID:  no apparent hyper or hypothyroid symptoms  CV: no chest pain, pressure, palpitations  LUNGS: no SOB, CLIFFORD, cough, wheezing   ABDOMEN: denies nausea, diarrhea, constipation, abdominal pain  EXTREMITIES: no rashes, ulcers, edema  NEUROLOGY: no headaches, denies changes in vision, tingling, extremitiy numbness   MSK: some back pain; denies muscle weakness  SKIN: cut at bottom of left great toe; no rashes or lesions  : regular menstrual cycles  PSYCH:  stable mood, no significant anxiety or depression  ENDOCRINE: no heat or cold intolerance    Physical Exam (visual exam)  VS:  no vital signs taken for video visit  CONSTITUTIONAL:  healthy, alert and NAD, well dressed, answering questions appropriately  ENT: no nose swelling or nasal discharge, mouth redness or gum changes.  EYES: eyes grossly normal to inspection, conjunctivae and sclerae normal, no exophthalmos or proptosis  THYROID:  no apparent nodules or goiter  LUNGS: no audible wheeze, cough or visible cyanosis, no visible retractions or increased work of breathing  ABDOMEN: abdomen not evaluated  EXTREMITIES: no hand tremors, limited exam  NEUROLOGY: CN grossly intact, mentation intact and speech normal   SKIN:  no apparent skin lesions, rash, or edema with visualized skin appearance  PSYCH: mentation appears normal, affect normal/bright, judgement and insight intact,   normal speech and appearance well groomed      LABS:     All pertinent notes, labs, and images personally reviewed by me.       A/P:  Encounter Diagnoses   Name Primary?     Type 1 diabetes mellitus without complication (H) Yes     Insulin pump status      Hypothyroidism due to Hashimoto's thyroiditis        Comments:  Reviewed health history, diabetes and thyroid issues.  Recent glucose control good overall  Reviewed the T2DM , assist dog, exercise topics also  Reviewed and interpreted tests that I previously ordered.   Ordered appropriate tests for the endocrinology disease management.    Management options discussed and implemented after shared medical decision making with the patient.  T1DM and hypothyroidism problems are chronic-stable    Plan:  Discussed general issues with the diabetes diagnosis and management  We discussed the hgbA1c test which reflects previous overall glucose levels or control  Discussed the importance of blood glucose (BG) testing to assess glucose trends  Reviewed recent Omnipod 5  pump report and pump settings  Reviewed recent DexcomG6 CGM glucose trends, in detail     Recommend:  Continue the Omnipod 5 insulin pump and diabetes management    No pump setting changes at this  time    Would not resume Jardiance med at this time, but could use this off-label med again in future.  Consider using pump Activity (exercise) mode for aerobic exercise  Continue use of DexcomG6 CGM glucose sensor  Monitor for symptom changes  Followup diabetes education appt with one of our [facility] CDE's  Agree with plan to lower the vit D supplement from 3 caps to 2 caps  Continue current levothyroxine daily dose  Plan repeat lab testing ~5/2023  Advise having fasting lipid panel testing and dilated eye examination, at least annually    Keep regular f/u PCP and GYN evaluations  Addressed patient questions today    There are no Patient Instructions on file for this visit.    Future labs ordered today:   Orders Placed This Encounter   Procedures     GLUCOSE MONITOR, 72 HOUR, PHYS INTERP     TSH with free T4 reflex     Hemoglobin A1c     Basic metabolic panel     Radiology/Consults ordered today: None    Total time spent on day of encounter:  35 min    Follow-up:  6/2023, Shannon Gaming MD, MS  Endocrinology  [facility]    CC:  WILLOW Sheehan

## 2023-02-24 NOTE — TELEPHONE ENCOUNTER
LORENA (2nd)  and [employer] message  to schedule a f/u in June with Dr. Gaming. Call back [phone]

## 2023-04-06 ENCOUNTER — MYC MEDICAL ADVICE (OUTPATIENT)
Dept: FAMILY MEDICINE | Facility: CLINIC | Age: 48
End: 2023-04-06
Payer: COMMERCIAL

## 2023-04-06 DIAGNOSIS — F41.9 ANXIETY: ICD-10-CM

## 2023-04-07 NOTE — TELEPHONE ENCOUNTER
Prescription approved per [facility] Refill Protocol.  Fabby Carney, RN  [facility] Triage Nurse

## 2023-04-09 ENCOUNTER — HEALTH MAINTENANCE LETTER (OUTPATIENT)
Age: 48
End: 2023-04-09

## 2023-04-12 ENCOUNTER — ONCOLOGY VISIT (OUTPATIENT)
Dept: ONCOLOGY | Facility: CLINIC | Age: 48
End: 2023-04-12
Payer: COMMERCIAL

## 2023-04-12 ENCOUNTER — ANCILLARY PROCEDURE (OUTPATIENT)
Dept: MAMMOGRAPHY | Facility: CLINIC | Age: 48
End: 2023-04-12
Attending: CLINICAL NURSE SPECIALIST
Payer: COMMERCIAL

## 2023-04-12 ENCOUNTER — ANCILLARY PROCEDURE (OUTPATIENT)
Dept: ULTRASOUND IMAGING | Facility: CLINIC | Age: 48
End: 2023-04-12
Attending: CLINICAL NURSE SPECIALIST
Payer: COMMERCIAL

## 2023-04-12 VITALS
DIASTOLIC BLOOD PRESSURE: 69 MMHG | HEART RATE: 61 BPM | HEIGHT: 67 IN | OXYGEN SATURATION: 95 % | WEIGHT: 153 LBS | SYSTOLIC BLOOD PRESSURE: 108 MMHG | RESPIRATION RATE: 16 BRPM | BODY MASS INDEX: 24.01 KG/M2

## 2023-04-12 DIAGNOSIS — Z80.3 FAMILY HISTORY OF MALIGNANT NEOPLASM OF BREAST: ICD-10-CM

## 2023-04-12 DIAGNOSIS — R92.30 DENSE BREAST: ICD-10-CM

## 2023-04-12 DIAGNOSIS — N63.13 MASS OF LOWER OUTER QUADRANT OF RIGHT BREAST: Primary | ICD-10-CM

## 2023-04-12 DIAGNOSIS — Z12.39 BREAST CANCER SCREENING, HIGH RISK PATIENT: ICD-10-CM

## 2023-04-12 DIAGNOSIS — N63.13 MASS OF LOWER OUTER QUADRANT OF RIGHT BREAST: ICD-10-CM

## 2023-04-12 PROCEDURE — 77066 DX MAMMO INCL CAD BI: CPT | Performed by: RADIOLOGY

## 2023-04-12 PROCEDURE — G0279 TOMOSYNTHESIS, MAMMO: HCPCS | Performed by: RADIOLOGY

## 2023-04-12 PROCEDURE — 76642 ULTRASOUND BREAST LIMITED: CPT | Mod: RT | Performed by: RADIOLOGY

## 2023-04-12 PROCEDURE — 99214 OFFICE O/P EST MOD 30 MIN: CPT | Performed by: CLINICAL NURSE SPECIALIST

## 2023-04-12 ASSESSMENT — PAIN SCALES - GENERAL: PAINLEVEL: NO PAIN (0)

## 2023-04-12 NOTE — LETTER
2023         RE: Jenise Smith  [address] [facility] [address] [facility] [id number]        Dear Colleague,    Thank you for referring your patient, Jenise Smith, to [facility]. Please see a copy of my visit note below.    Oncology Risk Management Consultation:  Date on this visit: 2023    Jenise Smith requires high risk screening and surveillance to reduce her risk of cancer secondary to having a family history of breast cancer in her paternal grandmother in her 70s and paternal cousin at 38. She is considered to be at high risk for breast cancer and has a 24.6%  lifetime risk for breast cancer by the GONZALO model.      Primary Physician:  Mirna Sheehan MD     History Of Present Illness:  Ms. Smith is a very pleasant 48 year old female who presents with a family history of breast cancer.     Pertinent history:  Menarche at age 13  First child at age 30  Premenopausal.   No hx of breastfeeding  Breast density: extremely dense  Ovaries, fallopian tubes and uterus in place  No hx of  hormone replacement therapy.    2022- Left breast biopsy, benign breast tissue     Hx of  Colonoscopies since the age of 47.   colonoscopy in 2022 found two 5-10 mm polyps, the larger of which was identified as a sessile serrated adenoma and the other was a hyperplastic polyp. Follow-up was recommended in 3 years, 2025.     Genetic testin22 -- Genetic Testing Result: NEGATIVE  Jenise is negative for mutations in APC, NICK, AXIN2, BARD1, BMPR1A, BRCA1, BRCA2, BRIP1, CDH1, CDK4, CDKN2A, CHEK2, CTNNA1, DICER1, EPCAM, GREM1, HOXB13, KIT, MEN1, MLH1, MSH2, MSH3, MSH6, MUTYH, NBN, NF1, NTHL1, PALB2, PDGFRA, PMS2, POLD1, POLE, PTEN, RAD50, RAD51C, RAD51D, SDHA, SDHB, SDHC, SDHD, SMAD4, SMARCA4, STK11, TP53, TSC1, TSC2, and VHL. No mutations were found in any of the 47 genes analyzed. This test involved sequencing and deletion/duplication analysis of all genes with the  exceptions of EPCAM and GREM1 (deletions/duplications only) and SDHA (sequencing only) identified using the Common Hereditary Cancers panel was ordered from [facility]. This testing was done because of Jenise's family history of breast, colon, prostate, and small bowel cancer.     Pertinent screening history:  4/27/2022- Screening tomosynthesis mammogram, BiRads1     7/20/2022- Breast MRI, BiRads0 for Clumped non mass enhancement in the upper inner left breast. While this may represent normal background parenchymal enhancement, it should be further characterized with ultrasound. If a suspicious ultrasound detected abnormality is found, biopsy could be   performed.  Right Breast: BI-RADS CATEGORY: 1 -  NEGATIVE.   Left Breast: BI-RADS CATEGORY: 0 - Need Additional Imaging Evaluation and/or Prior Mammograms for Comparison.      7/28/2022- Left breast US, In the left breast at 11:00 12 cm from nipple there is an island of  fibroglandular tissue corresponding to the MRI finding. There is no  sonographic evidence of suspicious mass or shadowing. However, given  the increased enhancement on recent MRI, ultrasound-guided biopsy of  this finding is recommended to ensure benignity. This was discussed  with patient, who understands and agrees with the plan. BI-RADS CATEGORY: 4 A- Suspicious Abnormality-Biopsy Should Be Considered RECOMMENDED FOLLOW-UP: Biopsy. PATHOLOGY: Left breast, 11:00, 12 cm from nipple, ultrasound-guided needle biopsy- Benign breast parenchyma     At this visit, she denies new fatigue, breast pain, asymmetry, masses, thickening, nipple discharge and skin changes in her breasts. She notes areas of lumps, which were previously identified as cysts.       Past Medical/Surgical History:  Past Medical History:   Diagnosis Date     Diabetes mellitus type 1 (H)      Dysmenorrhea      Excessive or frequent menstruation      Hypothyroid      Insulin pump in place      Lumbar back pain      Other and unspecified  hyperlipidemia      PONV (postoperative nausea and vomiting)      Past Surgical History:   Procedure Laterality Date     DILATION AND CURETTAGE, HYSTEROSCOPY, ABLATE ENDOMETRIUM NOVASURE, COMBINED  1/23/2014    Procedure: COMBINED DILATION AND CURETTAGE, HYSTEROSCOPY, ABLATE ENDOMETRIUM NOVASURE;  HYSTEROSCOPY, DILATION, CURETTAGE, WITH NOVASURE ABLATION, MYOSURE MORCELLATOR;  Surgeon: Swetha Queen MD;  Location: [facility]     LAPAROSCOPY       LEEP TX, CERVICAL       OPERATIVE HYSTEROSCOPY WITH MORCELLATOR  1/23/2014    Procedure: OPERATIVE HYSTEROSCOPY WITH MORCELLATOR;;  Surgeon: Swetha Queen MD;  Location: [facility]     wisdom teeth[         Allergies:  Allergies as of 04/12/2023 - Reviewed 04/12/2023   Allergen Reaction Noted     Keflex [cephalexin]  03/26/2020     Latex Rash 08/18/2008       Current Medications:  Current Outpatient Medications   Medication Sig Dispense Refill     blood glucose (ACCU-CHEK FASTCLIX) lancing device 1 Dose once daily.       cefadroxil (DURICEF) 500 MG capsule Take 1 capsule (500 mg) by mouth 2 times daily 14 capsule 0     Continuous Blood Gluc  (DEXCOM G6 ) FRANCISCO Use to read blood sugars as per 's instructions. 1 each 0     Continuous Blood Gluc Sensor (DEXCOM G6 SENSOR) MISC Change every 10 days. 9 each 3     Continuous Blood Gluc Transmit (DEXCOM G6 TRANSMITTER) MISC Change every 3 months. 1 each 3     CONTOUR NEXT TEST test strip USE 1 STRIP 6 TIMES DAILY 600 strip 3     eszopiclone (LUNESTA) 2 MG tablet TAKE 1 TABLET BY MOUTH AT NIGHT  AS NEEDED FOR SLEEP Strength: 2 mg 30 tablet 0     FLUoxetine (PROZAC) 20 MG capsule Take 1 capsule (20 mg) by mouth daily 90 capsule 0     Glucagon (BAQSIMI TWO PACK) 3 MG/DOSE POWD Spray 3 mg in nostril once as needed (severe hypoglycemia reaction, dose as directed) 1 each 3     HUMALOG 100 UNIT/ML injection        Insulin Disposable Pump (OMNIPOD 5 G6 INTRO, GEN 5,) KIT 1 Device  continuous prn (Use as Controller (PDM) device for Omnipod 5 pods) 1 kit 0     Insulin Disposable Pump (OMNIPOD 5 G6 POD, GEN 5,) MISC 1 Device continuous prn (Use for insulin delivery with Omnipod 5 Controller (PDM)) Changes pods every 3 days, needs 6 boxes of 5 pods every 90 days 6 each 3     insulin glargine (LANTUS SOLOSTAR) 100 UNIT/ML pen INJECT SUBCUTANEOUSLY 16  UNITS DAILY AS DIRECTED 15 mL 3     insulin lispro (HUMALOG KWIKPEN) 100 UNIT/ML (1 unit dial) KWIKPEN INJECT 3 TO 8 UNITS  SUBCUTANEOUSLY WITH MEALS  AND CORRECTION DOSES AS  DIRECTED, TOTAL DAILY DOSE  APPROXIMATELY 30 UNITS if the pump fails 45 mL 0     insulin lispro (HUMALOG VIAL) 100 UNIT/ML vial Use with insulin pump, total daily dose approx 70 units 70 mL 1     INSULIN PUMP - OUTPATIENT Date Last Updated: 12-3-21  Omnipod  BASAL RATES and times:  12   AM (midnight): 0.65 units/hour    3     AM: 0.60 units/hour   4     AM: 0.65 units/hour   6     AM: 0.70 units/hour   10   AM: 0.45 units/hour   1:30 PM: 0.65 units/hour     CARB RATIO and times:  12   AM (midnight): 12  11   AM: 9  5    PM:  8.5  Corection Factor (Sensitivity) and times:  12   AM (midnight): 60 mg/dL  8     AM: 55 mg/dL  9    PM: 60 mg/dL  BLOOD GLUCOSE TARGET/Correct Above and times:  12   AM (midnight): 100 - 130  6     AM:  90 - 120  9    PM:  100 - 140  Active Insulin Time: 3 hours    Sensor:  Yes: DexCom  Pump Website Username: [facility]cted to [facility]  Pump Website Password: [facility]       levothyroxine (SYNTHROID/LEVOTHROID) 50 MCG tablet Take 1 tablet (50 mcg) by mouth daily 90 tablet 2     Multiple Vitamins-Minerals (MULTI FOR HER PO)        VITAMIN D, CHOLECALCIFEROL, PO Take 1,000 Units by mouth          Family History:  Family History   Problem Relation Age of Onset     Diabetes Mother         type 2     Obesity Mother      Diabetes Father         type 1     Diabetes Sister         type 1     Thyroid Disease Sister      Diabetes Sister       "Prostate Cancer Maternal Grandfather 80     Breast Cancer Paternal Grandmother 70         in 80s     Prostate Cancer Paternal Grandfather      Brain Cancer Paternal Grandfather 80     Cancer Maternal Uncle 60        small bowel     Cancer Maternal Uncle 60        tonsil     Cancer - colorectal Paternal Uncle 60     Breast Cancer Paternal Cousin 38       Social History:  Social History     Socioeconomic History     Marital status:      Spouse name: Conrad     Number of children: 1     Years of education: Not on file     Highest education level: Not on file   Occupational History     Employer: [employer]   Tobacco Use     Smoking status: Never     Smokeless tobacco: Never   Vaping Use     Vaping status: Not on file   Substance and Sexual Activity     Alcohol use: Yes     Alcohol/week: 0.0 standard drinks of alcohol     Comment: 3 per week     Drug use: No     Sexual activity: Yes     Partners: Male     Birth control/protection: Male Surgical   Other Topics Concern     Parent/sibling w/ CABG, MI or angioplasty before 65F 55M? No   Social History Narrative     in May 2014    2 step children (20 and 18) and one biol dtr age 15.    Works for [employer]--works from home     Social Determinants of Health     Financial Resource Strain: Not on file   Food Insecurity: Not on file   Transportation Needs: Not on file   Physical Activity: Not on file   Stress: Not on file   Social Connections: Not on file   Intimate Partner Violence: Not on file   Housing Stability: Not on file       Physical Exam:  /69 (BP Location: Right arm)   Pulse 61   Resp 16   Ht 1.702 m (5' 7.01\")   Wt 69.4 kg (153 lb)   LMP 2023 (Approximate)   SpO2 95%   BMI 23.96 kg/m      GENERAL APPEARANCE: healthy, alert and no distress     NECK: no adenopathy, no asymmetry or masses     RESP: lungs clear to auscultation - no rales, rhonchi or wheezes    BREAST: A multipositional, bilateral breast exam was " performed. Symmetrical, nipples everted bilaterally. Right breast: palpable lump at 4 o'clock position.  no nipple discharge, no skin changes. Dense tissue.  Right axilla: no palpable adenopathy. Left breast: no palpable dominant masses, no nipple discharge, no skin changes. Left axilla: no palpable adenopathy. Dense tissue.    LYMPHATICS: No cervical, supraclavicular, axillary or inguinal lymphadenopathy     CARDIOVASCULAR: regular rate and rhythm, normal S1 S2, no S3 or S4 and no murmur. Pulses +1 in all extremities       SKIN: no suspicious lesions or rashes    Laboratory/Imaging Studies  Results for orders placed or performed in visit on 04/12/23   US Breast Right Limited 1-3 Quadrants     Status: None    Narrative    Examinations: MA DIAGNOSTIC BILATERAL W/ TARA, US BREAST RIGHT LIMITED  1-3 QUADRANTS, 4/12/2023 4:02 PM    Comparisons: 4/27/2022, 4/22/2021, 9/24/2018.    History: Clinician palpable right breast lump 4:00 position. Of note,  the patient does not feel the lump.    BREAST DENSITY: Extremely dense.    Findings:     Mammogram:  Technique: Standard mammographic views were performed with  tomosynthesis and 2D reconstruction.  Small focal asymmetry in the lower outer right breast persists on CC  spot compression measuring up to 8 mm.    Ultrasound:  Real-time targeted technologist and physician performed sonographic  imaging of the right breast.  The lower outer quadrant in the area of mammographic abnormality as  well as the 4:00 position was scanned. Multiple small benign cysts and  clusters of cysts are identified. No suspicious finding.      Impression    IMPRESSION: BI-RADS CATEGORY: 2 - Benign.    RECOMMENDED FOLLOW-UP: Annual Mammography.        The patient was given the results of the examination.    HUEY DOMINIQUE MD         SYSTEM ID:  [id number]   Results for orders placed or performed in visit on 04/12/23   MA Diagnostic Bilateral w/Tara     Status: None    Narrative    Examinations: MA  DIAGNOSTIC BILATERAL W/ TARA, US BREAST RIGHT LIMITED  1-3 QUADRANTS, 4/12/2023 4:02 PM    Comparisons: 4/27/2022, 4/22/2021, 9/24/2018.    History: Clinician palpable right breast lump 4:00 position. Of note,  the patient does not feel the lump.    BREAST DENSITY: Extremely dense.    Findings:     Mammogram:  Technique: Standard mammographic views were performed with  tomosynthesis and 2D reconstruction.  Small focal asymmetry in the lower outer right breast persists on CC  spot compression measuring up to 8 mm.    Ultrasound:  Real-time targeted technologist and physician performed sonographic  imaging of the right breast.  The lower outer quadrant in the area of mammographic abnormality as  well as the 4:00 position was scanned. Multiple small benign cysts and  clusters of cysts are identified. No suspicious finding.      Impression    IMPRESSION: BI-RADS CATEGORY: 2 - Benign.    RECOMMENDED FOLLOW-UP: Annual Mammography.        The patient was given the results of the examination.    HUEY DOMINIQUE MD         SYSTEM ID:  [id number]       ASSESSMENT  We discussed her last screening tests and reviewed results.  We discussed that she does have an area that I would want to investigate further at the 4 o'clock position of her right breast.  The thickened section does move, but it seems solid and is a new finding, so I have ordered a diagnostic mammogram and right US for her today.  She will proceed with the following.    Individualized Surveillance Plan for women  With 20% or greater lifetime risk of breast cancer   Per NCCN Breast Cancer Screening and Diagnosis Guidelines Version 1.2023   Recommended screening Test or procedure Last done Next Scheduled    Clinical encounter Clinical exam every 6-12 months.   Refer to genetic counseling if not already done.  Consider risk reduction strategies.   4/12/2023 April 2024   However, some family histories with breast cancers at a very young age, may warrant screening  starting earlier.    *May begin at age 40 if breast cancers in the family occur at later ages.    Annual mammogram beginning 10 years younger than the earliest breast cancer in the family but not prior to age 30.    Recommend annual breast MRI to begin 10 years younger than the earliest breast cancer in the family but not prior to age 25.    Breast MRIs are preferably done on day 7-15 of the menstrual cycle in premenopausal women.   Breast MRI in July 2022 Mammogram today    Breast MRI in October    Next exam: April 2024 followed by mammogram   Breast screening for patients at high risk due to thoracic radiation between the ages of 10-30   Annual clinical exam beginning 8 years after radiation therapy.    Annual screening mammogram beginning at age 30 or 8 years after radiation therapy    Annual breast MRI, beginning at age 25 or 8 years after radiation therapy.       Women who have a lifetime risk of >20% based on history of LCIS or ADH/ALH Annual screening mammogram beginning at age of LCIS or ADH/ALH but not prior to age 30.    Consider annual MRI to begin at age of diagnosis of LCIS or ADH/ALH but not prior to age 25.    Consider risk reducing strategies.      Recommend risk reducing strategies for women with 1.7% 5 year risk of breast cancer.       I spent a total of 38 minutes on the day of the visit. Please see the note for further information on patient assessment and treatment.    NANCY Bell-CNS, OCN, AGN-BC  Clinical Nurse Specialist  Cancer Risk Management Program  [facility]    CC: Mirna Sheehan MD

## 2023-04-12 NOTE — PATIENT INSTRUCTIONS
Individualized Surveillance Plan for women  With 20% or greater lifetime risk of breast cancer   Per NCCN Breast Cancer Screening and Diagnosis Guidelines Version 1.2023   Recommended screening Test or procedure Last done Next Scheduled    Clinical encounter Clinical exam every 6-12 months.   Refer to genetic counseling if not already done.  Consider risk reduction strategies.   4/12/2023 April 2024   However, some family histories with breast cancers at a very young age, may warrant screening starting earlier.    *May begin at age 40 if breast cancers in the family occur at later ages.    Annual mammogram beginning 10 years younger than the earliest breast cancer in the family but not prior to age 30.    Recommend annual breast MRI to begin 10 years younger than the earliest breast cancer in the family but not prior to age 25.    Breast MRIs are preferably done on day 7-15 of the menstrual cycle in premenopausal women.   Breast MRI in July 2022 Mammogram today    Breast MRI in October    Next exam: April 2024 followed by mammogram   Breast screening for patients at high risk due to thoracic radiation between the ages of 10-30   Annual clinical exam beginning 8 years after radiation therapy.    Annual screening mammogram beginning at age 30 or 8 years after radiation therapy    Annual breast MRI, beginning at age 25 or 8 years after radiation therapy.       Women who have a lifetime risk of >20% based on history of LCIS or ADH/ALH Annual screening mammogram beginning at age of LCIS or ADH/ALH but not prior to age 30.    Consider annual MRI to begin at age of diagnosis of LCIS or ADH/ALH but not prior to age 25.    Consider risk reducing strategies.      Recommend risk reducing strategies for women with 1.7% 5 year risk of breast cancer.

## 2023-04-12 NOTE — PROGRESS NOTES
Oncology Risk Management Consultation:  Date on this visit: 2023    Jenise Smith requires high risk screening and surveillance to reduce her risk of cancer secondary to having a family history of breast cancer in her paternal grandmother in her 70s and paternal cousin at 38. She is considered to be at high risk for breast cancer and has a 24.6%  lifetime risk for breast cancer by the GONZALO model.      Primary Physician:  Mirna Sheehan MD     History Of Present Illness:  Ms. Smith is a very pleasant 48 year old female who presents with a family history of breast cancer.     Pertinent history:  Menarche at age 13  First child at age 30  Premenopausal.   No hx of breastfeeding  Breast density: extremely dense  Ovaries, fallopian tubes and uterus in place  No hx of  hormone replacement therapy.    2022- Left breast biopsy, benign breast tissue     Hx of  Colonoscopies since the age of 47.   colonoscopy in 2022 found two 5-10 mm polyps, the larger of which was identified as a sessile serrated adenoma and the other was a hyperplastic polyp. Follow-up was recommended in 3 years, 2025.     Genetic testin22 -- Genetic Testing Result: NEGATIVE  Jenise is negative for mutations in APC, NICK, AXIN2, BARD1, BMPR1A, BRCA1, BRCA2, BRIP1, CDH1, CDK4, CDKN2A, CHEK2, CTNNA1, DICER1, EPCAM, GREM1, HOXB13, KIT, MEN1, MLH1, MSH2, MSH3, MSH6, MUTYH, NBN, NF1, NTHL1, PALB2, PDGFRA, PMS2, POLD1, POLE, PTEN, RAD50, RAD51C, RAD51D, SDHA, SDHB, SDHC, SDHD, SMAD4, SMARCA4, STK11, TP53, TSC1, TSC2, and VHL. No mutations were found in any of the 47 genes analyzed. This test involved sequencing and deletion/duplication analysis of all genes with the exceptions of EPCAM and GREM1 (deletions/duplications only) and SDHA (sequencing only) identified using the Common Hereditary Cancers panel was ordered from [employer]. This testing was done because of Jenise's family history of breast, colon, prostate, and small  bowel cancer.     Pertinent screening history:  4/27/2022- Screening tomosynthesis mammogram, BiRads1     7/20/2022- Breast MRI, BiRads0 for Clumped non mass enhancement in the upper inner left breast. While this may represent normal background parenchymal enhancement, it should be further characterized with ultrasound. If a suspicious ultrasound detected abnormality is found, biopsy could be   performed.  Right Breast: BI-RADS CATEGORY: 1 -  NEGATIVE.   Left Breast: BI-RADS CATEGORY: 0 - Need Additional Imaging Evaluation and/or Prior Mammograms for Comparison.      7/28/2022- Left breast US, In the left breast at 11:00 12 cm from nipple there is an island of  fibroglandular tissue corresponding to the MRI finding. There is no  sonographic evidence of suspicious mass or shadowing. However, given  the increased enhancement on recent MRI, ultrasound-guided biopsy of  this finding is recommended to ensure benignity. This was discussed  with patient, who understands and agrees with the plan. BI-RADS CATEGORY: 4 A- Suspicious Abnormality-Biopsy Should Be Considered RECOMMENDED FOLLOW-UP: Biopsy. PATHOLOGY: Left breast, 11:00, 12 cm from nipple, ultrasound-guided needle biopsy- Benign breast parenchyma     At this visit, she denies new fatigue, breast pain, asymmetry, masses, thickening, nipple discharge and skin changes in her breasts. She notes areas of lumps, which were previously identified as cysts.       Past Medical/Surgical History:  Past Medical History:   Diagnosis Date     Diabetes mellitus type 1 (H)      Dysmenorrhea      Excessive or frequent menstruation      Hypothyroid      Insulin pump in place      Lumbar back pain      Other and unspecified hyperlipidemia      PONV (postoperative nausea and vomiting)      Past Surgical History:   Procedure Laterality Date     DILATION AND CURETTAGE, HYSTEROSCOPY, ABLATE ENDOMETRIUM NOVASURE, COMBINED  1/23/2014    Procedure: COMBINED DILATION AND CURETTAGE,  HYSTEROSCOPY, ABLATE ENDOMETRIUM NOVASURE;  HYSTEROSCOPY, DILATION, CURETTAGE, WITH NOVASURE ABLATION, MYOSURE MORCELLATOR;  Surgeon: Swetha Queen MD;  Location: [facility]     LAPAROSCOPY       LEEP TX, CERVICAL       OPERATIVE HYSTEROSCOPY WITH MORCELLATOR  1/23/2014    Procedure: OPERATIVE HYSTEROSCOPY WITH MORCELLATOR;;  Surgeon: Swetha Queen MD;  Location: [facility]     wisdom teeth[         Allergies:  Allergies as of 04/12/2023 - Reviewed 04/12/2023   Allergen Reaction Noted     Keflex [cephalexin]  03/26/2020     Latex Rash 08/18/2008       Current Medications:  Current Outpatient Medications   Medication Sig Dispense Refill     blood glucose (ACCU-CHEK FASTCLIX) lancing device 1 Dose once daily.       cefadroxil (DURICEF) 500 MG capsule Take 1 capsule (500 mg) by mouth 2 times daily 14 capsule 0     Continuous Blood Gluc  (DEXCOM G6 ) FRANCISCO Use to read blood sugars as per 's instructions. 1 each 0     Continuous Blood Gluc Sensor (DEXCOM G6 SENSOR) MISC Change every 10 days. 9 each 3     Continuous Blood Gluc Transmit (DEXCOM G6 TRANSMITTER) MISC Change every 3 months. 1 each 3     CONTOUR NEXT TEST test strip USE 1 STRIP 6 TIMES DAILY 600 strip 3     eszopiclone (LUNESTA) 2 MG tablet TAKE 1 TABLET BY MOUTH AT NIGHT  AS NEEDED FOR SLEEP Strength: 2 mg 30 tablet 0     FLUoxetine (PROZAC) 20 MG capsule Take 1 capsule (20 mg) by mouth daily 90 capsule 0     Glucagon (BAQSIMI TWO PACK) 3 MG/DOSE POWD Spray 3 mg in nostril once as needed (severe hypoglycemia reaction, dose as directed) 1 each 3     HUMALOG 100 UNIT/ML injection        Insulin Disposable Pump (OMNIPOD 5 G6 INTRO, GEN 5,) KIT 1 Device continuous prn (Use as Controller (PDM) device for Omnipod 5 pods) 1 kit 0     Insulin Disposable Pump (OMNIPOD 5 G6 POD, GEN 5,) MISC 1 Device continuous prn (Use for insulin delivery with Omnipod 5 Controller (PDM)) Changes pods every 3 days, needs 6 boxes  of 5 pods every 90 days 6 each 3     insulin glargine (LANTUS SOLOSTAR) 100 UNIT/ML pen INJECT SUBCUTANEOUSLY 16  UNITS DAILY AS DIRECTED 15 mL 3     insulin lispro (HUMALOG KWIKPEN) 100 UNIT/ML (1 unit dial) KWIKPEN INJECT 3 TO 8 UNITS  SUBCUTANEOUSLY WITH MEALS  AND CORRECTION DOSES AS  DIRECTED, TOTAL DAILY DOSE  APPROXIMATELY 30 UNITS if the pump fails 45 mL 0     insulin lispro (HUMALOG VIAL) 100 UNIT/ML vial Use with insulin pump, total daily dose approx 70 units 70 mL 1     INSULIN PUMP - OUTPATIENT Date Last Updated: 12-3-21  Omnipod  BASAL RATES and times:  12   AM (midnight): 0.65 units/hour    3     AM: 0.60 units/hour   4     AM: 0.65 units/hour   6     AM: 0.70 units/hour   10   AM: 0.45 units/hour   1:30 PM: 0.65 units/hour     CARB RATIO and times:  12   AM (midnight): 12  11   AM: 9  5    PM:  8.5  Corection Factor (Sensitivity) and times:  12   AM (midnight): 60 mg/dL  8     AM: 55 mg/dL  9    PM: 60 mg/dL  BLOOD GLUCOSE TARGET/Correct Above and times:  12   AM (midnight): 100 - 130  6     AM:  90 - 120  9    PM:  100 - 140  Active Insulin Time: 3 hours    Sensor:  Yes: DexCom  Pump Website Username: [facility]  Pump Website Password: [facility]       levothyroxine (SYNTHROID/LEVOTHROID) 50 MCG tablet Take 1 tablet (50 mcg) by mouth daily 90 tablet 2     Multiple Vitamins-Minerals (MULTI FOR HER PO)        VITAMIN D, CHOLECALCIFEROL, PO Take 1,000 Units by mouth          Family History:  Family History   Problem Relation Age of Onset     Diabetes Mother         type 2     Obesity Mother      Diabetes Father         type 1     Diabetes Sister         type 1     Thyroid Disease Sister      Diabetes Sister      Prostate Cancer Maternal Grandfather 80     Breast Cancer Paternal Grandmother 70         in 80s     Prostate Cancer Paternal Grandfather      Brain Cancer Paternal Grandfather 80     Cancer Maternal Uncle 60        small bowel     Cancer Maternal Uncle 60      "   tonsil     Cancer - colorectal Paternal Uncle 60     Breast Cancer Paternal Cousin 38       Social History:  Social History     Socioeconomic History     Marital status:      Spouse name: Conrad     Number of children: 1     Years of education: Not on file     Highest education level: Not on file   Occupational History     Employer: [employer]   Tobacco Use     Smoking status: Never     Smokeless tobacco: Never   Vaping Use     Vaping status: Not on file   Substance and Sexual Activity     Alcohol use: Yes     Alcohol/week: 0.0 standard drinks of alcohol     Comment: 3 per week     Drug use: No     Sexual activity: Yes     Partners: Male     Birth control/protection: Male Surgical   Other Topics Concern     Parent/sibling w/ CABG, MI or angioplasty before 65F 55M? No   Social History Narrative     in May 2014    2 step children (20 and 18) and one biol dtr age 15.    Works for [employer]--works from home     Social Determinants of Health     Financial Resource Strain: Not on file   Food Insecurity: Not on file   Transportation Needs: Not on file   Physical Activity: Not on file   Stress: Not on file   Social Connections: Not on file   Intimate Partner Violence: Not on file   Housing Stability: Not on file       Physical Exam:  /69 (BP Location: Right arm)   Pulse 61   Resp 16   Ht 1.702 m (5' 7.01\")   Wt 69.4 kg (153 lb)   LMP 04/05/2023 (Approximate)   SpO2 95%   BMI 23.96 kg/m      GENERAL APPEARANCE: healthy, alert and no distress     NECK: no adenopathy, no asymmetry or masses     RESP: lungs clear to auscultation - no rales, rhonchi or wheezes    BREAST: A multipositional, bilateral breast exam was performed. Symmetrical, nipples everted bilaterally. Right breast: palpable lump at 4 o'clock position.  no nipple discharge, no skin changes. Dense tissue.  Right axilla: no palpable adenopathy. Left breast: no palpable dominant masses, no nipple discharge, no skin " changes. Left axilla: no palpable adenopathy. Dense tissue.    LYMPHATICS: No cervical, supraclavicular, axillary or inguinal lymphadenopathy     CARDIOVASCULAR: regular rate and rhythm, normal S1 S2, no S3 or S4 and no murmur. Pulses +1 in all extremities       SKIN: no suspicious lesions or rashes    Laboratory/Imaging Studies  Results for orders placed or performed in visit on 04/12/23   US Breast Right Limited 1-3 Quadrants     Status: None    Narrative    Examinations: MA DIAGNOSTIC BILATERAL W/ TARA, US BREAST RIGHT LIMITED  1-3 QUADRANTS, 4/12/2023 4:02 PM    Comparisons: 4/27/2022, 4/22/2021, 9/24/2018.    History: Clinician palpable right breast lump 4:00 position. Of note,  the patient does not feel the lump.    BREAST DENSITY: Extremely dense.    Findings:     Mammogram:  Technique: Standard mammographic views were performed with  tomosynthesis and 2D reconstruction.  Small focal asymmetry in the lower outer right breast persists on CC  spot compression measuring up to 8 mm.    Ultrasound:  Real-time targeted technologist and physician performed sonographic  imaging of the right breast.  The lower outer quadrant in the area of mammographic abnormality as  well as the 4:00 position was scanned. Multiple small benign cysts and  clusters of cysts are identified. No suspicious finding.      Impression    IMPRESSION: BI-RADS CATEGORY: 2 - Benign.    RECOMMENDED FOLLOW-UP: Annual Mammography.        The patient was given the results of the examination.    HUEY DOMINIQUE MD         SYSTEM ID:  [id number]   Results for orders placed or performed in visit on 04/12/23   MA Diagnostic Bilateral w/Tara     Status: None    Narrative    Examinations: MA DIAGNOSTIC BILATERAL W/ TARA, US BREAST RIGHT LIMITED  1-3 QUADRANTS, 4/12/2023 4:02 PM    Comparisons: 4/27/2022, 4/22/2021, 9/24/2018.    History: Clinician palpable right breast lump 4:00 position. Of note,  the patient does not feel the lump.    BREAST DENSITY:  Extremely dense.    Findings:     Mammogram:  Technique: Standard mammographic views were performed with  tomosynthesis and 2D reconstruction.  Small focal asymmetry in the lower outer right breast persists on CC  spot compression measuring up to 8 mm.    Ultrasound:  Real-time targeted technologist and physician performed sonographic  imaging of the right breast.  The lower outer quadrant in the area of mammographic abnormality as  well as the 4:00 position was scanned. Multiple small benign cysts and  clusters of cysts are identified. No suspicious finding.      Impression    IMPRESSION: BI-RADS CATEGORY: 2 - Benign.    RECOMMENDED FOLLOW-UP: Annual Mammography.        The patient was given the results of the examination.    HUEY DOMINIQUE MD         SYSTEM ID:  [id number]       ASSESSMENT  We discussed her last screening tests and reviewed results.  We discussed that she does have an area that I would want to investigate further at the 4 o'clock position of her right breast.  The thickened section does move, but it seems solid and is a new finding, so I have ordered a diagnostic mammogram and right US for her today.  She will proceed with the following.    Individualized Surveillance Plan for women  With 20% or greater lifetime risk of breast cancer   Per NCCN Breast Cancer Screening and Diagnosis Guidelines Version 1.2023   Recommended screening Test or procedure Last done Next Scheduled    Clinical encounter Clinical exam every 6-12 months.   Refer to genetic counseling if not already done.  Consider risk reduction strategies.   4/12/2023 April 2024   However, some family histories with breast cancers at a very young age, may warrant screening starting earlier.    *May begin at age 40 if breast cancers in the family occur at later ages.    Annual mammogram beginning 10 years younger than the earliest breast cancer in the family but not prior to age 30.    Recommend annual breast MRI to begin 10 years younger  than the earliest breast cancer in the family but not prior to age 25.    Breast MRIs are preferably done on day 7-15 of the menstrual cycle in premenopausal women.   Breast MRI in July 2022 Mammogram today    Breast MRI in October    Next exam: April 2024 followed by mammogram   Breast screening for patients at high risk due to thoracic radiation between the ages of 10-30   Annual clinical exam beginning 8 years after radiation therapy.    Annual screening mammogram beginning at age 30 or 8 years after radiation therapy    Annual breast MRI, beginning at age 25 or 8 years after radiation therapy.       Women who have a lifetime risk of >20% based on history of LCIS or ADH/ALH Annual screening mammogram beginning at age of LCIS or ADH/ALH but not prior to age 30.    Consider annual MRI to begin at age of diagnosis of LCIS or ADH/ALH but not prior to age 25.    Consider risk reducing strategies.      Recommend risk reducing strategies for women with 1.7% 5 year risk of breast cancer.       I spent a total of 38 minutes on the day of the visit. Please see the note for further information on patient assessment and treatment.    NANCY Bell-CNS, OCN, AGN-BC  Clinical Nurse Specialist  Cancer Risk Management Program  [facility]    CC: Mirna Sheehan MD

## 2023-04-12 NOTE — NURSING NOTE
"Oncology Rooming Note    April 12, 2023 3:04 PM   Jenise Smith is a 48 year old female who presents for:    Chief Complaint   Patient presents with     Family History Of Cancer     Follow up     Initial Vitals: /69 (BP Location: Right arm)   Pulse 61   Resp 16   Ht 1.702 m (5' 7.01\")   Wt 69.4 kg (153 lb)   LMP 04/05/2023 (Approximate)   SpO2 95%   BMI 23.96 kg/m   Estimated body mass index is 23.96 kg/m  as calculated from the following:    Height as of this encounter: 1.702 m (5' 7.01\").    Weight as of this encounter: 69.4 kg (153 lb). Body surface area is 1.81 meters squared.  No Pain (0) Comment: Data Unavailable   Patient's last menstrual period was 04/05/2023 (approximate).  Allergies reviewed: Yes  Medications reviewed: Yes    Medications: Medication refills not needed today.  Pharmacy name entered into [facility]:    [id number] - [place] - [address] [id number]  [place] - [address] [facility]  [facility] - [place], [place] - [address] [place] [address]  [facility] - [place], [place] - [facility]  [facility] - [place], [place] - [address]    Clinical concerns: No new concerns         Fabiana Hernandez LPN            "

## 2023-04-25 ENCOUNTER — MYC MEDICAL ADVICE (OUTPATIENT)
Dept: ENDOCRINOLOGY | Facility: CLINIC | Age: 48
End: 2023-04-25
Payer: COMMERCIAL

## 2023-04-25 DIAGNOSIS — Z96.41 INSULIN PUMP STATUS: ICD-10-CM

## 2023-04-25 DIAGNOSIS — E13.9 DIABETES MELLITUS TYPE 1.5, MANAGED AS TYPE 2 (H): ICD-10-CM

## 2023-04-25 DIAGNOSIS — E10.9 TYPE 1 DIABETES MELLITUS WITHOUT COMPLICATION (H): ICD-10-CM

## 2023-04-25 RX ORDER — GLUCAGON 3 MG/1
3 POWDER NASAL
Qty: 1 EACH | Refills: 3 | Status: SHIPPED | OUTPATIENT
Start: 2023-04-25 | End: 2024-06-03

## 2023-04-25 RX ORDER — INSULIN LISPRO 100 [IU]/ML
INJECTION, SOLUTION INTRAVENOUS; SUBCUTANEOUS
Qty: 45 ML | Refills: 0 | Status: SHIPPED | OUTPATIENT
Start: 2023-04-25 | End: 2023-06-30

## 2023-04-25 RX ORDER — INSULIN PMP CART,AUT,G6/7,CNTR
1 EACH SUBCUTANEOUS CONTINUOUS PRN
Qty: 6 EACH | Refills: 2 | Status: SHIPPED | OUTPATIENT
Start: 2023-04-25 | End: 2023-09-26

## 2023-04-25 NOTE — TELEPHONE ENCOUNTER
Last Written Prescription Date:  21, 23  Last Fill Quantity: 1/3, 3, 45/0, and 70/1   Last office visit: 23 with Dr. Gaming;  Future Office Visit:  6/15/23        Requested Prescriptions   Pending Prescriptions Disp Refills     Glucagon (BAQSIMI TWO PACK) 3 MG/DOSE POWD 1 each 3     Sig: Spray 1 spray (3 mg) in nostril once as needed (severe hypoglycemia reaction, dose as directed)       There is no refill protocol information for this order        insulin lispro (HUMALOG VIAL) 100 UNIT/ML vial 70 mL 1     Sig: Use with insulin pump, total daily dose approx 70 units       There is no refill protocol information for this order        Insulin Disposable Pump (OMNIPOD 5 G6 POD (GEN 5)) MISC 6 each 3     Si Device continuous prn (Use for insulin delivery with Omnipod 5 Controller (PDM)) Changes pods every 3 days, needs 6 boxes of 5 pods every 90 days       There is no refill protocol information for this order        Refills sent to [facility]  Kae Anthony RN

## 2023-06-02 ENCOUNTER — LAB (OUTPATIENT)
Dept: LAB | Facility: CLINIC | Age: 48
End: 2023-06-02
Payer: COMMERCIAL

## 2023-06-02 DIAGNOSIS — E06.3 HYPOTHYROIDISM DUE TO HASHIMOTO'S THYROIDITIS: ICD-10-CM

## 2023-06-02 DIAGNOSIS — E10.9 TYPE 1 DIABETES MELLITUS WITHOUT COMPLICATION (H): ICD-10-CM

## 2023-06-02 DIAGNOSIS — Z96.41 INSULIN PUMP STATUS: ICD-10-CM

## 2023-06-02 LAB
ANION GAP SERPL CALCULATED.3IONS-SCNC: 12 MMOL/L (ref 7–15)
BUN SERPL-MCNC: 25 MG/DL (ref 6–20)
CALCIUM SERPL-MCNC: 9.1 MG/DL (ref 8.6–10)
CHLORIDE SERPL-SCNC: 101 MMOL/L (ref 98–107)
CREAT SERPL-MCNC: 0.87 MG/DL (ref 0.51–0.95)
DEPRECATED HCO3 PLAS-SCNC: 24 MMOL/L (ref 22–29)
GFR SERPL CREATININE-BSD FRML MDRD: 83 ML/MIN/1.73M2
GLUCOSE SERPL-MCNC: 100 MG/DL (ref 70–99)
HBA1C MFR BLD: 6.2 % (ref 0–5.6)
POTASSIUM SERPL-SCNC: 3.9 MMOL/L (ref 3.4–5.3)
SODIUM SERPL-SCNC: 137 MMOL/L (ref 136–145)
TSH SERPL DL<=0.005 MIU/L-ACNC: 2.26 UIU/ML (ref 0.3–4.2)

## 2023-06-02 PROCEDURE — 36415 COLL VENOUS BLD VENIPUNCTURE: CPT

## 2023-06-02 PROCEDURE — 80048 BASIC METABOLIC PNL TOTAL CA: CPT

## 2023-06-02 PROCEDURE — 84443 ASSAY THYROID STIM HORMONE: CPT

## 2023-06-02 PROCEDURE — 83036 HEMOGLOBIN GLYCOSYLATED A1C: CPT

## 2023-06-15 ENCOUNTER — VIRTUAL VISIT (OUTPATIENT)
Dept: ENDOCRINOLOGY | Facility: CLINIC | Age: 48
End: 2023-06-15
Payer: COMMERCIAL

## 2023-06-15 DIAGNOSIS — E06.3 HYPOTHYROIDISM DUE TO HASHIMOTO'S THYROIDITIS: ICD-10-CM

## 2023-06-15 DIAGNOSIS — Z96.41 INSULIN PUMP STATUS: ICD-10-CM

## 2023-06-15 DIAGNOSIS — E10.9 TYPE 1 DIABETES MELLITUS WITHOUT COMPLICATION (H): Primary | ICD-10-CM

## 2023-06-15 PROCEDURE — 99214 OFFICE O/P EST MOD 30 MIN: CPT | Mod: VID | Performed by: INTERNAL MEDICINE

## 2023-06-15 PROCEDURE — 95251 CONT GLUC MNTR ANALYSIS I&R: CPT | Performed by: INTERNAL MEDICINE

## 2023-06-15 NOTE — PROGRESS NOTES
Virtual Visit Details    Type of service:  Video Visit     Originating Location (pt. 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 Brand/Urogynecology          Diabetes:  Previous diagnosis of IFG with high GAD65 Ab level  6/2011. Developed acute hyperglycemia and diagnosis of T1DM  Treatment with insulin medication, MDI plan  2/2013. Changed to OmniPod pump with Novolog insulin  Used DexcomG5 CGMS system  5/2017. Changed to Medtronic 630G pump  9/2017. Upgraded to Medtronic 670G pump and Guardian3 sensor, Humalog insulin  8/2018. Discontinued Medtronic pump and sensor, changed to MDI plan              Had taken Basaglar 17U at bedtime and mealtime Humalog  Had taken Basaglar 13U at bedtime, Humalog Luxura pen 1:7 at mealtime, ISF 60, goal target  mg/dl  12/2018. Restarted OmniPod pump  ~Early 11/2021. Stopped the Jardiance medication  Using OmniPod pump with Dash PDM, with DexcomG6 CGM    12/24/21. Switched to Tandem TSlim pump, Humalog insulin  6/2022. Switched to Omnipod 5 with DexcomG6              Humalog in pump    Current Omnipod 5 pump settings:`       Recent Omnipod 5 pump data:              Recent DexcomG6 CGM data:                     Has reduced hypoglycemia awareness              Previously had service dog (Juliet), trained to recognize hypoglycemia smell/symptoms  Exercises with walking, strenght training, Pellaton treadmill/bike  Using Contour Next Link BG meter, variable testing, tests 6x/day    Exercises with home Pellaton, also classes at health club- yoga, strength              Uses Activity Mode for exercise  Previous FV labs include:  Lab Results    Component Value Date    A1C 6.2 (H) 06/02/2023     06/02/2023    POTASSIUM 3.9 06/02/2023    CHLORIDE 101 06/02/2023    CO2 24 06/02/2023    ANIONGAP 12 06/02/2023     (H) 06/02/2023    BUN 25.0 (H) 06/02/2023    CR 0.87 06/02/2023    GFRESTIMATED 83 06/02/2023    GFRESTBLACK 73 07/07/2021    VIJI 9.1 06/02/2023    CHOL 158 03/01/2022    TRIG 50 03/01/2022    HDL 96 03/01/2022    LDL 52 03/01/2022    NHDL 62 03/01/2022    UCRR 118 03/01/2022    MICROL <5 03/01/2022    UMALCR  03/01/2022      Comment:      Unable to calculate:  Urine creatinine or albumin value below detectable level     Lab Results   Component Value Date     06/02/2023    POTASSIUM 3.9 06/02/2023    CHLORIDE 101 06/02/2023    CO2 24 06/02/2023    ANIONGAP 12 06/02/2023     (H) 06/02/2023    BUN 25.0 (H) 06/02/2023    CR 0.87 06/02/2023    GFRESTIMATED 83 06/02/2023    GFRESTBLACK 73 07/07/2021    VIJI 9.1 06/02/2023    TSH 2.26 06/02/2023    VITDT 75 01/25/2023       Previous Lasik surgery at [facility] 2015  Last eye exam 2/8/22 with Dr. Diaz/[facility], no DR reported  DM Complications: none known        Thyroid:  2011. History of hypothyroidism  Takes levothyroxine medication  Eary 10/2018. Dose incease to levothyroxine 0.05 mg daily per GYN physician    Labs done at [facility]  6/29/22:  TSH 2.69    Recent [facility] labs include:  Lab Results   Component Value Date    TSH 2.26 06/02/2023    T4 0.85 05/17/2021     Current dose:  Levothyroxine 0.05 mg daily          , lives in [place],  Conrad, daughter Myra and stepchildren Jag (formerly Tisha) + Anoop  New job with [employer], working with [facility]- Medicare  Plans to see Dr. Anali Liu/[facility] for general medicine evaluations.  Also sees [facility]. Ashford[facility]  Has seen nutritionist (Radha Turcios?) at [facility], helpful       PMH/PSH:  Past Medical History:   Diagnosis Date     Diabetes mellitus type 1 (H)      Dysmenorrhea       Excessive or frequent menstruation      Female stress incontinence      Hypothyroid      Insulin pump in place      Lumbar back pain      Other and unspecified hyperlipidemia      PONV (postoperative nausea and vomiting)      Past Surgical History:   Procedure Laterality Date     DILATION AND CURETTAGE, HYSTEROSCOPY, ABLATE ENDOMETRIUM NOVASURE, COMBINED  2014    Procedure: COMBINED DILATION AND CURETTAGE, HYSTEROSCOPY, ABLATE ENDOMETRIUM NOVASURE;  HYSTEROSCOPY, DILATION, CURETTAGE, WITH NOVASURE ABLATION, MYOSURE MORCELLATOR;  Surgeon: Swetha Queen MD;  Location: [facility]     LAPAROSCOPY       LEEP TX, CERVICAL       OPERATIVE HYSTEROSCOPY WITH MORCELLATOR  2014    Procedure: OPERATIVE HYSTEROSCOPY WITH MORCELLATOR;;  Surgeon: Swetha Queen MD;  Location: [facility]     wisdom teeth[         Family Hx:  Family History   Problem Relation Age of Onset     Diabetes Mother         type 2     Obesity Mother      Diabetes Father         type 1     Diabetes Sister         type 1     Thyroid Disease Sister      Diabetes Sister      Prostate Cancer Maternal Grandfather 80     Breast Cancer Paternal Grandmother 70         in 80s     Prostate Cancer Paternal Grandfather      Brain Cancer Paternal Grandfather 80     Cancer Maternal Uncle 60        small bowel     Cancer Maternal Uncle 60        tonsil     Cancer - colorectal Paternal Uncle 60     Breast Cancer Paternal Cousin 38         Social Hx:  Social History     Socioeconomic History     Marital status:      Spouse name: Conrad     Number of children: 1     Years of education: Not on file     Highest education level: Not on file   Occupational History     Employer: [employer]   Tobacco Use     Smoking status: Never     Smokeless tobacco: Never   Vaping Use     Vaping status: Not on file   Substance and Sexual Activity     Alcohol use: Yes     Alcohol/week: 0.0 standard drinks of alcohol     Comment: 3 per week      Drug use: No     Sexual activity: Yes     Partners: Male     Birth control/protection: Male Surgical   Other Topics Concern     Parent/sibling w/ CABG, MI or angioplasty before 65F 55M? No   Social History Narrative     in May 2014    2 step children (20 and 18) and one biol dtr age 15.    Works for [employer]--works from home     Social Determinants of Health     Financial Resource Strain: Not on file   Food Insecurity: Not on file   Transportation Needs: Not on file   Physical Activity: Not on file   Stress: Not on file   Social Connections: Not on file   Intimate Partner Violence: Not on file   Housing Stability: Not on file          MEDICATIONS:  has a current medication list which includes the following prescription(s): dexcom g6 sensor, dexcom g6 transmitter, fluoxetine, omnipod 5 g6 intro (gen 5), omnipod 5 g6 pod (gen 5), lantus solostar, insulin lispro, insulin pump, levothyroxine, multiple vitamins-minerals, cholecalciferol, blood glucose, cefadroxil, dexcom g6 , contour next test, baqsimi two pack, humalog, and insulin lispro.       ROS: 10 point ROS neg other than the symptoms noted above in the HPI.     GENERAL: energy good, wt stable; denies fevers, chills, night sweats.   HEENT: no dysphagia, odonophagia, diplopia, neck pain  THYROID:  no apparent hyper or hypothyroid symptoms  CV: no chest pain, pressure, palpitations  LUNGS: no SOB, CLIFFORD, cough, wheezing   ABDOMEN: denies nausea, diarrhea, constipation, abdominal pain  EXTREMITIES: no rashes, ulcers, edema  NEUROLOGY: no headaches, denies changes in vision, tingling, extremitiy numbness   MSK: some back pain; denies muscle weakness  SKIN: no rashes or lesions  : some urinary incontinence; regular menstrual cycles  PSYCH:  stable mood, no significant anxiety or depression  ENDOCRINE: no heat or cold intolerance    Physical Exam (visual exam)  VS:  no vital signs taken for video visit  CONSTITUTIONAL: healthy, alert and NAD,  well dressed, answering questions appropriately  ENT: no nose swelling or nasal discharge, mouth redness or gum changes.  EYES: eyes grossly normal to inspection, conjunctivae and sclerae normal, no exophthalmos or proptosis  THYROID:  no apparent nodules or goiter  LUNGS: no audible wheeze, cough or visible cyanosis, no visible retractions or increased work of breathing  ABDOMEN: abdomen not evaluated  EXTREMITIES: no hand tremors, limited exam  NEUROLOGY: CN grossly intact, mentation intact and speech normal   SKIN:  no apparent skin lesions, rash, or edema with visualized skin appearance  PSYCH: mentation appears normal, affect normal/bright, judgement and insight intact,   normal speech and appearance well groomed      LABS:     All pertinent notes, labs, and images personally reviewed by me.       A/P:  Encounter Diagnoses   Name Primary?     Type 1 diabetes mellitus without complication (H) Yes     Insulin pump status      Hypothyroidism due to Hashimoto's thyroiditis        Comments:  Reviewed health history, diabetes and thyroid issues.  Recent glucose control good overall  Reviewed the T2DM , assist dog, exercise topics also  Reviewed and interpreted tests that I previously ordered.   Ordered appropriate tests for the endocrinology disease management.    Management options discussed and implemented after shared medical decision making with the patient.  T1DM and hypothyroidism problems are chronic-stable    Plan:  Discussed general issues with the diabetes diagnosis and management  We discussed the hgbA1c test which reflects previous overall glucose levels or control  Discussed the importance of blood glucose (BG) testing to assess glucose trends  Reviewed recent Omnipod 5  pump report and pump settings  Reviewed recent DexcomG6 CGM glucose trends, in detail     Recommend:  Continue the Omnipod 5 insulin pump and diabetes management    No pump setting changes at this time    Would not resume  Jardiance med at this time  Advise using the pump Activity Mode to raise the sensor glucose target from the preset setting to 150 mg/dl    and lowers the adaptive basal rate by 50% potency. Can preset from 1-24 hrs duration.  Continue use of DexcomG6 CGM glucose sensor  Monitor for symptom changes  Followup diabetes education appt with one of our [facility] CDE's  Agree with plan to lower the vit D supplement from 3 caps to 2 caps  Continue current levothyroxine daily dose  Plan repeat fasting labs in mid 9/2023   Testing at [facility]   Lab orders placed  Advise having fasting lipid panel testing and dilated eye examination, at least annually    Keep regular f/u PCP and GYN evaluations  Addressed patient questions today    There are no Patient Instructions on file for this visit.    Future labs ordered today:   Orders Placed This Encounter   Procedures     GLUCOSE MONITOR, 72 HOUR, PHYS INTERP     Hemoglobin A1c     Basic metabolic panel     Albumin Random Urine Quantitative with Creat Ratio     Lipid panel reflex to direct LDL Fasting     TSH with free T4 reflex     Radiology/Consults ordered today: None    Total time spent on day of encounter:  35 min    Follow-up:  9/26/23 at 2:30 pm, Shannon Gaming MD, MS  Endocrinology  [facility]    CC:  ALAN Liu

## 2023-06-15 NOTE — LETTER
6/15/2023         RE: Jenise Smith  [address]  [place] [address]        Dear Colleague,    Thank you for referring your patient, Jenise Smith, to the [facility] [place]. Please see a copy of my visit note below.    Virtual Visit Details    Type of service:  Video Visit     Originating Location (pt. 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 Barnd/Urogynecology          Diabetes:  Previous diagnosis of IFG with high GAD65 Ab level  6/2011. Developed acute hyperglycemia and diagnosis of T1DM  Treatment with insulin medication, MDI plan  2/2013. Changed to OmniPod pump with Novolog insulin  Used DexcomG5 CGMS system  5/2017. Changed to Medtronic 630G pump  9/2017. Upgraded to Medtronic 670G pump and Guardian3 sensor, Humalog insulin  8/2018. Discontinued Medtronic pump and sensor, changed to MDI plan              Had taken Basaglar 17U at bedtime and mealtime Humalog  Had taken Basaglar 13U at bedtime, Humalog Luxura pen 1:7 at mealtime, ISF 60, goal target  mg/dl  12/2018. Restarted OmniPod pump  ~Early 11/2021. Stopped the Jardiance medication  Using OmniPod pump with Dash PDM, with DexcomG6 CGM    12/24/21. Switched to Tandem TSlim pump, Humalog insulin  6/2022. Switched to Omnipod 5 with DexcomG6              Humalog in pump    Current Omnipod 5 pump settings:`       Recent Omnipod 5 pump data:              Recent DexcomG6 CGM data:                     Has reduced hypoglycemia awareness              Previously had service dog (Juliet), trained to recognize hypoglycemia smell/symptoms  Exercises with walking, strenght  training, Pellaton treadmill/bike  Using Contour Next Link BG meter, variable testing, tests 6x/day    Exercises with home Pellaton, also classes at health club- yoga, strength              Uses Activity Mode for exercise  Previous [facility] labs include:  Lab Results   Component Value Date    A1C 6.2 (H) 06/02/2023     06/02/2023    POTASSIUM 3.9 06/02/2023    CHLORIDE 101 06/02/2023    CO2 24 06/02/2023    ANIONGAP 12 06/02/2023     (H) 06/02/2023    BUN 25.0 (H) 06/02/2023    CR 0.87 06/02/2023    GFRESTIMATED 83 06/02/2023    GFRESTBLACK 73 07/07/2021    VIJI 9.1 06/02/2023    CHOL 158 03/01/2022    TRIG 50 03/01/2022    HDL 96 03/01/2022    LDL 52 03/01/2022    NHDL 62 03/01/2022    UCRR 118 03/01/2022    MICROL <5 03/01/2022    UMALCR  03/01/2022      Comment:      Unable to calculate:  Urine creatinine or albumin value below detectable level     Lab Results   Component Value Date     06/02/2023    POTASSIUM 3.9 06/02/2023    CHLORIDE 101 06/02/2023    CO2 24 06/02/2023    ANIONGAP 12 06/02/2023     (H) 06/02/2023    BUN 25.0 (H) 06/02/2023    CR 0.87 06/02/2023    GFRESTIMATED 83 06/02/2023    GFRESTBLACK 73 07/07/2021    VIJI 9.1 06/02/2023    TSH 2.26 06/02/2023    VITDT 75 01/25/2023       Previous Lasik surgery at [facility] 2015  Last eye exam 2/8/22 with Dr. Diaz/[facility], no DR reported  DM Complications: none known        Thyroid:  2011. History of hypothyroidism  Takes levothyroxine medication  Eary 10/2018. Dose incease to levothyroxine 0.05 mg daily per GYN physician    Labs done at [facility]  6/29/22:  TSH 2.69    Recent [facility] labs include:  Lab Results   Component Value Date    TSH 2.26 06/02/2023    T4 0.85 05/17/2021     Current dose:  Levothyroxine 0.05 mg daily          , lives in [place],  Conrad, daughter Myra and stepchildren Jag (formerly Tisha) + Anoop  New job with [employer], working with  [facility] program- Medicare  Plans to see Dr. Briones  [place]/[facility] for general medicine evaluations.  Also sees Dr. Ashford[facility]  Has seen nutritionist (Radha Turcios?) at [facility], helpful       PMH/PSH:  Past Medical History:   Diagnosis Date     Diabetes mellitus type 1 (H)      Dysmenorrhea      Excessive or frequent menstruation      Female stress incontinence      Hypothyroid      Insulin pump in place      Lumbar back pain      Other and unspecified hyperlipidemia      PONV (postoperative nausea and vomiting)      Past Surgical History:   Procedure Laterality Date     DILATION AND CURETTAGE, HYSTEROSCOPY, ABLATE ENDOMETRIUM NOVASURE, COMBINED  2014    Procedure: COMBINED DILATION AND CURETTAGE, HYSTEROSCOPY, ABLATE ENDOMETRIUM NOVASURE;  HYSTEROSCOPY, DILATION, CURETTAGE, WITH NOVASURE ABLATION, MYOSURE MORCELLATOR;  Surgeon: Swetha Queen MD;  Location: [facility]     LAPAROSCOPY       LEEP TX, CERVICAL       OPERATIVE HYSTEROSCOPY WITH MORCELLATOR  2014    Procedure: OPERATIVE HYSTEROSCOPY WITH MORCELLATOR;;  Surgeon: Swetha Queen MD;  Location: [facility]     wisdom teeth[         Family Hx:  Family History   Problem Relation Age of Onset     Diabetes Mother         type 2     Obesity Mother      Diabetes Father         type 1     Diabetes Sister         type 1     Thyroid Disease Sister      Diabetes Sister      Prostate Cancer Maternal Grandfather 80     Breast Cancer Paternal Grandmother 70         in 80s     Prostate Cancer Paternal Grandfather      Brain Cancer Paternal Grandfather 80     Cancer Maternal Uncle 60        small bowel     Cancer Maternal Uncle 60        tonsil     Cancer - colorectal Paternal Uncle 60     Breast Cancer Paternal Cousin 38         Social Hx:  Social History     Socioeconomic History     Marital status:      Spouse name: Conrad     Number of children: 1     Years of education: Not on file     Highest education level: Not on file   Occupational History     Employer:  [employer]   Tobacco Use     Smoking status: Never     Smokeless tobacco: Never   Vaping Use     Vaping status: Not on file   Substance and Sexual Activity     Alcohol use: Yes     Alcohol/week: 0.0 standard drinks of alcohol     Comment: 3 per week     Drug use: No     Sexual activity: Yes     Partners: Male     Birth control/protection: Male Surgical   Other Topics Concern     Parent/sibling w/ CABG, MI or angioplasty before 65F 55M? No   Social History Narrative     in May 2014    2 step children (20 and 18) and one biol dtr age 15.    Works for [employer]--works from home     Social [employer]     Financial Resource Strain: Not on file   Food Insecurity: Not on file   Transportation Needs: Not on file   Physical Activity: Not on file   Stress: Not on file   Social Connections: Not on file   Intimate Partner Violence: Not on file   Housing Stability: Not on file          MEDICATIONS:  has a current medication list which includes the following prescription(s): dexcom g6 sensor, dexcom g6 transmitter, fluoxetine, omnipod 5 g6 intro (gen 5), omnipod 5 g6 pod (gen 5), lantus solostar, insulin lispro, insulin pump, levothyroxine, multiple vitamins-minerals, cholecalciferol, blood glucose, cefadroxil, dexcom g6 , contour next test, baqsimi two pack, humalog, and insulin lispro.       ROS: 10 point ROS neg other than the symptoms noted above in the HPI.     GENERAL: energy good, wt stable; denies fevers, chills, night sweats.   HEENT: no dysphagia, odonophagia, diplopia, neck pain  THYROID:  no apparent hyper or hypothyroid symptoms  CV: no chest pain, pressure, palpitations  LUNGS: no SOB, CLIFFORD, cough, wheezing   ABDOMEN: denies nausea, diarrhea, constipation, abdominal pain  EXTREMITIES: no rashes, ulcers, edema  NEUROLOGY: no headaches, denies changes in vision, tingling, extremitiy numbness   MSK: some back pain; denies muscle weakness  SKIN: no rashes or lesions  : some  urinary incontinence; regular menstrual cycles  PSYCH:  stable mood, no significant anxiety or depression  ENDOCRINE: no heat or cold intolerance    Physical Exam (visual exam)  VS:  no vital signs taken for video visit  CONSTITUTIONAL: healthy, alert and NAD, well dressed, answering questions appropriately  ENT: no nose swelling or nasal discharge, mouth redness or gum changes.  EYES: eyes grossly normal to inspection, conjunctivae and sclerae normal, no exophthalmos or proptosis  THYROID:  no apparent nodules or goiter  LUNGS: no audible wheeze, cough or visible cyanosis, no visible retractions or increased work of breathing  ABDOMEN: abdomen not evaluated  EXTREMITIES: no hand tremors, limited exam  NEUROLOGY: CN grossly intact, mentation intact and speech normal   SKIN:  no apparent skin lesions, rash, or edema with visualized skin appearance  PSYCH: mentation appears normal, affect normal/bright, judgement and insight intact,   normal speech and appearance well groomed      LABS:     All pertinent notes, labs, and images personally reviewed by me.       A/P:  Encounter Diagnoses   Name Primary?     Type 1 diabetes mellitus without complication (H) Yes     Insulin pump status      Hypothyroidism due to Hashimoto's thyroiditis        Comments:  Reviewed health history, diabetes and thyroid issues.  Recent glucose control good overall  Reviewed the T2DM , assist dog, exercise topics also  Reviewed and interpreted tests that I previously ordered.   Ordered appropriate tests for the endocrinology disease management.    Management options discussed and implemented after shared medical decision making with the patient.  T1DM and hypothyroidism problems are chronic-stable    Plan:  Discussed general issues with the diabetes diagnosis and management  We discussed the hgbA1c test which reflects previous overall glucose levels or control  Discussed the importance of blood glucose (BG) testing to assess glucose  trends  Reviewed recent Omnipod 5  pump report and pump settings  Reviewed recent DexcomG6 CGM glucose trends, in detail     Recommend:  Continue the Omnipod 5 insulin pump and diabetes management    No pump setting changes at this time    Would not resume Jardiance med at this time  Advise using the pump Activity Mode to raise the sensor glucose target from the preset setting to 150 mg/dl    and lowers the adaptive basal rate by 50% potency. Can preset from 1-24 hrs duration.  Continue use of DexcomG6 CGM glucose sensor  Monitor for symptom changes  Followup diabetes education appt with one of our [facility] CDE's  Agree with plan to lower the vit D supplement from 3 caps to 2 caps  Continue current levothyroxine daily dose  Plan repeat fasting labs in mid 9/2023   Testing at [facility]   Lab orders placed  Advise having fasting lipid panel testing and dilated eye examination, at least annually    Keep regular f/u PCP and GYN evaluations  Addressed patient questions today    There are no Patient Instructions on file for this visit.    Future labs ordered today:   Orders Placed This Encounter   Procedures     GLUCOSE MONITOR, 72 HOUR, PHYS INTERP     Hemoglobin A1c     Basic metabolic panel     Albumin Random Urine Quantitative with Creat Ratio     Lipid panel reflex to direct LDL Fasting     TSH with free T4 reflex     Radiology/Consults ordered today: None    Total time spent on day of encounter:  35 min    Follow-up:  9/26/23 at 2:30 pm, Shannon Gaming MD, MS  Endocrinology  [facility]    CC:  ALAN Liu                     Again, thank you for allowing me to participate in the care of your patient.        Sincerely,        Lyndon Gaming MD

## 2023-06-16 ENCOUNTER — TELEPHONE (OUTPATIENT)
Dept: ENDOCRINOLOGY | Facility: CLINIC | Age: 48
End: 2023-06-16
Payer: COMMERCIAL

## 2023-06-16 DIAGNOSIS — E13.9 DIABETES MELLITUS TYPE 1.5, MANAGED AS TYPE 2 (H): ICD-10-CM

## 2023-06-16 RX ORDER — PROCHLORPERAZINE 25 MG/1
SUPPOSITORY RECTAL
Qty: 1 EACH | Refills: 3 | Status: SHIPPED | OUTPATIENT
Start: 2023-06-16 | End: 2024-05-02

## 2023-06-16 RX ORDER — PROCHLORPERAZINE 25 MG/1
SUPPOSITORY RECTAL
Qty: 9 EACH | Refills: 3 | Status: SHIPPED | OUTPATIENT
Start: 2023-06-16 | End: 2024-05-02

## 2023-06-16 NOTE — TELEPHONE ENCOUNTER
PA Initiation    Medication: DEXCOM G6 TRANSMITTER MISC  Insurance Company: [employer] ([facility]) - Phone [phone] Fax [phone]  Pharmacy Filling the Rx: [address]  Filling Pharmacy Phone: [phone]  Filling Pharmacy Fax: [phone]  Start Date: 6/16/2023

## 2023-06-16 NOTE — TELEPHONE ENCOUNTER
Prior Authorization Approval    Medication: DEXCOM G6 TRANSMITTER MISC  Authorization Effective Date: 6/16/2023  Authorization Expiration Date: 6/16/2024  Approved Dose/Quantity: 1 month  Reference #: [id number]   Insurance Company: Rosa ([facility]) - Phone [phone] Fax [phone]  Expected CoPay:       CoPay Card Available:     Financial Assistance Needed: n/a  Which Pharmacy is filling the prescription: [id number]  Pharmacy Notified: Yes  Patient Notified: Yes

## 2023-06-29 DIAGNOSIS — E10.9 TYPE 1 DIABETES MELLITUS WITHOUT COMPLICATION (H): ICD-10-CM

## 2023-06-30 RX ORDER — INSULIN LISPRO 100 [IU]/ML
INJECTION, SOLUTION INTRAVENOUS; SUBCUTANEOUS
Qty: 30 ML | Refills: 1 | Status: SHIPPED | OUTPATIENT
Start: 2023-06-30 | End: 2023-12-18

## 2023-06-30 NOTE — TELEPHONE ENCOUNTER
"Last Written Prescription Date: 4/25/23  Last Fill Quantity: 45,  # refills: 0   Last office visit: 6/15/2023 with prescribing provider: Dr. Gaming   Future Office Visit:  9/26/23        Requested Prescriptions   Pending Prescriptions Disp Refills     insulin lispro (HUMALOG KWIKPEN) 100 UNIT/ML (1 unit dial) KWIKPEN [Pharmacy Med Name: HumaLOG KwikPen 100 UNIT/ML Subcutaneous Solution Pen-injector] 30 mL 3     Sig: INJECT 3 TO 8 UNITS  SUBCUTANEOUSLY WITH MEALS AND  CORRECTION DOSES AS DIRECTED,  TOTAL DAILY DOSE APPROXIMATELY  30 UNITS IF THE PUMP FAILS       Short Acting Insulin Protocol Passed - 6/29/2023  9:46 PM        Passed - Serum creatinine on file in past 12 months     Recent Labs   Lab Test 06/02/23  1546 11/12/21  1503 07/07/21  0730   CR 0.87   < >  --    CREAT  --   --  1.0    < > = values in this interval not displayed.       Ok to refill medication if creatinine is low          Passed - HgbA1C in past 3 or 6 months     If HgbA1C is 8 or greater, it needs to be on file within the past 3 months.  If less than 8, must be on file within the past 6 months.     Recent Labs   Lab Test 06/02/23  1546   A1C 6.2*             Passed - Medication is active on med list        Passed - Patient is age 18 or older        Passed - Recent (6 mo) or future (30 days) visit within the authorizing provider's specialty     Patient had office visit in the last 6 months or has a visit in the next 30 days with authorizing provider or within the authorizing provider's specialty.  See \"Patient Info\" tab in inbasket, or \"Choose Columns\" in Meds & Orders section of the refill encounter.               Refills sent  Kae Anthony RN    "

## 2023-08-25 DIAGNOSIS — R92.30 DENSE BREAST: ICD-10-CM

## 2023-08-25 DIAGNOSIS — Z12.39 BREAST CANCER SCREENING, HIGH RISK PATIENT: Primary | ICD-10-CM

## 2023-08-25 DIAGNOSIS — Z80.3 FAMILY HISTORY OF MALIGNANT NEOPLASM OF BREAST: ICD-10-CM

## 2023-09-19 ENCOUNTER — VIRTUAL VISIT (OUTPATIENT)
Dept: EDUCATION SERVICES | Facility: CLINIC | Age: 48
End: 2023-09-19
Payer: COMMERCIAL

## 2023-09-19 DIAGNOSIS — E10.9 TYPE 1 DIABETES MELLITUS WITHOUT COMPLICATION (H): Primary | ICD-10-CM

## 2023-09-19 PROCEDURE — G0108 DIAB MANAGE TRN  PER INDIV: HCPCS | Mod: GT | Performed by: DIETITIAN, REGISTERED

## 2023-09-21 DIAGNOSIS — E13.9 DIABETES MELLITUS TYPE 1.5, MANAGED AS TYPE 2 (H): Primary | ICD-10-CM

## 2023-09-21 DIAGNOSIS — Z96.41 INSULIN PUMP STATUS: ICD-10-CM

## 2023-09-21 NOTE — PROGRESS NOTES
Diabetes Self-Management Education & Support    Presents for: Insulin Pump and CGM Review    Type of Service: Telephone Visit    Originating Location (Patient Location): Home  Distant Location (Provider Location): [facility]  Mode of Communication:  Telephone    Telephone Visit Start Time:  11:10a  Telephone Visit End Time (telephone visit stop time): 11:45a    How would patient like to obtain AVS? MyChart    Assessment Type:   REPORTS:                Insulin Pump Information  Insulin Pump Brand: OmniPod    Education specific to insulin pump provided today on:   importance of bolusing before meals, exercise recommendations, and Omnipod updates     Opportunities for ongoing education and support in diabetes-self management were discussed.    Pt verbalized understanding of concepts discussed and recommendations provided today.       Continue education with the following diabetes management concepts: Healthy Coping    ASSESSMENT    Jenise comes in for a check in - to get any new tips or tricks to help manager her diabetes. Generally meeting TIR targets, excellent blood sugar control. We reviewed use of exercise mode, upcoming tech, integration of iPhone, Dexcom G7. We reviewed new pump options in the future, use of local support group, general info about diabetes management. Jenise notices higher blood sugars around the time to switch her pods - we discussed potential need to switch pod every 2.5 days due to tissue fatigue. She will try this and see if she notices a difference.     Glucose Patterns & Trends:  No clear patterns identified    Patient would benefit from entering activitiy/exercise mode 30-60 minutes prior to exercise and continuing 60 minutes after exercise.    Changes made to pump settings:  none    Changes made to sensor settings:   none    PLAN    Could consider changing pod every 2.5 days - may need updated prescription if she finds this more effective.   No changes  "to treatment plan today     Topics to cover at upcoming visits: Healthy Coping    Follow-up: 12/19/23    See Care Plan for co-developed, patient-state behavior change goals.  AVS provided for patient today.    Education Materials Provided:  No new materials provided today      SUBJECTIVE/OBJECTIVE:  Presents for: Insulin Pump and CGM Review  Accompanied by: Self  Diabetes education in the past 24mo: Yes  Focus of Visit: Insulin Pump, CGM  Type of Pump visit: Pump Review  Type of CGM visit: Personal CGM Follow-up  Diabetes type: Type 1  Disease course: Stable  How confident are you filling out medical forms by yourself:: Extremely  Diabetes management related comments/concerns: Just want to know what's new out there, any tips or tricks to use  Transportation concerns: No  Difficulty affording diabetes medication?: No  Difficulty affording diabetes testing supplies?: No  Other concerns:: None  Cultural Influences/Ethnic Background:  Choose not to answer      Diabetes Symptoms & Complications:     Complications assessed today?: No    Patient Problem List and Family Medical History reviewed for relevant medical history, current medical status, and diabetes risk factors.    Vitals:  There were no vitals taken for this visit.  Estimated body mass index is 23.96 kg/m  as calculated from the following:    Height as of 4/12/23: 1.702 m (5' 7.01\").    Weight as of 4/12/23: 69.4 kg (153 lb).   Last 3 BP:   BP Readings from Last 3 Encounters:   04/12/23 108/69   11/10/22 118/70   11/08/22 108/62       History   Smoking Status    Never   Smokeless Tobacco    Never       Labs:  Lab Results   Component Value Date    A1C 6.2 06/02/2023    A1C 6.1 05/17/2021     Lab Results   Component Value Date     06/02/2023     09/23/2022     05/17/2021     Lab Results   Component Value Date    LDL 52 03/01/2022    LDL 60 02/02/2021     HDL Cholesterol   Date Value Ref Range Status   02/02/2021 73 >49 mg/dL Final     Direct " Measure HDL   Date Value Ref Range Status   03/01/2022 96 >=50 mg/dL Final   ]  GFR Estimate   Date Value Ref Range Status   06/02/2023 83 >60 mL/min/1.73m2 Final     Comment:     eGFR calculated using 2021 CKD-EPI equation.   07/07/2021 60 (L) >60 mL/min/[1.73_m2] Final     GFR Estimate If Black   Date Value Ref Range Status   07/07/2021 73 >60 mL/min/[1.73_m2] Final     Lab Results   Component Value Date    CR 0.87 06/02/2023    CR 1.03 05/17/2021     No results found for: MICROALBUMIN    Healthy Eating:  Healthy Eating Assessed Today: Yes  Other: Working with  to help lose weight - down 15 lbs, has decreased red wine consumption, trying to make healthier choices.    Being Active:  Being Active Assessed Today: Yes  Exercise:: Yes    Monitoring:  Monitoring Assessed Today: Yes  Did patient bring glucose meter to appointment? : Yes  Blood Glucose Meter: CGM  Times checking blood sugar at home (number): 5+  Times checking blood sugar at home (per): Day  Blood glucose trend: Decreasing    See above.     Taking Medications:  Diabetes Medication(s)       Diabetic Other       Glucagon (BAQSIMI TWO PACK) 3 MG/DOSE POWD    Spray 1 spray (3 mg) in nostril once as needed (severe hypoglycemia reaction, dose as directed)      Insulin       HUMALOG 100 UNIT/ML injection         insulin glargine (LANTUS SOLOSTAR) 100 UNIT/ML pen    INJECT SUBCUTANEOUSLY 16  UNITS DAILY AS DIRECTED     insulin lispro (HUMALOG KWIKPEN) 100 UNIT/ML (1 unit dial) KWIKPEN    INJECT 3 TO 8 UNITS  SUBCUTANEOUSLY WITH MEALS AND  CORRECTION DOSES AS DIRECTED,  TOTAL DAILY DOSE APPROXIMATELY  30 UNITS IF THE PUMP FAILS     insulin lispro (HUMALOG VIAL) 100 UNIT/ML vial    Use with insulin pump, total daily dose approx 70 units            Taking Medication Assessed Today: Yes  Current Treatments: Insulin Pump  Problems taking diabetes medications regularly?: No  Diabetes medication side effects?: No    Problem Solving:  Problem Solving  Assessed Today: No  Is the patient at risk for hypoglycemia?: Yes  Hypoglycemia Frequency: Weekly              Reducing Risks:  Reducing Risks Assessed Today: No    Healthy Coping:  Healthy Coping Assessed Today: Yes  Emotional response to diabetes: Ready to learn, Confidence diabetes can be controlled  Informal Support system:: Family, Friends  Stage of change: MAINTENANCE (Working to maintain change, with risk of relapse)  Support resources: Offerings in Clinic Communities, Support Groups  Patient Activation Measure Survey Score:      7/21/2014     4:00 PM   JOJO Score (Last Two)   JOJO Raw Score 52   Activation Score 100   JOJO Level 4         Care Plan and Education Provided:  Care Plan: Diabetes   Updates made by Amy Mcguire RD since 9/21/2023 12:00 AM        Problem: HbA1C Not In Goal         Goal: Establish Regular Follow-Ups with PCP         Task: Discuss with PCP the recommended timing for patient's next follow up visit(s)    Responsible User: Amy Mcguire RD        Task: Discuss schedule for PCP visits with patient    Responsible User: Amy Mcguire RD        Goal: Get HbA1C Level in Goal         Task: Educate patient on diabetes education self-management topics    Responsible User: Amy Mcguire RD        Task: Educate patient on benefits of regular glucose monitoring    Responsible User: Amy Mcguire RD        Task: Refer patient to appropriate extended care team member, as needed (Medication Therapy Management, Behavioral Health, Physical Therapy, etc.)    Responsible User: Amy Mcguire RD        Task: Discuss diabetes treatment plan with patient    Responsible User: Amy Mcguire RD        Problem: Diabetes Self-Management Education Needed to Optimize Self-Care Behaviors         Goal: Understand diabetes pathophysiology and disease progression         Task: Provide education on diabetes pathophysiology and disease progression specfic to patient's diabetes type    Responsible User: Maynor  IJEOMA Reyes        Goal: Healthy Eating - follow a healthy eating pattern for diabetes         Task: Provide education on portion control and consistency in amount, composition and timing of food intake    Responsible User: Amy Mcguire RD        Task: Provide education on managing carbohydrate intake (carbohydrate counting, plate planning method, etc.)    Responsible User: Amy Mcguire RD        Task: Provide education on weight management    Responsible User: Amy Mcguire RD        Task: Provide education on heart healthy eating    Responsible User: Amy Mcguire RD        Task: Provide education on eating out    Responsible User: Amy Mcguire RD        Task: Develop individualized healthy eating plan with patient    Responsible User: Amy Mcguire RD        Goal: Being Active - get regular physical activity, working up to at least 150 minutes per week         Task: Provide education on relationship of activity to glucose and precautions to take if at risk for low glucose Completed 9/21/2023   Responsible User: Amy Mcguire RD        Task: Discuss barriers to physical activity with patient Completed 9/21/2023   Responsible User: Amy Mcguire RD        Task: Develop physical activity plan with patient Completed 9/21/2023   Responsible User: Amy Mcguire RD        Task: Explore community resources including walking groups, assistance programs, and home videos    Responsible User: Amy Mcguire RD        Goal: Monitoring - monitor glucose and ketones as directed         Task: Provide education on blood glucose monitoring (purpose, proper technique, frequency, glucose targets, interpreting results, when to use glucose control solution, sharps disposal)    Responsible User: Amy Mcguire RD        Task: Provide education on continuous glucose monitoring (sensor placement, use of erinn or /reader, understanding glucose trends, alerts and alarms, differences between sensor glucose and blood glucose)  Completed 9/21/2023   Responsible User: Amy Mcguire RD        Task: Provide education on ketone monitoring (when to monitor, frequency, etc.)    Responsible User: Amy Mcguire RD        Goal: Taking Medication - patient is consistently taking medications as directed         Task: Provide education on action of prescribed medication, including when to take and possible side effects Completed 9/21/2023   Responsible User: Amy Mcguire RD        Task: Provide education on insulin and injectable diabetes medications, including administration, storage, site selection and rotation for injection sites Completed 9/21/2023   Responsible User: Amy Mcguire RD        Task: Discuss barriers to medication adherence with patient and provide management technique ideas as appropriate    Responsible User: Amy Mcguire RD        Task: Provide education on frequency and refill details of medications    Responsible User: Amy Mcguire RD        Goal: Problem Solving - know how to prevent and manage short-term diabetes complications         Task: Provide education on high blood glucose - causes, signs/symptoms, prevention and treatment    Responsible User: Amy Mcguire RD        Task: Provide education on low blood glucose - causes, signs/symptoms, prevention, treatment, carrying a carbohydrate source at all times, and medical identification    Responsible User: Amy Mcguire RD        Task: Provide education on safe travel with diabetes    Responsible User: Amy Mcguire RD        Task: Provide education on how to care for diabetes on sick days    Responsible User: Amy Mcguire RD        Task: Provide education on when to call a health care provider    Responsible User: Amy Mcguire RD        Goal: Reducing Risks - know how to prevent and treat long-term diabetes complications         Task: Provide education on major complications of diabetes, prevention, early diagnostic measures and treatment of complications     Responsible User: Amy Mcguire RD        Task: Provide education on recommended care for dental, eye and foot health    Responsible User: Amy Mcguire RD        Task: Provide education on Hemoglobin A1c - goals and relationship to blood glucose levels    Responsible User: Amy Mcguire RD        Task: Provide education on recommendations for heart health - lipid levels and goals, blood pressure and goals, and aspirin therapy, if indicated    Responsible User: Amy Mcguire RD        Task: Provide education on tobacco cessation    Responsible User: Amy Mcguire RD        Goal: Healthy Coping - use available resources to cope with the challenges of managing diabetes         Task: Discuss recognizing feelings about having diabetes    Responsible User: Amy Mcguire RD        Task: Provide education on the benefits of making appropriate lifestyle changes    Responsible User: Amy Mcguire RD        Task: Provide education on benefits of utilizing support systems    Responsible User: Amy Mcguire RD        Task: Discuss methods for coping with stress    Responsible User: Amy Mcguire RD        Task: Provide education on when to seek professional counseling    Responsible User: Amy Mcguire RD Elise Graham, RD, [place], [facility]     Time Spent: 30 minutes  Encounter Type: Individual    Any diabetes medication dose changes were made via the CDE Protocol per the patient's endocrinology provider. A copy of this encounter was shared with the provider.

## 2023-09-26 ENCOUNTER — MYC MEDICAL ADVICE (OUTPATIENT)
Dept: ENDOCRINOLOGY | Facility: CLINIC | Age: 48
End: 2023-09-26

## 2023-09-26 ENCOUNTER — VIRTUAL VISIT (OUTPATIENT)
Dept: ENDOCRINOLOGY | Facility: CLINIC | Age: 48
End: 2023-09-26
Payer: COMMERCIAL

## 2023-09-26 DIAGNOSIS — E13.9 DIABETES MELLITUS TYPE 1.5, MANAGED AS TYPE 2 (H): ICD-10-CM

## 2023-09-26 DIAGNOSIS — Z96.41 INSULIN PUMP STATUS: ICD-10-CM

## 2023-09-26 DIAGNOSIS — E06.3 HYPOTHYROIDISM DUE TO HASHIMOTO'S THYROIDITIS: ICD-10-CM

## 2023-09-26 DIAGNOSIS — E13.9 DIABETES MELLITUS TYPE 1.5, MANAGED AS TYPE 2 (H): Primary | ICD-10-CM

## 2023-09-26 PROCEDURE — 95251 CONT GLUC MNTR ANALYSIS I&R: CPT | Performed by: INTERNAL MEDICINE

## 2023-09-26 PROCEDURE — 99214 OFFICE O/P EST MOD 30 MIN: CPT | Mod: VID | Performed by: INTERNAL MEDICINE

## 2023-09-26 RX ORDER — INSULIN PMP CART,AUT,G6/7,CNTR
1 EACH SUBCUTANEOUS CONTINUOUS PRN
Qty: 8 EACH | Refills: 3 | Status: SHIPPED | OUTPATIENT
Start: 2023-09-26 | End: 2023-09-27

## 2023-09-26 RX ORDER — LEVOTHYROXINE SODIUM 50 UG/1
50 TABLET ORAL DAILY
Qty: 90 TABLET | Refills: 3 | Status: SHIPPED | OUTPATIENT
Start: 2023-09-26

## 2023-09-26 RX ORDER — INSULIN LISPRO 100 [IU]/ML
INJECTION, SOLUTION INTRAVENOUS; SUBCUTANEOUS
Qty: 70 ML | Refills: 3 | Status: SHIPPED | OUTPATIENT
Start: 2023-09-26 | End: 2023-10-16

## 2023-09-26 NOTE — NURSING NOTE
Chief Complaint   Patient presents with    Diabetes       There were no vitals filed for this visit.    There is no height or weight on file to calculate BMI.    Johanna Russ, [facility]

## 2023-09-26 NOTE — Clinical Note
9/26/2023         RE: Jenise Smith  [address]  [place] [address]        Dear Colleague,    Thank you for referring your patient, Jenise Smith, to the [facility] [place]. Please see a copy of my visit note below.    Virtual Visit Details    Type of service:  Video Visit     Originating Location (pt. Location): Home  Distant Location (provider location):  Off-site  Platform used for Video Visit: Katerine        Recent issues:  Diabetes and thyroid follow-up evaluation  Continues to use the Omnipod 5 pump with DexcomG6 since 6/2022  Patient sees PT for stress and bladder issues, has seen Dr. Brand/Urogynecology  Has seen (virtual) nutritionist [employer] for food tracking,   wt loss management and strength training, has lost 15# to 140# (her goal)          Diabetes:  Previous diagnosis of IFG with high GAD65 Ab level  6/2011. Developed acute hyperglycemia and diagnosis of T1DM  Treatment with insulin medication, MDI plan  2/2013. Changed to OmniPod pump with Novolog insulin  Used DexcomG5 CGMS system  5/2017. Changed to Medtronic 630G pump  9/2017. Upgraded to Medtronic 670G pump and Guardian3 sensor, Humalog insulin  8/2018. Discontinued Medtronic pump and sensor, changed to MDI plan              Had taken Basaglar 17U at bedtime and mealtime Humalog  Had taken Basaglar 13U at bedtime, Humalog Luxura pen 1:7 at mealtime, ISF 60, goal target  mg/dl  12/2018. Restarted OmniPod pump  ~Early 11/2021. Stopped the Jardiance medication  Using OmniPod pump with Dash PDM, with DexcomG6 CGM    12/24/21. Switched to Tandem TSlim pump, Humalog insulin  6/2022. Switched to Omnipod 5 with DexcomG6              Humalog in pump    Current Omnipod 5 pump settings:`        Recent Omnipod 5 pump data:          Recent DexcomG6 CGM data:                       Has reduced hypoglycemia awareness              Previously had service dog (Juliet), trained to recognize hypoglycemia  smell/symptoms  Exercises with walking, strenght training, Pellaton treadmill/bike  Using Contour Next Link BG meter, variable testing, tests 6x/day    Exercises with home Pellaton, also classes at health club- yoga, strength              Uses Activity Mode for exercise  Previous [facility] labs include:  Lab Results   Component Value Date    A1C 6.2 (H) 06/02/2023     06/02/2023    POTASSIUM 3.9 06/02/2023    CHLORIDE 101 06/02/2023    CO2 24 06/02/2023    ANIONGAP 12 06/02/2023     (H) 06/02/2023    BUN 25.0 (H) 06/02/2023    CR 0.87 06/02/2023    GFRESTIMATED 83 06/02/2023    GFRESTBLACK 73 07/07/2021    VIJI 9.1 06/02/2023    CHOL 158 03/01/2022    TRIG 50 03/01/2022    HDL 96 03/01/2022    LDL 52 03/01/2022    NHDL 62 03/01/2022    UCRR 118 03/01/2022    MICROL <5 03/01/2022    UMALCR  03/01/2022      Comment:      Unable to calculate:  Urine creatinine or albumin value below detectable level     Lab Results   Component Value Date     06/02/2023    POTASSIUM 3.9 06/02/2023    CHLORIDE 101 06/02/2023    CO2 24 06/02/2023    ANIONGAP 12 06/02/2023     (H) 06/02/2023    BUN 25.0 (H) 06/02/2023    CR 0.87 06/02/2023    GFRESTIMATED 83 06/02/2023    GFRESTBLACK 73 07/07/2021    VIJI 9.1 06/02/2023    TSH 2.26 06/02/2023    VITDT 75 01/25/2023       Previous Lasik surgery at [facility] 2015  Last eye exam 1/2023 with Dr. ZURI Houser/[facility], no DR reported  DM Complications: none known        Thyroid:  2011. History of hypothyroidism  Takes levothyroxine medication  Eary 10/2018. 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 Anali Liu/[facility] for general medicine evaluations.  Also sees Dr. Ashford/[facility]  Has seen (virtual) nutritionist [employer]       PMH/PSH:  Past Medical History:   Diagnosis Date    Diabetes mellitus type 1 (H)     Dysmenorrhea     Excessive or frequent menstruation     Female stress incontinence     Hypothyroid     Insulin pump in place     Lumbar back pain     Other and unspecified hyperlipidemia     PONV (postoperative nausea and vomiting)      Past Surgical History:   Procedure Laterality Date    DILATION AND CURETTAGE, HYSTEROSCOPY, ABLATE ENDOMETRIUM NOVASURE, COMBINED  2014    Procedure: COMBINED DILATION AND CURETTAGE, HYSTEROSCOPY, ABLATE ENDOMETRIUM NOVASURE;  HYSTEROSCOPY, DILATION, CURETTAGE, WITH NOVASURE ABLATION, MYOSURE MORCELLATOR;  Surgeon: Swetha Queen MD;  [facility]    LAPAROSCOPY      LEEP TX, CERVICAL      OPERATIVE HYSTEROSCOPY WITH MORCELLATOR  2014    Procedure: OPERATIVE HYSTEROSCOPY WITH MORCELLATOR;;  Surgeon: Swetha Queen MD;  Location: [facility]    wisdom teeth[         Family Hx:  Family History   Problem Relation Age of Onset    Diabetes Mother         type 2    Obesity Mother     Diabetes Father         type 1    Diabetes Sister         type 1    Thyroid Disease Sister     Diabetes Sister     Prostate Cancer Maternal Grandfather 80    Breast Cancer Paternal Grandmother 70         in 80s    Prostate Cancer Paternal Grandfather     Brain Cancer Paternal Grandfather 80    Cancer Maternal Uncle 60        small bowel    Cancer Maternal Uncle 60        tonsil    Cancer - colorectal Paternal Uncle 60    Breast Cancer Paternal Cousin 38         Social Hx:  Social History     Socioeconomic History    Marital status:      Spouse name: Conrad    Number of children: 1    Years of education: Not on file    Highest education level: Not on file   Occupational  History     Employer: [employer]   Tobacco Use    Smoking status: Never    Smokeless tobacco: Never   Substance and Sexual Activity    Alcohol use: Yes     Alcohol/week: 0.0 standard drinks of alcohol     Comment: 3 per week    Drug use: No    Sexual activity: Yes     Partners: Male     Birth control/protection: Male Surgical   Other Topics Concern    Parent/sibling w/ CABG, MI or angioplasty before 65F 55M? No   Social History Narrative     in May 2014    2 step children (20 and 18) and one biol dtr age 15.    Works for [employer]--works from home     Social Determinants of Health     Financial Resource Strain: Not on file   Food Insecurity: Not on file   Transportation Needs: Not on file   Physical Activity: Not on file   Stress: Not on file   Social Connections: Not on file   Interpersonal Safety: Not on file   Housing Stability: Not on file          MEDICATIONS:  has a current medication list which includes the following prescription(s): blood glucose, dexcom g6 , dexcom g6 sensor, dexcom g6 transmitter, contour next test, fluoxetine, baqsimi two pack, humalog, omnipod 5 g6 intro (gen 5), omnipod 5 g6 pod (gen 5), lantus solostar, insulin lispro, insulin lispro, insulin pump, levothyroxine, multiple vitamins-minerals, cholecalciferol, and cefadroxil.       ROS: 10 point ROS neg other than the symptoms noted above in the HPI.     GENERAL: energy good, wt stable; denies fevers, chills, night sweats.   HEENT: no dysphagia, odonophagia, diplopia, neck pain  THYROID:  no apparent hyper or hypothyroid symptoms  CV: no chest pain, pressure, palpitations  LUNGS: no SOB, CLIFFORD, cough, wheezing   ABDOMEN: denies nausea, diarrhea, constipation, abdominal pain  EXTREMITIES: no rashes, ulcers, edema  NEUROLOGY: no headaches, denies changes in vision, tingling, extremitiy numbness   MSK: some back pain; denies muscle weakness  SKIN: no rashes or lesions  : some urinary incontinence; regular  menstrual cycles  PSYCH:  stable mood, no significant anxiety or depression  ENDOCRINE: no heat or cold intolerance    Physical Exam (visual exam)  VS:  no vital signs taken for video visit  CONSTITUTIONAL: healthy, alert and NAD, well dressed, answering questions appropriately  ENT: no nose swelling or nasal discharge, mouth redness or gum changes.  EYES: eyes grossly normal to inspection, conjunctivae and sclerae normal, no exophthalmos or proptosis  THYROID:  no apparent nodules or goiter  LUNGS: no audible wheeze, cough or visible cyanosis, no visible retractions or increased work of breathing  ABDOMEN: abdomen not evaluated  EXTREMITIES: no hand tremors, limited exam  NEUROLOGY: CN grossly intact, mentation intact and speech normal   SKIN:  no apparent skin lesions, rash, or edema with visualized skin appearance  PSYCH: mentation appears normal, affect normal/bright, judgement and insight intact,   normal speech and appearance well groomed      LABS:     All pertinent notes, labs, and images personally reviewed by me.       A/P:  Encounter Diagnoses   Name Primary?    Diabetes mellitus type 1.5, managed as type 2 (H)     Insulin pump status          Comments:  Reviewed health history, diabetes and thyroid issues.  Recent glucose control good overall  Reviewed the T2DM , assist dog, exercise topics also  Reviewed and interpreted tests that I previously ordered.   Ordered appropriate tests for the endocrinology disease management.    Management options discussed and implemented after shared medical decision making with the patient.  T1DM and hypothyroidism problems are chronic-stable    Plan:  Discussed general issues with the diabetes diagnosis and management  We discussed the hgbA1c test which reflects previous overall glucose levels or control  Discussed the importance of blood glucose (BG) testing to assess glucose trends  Reviewed recent Omnipod 5  pump report and pump settings  Reviewed recent  DexcomG6 CGM glucose trends, in detail     Recommend:  Continue the Omnipod 5 insulin pump and diabetes management    No pump setting changes at this time    New Rxs for Humalog vials and levothyroxine  Updated pod Rx 8 boxes of 5 per 3 months.          Would not resume Jardiance med at this time  Advise using the pump Activity Mode to raise the sensor glucose target from the preset setting to 150 mg/dl    and lowers the adaptive basal rate by 50% potency. Can preset from 1-24 hrs duration.  Continue use of DexcomG6 CGM glucose sensor  Monitor for symptom changes  Followup diabetes education appt with one of our [facility] CDE's  Agree with plan to lower the vit D supplement from 3 caps to 2 caps  Continue current levothyroxine daily dose  Plan repeat fasting labs soon   Testing at [facility]   Lab orders available  Advise having fasting lipid panel testing and dilated eye examination, at least annually    Keep regular f/u PCP and GYN evaluations  Addressed patient questions today    There are no Patient Instructions on file for this visit.    Future labs ordered today: No orders of the defined types were placed in this encounter.    Radiology/Consults ordered today: None    Total time spent on day of encounter:  ***    Follow-up:  1/30/24 at 11:30 am, Return    DARI Gaming MD, MS  Endocrinology  [facility]    CC:  ALAN Liu                 Virtual Visit Details    Type of service:  Video Visit     Originating Location (pt. Location): Home  Distant Location (provider location):  Off-site  Platform used for Video Visit: Katerine        Recent issues:  Diabetes and thyroid follow-up evaluation  Continues to use the Omnipod 5 pump with DexcomG6 since 6/2022  Patient sees PT for stress and bladder issues, has seen Dr. Brand/Urogynecology  Has seen (virtual) nutritionist [employer] for food tracking,   wt loss management and strength training, has lost 15# to 140# (her goal)          Diabetes:  Previous  diagnosis of IFG with high GAD65 Ab level  6/2011. Developed acute hyperglycemia and diagnosis of T1DM  Treatment with insulin medication, MDI plan  2/2013. Changed to OmniPod pump with Novolog insulin  Used DexcomG5 CGMS system  5/2017. Changed to Medtronic 630G pump  9/2017. Upgraded to Medtronic 670G pump and Guardian3 sensor, Humalog insulin  8/2018. Discontinued Medtronic pump and sensor, changed to MDI plan              Had taken Basaglar 17U at bedtime and mealtime Humalog  Had taken Basaglar 13U at bedtime, Humalog Luxura pen 1:7 at mealtime, ISF 60, goal target  mg/dl  12/2018. Restarted OmniPod pump  ~Early 11/2021. Stopped the Jardiance medication  Using OmniPod pump with Dash PDM, with DexcomG6 CGM    12/24/21. Switched to Tandem TSlim pump, Humalog insulin  6/2022. Switched to Omnipod 5 with DexcomG6              Humalog in pump    Current Omnipod 5 pump settings:`        Recent Omnipod 5 pump data:          Recent DexcomG6 CGM data:                       Has reduced hypoglycemia awareness              Previously had service dog (Juliet), trained to recognize hypoglycemia smell/symptoms  Exercises with walking, strenght training, Pellaton treadmill/bike  Using Contour Next Link BG meter, variable testing, tests 6x/day    Exercises with home Pellaton, also classes at health club- yoga, strength              Uses Activity Mode for exercise  Previous [facility] labs include:  Lab Results   Component Value Date    A1C 6.2 (H) 06/02/2023     06/02/2023    POTASSIUM 3.9 06/02/2023    CHLORIDE 101 06/02/2023    CO2 24 06/02/2023    ANIONGAP 12 06/02/2023     (H) 06/02/2023    BUN 25.0 (H) 06/02/2023    CR 0.87 06/02/2023    GFRESTIMATED 83 06/02/2023    GFRESTBLACK 73 07/07/2021    VIJI 9.1 06/02/2023    CHOL 158 03/01/2022    TRIG 50 03/01/2022    HDL 96 03/01/2022    LDL 52 03/01/2022    NHDL 62 03/01/2022    UCRR 118 03/01/2022    MICROL <5 03/01/2022    UMALCR  03/01/2022      Comment:       Unable to calculate:  Urine creatinine or albumin value below detectable level     Lab Results   Component Value Date     06/02/2023    POTASSIUM 3.9 06/02/2023    CHLORIDE 101 06/02/2023    CO2 24 06/02/2023    ANIONGAP 12 06/02/2023     (H) 06/02/2023    BUN 25.0 (H) 06/02/2023    CR 0.87 06/02/2023    GFRESTIMATED 83 06/02/2023    GFRESTBLACK 73 07/07/2021    VIJI 9.1 06/02/2023    TSH 2.26 06/02/2023    VITDT 75 01/25/2023       Previous Lasik surgery at [facility] 2015  Last eye exam 1/2023 with Dr. ZURI Houser/[facility], no DR reported  DM Complications: none known        Thyroid:  2011. History of hypothyroidism  Takes levothyroxine medication  Eary 10/2018. 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 Anali Liu/[facility] for general medicine evaluations.  Also sees Dr. Ashford/[facility], Dr. Brand/Urogynecology  Has seen (virtual) nutritionist [employer]       PMH/PSH:  Past Medical History:   Diagnosis Date     Diabetes mellitus type 1 (H)      Dysmenorrhea      Excessive or frequent menstruation      Female stress incontinence      Hypothyroid      Insulin pump in place      Lumbar back pain      Other and unspecified hyperlipidemia      PONV (postoperative nausea and vomiting)      Past Surgical History:   Procedure Laterality Date     DILATION AND CURETTAGE, HYSTEROSCOPY, ABLATE ENDOMETRIUM NOVASURE, COMBINED  1/23/2014    Procedure: COMBINED DILATION AND CURETTAGE, HYSTEROSCOPY, ABLATE ENDOMETRIUM NOVASURE;  HYSTEROSCOPY, DILATION, CURETTAGE, WITH  NOVASURE ABLATION, MYOSURE MORCELLATOR;  Surgeon: Swetha Queen MD;  Location: [facility]     LAPAROSCOPY       LEEP TX, CERVICAL       OPERATIVE HYSTEROSCOPY WITH MORCELLATOR  2014    Procedure: OPERATIVE HYSTEROSCOPY WITH MORCELLATOR;;  Surgeon: Swetha Queen MD;  Location: [facility]     wisdom teeth[         Family Hx:  Family History   Problem Relation Age of Onset     Diabetes Mother         type 2     Obesity Mother      Diabetes Father         type 1     Diabetes Sister         type 1     Thyroid Disease Sister      Diabetes Sister      Prostate Cancer Maternal Grandfather 80     Breast Cancer Paternal Grandmother 70         in 80s     Prostate Cancer Paternal Grandfather      Brain Cancer Paternal Grandfather 80     Cancer Maternal Uncle 60        small bowel     Cancer Maternal Uncle 60        tonsil     Cancer - colorectal Paternal Uncle 60     Breast Cancer Paternal Cousin 38         Social Hx:  Social History     Socioeconomic History     Marital status:      Spouse name: Conrad     Number of children: 1     Years of education: Not on file     Highest education level: Not on file   Occupational History     Employer: [employer]   Tobacco Use     Smoking status: Never     Smokeless tobacco: Never   Substance and Sexual Activity     Alcohol use: Yes     Alcohol/week: 0.0 standard drinks of alcohol     Comment: 3 per week     Drug use: No     Sexual activity: Yes     Partners: Male     Birth control/protection: Male Surgical   Other Topics Concern     Parent/sibling w/ CABG, MI or angioplasty before 65F 55M? No   Social History Narrative     in May 2014    2 step children (20 and 18) and one biol dtr age 15.    Works for [employer]--works from home     Social Determinants of Health     Financial Resource Strain: Not on file   Food Insecurity: Not on file   Transportation Needs: Not on file   Physical Activity: Not on file   Stress: Not on  file   Social Connections: Not on file   Interpersonal Safety: Not on file   Housing Stability: Not on file          MEDICATIONS:  has a current medication list which includes the following prescription(s): blood glucose, dexcom g6 , dexcom g6 sensor, dexcom g6 transmitter, contour next test, fluoxetine, baqsimi two pack, humalog, omnipod 5 g6 intro (gen 5), omnipod 5 g6 pod (gen 5), lantus solostar, insulin lispro, insulin lispro, insulin pump, levothyroxine, multiple vitamins-minerals, cholecalciferol, and cefadroxil.       ROS: 10 point ROS neg other than the symptoms noted above in the HPI.     GENERAL: energy good, wt stable; denies fevers, chills, night sweats.   HEENT: no dysphagia, odonophagia, diplopia, neck pain  THYROID:  no apparent hyper or hypothyroid symptoms  CV: no chest pain, pressure, palpitations  LUNGS: no SOB, CLIFFORD, cough, wheezing   ABDOMEN: denies nausea, diarrhea, constipation, abdominal pain  EXTREMITIES: no rashes, ulcers, edema  NEUROLOGY: no headaches, denies changes in vision, tingling, extremitiy numbness   MSK: some back pain; denies muscle weakness  SKIN: no rashes or lesions  : some urinary incontinence; regular menstrual cycles  PSYCH:  stable mood, no significant anxiety or depression  ENDOCRINE: no heat or cold intolerance    Physical Exam (visual exam)  VS:  no vital signs taken for video visit  CONSTITUTIONAL: healthy, alert and NAD, well dressed, answering questions appropriately  ENT: no nose swelling or nasal discharge, mouth redness or gum changes.  EYES: eyes grossly normal to inspection, conjunctivae and sclerae normal, no exophthalmos or proptosis  THYROID:  no apparent nodules or goiter  LUNGS: no audible wheeze, cough or visible cyanosis, no visible retractions or increased work of breathing  ABDOMEN: abdomen not evaluated  EXTREMITIES: no hand tremors, limited exam  NEUROLOGY: CN grossly intact, mentation intact and speech normal   SKIN:  no apparent skin  lesions, rash, or edema with visualized skin appearance  PSYCH: mentation appears normal, affect normal/bright, judgement and insight intact,   normal speech and appearance well groomed      LABS:     All pertinent notes, labs, and images personally reviewed by me.       A/P:  Encounter Diagnoses   Name Primary?     Diabetes mellitus type 1.5, managed as type 2 (H) Yes     Insulin pump status      Hypothyroidism due to Hashimoto's thyroiditis      Comments:  Reviewed health history, diabetes and thyroid issues.  Recent glucose control excellent  Reviewed and interpreted tests that I previously ordered.   Ordered appropriate tests for the endocrinology disease management.    Management options discussed and implemented after shared medical decision making with the patient.  T1DM and hypothyroidism problems are chronic-stable    Plan:  Discussed general issues with the diabetes diagnosis and management  We discussed the hgbA1c test which reflects previous overall glucose levels or control  Discussed the importance of blood glucose (BG) testing to assess glucose trends  Reviewed the recent Omnipod 5  pump report and pump settings  Reviewed recent DexcomG6 CGM glucose trends, in detail     Recommend:  Continue the Omnipod 5 insulin pump and diabetes management    No pump setting changes at this time    New Rxs for Humalog vials and levothyroxine  Updated pod Rx 8 boxes of 5 per 3 months.          Would not resume Jardiance med at this time  Advise using the pump Activity Mode to raise the sensor glucose target from the preset setting to 150 mg/dl    and lowers the adaptive basal rate by 50% potency. Can preset from 1-24 hrs duration.  Continue use of DexcomG6 CGM glucose sensor  Since she perceives higher glucose trends on day 3 of pod use, will change to pod changes Q2-3 days, updated Rx sent  Sent updated Humalog vial, levothyroxine Rx's to [facility]  Monitor for symptom changes  Consider follow-up [facility] Diab  Educators  Continue current levothyroxine, vitamin D daily dose  Plan repeat fasting labs soon   Testing at [facility]   Lab orders available  Advise having fasting lipid panel testing and dilated eye examination, at least annually    Keep regular f/u PCP and GYN evaluations  Addressed patient questions today    There are no Patient Instructions on file for this visit.    Future labs ordered today:   Orders Placed This Encounter   Procedures     GLUCOSE MONITOR, 72 HOUR, PHYS INTERP     Radiology/Consults ordered today: None    Total time spent on day of encounter:  32 min    Follow-up:  1/30/24 at 11:30 am, Return    DARI Gaming MD, MS  Endocrinology  [facility]    CC:  WILLOW Sheehan                   Again, thank you for allowing me to participate in the care of your patient.        Sincerely,        Lyndon Gaming MD

## 2023-09-26 NOTE — PROGRESS NOTES
Virtual Visit Details    Type of service:  Video Visit     Originating Location (pt. Location): Home  Distant Location (provider location):  Off-site  Platform used for Video Visit: Katerine        Recent issues:  Diabetes and thyroid follow-up evaluation  Continues to use the Omnipod 5 pump with DexcomG6 since 6/2022  Patient sees PT for stress and bladder issues, has seen Dr. Brand/Urogynecology  Has seen (virtual) nutritionist [facility] for food tracking,   wt loss management and strength training, has lost 15# to 140# (her goal)          Diabetes:  Previous diagnosis of IFG with high GAD65 Ab level  6/2011. Developed acute hyperglycemia and diagnosis of T1DM  Treatment with insulin medication, MDI plan  2/2013. Changed to OmniPod pump with Novolog insulin  Used DexcomG5 CGMS system  5/2017. Changed to Medtronic 630G pump  9/2017. Upgraded to Medtronic 670G pump and Guardian3 sensor, Humalog insulin  8/2018. Discontinued Medtronic pump and sensor, changed to MDI plan              Had taken Basaglar 17U at bedtime and mealtime Humalog  Had taken Basaglar 13U at bedtime, Humalog Luxura pen 1:7 at mealtime, ISF 60, goal target  mg/dl  12/2018. Restarted OmniPod pump  ~Early 11/2021. Stopped the Jardiance medication  Using OmniPod pump with Dash PDM, with DexcomG6 CGM    12/24/21. Switched to Tandem TSlim pump, Humalog insulin  6/2022. Switched to Omnipod 5 with DexcomG6              Humalog in pump    Current Omnipod 5 pump settings:`        Recent Omnipod 5 pump data:          Recent DexcomG6 CGM data:                       Has reduced hypoglycemia awareness              Previously had service dog (Juliet), trained to recognize hypoglycemia smell/symptoms  Exercises with walking, strenght training, Pellaton treadmill/bike  Using Contour Next Link BG meter, variable testing, tests 6x/day    Exercises with home Pellaton, also classes at health club- yoga, strength              Uses Activity Mode for  exercise  Previous [facility] labs include:  Lab Results   Component Value Date    A1C 6.2 (H) 06/02/2023     06/02/2023    POTASSIUM 3.9 06/02/2023    CHLORIDE 101 06/02/2023    CO2 24 06/02/2023    ANIONGAP 12 06/02/2023     (H) 06/02/2023    BUN 25.0 (H) 06/02/2023    CR 0.87 06/02/2023    GFRESTIMATED 83 06/02/2023    GFRESTBLACK 73 07/07/2021    VIJI 9.1 06/02/2023    CHOL 158 03/01/2022    TRIG 50 03/01/2022    HDL 96 03/01/2022    LDL 52 03/01/2022    NHDL 62 03/01/2022    UCRR 118 03/01/2022    MICROL <5 03/01/2022    UMALCR  03/01/2022      Comment:      Unable to calculate:  Urine creatinine or albumin value below detectable level     Lab Results   Component Value Date     06/02/2023    POTASSIUM 3.9 06/02/2023    CHLORIDE 101 06/02/2023    CO2 24 06/02/2023    ANIONGAP 12 06/02/2023     (H) 06/02/2023    BUN 25.0 (H) 06/02/2023    CR 0.87 06/02/2023    GFRESTIMATED 83 06/02/2023    GFRESTBLACK 73 07/07/2021    VIJI 9.1 06/02/2023    TSH 2.26 06/02/2023    VITDT 75 01/25/2023       Previous Lasik surgery at [facility] 2015  Last eye exam 1/2023 with Dr. ZURI Houser/[facility], no DR reported  DM Complications: none known        Thyroid:  2011. History of hypothyroidism  Takes levothyroxine medication  Eary 10/2018. Dose incease to levothyroxine 0.05 mg daily per GYN physician    Labs done at [facility]  6/29/22:  TSH 2.69    Recent [facility] labs include:  Lab Results   Component Value Date    TSH 2.26 06/02/2023    T4 0.85 05/17/2021     Current dose:  Levothyroxine 0.05 mg daily          , lives in [place],  Conrad, daughter Myra and stepchildren Jag (formerly Tisha) + Anoop  New job with [employer], working with [facility]- Medicare  Plans to see Dr. Anali Liu/[facility] for general medicine evaluations.  Also sees Dr. Ashford/[facility], Dr. Brand/Urogynecology  Has seen (virtual) nutritionist [facility]       PMH/PSH:  Past Medical History:    Diagnosis Date    Diabetes mellitus type 1 (H)     Dysmenorrhea     Excessive or frequent menstruation     Female stress incontinence     Hypothyroid     Insulin pump in place     Lumbar back pain     Other and unspecified hyperlipidemia     PONV (postoperative nausea and vomiting)      Past Surgical History:   Procedure Laterality Date    DILATION AND CURETTAGE, HYSTEROSCOPY, ABLATE ENDOMETRIUM NOVASURE, COMBINED  2014    Procedure: COMBINED DILATION AND CURETTAGE, HYSTEROSCOPY, ABLATE ENDOMETRIUM NOVASURE;  HYSTEROSCOPY, DILATION, CURETTAGE, WITH NOVASURE ABLATION, MYOSURE MORCELLATOR;  Surgeon: Swetha Queen MD;  Location: [facility]    LAPAROSCOPY      LEEP TX, CERVICAL      OPERATIVE HYSTEROSCOPY WITH MORCELLATOR  2014    Procedure: OPERATIVE HYSTEROSCOPY WITH MORCELLATOR;;  Surgeon: Swetha Queen MD;  Location: [facility]    wisdom teeth[         Family Hx:  Family History   Problem Relation Age of Onset    Diabetes Mother         type 2    Obesity Mother     Diabetes Father         type 1    Diabetes Sister         type 1    Thyroid Disease Sister     Diabetes Sister     Prostate Cancer Maternal Grandfather 80    Breast Cancer Paternal Grandmother 70         in 80s    Prostate Cancer Paternal Grandfather     Brain Cancer Paternal Grandfather 80    Cancer Maternal Uncle 60        small bowel    Cancer Maternal Uncle 60        tonsil    Cancer - colorectal Paternal Uncle 60    Breast Cancer Paternal Cousin 38         Social Hx:  Social History     Socioeconomic History    Marital status:      Spouse name: Conrad    Number of children: 1    Years of education: Not on file    Highest education level: Not on file   Occupational History     Employer: [employer]   Tobacco Use    Smoking status: Never    Smokeless tobacco: Never   Substance and Sexual Activity    Alcohol use: Yes     Alcohol/week: 0.0 standard drinks of alcohol     Comment: 3 per week    Drug  use: No    Sexual activity: Yes     Partners: Male     Birth control/protection: Male Surgical   Other Topics Concern    Parent/sibling w/ CABG, MI or angioplasty before 65F 55M? No   Social History Narrative     in May 2014    2 step children (20 and 18) and one biol dtr age 15.    Works for [employer]--works from home     Social Determinants of Health     Financial Resource Strain: Not on file   Food Insecurity: Not on file   Transportation Needs: Not on file   Physical Activity: Not on file   Stress: Not on file   Social Connections: Not on file   Interpersonal Safety: Not on file   Housing Stability: Not on file          MEDICATIONS:  has a current medication list which includes the following prescription(s): blood glucose, dexcom g6 , dexcom g6 sensor, dexcom g6 transmitter, contour next test, fluoxetine, baqsimi two pack, humalog, omnipod 5 g6 intro (gen 5), omnipod 5 g6 pod (gen 5), lantus solostar, insulin lispro, insulin lispro, insulin pump, levothyroxine, multiple vitamins-minerals, cholecalciferol, and cefadroxil.       ROS: 10 point ROS neg other than the symptoms noted above in the HPI.     GENERAL: energy good, wt stable; denies fevers, chills, night sweats.   HEENT: no dysphagia, odonophagia, diplopia, neck pain  THYROID:  no apparent hyper or hypothyroid symptoms  CV: no chest pain, pressure, palpitations  LUNGS: no SOB, CLIFFORD, cough, wheezing   ABDOMEN: denies nausea, diarrhea, constipation, abdominal pain  EXTREMITIES: no rashes, ulcers, edema  NEUROLOGY: no headaches, denies changes in vision, tingling, extremitiy numbness   MSK: some back pain; denies muscle weakness  SKIN: no rashes or lesions  : some urinary incontinence; regular menstrual cycles  PSYCH:  stable mood, no significant anxiety or depression  ENDOCRINE: no heat or cold intolerance    Physical Exam (visual exam)  VS:  no vital signs taken for video visit  CONSTITUTIONAL: healthy, alert and NAD, well dressed,  answering questions appropriately  ENT: no nose swelling or nasal discharge, mouth redness or gum changes.  EYES: eyes grossly normal to inspection, conjunctivae and sclerae normal, no exophthalmos or proptosis  THYROID:  no apparent nodules or goiter  LUNGS: no audible wheeze, cough or visible cyanosis, no visible retractions or increased work of breathing  ABDOMEN: abdomen not evaluated  EXTREMITIES: no hand tremors, limited exam  NEUROLOGY: CN grossly intact, mentation intact and speech normal   SKIN:  no apparent skin lesions, rash, or edema with visualized skin appearance  PSYCH: mentation appears normal, affect normal/bright, judgement and insight intact,   normal speech and appearance well groomed      LABS:     All pertinent notes, labs, and images personally reviewed by me.       A/P:  Encounter Diagnoses   Name Primary?    Diabetes mellitus type 1.5, managed as type 2 (H) Yes    Insulin pump status     Hypothyroidism due to Hashimoto's thyroiditis      Comments:  Reviewed health history, diabetes and thyroid issues.  Recent glucose control excellent  Reviewed and interpreted tests that I previously ordered.   Ordered appropriate tests for the endocrinology disease management.    Management options discussed and implemented after shared medical decision making with the patient.  T1DM and hypothyroidism problems are chronic-stable    Plan:  Discussed general issues with the diabetes diagnosis and management  We discussed the hgbA1c test which reflects previous overall glucose levels or control  Discussed the importance of blood glucose (BG) testing to assess glucose trends  Reviewed the recent Omnipod 5  pump report and pump settings  Reviewed recent DexcomG6 CGM glucose trends, in detail     Recommend:  Continue the Omnipod 5 insulin pump and diabetes management    No pump setting changes at this time    New Rxs for Humalog vials and levothyroxine  Updated pod Rx 8 boxes of 5 per 3 months.          Would  not resume Jardiance med at this time  Advise using the pump Activity Mode to raise the sensor glucose target from the preset setting to 150 mg/dl    and lowers the adaptive basal rate by 50% potency. Can preset from 1-24 hrs duration.  Continue use of DexcomG6 CGM glucose sensor  Since she perceives higher glucose trends on day 3 of pod use, will change to pod changes Q2-3 days, updated Rx sent  Sent updated Humalog vial, levothyroxine Rx's to [facility]  Monitor for symptom changes  Consider follow-up [facility]  Continue current levothyroxine, vitamin D daily dose  Plan repeat fasting labs soon   Testing at [facility]   Lab orders available  Advise having fasting lipid panel testing and dilated eye examination, at least annually    Keep regular f/u PCP and GYN evaluations  Addressed patient questions today    There are no Patient Instructions on file for this visit.    Future labs ordered today:   Orders Placed This Encounter   Procedures    GLUCOSE MONITOR, 72 HOUR, PHYS INTERP     Radiology/Consults ordered today: None    Total time spent on day of encounter:  32 min    Follow-up:  1/30/24 at 11:30 am, Return    DARI Gaming MD, MS  Endocrinology  [facility]    CC:  WILLOW Sheehan

## 2023-09-27 RX ORDER — INSULIN PMP CART,AUT,G6/7,CNTR
1 EACH SUBCUTANEOUS CONTINUOUS PRN
Qty: 8 EACH | Refills: 3 | Status: SHIPPED | OUTPATIENT
Start: 2023-09-27 | End: 2024-09-04

## 2023-10-12 ENCOUNTER — LAB (OUTPATIENT)
Dept: LAB | Facility: CLINIC | Age: 48
End: 2023-10-12
Payer: COMMERCIAL

## 2023-10-12 DIAGNOSIS — E06.3 HYPOTHYROIDISM DUE TO HASHIMOTO'S THYROIDITIS: ICD-10-CM

## 2023-10-12 DIAGNOSIS — E10.9 TYPE 1 DIABETES MELLITUS WITHOUT COMPLICATION (H): ICD-10-CM

## 2023-10-12 LAB
ANION GAP SERPL CALCULATED.3IONS-SCNC: 10 MMOL/L (ref 7–15)
BUN SERPL-MCNC: 28.7 MG/DL (ref 6–20)
CALCIUM SERPL-MCNC: 9 MG/DL (ref 8.6–10)
CHLORIDE SERPL-SCNC: 103 MMOL/L (ref 98–107)
CHOLEST SERPL-MCNC: 156 MG/DL
CREAT SERPL-MCNC: 0.92 MG/DL (ref 0.51–0.95)
CREAT UR-MCNC: 86.2 MG/DL
DEPRECATED HCO3 PLAS-SCNC: 23 MMOL/L (ref 22–29)
EGFRCR SERPLBLD CKD-EPI 2021: 77 ML/MIN/1.73M2
GLUCOSE SERPL-MCNC: 174 MG/DL (ref 70–99)
HBA1C MFR BLD: 5.8 % (ref 0–5.6)
HDLC SERPL-MCNC: 81 MG/DL
LDLC SERPL CALC-MCNC: 63 MG/DL
MICROALBUMIN UR-MCNC: <12 MG/L
MICROALBUMIN/CREAT UR: NORMAL MG/G{CREAT}
NONHDLC SERPL-MCNC: 75 MG/DL
POTASSIUM SERPL-SCNC: 4.5 MMOL/L (ref 3.4–5.3)
SODIUM SERPL-SCNC: 136 MMOL/L (ref 135–145)
TRIGL SERPL-MCNC: 61 MG/DL
TSH SERPL DL<=0.005 MIU/L-ACNC: 2.59 UIU/ML (ref 0.3–4.2)

## 2023-10-12 PROCEDURE — 80048 BASIC METABOLIC PNL TOTAL CA: CPT

## 2023-10-12 PROCEDURE — 84443 ASSAY THYROID STIM HORMONE: CPT

## 2023-10-12 PROCEDURE — 83036 HEMOGLOBIN GLYCOSYLATED A1C: CPT

## 2023-10-12 PROCEDURE — 82570 ASSAY OF URINE CREATININE: CPT

## 2023-10-12 PROCEDURE — 36415 COLL VENOUS BLD VENIPUNCTURE: CPT

## 2023-10-12 PROCEDURE — 80061 LIPID PANEL: CPT

## 2023-10-12 PROCEDURE — 82043 UR ALBUMIN QUANTITATIVE: CPT

## 2023-10-16 DIAGNOSIS — Z96.41 INSULIN PUMP STATUS: ICD-10-CM

## 2023-10-16 DIAGNOSIS — E13.9 DIABETES MELLITUS TYPE 1.5, MANAGED AS TYPE 2 (H): ICD-10-CM

## 2023-10-16 RX ORDER — INSULIN LISPRO 100 [IU]/ML
INJECTION, SOLUTION INTRAVENOUS; SUBCUTANEOUS
Qty: 70 ML | Refills: 3 | Status: SHIPPED | OUTPATIENT
Start: 2023-10-16 | End: 2024-01-30

## 2023-10-31 ENCOUNTER — HOSPITAL ENCOUNTER (OUTPATIENT)
Dept: MRI IMAGING | Facility: CLINIC | Age: 48
Discharge: HOME | End: 2023-10-31
Attending: CLINICAL NURSE SPECIALIST | Admitting: CLINICAL NURSE SPECIALIST
Payer: COMMERCIAL

## 2023-10-31 DIAGNOSIS — Z12.39 BREAST CANCER SCREENING, HIGH RISK PATIENT: ICD-10-CM

## 2023-10-31 DIAGNOSIS — Z80.3 FAMILY HISTORY OF MALIGNANT NEOPLASM OF BREAST: ICD-10-CM

## 2023-10-31 DIAGNOSIS — R92.30 DENSE BREAST: ICD-10-CM

## 2023-10-31 PROCEDURE — 255N000002 HC RX 255 OP 636: Performed by: CLINICAL NURSE SPECIALIST

## 2023-10-31 PROCEDURE — 77049 MRI BREAST C-+ W/CAD BI: CPT

## 2023-10-31 PROCEDURE — A9585 GADOBUTROL INJECTION: HCPCS | Performed by: CLINICAL NURSE SPECIALIST

## 2023-10-31 RX ORDER — GADOBUTROL 604.72 MG/ML
7 INJECTION INTRAVENOUS ONCE
Status: COMPLETED | OUTPATIENT
Start: 2023-10-31 | End: 2023-10-31

## 2023-10-31 RX ADMIN — GADOBUTROL 7 ML: 604.72 INJECTION INTRAVENOUS at 08:52

## 2023-11-05 DIAGNOSIS — F41.9 ANXIETY: ICD-10-CM

## 2023-12-06 PROCEDURE — 88305 TISSUE EXAM BY PATHOLOGIST: CPT | Mod: TC,ORL | Performed by: SURGERY

## 2023-12-07 ENCOUNTER — LAB REQUISITION (OUTPATIENT)
Dept: LAB | Facility: CLINIC | Age: 48
End: 2023-12-07
Payer: COMMERCIAL

## 2023-12-12 LAB
PATH REPORT.COMMENTS IMP SPEC: NORMAL
PATH REPORT.COMMENTS IMP SPEC: NORMAL
PATH REPORT.FINAL DX SPEC: NORMAL
PATH REPORT.GROSS SPEC: NORMAL
PATH REPORT.MICROSCOPIC SPEC OTHER STN: NORMAL
PATH REPORT.RELEVANT HX SPEC: NORMAL
PHOTO IMAGE: NORMAL

## 2023-12-12 PROCEDURE — 88304 TISSUE EXAM BY PATHOLOGIST: CPT | Mod: 26 | Performed by: PATHOLOGY

## 2023-12-16 DIAGNOSIS — E10.9 TYPE 1 DIABETES MELLITUS WITHOUT COMPLICATION (H): ICD-10-CM

## 2023-12-18 RX ORDER — INSULIN LISPRO 100 [IU]/ML
INJECTION, SOLUTION INTRAVENOUS; SUBCUTANEOUS
Qty: 30 ML | Refills: 1 | Status: SHIPPED | OUTPATIENT
Start: 2023-12-18

## 2023-12-19 ENCOUNTER — VIRTUAL VISIT (OUTPATIENT)
Dept: EDUCATION SERVICES | Facility: CLINIC | Age: 48
End: 2023-12-19
Payer: COMMERCIAL

## 2023-12-19 DIAGNOSIS — E10.9 TYPE 1 DIABETES MELLITUS WITHOUT COMPLICATION (H): Primary | ICD-10-CM

## 2023-12-19 PROCEDURE — 98968 PH1 ASSMT&MGMT NQHP 21-30: CPT | Mod: VID | Performed by: DIETITIAN, REGISTERED

## 2023-12-19 NOTE — LETTER
12/19/2023         RE: Jenise Smith  [address] [id number]        Dear Colleague,    Thank you for referring your patient, Jenise Smith, to the [facility]. Please see a copy of my visit note below.    Diabetes Education Follow-up - Pump Review     Type of service:  Video Visit    If the video visit is dropped, the video visit invitation should be resent by: Send to e-mail at: [email or web address]    Originating Location (pt. Location): Home  Distant Location (provider location): [facility]  Mode of Communication:  Video Conference via [employer]    Video Start Time:  2:03p  Video End Time (time video stopped): 2:24p    How would patient like to obtain AVS? MyChart      Subjective/Objective:    Jenise Smith sent in blood glucose log for review, via [employer]. Last date of communication was: 9/19/23.    Diabetes is being managed with Lifestyle (diet/activity), Diabetes Medications   Diabetes Medication(s)       Diabetic Other       Glucagon (BAQSIMI TWO PACK) 3 MG/DOSE POWD    Spray 1 spray (3 mg) in nostril once as needed (severe hypoglycemia reaction, dose as directed)      Insulin       HUMALOG 100 UNIT/ML injection         insulin glargine (LANTUS SOLOSTAR) 100 UNIT/ML pen    INJECT SUBCUTANEOUSLY 16  UNITS DAILY AS DIRECTED     insulin lispro (HUMALOG KWIKPEN) 100 UNIT/ML (1 unit dial) KWIKPEN    INJECT 3 TO 8 UNITS  SUBCUTANEOUSLY WITH MEALS AND  CORRECTION DOSES AS DIRECTED,  TOTAL DAILY DOSE APPROXIMATELY  30 UNITS IF THE PUMP FAILS     insulin lispro (HUMALOG VIAL) 100 UNIT/ML vial    Use with insulin pump, total daily dose approx 70U            BG/Food Log:                   Assessment:    Jenise is meeting TIR target >70% of the time. Complains of more frequent hyperglycemia, tries to get on the walking pad when she's high and is wondering if she is manipulating the algorithm. Will reach out to Omnod  ARNALDO to see if she has insight on this. Generally speaking, patient's blood sugars are very well controlled. We discussed how things like decreased activity and infection/illness can contribute to higher numbers - she endorses both of these issues recently. Encouraged her to keep up her hard work. 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 Commended her on this! No changes in pump settings today.     Plan/Response:  No change in pump settings today   Follow up with ARNALDO annually or sooner as needed     mAy Mcguire RD, LD, [facility]     Time spent: 21 minutes     Any diabetes medication dose changes were made via the CDE Protocol and Collaborative Practice Agreement with the patient's endocrinology provider. A copy of this encounter was shared with the provider.

## 2023-12-19 NOTE — PROGRESS NOTES
Diabetes Education Follow-up - Pump Review     Type of service:  Video Visit    If the video visit is dropped, the video visit invitation should be resent by: Send to e-mail at: [email or web address]    Originating Location (pt. Location): Home  Distant Location (provider location): [facility]  Mode of Communication:  Video Conference via [employer]    Video Start Time:  2:03p  Video End Time (time video stopped): 2:24p    How would patient like to obtain AVS? MyChart      Subjective/Objective:    Jenise Smith sent in blood glucose log for review, via [employer]. Last date of communication was: 9/19/23.    Diabetes is being managed with Lifestyle (diet/activity), Diabetes Medications   Diabetes Medication(s)       Diabetic Other       Glucagon (BAQSIMI TWO PACK) 3 MG/DOSE POWD    Spray 1 spray (3 mg) in nostril once as needed (severe hypoglycemia reaction, dose as directed)      Insulin       HUMALOG 100 UNIT/ML injection         insulin glargine (LANTUS SOLOSTAR) 100 UNIT/ML pen    INJECT SUBCUTANEOUSLY 16  UNITS DAILY AS DIRECTED     insulin lispro (HUMALOG KWIKPEN) 100 UNIT/ML (1 unit dial) KWIKPEN    INJECT 3 TO 8 UNITS  SUBCUTANEOUSLY WITH MEALS AND  CORRECTION DOSES AS DIRECTED,  TOTAL DAILY DOSE APPROXIMATELY  30 UNITS IF THE PUMP FAILS     insulin lispro (HUMALOG VIAL) 100 UNIT/ML vial    Use with insulin pump, total daily dose approx 70U            BG/Food Log:                   Assessment:    Jenise is meeting TIR target >70% of the time. Complains of more frequent hyperglycemia, tries to get on the walking pad when she's high and is wondering if she is manipulating the algorithm. Will reach out to Omnipod CDCES to see if she has insight on this. Generally speaking, patient's blood sugars are very well controlled. We discussed how things like decreased activity and infection/illness can contribute to higher numbers - she endorses both of these issues recently.  Encouraged her to keep up her hard work. 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 Commended her on this! No changes in pump settings today.     Plan/Response:  No change in pump settings today   Follow up with ARNALDO annually or sooner as needed     Amy Mcguire RD, LD, [facility]     Time spent: 21 minutes     Any diabetes medication dose changes were made via the CDE Protocol and Collaborative Practice Agreement with the patient's endocrinology provider. A copy of this encounter was shared with the provider.

## 2024-01-30 ENCOUNTER — OFFICE VISIT (OUTPATIENT)
Dept: ENDOCRINOLOGY | Facility: CLINIC | Age: 49
End: 2024-01-30
Payer: COMMERCIAL

## 2024-01-30 ENCOUNTER — TRANSFERRED RECORDS (OUTPATIENT)
Dept: ENDOCRINOLOGY | Facility: CLINIC | Age: 49
End: 2024-01-30

## 2024-01-30 VITALS
HEIGHT: 67 IN | HEART RATE: 64 BPM | SYSTOLIC BLOOD PRESSURE: 133 MMHG | WEIGHT: 147 LBS | BODY MASS INDEX: 23.07 KG/M2 | DIASTOLIC BLOOD PRESSURE: 75 MMHG

## 2024-01-30 DIAGNOSIS — E06.3 HYPOTHYROIDISM DUE TO HASHIMOTO'S THYROIDITIS: ICD-10-CM

## 2024-01-30 DIAGNOSIS — Z96.41 INSULIN PUMP STATUS: ICD-10-CM

## 2024-01-30 DIAGNOSIS — E13.9 DIABETES MELLITUS TYPE 1.5, MANAGED AS TYPE 2 (H): ICD-10-CM

## 2024-01-30 DIAGNOSIS — E10.9 TYPE 1 DIABETES MELLITUS WITHOUT COMPLICATION (H): Primary | ICD-10-CM

## 2024-01-30 PROCEDURE — G2211 COMPLEX E/M VISIT ADD ON: HCPCS | Performed by: INTERNAL MEDICINE

## 2024-01-30 PROCEDURE — 95251 CONT GLUC MNTR ANALYSIS I&R: CPT | Performed by: INTERNAL MEDICINE

## 2024-01-30 PROCEDURE — 99214 OFFICE O/P EST MOD 30 MIN: CPT | Performed by: INTERNAL MEDICINE

## 2024-01-30 RX ORDER — INSULIN LISPRO 100 [IU]/ML
INJECTION, SOLUTION INTRAVENOUS; SUBCUTANEOUS
Qty: 70 ML | Refills: 3 | Status: SHIPPED | OUTPATIENT
Start: 2024-01-30

## 2024-01-30 NOTE — Clinical Note
1/30/2024         RE: Jneise Smith  [address]  [place] [address]        Dear Colleague,    Thank you for referring your patient, Jenise Smith, to the [facility] [place]. Please see a copy of my visit note below.      Recent issues:  Diabetes and thyroid follow-up evaluation, with black lab Veena  Continues to use the Omnipod 5 pump with DexcomG6 since 6/2022  Patient sees PT for stress and bladder issues, has seen Dr. Brand/Urogynecology  Has seen (virtual) nutritionist [employer] for food tracking,   wt loss management and strength training, has lost 15# to 140# (her goal)  She has noticed some higher glucose trends recently  Participating in a health and wellness coaching program at [facility], working with behavior change counseling        Diabetes:  Previous diagnosis of IFG with high GAD65 Ab level  6/2011. Developed acute hyperglycemia and diagnosis of T1DM  Treatment with insulin medication, MDI plan  2/2013. Changed to OmniPod pump with Novolog insulin  Used DexcomG5 CGMS system  5/2017. Changed to Medtronic 630G pump  9/2017. Upgraded to Medtronic 670G pump and Guardian3 sensor, Humalog insulin  8/2018. Discontinued Medtronic pump and sensor, changed to MDI plan              Had taken Basaglar 17U at bedtime and mealtime Humalog  Had taken Basaglar 13U at bedtime, Humalog Luxura pen 1:7 at mealtime, ISF 60, goal target  mg/dl  12/2018. Restarted OmniPod pump  ~Early 11/2021. Stopped the Jardiance medication  Using OmniPod pump with Dash PDM, with DexcomG6 CGM    12/24/21. Switched to Tandem TSlim pump, Humalog insulin  6/2022. Switched to Omnipod 5 with DexcomG6              Humalog in pump    Current Omnipod 5 pump settings:`        Recent Omnipod 5 pump data:           Recent DexcomG6 CGM data:      Has reduced hypoglycemia awareness              Previously had service dog (Juliet), trained to recognize hypoglycemia smell/symptoms  Exercises with  walking, strenght training, Pellaton treadmill/bike  Using Contour Next Link BG meter, variable testing, tests 6x/day    Exercises with home Pellaton, also classes at health club- yoga, strength              Uses Activity Mode for exercise  Previous [facility] labs include:  Lab Results   Component Value Date    A1C 5.8 (H) 10/12/2023     10/12/2023    POTASSIUM 4.5 10/12/2023    CHLORIDE 103 10/12/2023    CO2 23 10/12/2023    ANIONGAP 10 10/12/2023     (H) 10/12/2023    BUN 28.7 (H) 10/12/2023    CR 0.92 10/12/2023    GFRESTIMATED 77 10/12/2023    GFRESTBLACK 73 07/07/2021    VIJI 9.0 10/12/2023    CHOL 156 10/12/2023    TRIG 61 10/12/2023    HDL 81 10/12/2023    LDL 63 10/12/2023    NHDL 75 10/12/2023    UCRR 86.2 10/12/2023    MICROL <12.0 10/12/2023    UMALCR  10/12/2023      Comment:      Unable to calculate, urine albumin and/or urine creatinine is outside detectable limits.  Microalbuminuria is defined as an albumin:creatinine ratio of 17 to 299 for males and 25 to 299 for females. A ratio of albumin:creatinine of 300 or higher is indicative of overt proteinuria.  Due to biologic variability, positive results should be confirmed by a second, first-morning random or 24-hour timed urine specimen. If there is discrepancy, a third specimen is recommended. When 2 out of 3 results are in the microalbuminuria range, this is evidence for incipient nephropathy and warrants increased efforts at glucose control, blood pressure control, and institution of therapy with an angiotensin-converting-enzyme (ACE) inhibitor (if the patient can tolerate it).       Lab Results   Component Value Date     10/12/2023    POTASSIUM 4.5 10/12/2023    CHLORIDE 103 10/12/2023    CO2 23 10/12/2023    ANIONGAP 10 10/12/2023     (H) 10/12/2023    BUN 28.7 (H) 10/12/2023    CR 0.92 10/12/2023    GFRESTIMATED 77 10/12/2023    GFRESTBLACK 73 07/07/2021    VIJI 9.0 10/12/2023    TSH 2.59 10/12/2023    VITDT 75 01/25/2023     Last  eye exam 1/2023 with Dr. ZURI Houser/[facility], no DR reported  DM Complications: none known        Thyroid:  2011. History of hypothyroidism  Takes levothyroxine medication  Eary 10/2018. Dose incease to levothyroxine 0.05 mg daily per GYN physician    Labs done at [facility]  6/29/22:  TSH 2.69    Recent [facility] labs include:  Lab Results   Component Value Date    TSH 2.59 10/12/2023    T4 0.85 05/17/2021     Current dose:  Levothyroxine 0.05 mg daily          , lives in [place],  Conrad, daughter Myra and stepchildren Jag (formerly Tisha) + Anoop  New job with [employer], working with [employer]- Medicare  Plans to see Dr. Anali Liu/[facility] for general medicine evaluations.  Also sees Dr. Ashford/[facility], Dr. Brand/Urogynecology  Has seen (virtual) nutritionist [employer]       PMH/PSH:  Past Medical History:   Diagnosis Date    Diabetes mellitus type 1 (H)     Dysmenorrhea     Excessive or frequent menstruation     Female stress incontinence     Hypothyroid     Insulin pump in place     Lumbar back pain     Other and unspecified hyperlipidemia     PONV (postoperative nausea and vomiting)      Past Surgical History:   Procedure Laterality Date    DILATION AND CURETTAGE, HYSTEROSCOPY, ABLATE ENDOMETRIUM NOVASURE, COMBINED  1/23/2014    Procedure: COMBINED DILATION AND CURETTAGE, HYSTEROSCOPY, ABLATE ENDOMETRIUM NOVASURE;  HYSTEROSCOPY, DILATION, CURETTAGE, WITH NOVASURE ABLATION, MYOSURE MORCELLATOR;  Surgeon: Swetha Queen MD;  Location: [facility]    LAPAROSCOPY      LEEP TX, CERVICAL      OPERATIVE HYSTEROSCOPY WITH MORCELLATOR  1/23/2014    Procedure: OPERATIVE HYSTEROSCOPY WITH MORCELLATOR;;  Surgeon: Swetha Queen MD;  Location: [facility]    wisdom teeth[         Family Hx:  Family History   Problem Relation Age of Onset    Diabetes Mother         type 2    Obesity Mother     Diabetes Father         type 1    Diabetes Sister         type  1    Thyroid Disease Sister     Diabetes Sister     Prostate Cancer Maternal Grandfather 80    Breast Cancer Paternal Grandmother 70         in 80s    Prostate Cancer Paternal Grandfather     Brain Cancer Paternal Grandfather 80    Cancer Maternal Uncle 60        small bowel    Cancer Maternal Uncle 60        tonsil    Cancer - colorectal Paternal Uncle 60    Breast Cancer Paternal Cousin 38         Social Hx:  Social History     Socioeconomic History    Marital status:      Spouse name: Conrad    Number of children: 1    Years of education: Not on file    Highest education level: Not on file   Occupational History     Employer: [employer]   Tobacco Use    Smoking status: Never    Smokeless tobacco: Never   Substance and Sexual Activity    Alcohol use: Yes     Alcohol/week: 0.0 standard drinks of alcohol     Comment: 3 per week    Drug use: No    Sexual activity: Yes     Partners: Male     Birth control/protection: Male Surgical   Other Topics Concern    Parent/sibling w/ CABG, MI or angioplasty before 65F 55M? No   Social History Narrative     in May 2014    2 step children (20 and 18) and one biol dtr age 15.    Works for [employer]--works from home     Social Determinants of Health     Financial Resource Strain: Not on file   Food Insecurity: Not on file   Transportation Needs: Not on file   Physical Activity: Not on file   Stress: Not on file   Social Connections: Not on file   Interpersonal Safety: Not on file   Housing Stability: Not on file          MEDICATIONS:  has a current medication list which includes the following prescription(s): blood glucose, cefadroxil, dexcom g6 , dexcom g6 sensor, dexcom g6 transmitter, contour next test, fluoxetine, baqsimi two pack, humalog, omnipod 5 g6 intro (gen 5), omnipod 5 g6 pod (gen 5), lantus solostar, insulin lispro, insulin lispro, insulin pump, levothyroxine, multiple vitamins-minerals, and cholecalciferol.       ROS: 10  point ROS neg other than the symptoms noted above in the HPI.     GENERAL: energy good, wt stable; denies fevers, chills, night sweats.   HEENT: no dysphagia, odonophagia, diplopia, neck pain  THYROID:  no apparent hyper or hypothyroid symptoms  CV: no chest pain, pressure, palpitations  LUNGS: no SOB, CLIFFORD, cough, wheezing   ABDOMEN: denies nausea, diarrhea, constipation, abdominal pain  EXTREMITIES: no rashes, ulcers, edema  NEUROLOGY: no headaches, denies changes in vision, tingling, extremitiy numbness   MSK: some back pain; denies muscle weakness  SKIN: no rashes or lesions  : some urinary incontinence; regular menstrual cycles  PSYCH:  stable mood, no significant anxiety or depression  ENDOCRINE: no heat or cold intolerance      Physical Exam   VS: There were no vitals taken for this visit.  GENERAL: AXOX3, NAD, well dressed, answering questions appropriately, appears stated age.  ENT: no nose swelling or nasal discharge, mouth redness or gum changes.  EYES: eyes grossly normal to inspection, conjunctivae and sclerae normal, no exophthalmos or proptosis  THYROID:  no apparent nodules or goiter  LUNGS: no audible wheeze, cough or visible cyanosis, or increased work of breathing  ABDOMEN: abdomen size  EXTREMITIES: no edema noted  NEUROLOGY: CN grossly intact, no tremors  MSK: grossly intact  SKIN:  no apparent skin lesions, rash, or edema with visualized skin appearance  PSYCH: mentation appears normal, affect normal/bright, judgement and insight intact,   normal speech and appearance well groomed      LABS:     All pertinent notes, labs, and images personally reviewed by me.       A/P:  No diagnosis found.    Comments:  Reviewed health history, diabetes and thyroid issues.  Recent glucose control excellent  Reviewed and interpreted tests that I previously ordered.   Ordered appropriate tests for the endocrinology disease management.    Management options discussed and implemented after shared medical decision  making with the patient.  T1DM and hypothyroidism problems are chronic-stable    Plan:  Discussed general issues with the diabetes diagnosis and management  We discussed the hgbA1c test which reflects previous overall glucose levels or control  Discussed the importance of blood glucose (BG) testing to assess glucose trends  Reviewed the recent Omnipod 5  pump report and pump settings  Reviewed recent DexcomG6 CGM glucose trends, in detail     Recommend:  Continue the Omnipod 5 insulin pump and diabetes management    Pump setting changes:   Bolus ICR 11a 7 to 6.8    New Rxs for Lispro vials             Would not resume Jardiance med at this time  Advise using the pump Activity Mode to raise the sensor glucose target from the preset setting to 150 mg/dl    and lowers the adaptive basal rate by 50% potency. Can preset from 1-24 hrs duration.  Continue use of DexcomG6 CGM glucose sensor  Since she perceives higher glucose trends on day 3 of pod use, will change to pod changes Q2-3 days, updated Rx sent    Monitor for symptom changes  Consider follow-up [facility]  Continue current levothyroxine, vitamin D daily dose  Plan repeat non-fasting labs in 5/2024   Testing at [facility]   Lab orders available  Advise having fasting lipid panel testing and dilated eye examination, at least annually    Keep regular f/u PCP and GYN evaluations  Addressed patient questions today    The longitudinal plan of care for the endocrine problem(s) were addressed during this visit.  Due to added complexity of care,   we will continue to support the patient and the subsequent management of this condition with ongoing continuity of care.    There are no Patient Instructions on file for this visit.    Future labs ordered today: No orders of the defined types were placed in this encounter.    Radiology/Consults ordered today: None    Total time spent on day of encounter:  ***    Follow-up:  5/2024 VVAm,     9/2024 Return    T.W.  Mekhi GUNN, MS  Endocrinology  [facility]    CC:  WILLOW Sheehan                   Recent issues:  Diabetes and thyroid follow-up evaluation, with black lab Veena  Continues to use the Omnipod 5 pump with DexcomG6 since 6/2022  Participating in a health and wellness coaching program at [facility], working with behavior change counseling  Feeling well overall, no new health issues reported       Diabetes:  Previous diagnosis of IFG with high GAD65 Ab level  6/2011. Developed acute hyperglycemia and diagnosis of T1DM  Treatment with insulin medication, MDI plan  2/2013. Changed to OmniPod pump with Novolog insulin  Used DexcomG5 CGMS system  5/2017. Changed to Medtronic 630G pump  9/2017. Upgraded to Medtronic 670G pump and Guardian3 sensor, Humalog insulin  8/2018. Discontinued Medtronic pump and sensor, changed to MDI plan              Had taken Basaglar 17U at bedtime and mealtime Humalog  Had taken Basaglar 13U at bedtime, Humalog Luxura pen 1:7 at mealtime, ISF 60, goal target  mg/dl  12/2018. Restarted OmniPod pump  ~Early 11/2021. Stopped the Jardiance medication  Using OmniPod pump with Dash PDM, with DexcomG6 CGM    12/24/21. Switched to Tandem TSlim pump, Humalog insulin  6/2022. Switched to Omnipod 5 with DexcomG6              Humalog in pump    Current Omnipod 5 pump settings:`        Recent Omnipod 5 pump data:           Recent DexcomG6 CGM data:      Has reduced hypoglycemia awareness              Previously had service dog (Juliet), trained to recognize hypoglycemia smell/symptoms  Exercises with walking, strenght training, Pellaton treadmill/bike  Using Contour Next Link BG meter, variable testing, tests 6x/day    Exercises with home Pellaton, also classes at health club- yoga, strength              Uses Activity Mode for exercise  Previous [facility] labs include:  Lab Results   Component Value Date    A1C 5.8 (H) 10/12/2023     10/12/2023    POTASSIUM 4.5 10/12/2023    CHLORIDE 103 10/12/2023     CO2 23 10/12/2023    ANIONGAP 10 10/12/2023     (H) 10/12/2023    BUN 28.7 (H) 10/12/2023    CR 0.92 10/12/2023    GFRESTIMATED 77 10/12/2023    GFRESTBLACK 73 07/07/2021    VIJI 9.0 10/12/2023    CHOL 156 10/12/2023    TRIG 61 10/12/2023    HDL 81 10/12/2023    LDL 63 10/12/2023    NHDL 75 10/12/2023    UCRR 86.2 10/12/2023    MICROL <12.0 10/12/2023    UMALCR  10/12/2023      Comment:      Unable to calculate, urine albumin and/or urine creatinine is outside detectable limits.  Microalbuminuria is defined as an albumin:creatinine ratio of 17 to 299 for males and 25 to 299 for females. A ratio of albumin:creatinine of 300 or higher is indicative of overt proteinuria.  Due to biologic variability, positive results should be confirmed by a second, first-morning random or 24-hour timed urine specimen. If there is discrepancy, a third specimen is recommended. When 2 out of 3 results are in the microalbuminuria range, this is evidence for incipient nephropathy and warrants increased efforts at glucose control, blood pressure control, and institution of therapy with an angiotensin-converting-enzyme (ACE) inhibitor (if the patient can tolerate it).       Lab Results   Component Value Date     10/12/2023    POTASSIUM 4.5 10/12/2023    CHLORIDE 103 10/12/2023    CO2 23 10/12/2023    ANIONGAP 10 10/12/2023     (H) 10/12/2023    BUN 28.7 (H) 10/12/2023    CR 0.92 10/12/2023    GFRESTIMATED 77 10/12/2023    GFRESTBLACK 73 07/07/2021    VIJI 9.0 10/12/2023    TSH 2.59 10/12/2023    VITDT 75 01/25/2023     Last eye exam 1/2023 with Dr. ZURI Houser/[facility], no DR reported  DM Complications: none known        Thyroid:  2011. History of hypothyroidism  Takes levothyroxine medication  Eary 10/2018. Dose incease to levothyroxine 0.05 mg daily per GYN physician    Labs done at [facility]  6/29/22:  TSH 2.69    Recent [facility] labs include:  Lab Results   Component Value Date    TSH 2.59 10/12/2023    T4 0.85  2021     Current dose:  Levothyroxine 0.05 mg daily          , lives in [place],  Conrad, daughter Myra and stepchildren Jag (formerly Tisha) + Anoop  New job with [employer], working with [employer]- Medicare  Plans to see Dr. Anali Liu/[facility] for general medicine evaluations.  Also sees Dr. Ashford/[facility], Dr. Brand/Urogynecology       PMH/PSH:  Past Medical History:   Diagnosis Date     Diabetes mellitus type 1 (H)      Dysmenorrhea      Excessive or frequent menstruation      Female stress incontinence      Hypothyroid      Insulin pump in place      Lumbar back pain      Other and unspecified hyperlipidemia      PONV (postoperative nausea and vomiting)      Past Surgical History:   Procedure Laterality Date     DILATION AND CURETTAGE, HYSTEROSCOPY, ABLATE ENDOMETRIUM NOVASURE, COMBINED  2014    Procedure: COMBINED DILATION AND CURETTAGE, HYSTEROSCOPY, ABLATE ENDOMETRIUM NOVASURE;  HYSTEROSCOPY, DILATION, CURETTAGE, WITH NOVASURE ABLATION, MYOSURE MORCELLATOR;  Surgeon: Swetha Queen MD;  Location: [facility]     LAPAROSCOPY       LEEP TX, CERVICAL       OPERATIVE HYSTEROSCOPY WITH MORCELLATOR  2014    Procedure: OPERATIVE HYSTEROSCOPY WITH MORCELLATOR;;  Surgeon: Swetha Queen MD;  Location: [facility]     wisdom teeth[         Family Hx:  Family History   Problem Relation Age of Onset     Diabetes Mother         type 2     Obesity Mother      Diabetes Father         type 1     Diabetes Sister         type 1     Thyroid Disease Sister      Diabetes Sister      Prostate Cancer Maternal Grandfather 80     Breast Cancer Paternal Grandmother 70         in 80s     Prostate Cancer Paternal Grandfather      Brain Cancer Paternal Grandfather 80     Cancer Maternal Uncle 60        small bowel     Cancer Maternal Uncle 60        tonsil     Cancer - colorectal Paternal Uncle 60     Breast Cancer Paternal Cousin 38         Social  Hx:  Social History     Socioeconomic History     Marital status:      Spouse name: Conrad     Number of children: 1     Years of education: Not on file     Highest education level: Not on file   Occupational History     Employer: [employer]   Tobacco Use     Smoking status: Never     Smokeless tobacco: Never   Substance and Sexual Activity     Alcohol use: Yes     Alcohol/week: 0.0 standard drinks of alcohol     Comment: 3 per week     Drug use: No     Sexual activity: Yes     Partners: Male     Birth control/protection: Male Surgical   Other Topics Concern     Parent/sibling w/ CABG, MI or angioplasty before 65F 55M? No   Social History Narrative     in May 2014    2 step children (20 and 18) and one biol dtr age 15.    Works for [employer]--works from home     Social Determinants of Health     Financial Resource Strain: Not on file   Food Insecurity: Not on file   Transportation Needs: Not on file   Physical Activity: Not on file   Stress: Not on file   Social Connections: Not on file   Interpersonal Safety: Not on file   Housing Stability: Not on file          MEDICATIONS:  has a current medication list which includes the following prescription(s): dexcom g6 , dexcom g6 sensor, dexcom g6 transmitter, fluoxetine, omnipod 5 g6 intro (gen 5), omnipod 5 g6 pod (gen 5), insulin lispro, levothyroxine, multiple vitamins-minerals, cholecalciferol, blood glucose, cefadroxil, contour next test, baqsimi two pack, lantus solostar, insulin lispro, and insulin pump.       ROS: 10 point ROS neg other than the symptoms noted above in the HPI.     GENERAL: energy good, wt stable; denies fevers, chills, night sweats.   HEENT: no dysphagia, odonophagia, diplopia, neck pain  THYROID:  no apparent hyper or hypothyroid symptoms  CV: no chest pain, pressure, palpitations  LUNGS: no SOB, CLIFFORD, cough, wheezing   ABDOMEN: denies nausea, diarrhea, constipation, abdominal pain  EXTREMITIES: no rashes,  "ulcers, edema  NEUROLOGY: no headaches, denies changes in vision, tingling, extremitiy numbness   MSK: some back pain; denies muscle weakness  SKIN: no rashes or lesions  : some urinary incontinence; regular menstrual cycles  PSYCH:  stable mood, no significant anxiety or depression  ENDOCRINE: no heat or cold intolerance      Physical Exam   VS: /75   Pulse 64   Ht 1.702 m (5' 7\")   Wt 66.7 kg (147 lb)   BMI 23.02 kg/m    GENERAL: AXOX3, NAD, well dressed, slender, answering questions appropriately, appears stated age.  ENT: no nose swelling or nasal discharge, mouth redness or gum changes.  EYES: eyes grossly normal to inspection, conjunctivae and sclerae normal, no exophthalmos or proptosis  THYROID:  no apparent nodules or goiter  LUNGS: no audible wheeze, cough or visible cyanosis, or increased work of breathing  ABDOMEN: abdomen normal size  EXTREMITIES: feet not examined today  NEUROLOGY: CN grossly intact, no tremors  MSK: grossly intact  SKIN:  no apparent skin lesions, rash, or edema with visualized skin appearance  PSYCH: mentation appears normal, affect normal/bright, judgement and insight intact,   normal speech and appearance well groomed      LABS:     All pertinent notes, labs, and images personally reviewed by me.       A/P:  Encounter Diagnoses   Name Primary?     Type 1 diabetes mellitus without complication (H) Yes     Insulin pump status      Hypothyroidism due to Hashimoto's thyroiditis      Diabetes mellitus type 1.5, managed as type 2 (H)        Comments:  Reviewed health history, diabetes and thyroid issues.  Recent glucose control excellent  Reviewed and interpreted tests that I previously ordered.   Ordered appropriate tests for the endocrinology disease management.    Management options discussed and implemented after shared medical decision making with the patient.  T1DM and hypothyroidism problems are chronic-stable    Plan:  Discussed general issues with the diabetes " diagnosis and management  We discussed the hgbA1c test which reflects previous overall glucose levels or control  Discussed the importance of blood glucose (BG) testing to assess glucose trends  Reviewed the recent Omnipod 5  pump report and pump settings  Reviewed recent DexcomG6 CGM glucose trends, in detail     Recommend:  Continue the Omnipod 5 insulin pump and diabetes management    Pump setting changes:   Bolus ICR 11a 7 to 6.8    Sent updated Lispro vial Rx today  Would not resume Jardiance med at this time  Advise using the pump Activity Mode to raise the sensor glucose target from the preset setting to 150 mg/dl    and lowers the adaptive basal rate by 50% potency. Can preset from 1-24 hrs duration.  Continue use of DexcomG6 CGM glucose sensor  Monitor for symptom changes  Consider follow-up [facility]  Continue current levothyroxine, vitamin D daily dose  Plan repeat non-fasting labs in 5/2024   Testing at [facility]   Lab orders available  Discussed (black lab) dog training for hypoglycemia detection  Advise having fasting lipid panel testing and dilated eye examination, at least annually    Keep regular f/u PCP and GYN evaluations  Addressed patient questions today    The longitudinal plan of care for the endocrine problem(s) were addressed during this visit.  Due to added complexity of care,   we will continue to support the patient and the subsequent management of this condition with ongoing continuity of care.    There are no Patient Instructions on file for this visit.    Future labs ordered today:   Orders Placed This Encounter   Procedures     GLUCOSE MONITOR, 72 HOUR, PHYS INTERP     Hemoglobin A1c     Basic metabolic panel     TSH with free T4 reflex     Radiology/Consults ordered today: None    Total time spent on day of encounter:  36 min    Follow-up:  5/2024 VVAm,     9/2024 Return    DARI Gaming MD, MS  Endocrinology  [facility]    CC:  WILLOW Sheehan                    Again, thank you for allowing me to participate in the care of your patient.        Sincerely,        Lyndon Gaming MD

## 2024-01-30 NOTE — PROGRESS NOTES
Recent issues:  Diabetes and thyroid follow-up evaluation, with black lab [facility]  Continues to use the Omnipod 5 pump with DexcomG6 since 6/2022  Participating in a health and wellness coaching program at [facility], working with behavior change counseling  Feeling well overall, no new health issues reported       Diabetes:  Previous diagnosis of IFG with high GAD65 Ab level  6/2011. Developed acute hyperglycemia and diagnosis of T1DM  Treatment with insulin medication, MDI plan  2/2013. Changed to OmniPod pump with Novolog insulin  Used DexcomG5 CGMS system  5/2017. Changed to Medtronic 630G pump  9/2017. Upgraded to Medtronic 670G pump and Guardian3 sensor, Humalog insulin  8/2018. Discontinued Medtronic pump and sensor, changed to MDI plan              Had taken Basaglar 17U at bedtime and mealtime Humalog  Had taken Basaglar 13U at bedtime, Humalog Luxura pen 1:7 at mealtime, ISF 60, goal target  mg/dl  12/2018. Restarted OmniPod pump  ~Early 11/2021. Stopped the Jardiance medication  Using OmniPod pump with Dash PDM, with DexcomG6 CGM    12/24/21. Switched to Tandem TSlim pump, Humalog insulin  6/2022. Switched to Omnipod 5 with DexcomG6              Humalog in pump    Current Omnipod 5 pump settings:`        Recent Omnipod 5 pump data:           Recent DexcomG6 CGM data:      Has reduced hypoglycemia awareness              Previously had service dog (Juliet), trained to recognize hypoglycemia smell/symptoms  Exercises with walking, strenght training, [place]aton treadmill/bike  Using Contour Next Link BG meter, variable testing, tests 6x/day    Exercises with home Pellaton, also classes at health club- yoga, strength              Uses Activity Mode for exercise  Previous [facility] labs include:  Lab Results   Component Value Date    A1C 5.8 (H) 10/12/2023     10/12/2023    POTASSIUM 4.5 10/12/2023    CHLORIDE 103 10/12/2023    CO2 23 10/12/2023    ANIONGAP 10 10/12/2023     (H) 10/12/2023    BUN 28.7  (H) 10/12/2023    CR 0.92 10/12/2023    GFRESTIMATED 77 10/12/2023    GFRESTBLACK 73 07/07/2021    VIJI 9.0 10/12/2023    CHOL 156 10/12/2023    TRIG 61 10/12/2023    HDL 81 10/12/2023    LDL 63 10/12/2023    NHDL 75 10/12/2023    UCRR 86.2 10/12/2023    MICROL <12.0 10/12/2023    UMALCR  10/12/2023      Comment:      Unable to calculate, urine albumin and/or urine creatinine is outside detectable limits.  Microalbuminuria is defined as an albumin:creatinine ratio of 17 to 299 for males and 25 to 299 for females. A ratio of albumin:creatinine of 300 or higher is indicative of overt proteinuria.  Due to biologic variability, positive results should be confirmed by a second, first-morning random or 24-hour timed urine specimen. If there is discrepancy, a third specimen is recommended. When 2 out of 3 results are in the microalbuminuria range, this is evidence for incipient nephropathy and warrants increased efforts at glucose control, blood pressure control, and institution of therapy with an angiotensin-converting-enzyme (ACE) inhibitor (if the patient can tolerate it).       Lab Results   Component Value Date     10/12/2023    POTASSIUM 4.5 10/12/2023    CHLORIDE 103 10/12/2023    CO2 23 10/12/2023    ANIONGAP 10 10/12/2023     (H) 10/12/2023    BUN 28.7 (H) 10/12/2023    CR 0.92 10/12/2023    GFRESTIMATED 77 10/12/2023    GFRESTBLACK 73 07/07/2021    VIJI 9.0 10/12/2023    TSH 2.59 10/12/2023    VITDT 75 01/25/2023     Last eye exam 1/2023 with Dr. ZURI Houser/[facility], no DR reported  DM Complications: none known        Thyroid:  2011. History of hypothyroidism  Takes levothyroxine medication  Eary 10/2018. Dose incease to levothyroxine 0.05 mg daily per GYN physician    Labs done at [facility]  6/29/22:  TSH 2.69    Recent [facility] labs include:  Lab Results   Component Value Date    TSH 2.59 10/12/2023    T4 0.85 05/17/2021     Current dose:  Levothyroxine 0.05 mg daily          , lives in  [place],  [place], daughter Myra and stepchildren Jag (formerly Tisha) + Anoop  New job with [employer], working with [facility]- Medicare  Plans to see Dr. Anali Liu/[facility] for general medicine evaluations.  Also sees Dr. Ashford/[facility], Dr. Brand/Urogynecology       PMH/PSH:  Past Medical History:   Diagnosis Date    Diabetes mellitus type 1 (H)     Dysmenorrhea     Excessive or frequent menstruation     Female stress incontinence     Hypothyroid     Insulin pump in place     Lumbar back pain     Other and unspecified hyperlipidemia     PONV (postoperative nausea and vomiting)      Past Surgical History:   Procedure Laterality Date    DILATION AND CURETTAGE, HYSTEROSCOPY, ABLATE ENDOMETRIUM NOVASURE, COMBINED  2014    Procedure: COMBINED DILATION AND CURETTAGE, HYSTEROSCOPY, ABLATE ENDOMETRIUM NOVASURE;  HYSTEROSCOPY, DILATION, CURETTAGE, WITH NOVASURE ABLATION, MYOSURE MORCELLATOR;  Surgeon: Swetha Queen MD;  Location: [facility]    LAPAROSCOPY      LEEP TX, CERVICAL      OPERATIVE HYSTEROSCOPY WITH MORCELLATOR  2014    Procedure: OPERATIVE HYSTEROSCOPY WITH MORCELLATOR;;  Surgeon: Swetha Queen MD;  Location: [facility]    wisdom teeth[         Family Hx:  Family History   Problem Relation Age of Onset    Diabetes Mother         type 2    Obesity Mother     Diabetes Father         type 1    Diabetes Sister         type 1    Thyroid Disease Sister     Diabetes Sister     Prostate Cancer Maternal Grandfather 80    Breast Cancer Paternal Grandmother 70         in 80s    Prostate Cancer Paternal Grandfather     Brain Cancer Paternal Grandfather 80    Cancer Maternal Uncle 60        small bowel    Cancer Maternal Uncle 60        tonsil    Cancer - colorectal Paternal Uncle 60    Breast Cancer Paternal Cousin 38         Social Hx:  Social History     Socioeconomic History    Marital status:      Spouse name: Conrad    Number of children:  1    Years of education: Not on file    Highest education level: Not on file   Occupational History     Employer: [employer]   Tobacco Use    Smoking status: Never    Smokeless tobacco: Never   Substance and Sexual Activity    Alcohol use: Yes     Alcohol/week: 0.0 standard drinks of alcohol     Comment: 3 per week    Drug use: No    Sexual activity: Yes     Partners: Male     Birth control/protection: Male Surgical   Other Topics Concern    Parent/sibling w/ CABG, MI or angioplasty before 65F 55M? No   Social History Narrative     in May 2014    2 step children (20 and 18) and one biol dtr age 15.    Works for [employer]--works from home     Social Determinants of Health     Financial Resource Strain: Not on file   Food Insecurity: Not on file   Transportation Needs: Not on file   Physical Activity: Not on file   Stress: Not on file   Social Connections: Not on file   Interpersonal Safety: Not on file   Housing Stability: Not on file          MEDICATIONS:  has a current medication list which includes the following prescription(s): dexcom g6 , dexcom g6 sensor, dexcom g6 transmitter, fluoxetine, omnipod 5 g6 intro (gen 5), omnipod 5 g6 pod (gen 5), insulin lispro, levothyroxine, multiple vitamins-minerals, cholecalciferol, blood glucose, cefadroxil, contour next test, baqsimi two pack, lantus solostar, insulin lispro, and insulin pump.       ROS: 10 point ROS neg other than the symptoms noted above in the HPI.     GENERAL: energy good, wt stable; denies fevers, chills, night sweats.   HEENT: no dysphagia, odonophagia, diplopia, neck pain  THYROID:  no apparent hyper or hypothyroid symptoms  CV: no chest pain, pressure, palpitations  LUNGS: no SOB, CLIFFORD, cough, wheezing   ABDOMEN: denies nausea, diarrhea, constipation, abdominal pain  EXTREMITIES: no rashes, ulcers, edema  NEUROLOGY: no headaches, denies changes in vision, tingling, extremitiy numbness   MSK: some back pain; denies  "muscle weakness  SKIN: no rashes or lesions  : some urinary incontinence; regular menstrual cycles  PSYCH:  stable mood, no significant anxiety or depression  ENDOCRINE: no heat or cold intolerance      Physical Exam   VS: /75   Pulse 64   Ht 1.702 m (5' 7\")   Wt 66.7 kg (147 lb)   BMI 23.02 kg/m    GENERAL: AXOX3, NAD, well dressed, slender, answering questions appropriately, appears stated age.  ENT: no nose swelling or nasal discharge, mouth redness or gum changes.  EYES: eyes grossly normal to inspection, conjunctivae and sclerae normal, no exophthalmos or proptosis  THYROID:  no apparent nodules or goiter  LUNGS: no audible wheeze, cough or visible cyanosis, or increased work of breathing  ABDOMEN: abdomen normal size  EXTREMITIES: feet not examined today  NEUROLOGY: CN grossly intact, no tremors  MSK: grossly intact  SKIN:  no apparent skin lesions, rash, or edema with visualized skin appearance  PSYCH: mentation appears normal, affect normal/bright, judgement and insight intact,   normal speech and appearance well groomed      LABS:     All pertinent notes, labs, and images personally reviewed by me.       A/P:  Encounter Diagnoses   Name Primary?    Type 1 diabetes mellitus without complication (H) Yes    Insulin pump status     Hypothyroidism due to Hashimoto's thyroiditis     Diabetes mellitus type 1.5, managed as type 2 (H)        Comments:  Reviewed health history, diabetes and thyroid issues.  Recent glucose control excellent  Reviewed and interpreted tests that I previously ordered.   Ordered appropriate tests for the endocrinology disease management.    Management options discussed and implemented after shared medical decision making with the patient.  T1DM and hypothyroidism problems are chronic-stable    Plan:  Discussed general issues with the diabetes diagnosis and management  We discussed the hgbA1c test which reflects previous overall glucose levels or control  Discussed the " importance of blood glucose (BG) testing to assess glucose trends  Reviewed the recent Omnipod 5  pump report and pump settings  Reviewed recent DexcomG6 CGM glucose trends, in detail     Recommend:  Continue the Omnipod 5 insulin pump and diabetes management    Pump setting changes:   Bolus ICR 11a 7 to 6.8    Sent updated Lispro vial Rx today  Would not resume Jardiance med at this time  Advise using the pump Activity Mode to raise the sensor glucose target from the preset setting to 150 mg/dl    and lowers the adaptive basal rate by 50% potency. Can preset from 1-24 hrs duration.  Continue use of DexcomG6 CGM glucose sensor  Monitor for symptom changes  Consider follow-up [facility] Diab Educators  Continue current levothyroxine, vitamin D daily dose  Plan repeat non-fasting labs in 5/2024   Testing at [facility]   Lab orders available  Discussed (black lab) dog training for hypoglycemia detection  Advise having fasting lipid panel testing and dilated eye examination, at least annually    Keep regular f/u PCP and GYN evaluations  Addressed patient questions today    The longitudinal plan of care for the endocrine problem(s) were addressed during this visit.  Due to added complexity of care,   we will continue to support the patient and the subsequent management of this condition with ongoing continuity of care.    There are no Patient Instructions on file for this visit.    Future labs ordered today:   Orders Placed This Encounter   Procedures    GLUCOSE MONITOR, 72 HOUR, PHYS INTERP    Hemoglobin A1c    Basic metabolic panel    TSH with free T4 reflex     Radiology/Consults ordered today: None    Total time spent on day of encounter:  36 min    Follow-up:  5/2024 VVAm,     9/2024 Return    DARI Gaming MD, MS  Endocrinology  [facility]    CC:  WILLOW Sheehan

## 2024-02-09 ENCOUNTER — TRANSFERRED RECORDS (OUTPATIENT)
Dept: ENDOCRINOLOGY | Facility: CLINIC | Age: 49
End: 2024-02-09

## 2024-02-09 LAB — RETINOPATHY: NEGATIVE

## 2024-04-22 DIAGNOSIS — Z96.41 INSULIN PUMP STATUS: ICD-10-CM

## 2024-04-22 DIAGNOSIS — E10.9 TYPE 1 DIABETES MELLITUS WITHOUT COMPLICATION (H): Primary | ICD-10-CM

## 2024-05-02 DIAGNOSIS — E13.9 DIABETES MELLITUS TYPE 1.5, MANAGED AS TYPE 2 (H): ICD-10-CM

## 2024-05-02 RX ORDER — PROCHLORPERAZINE 25 MG/1
SUPPOSITORY RECTAL
Qty: 1 EACH | Refills: 3 | Status: SHIPPED | OUTPATIENT
Start: 2024-05-02

## 2024-05-02 RX ORDER — PROCHLORPERAZINE 25 MG/1
SUPPOSITORY RECTAL
Qty: 9 EACH | Refills: 3 | Status: SHIPPED | OUTPATIENT
Start: 2024-05-02

## 2024-05-10 ENCOUNTER — LAB (OUTPATIENT)
Dept: LAB | Facility: CLINIC | Age: 49
End: 2024-05-10
Payer: COMMERCIAL

## 2024-05-10 DIAGNOSIS — Z96.41 INSULIN PUMP STATUS: ICD-10-CM

## 2024-05-10 DIAGNOSIS — E10.9 TYPE 1 DIABETES MELLITUS WITHOUT COMPLICATION (H): ICD-10-CM

## 2024-05-10 LAB
ANION GAP SERPL CALCULATED.3IONS-SCNC: 8 MMOL/L (ref 7–15)
BUN SERPL-MCNC: 19.5 MG/DL (ref 6–20)
CALCIUM SERPL-MCNC: 8.9 MG/DL (ref 8.6–10)
CHLORIDE SERPL-SCNC: 101 MMOL/L (ref 98–107)
CREAT SERPL-MCNC: 0.74 MG/DL (ref 0.51–0.95)
DEPRECATED HCO3 PLAS-SCNC: 25 MMOL/L (ref 22–29)
EGFRCR SERPLBLD CKD-EPI 2021: >90 ML/MIN/1.73M2
GLUCOSE SERPL-MCNC: 138 MG/DL (ref 70–99)
HBA1C MFR BLD: 6.2 % (ref 0–5.6)
POTASSIUM SERPL-SCNC: 4.4 MMOL/L (ref 3.4–5.3)
SODIUM SERPL-SCNC: 134 MMOL/L (ref 135–145)
TSH SERPL DL<=0.005 MIU/L-ACNC: 1.3 UIU/ML (ref 0.3–4.2)

## 2024-05-10 PROCEDURE — 36415 COLL VENOUS BLD VENIPUNCTURE: CPT

## 2024-05-10 PROCEDURE — 84443 ASSAY THYROID STIM HORMONE: CPT

## 2024-05-10 PROCEDURE — 83036 HEMOGLOBIN GLYCOSYLATED A1C: CPT

## 2024-05-10 PROCEDURE — 80048 BASIC METABOLIC PNL TOTAL CA: CPT

## 2024-05-15 ENCOUNTER — MYC MEDICAL ADVICE (OUTPATIENT)
Dept: ENDOCRINOLOGY | Facility: CLINIC | Age: 49
End: 2024-05-15

## 2024-05-15 ENCOUNTER — VIRTUAL VISIT (OUTPATIENT)
Dept: ENDOCRINOLOGY | Facility: CLINIC | Age: 49
End: 2024-05-15
Payer: COMMERCIAL

## 2024-05-15 DIAGNOSIS — Z96.41 INSULIN PUMP STATUS: ICD-10-CM

## 2024-05-15 DIAGNOSIS — E10.9 TYPE 1 DIABETES MELLITUS WITHOUT COMPLICATION (H): Primary | ICD-10-CM

## 2024-05-15 DIAGNOSIS — E06.3 HYPOTHYROIDISM DUE TO HASHIMOTO'S THYROIDITIS: ICD-10-CM

## 2024-05-15 PROCEDURE — 99214 OFFICE O/P EST MOD 30 MIN: CPT | Mod: 95 | Performed by: INTERNAL MEDICINE

## 2024-05-15 PROCEDURE — 95251 CONT GLUC MNTR ANALYSIS I&R: CPT | Performed by: INTERNAL MEDICINE

## 2024-05-15 PROCEDURE — G2211 COMPLEX E/M VISIT ADD ON: HCPCS | Mod: 95 | Performed by: INTERNAL MEDICINE

## 2024-05-15 NOTE — PROGRESS NOTES
Recent issues:  Diabetes and thyroid follow-up evaluation, with black lab [facility]  Continues to use the Omnipod 5 pump with DexcomG6 since 6/2022  Finished the health and wellness coaching classroom portion at [facility], working practicum hours with the behavior change counseling  Feeling well overall, no new health issues reported         Diabetes:  Previous diagnosis of IFG with high GAD65 Ab level  6/2011. Developed acute hyperglycemia and diagnosis of T1DM  Treatment with insulin medication, MDI plan  2/2013. Changed to OmniPod pump with Novolog insulin  Used DexcomG5 CGMS system  5/2017. Changed to Medtronic 630G pump  9/2017. Upgraded to Medtronic 670G pump and Guardian3 sensor, Humalog insulin  8/2018. Discontinued Medtronic pump and sensor, changed to MDI plan              Had taken Basaglar 17U at bedtime and mealtime Humalog  Had taken Basaglar 13U at bedtime, Humalog Luxura pen 1:7 at mealtime, ISF 60, goal target  mg/dl  12/2018. Restarted OmniPod pump  ~Early 11/2021. Stopped the Jardiance medication  Using OmniPod pump with Dash PDM, with DexcomG6 CGM    12/24/21. Switched to Tandem TSlim pump, Humalog insulin  6/2022. Switched to Omnipod 5 with DexcomG6              Lispro in pump    Current Omnipod 5 pump settings:`          Recent Omnipod 5 pump data:        Recent DexcomG6 CGM data:      Has reduced hypoglycemia awareness              Previously had service dog (Juliet), trained to recognize hypoglycemia smell/symptoms  Using Contour Next Link BG meter, variable testing up to 6x/day  Exercises with home Yanelis, also classes at health club- yoga, strength              Uses Activity Mode for exercise  Previous [facility] hgbA1c trends include:  Lab Results   Component Value Date    A1C 6.2 (H) 05/10/2024    A1C 5.8 (H) 10/12/2023    A1C 6.2 (H) 06/02/2023    A1C 6.1 (H) 01/25/2023    A1C 5.9 (H) 09/23/2022        Recent [facility] labs include:  Lab Results   Component Value Date    A1C 6.2 (H) 05/10/2024      (L) 05/10/2024    POTASSIUM 4.4 05/10/2024    CHLORIDE 101 05/10/2024    CO2 25 05/10/2024    ANIONGAP 8 05/10/2024     (H) 05/10/2024    BUN 19.5 05/10/2024    CR 0.74 05/10/2024    GFRESTIMATED >90 05/10/2024    GFRESTBLACK 73 07/07/2021    VIJI 8.9 05/10/2024    CHOL 156 10/12/2023    TRIG 61 10/12/2023    HDL 81 10/12/2023    LDL 63 10/12/2023    NHDL 75 10/12/2023    UCRR 86.2 10/12/2023    MICROL <12.0 10/12/2023    UMALCR  10/12/2023      Comment:      Unable to calculate, urine albumin and/or urine creatinine is outside detectable limits.  Microalbuminuria is defined as an albumin:creatinine ratio of 17 to 299 for males and 25 to 299 for females. A ratio of albumin:creatinine of 300 or higher is indicative of overt proteinuria.  Due to biologic variability, positive results should be confirmed by a second, first-morning random or 24-hour timed urine specimen. If there is discrepancy, a third specimen is recommended. When 2 out of 3 results are in the microalbuminuria range, this is evidence for incipient nephropathy and warrants increased efforts at glucose control, blood pressure control, and institution of therapy with an angiotensin-converting-enzyme (ACE) inhibitor (if the patient can tolerate it).       Lab Results   Component Value Date     (L) 05/10/2024    POTASSIUM 4.4 05/10/2024    CHLORIDE 101 05/10/2024    CO2 25 05/10/2024    ANIONGAP 8 05/10/2024     (H) 05/10/2024    BUN 19.5 05/10/2024    CR 0.74 05/10/2024    GFRESTIMATED >90 05/10/2024    GFRESTBLACK 73 07/07/2021    VIJI 8.9 05/10/2024    TSH 1.30 05/10/2024    VITDT 75 01/25/2023     Last eye exam 2/2024 with Dr. Eduar Yeboah/[facility], no DR reported  DM Complications: none known          Thyroid:  2011. History of hypothyroidism  Takes levothyroxine medication  Eary 10/2018. Dose incease to levothyroxine 0.05 mg daily per GYN physician  Recent [facility] labs include:  Lab Results   Component Value Date    TSH 1.30  05/10/2024    T4 0.85 2021     Current dose:  Levothyroxine 0.05 mg daily          , lives in [place],  [place], daughter Myra and stepchildren Jag (formerly Tisha) + Anoop  New job with [employer], working with  [employer] [facility]- Medicare  Plans to see Dr. Anali Liu/[facility] for general medicine evaluations.  Also sees Dr. Ashford/[facility], Dr. Brand/Urogynecology       PMH/PSH:  Past Medical History:   Diagnosis Date    Diabetes mellitus type 1 (H)     Dysmenorrhea     Excessive or frequent menstruation     Female stress incontinence     Hypothyroid     Insulin pump in place     Lumbar back pain     Other and unspecified hyperlipidemia     PONV (postoperative nausea and vomiting)      Past Surgical History:   Procedure Laterality Date    DILATION AND CURETTAGE, HYSTEROSCOPY, ABLATE ENDOMETRIUM NOVASURE, COMBINED  2014    Procedure: COMBINED DILATION AND CURETTAGE, HYSTEROSCOPY, ABLATE ENDOMETRIUM NOVASURE;  HYSTEROSCOPY, DILATION, CURETTAGE, WITH NOVASURE ABLATION, MYOSURE MORCELLATOR;  Surgeon: Swetha Queen MD;  Location: [facility]    LAPAROSCOPY      LEEP TX, CERVICAL      OPERATIVE HYSTEROSCOPY WITH MORCELLATOR  2014    Procedure: OPERATIVE HYSTEROSCOPY WITH MORCELLATOR;;  Surgeon: Swetha Queen MD;  Location: [facility]    wisdom teeth[         Family Hx:  Family History   Problem Relation Age of Onset    Diabetes Mother         type 2    Obesity Mother     Diabetes Father         type 1    Diabetes Sister         type 1    Thyroid Disease Sister     Diabetes Sister     Prostate Cancer Maternal Grandfather 80    Breast Cancer Paternal Grandmother 70         in 80s    Prostate Cancer Paternal Grandfather     Brain Cancer Paternal Grandfather 80    Cancer Maternal Uncle 60        small bowel    Cancer Maternal Uncle 60        tonsil    Cancer - colorectal Paternal Uncle 60    Breast Cancer Paternal Cousin 38         Social  Hx:  Social History     Socioeconomic History    Marital status:      Spouse name: Conrad    Number of children: 1    Years of education: Not on file    Highest education level: Not on file   Occupational History     Employer: [employer]   Tobacco Use    Smoking status: Never    Smokeless tobacco: Never   Substance and Sexual Activity    Alcohol use: Yes     Alcohol/week: 0.0 standard drinks of alcohol     Comment: 3 per week    Drug use: No    Sexual activity: Yes     Partners: Male     Birth control/protection: Male Surgical   Other Topics Concern    Parent/sibling w/ CABG, MI or angioplasty before 65F 55M? No   Social History Narrative     in May 2014    2 step children (20 and 18) and one biol dtr age 15.    Works for [employer]--works from home     Social Determinants of Health     Financial Resource Strain: Not on file   Food Insecurity: Not on file   Transportation Needs: Not on file   Physical Activity: Not on file   Stress: Not on file   Social Connections: Not on file   Interpersonal Safety: Not on file   Housing Stability: Not on file          MEDICATIONS:  has a current medication list which includes the following prescription(s): dexcom g6 sensor, dexcom g6 transmitter, fluoxetine, omnipod 5 g6 intro (gen 5), omnipod 5 g6 pods (gen 5), insulin lispro, levothyroxine, multiple vitamins-minerals, cholecalciferol, blood glucose, cefadroxil, dexcom g6 , contour next test, baqsimi two pack, lantus solostar, insulin lispro, and insulin pump.       ROS: 10 point ROS neg other than the symptoms noted above in the HPI.     GENERAL: energy good, wt stable; denies fevers, chills, night sweats.   HEENT: no dysphagia, odonophagia, diplopia, neck pain  THYROID:  no apparent hyper or hypothyroid symptoms  CV: no chest pain, pressure, palpitations  LUNGS: no SOB, CLIFFORD, cough, wheezing   ABDOMEN: denies nausea, diarrhea, constipation, abdominal pain  EXTREMITIES: no rashes, ulcers,  edema  NEUROLOGY: no headaches, denies changes in vision, tingling, extremitiy numbness   MSK: some back pain; denies muscle weakness  SKIN: no rashes or lesions  : some urinary incontinence; regular menstrual cycles  PSYCH:  stable mood, no significant anxiety or depression  ENDOCRINE: no heat or cold intolerance    Physical Exam (visual exam)  VS:  no vital signs taken for video visit  CONSTITUTIONAL: healthy, alert and NAD, well dressed, answering questions appropriately  ENT: no nose swelling or nasal discharge, mouth redness or gum changes.  EYES: eyes grossly normal to inspection, conjunctivae and sclerae normal, no exophthalmos or proptosis  THYROID:  no apparent nodules or goiter  LUNGS: no audible wheeze, cough or visible cyanosis, no visible retractions or increased work of breathing  ABDOMEN: abdomen not evaluated  EXTREMITIES: no hand tremors, limited exam  NEUROLOGY: CN grossly intact, mentation intact and speech normal   SKIN:  no apparent skin lesions, rash, or edema with visualized skin appearance  PSYCH: mentation appears normal, affect normal/bright, judgement and insight intact,   normal speech and appearance well groomed    LABS:     All pertinent notes, labs, and images personally reviewed by me.       A/P:  Encounter Diagnoses   Name Primary?    Type 1 diabetes mellitus without complication (H) Yes    Insulin pump status     Hypothyroidism due to Hashimoto's thyroiditis      Comments:  Reviewed health history, diabetes and thyroid issues.  Recent glucose control excellent  Reviewed and interpreted tests that I previously ordered.   Ordered appropriate tests for the endocrinology disease management.    Management options discussed and implemented after shared medical decision making with the patient.  T1DM and hypothyroidism problems are chronic-stable    Plan:  Discussed general issues with the diabetes diagnosis and management  We discussed the hgbA1c test which reflects previous overall  glucose levels or control  Discussed the importance of blood glucose (BG) testing to assess glucose trends  Reviewed the recent Omnipod 5  pump report and pump settings  Reviewed recent DexcomG6 CGM glucose trends, in detail     Recommend:  Continue the Omnipod 5 insulin pump and diabetes management    Pump setting changes:   Bolus ICR MN 6 to 6.5    Would not resume Jardiance med at this time  Advise using the pump Activity Mode to raise the sensor glucose target from the preset setting to 150 mg/dl    and lowers the adaptive basal rate by 50% potency. Can preset from 1-24 hrs duration.  Continue use of DexcomG6 CGM glucose sensor  Monitor for symptom changes  Consider follow-up [facility] Diab Educators  Continue current levothyroxine, vitamin D daily dose  Plan repeat non-fasting labs in 9/2024   Testing at [facility]  Discussed (black lab) dog training for hypoglycemia detection  Advise having fasting lipid panel testing and dilated eye examination, at least annually    Keep regular f/u PCP and GYN evaluations  Addressed patient questions today    The longitudinal plan of care for the endocrine problem(s) were addressed during this visit.  Due to added complexity of care,   we will continue to support the patient and the subsequent management of this condition with ongoing continuity of care.    There are no Patient Instructions on file for this visit.    Future labs ordered today:   Orders Placed This Encounter   Procedures    GLUCOSE MONITOR, 72 HOUR, PHYS INTERP     Radiology/Consults ordered today: None    Total time spent on day of encounter:  33 min    Follow-up:  9/4/24 Return    DARI Gaming MD, MS  Endocrinology  [facility]

## 2024-05-20 ENCOUNTER — VIRTUAL VISIT (OUTPATIENT)
Dept: EDUCATION SERVICES | Facility: CLINIC | Age: 49
End: 2024-05-20
Attending: INTERNAL MEDICINE
Payer: COMMERCIAL

## 2024-05-20 DIAGNOSIS — E10.9 TYPE 1 DIABETES MELLITUS WITHOUT COMPLICATION (H): ICD-10-CM

## 2024-05-20 DIAGNOSIS — Z96.41 INSULIN PUMP STATUS: ICD-10-CM

## 2024-05-20 PROCEDURE — 98967 PH1 ASSMT&MGMT NQHP 11-20: CPT | Mod: 95 | Performed by: DIETITIAN, REGISTERED

## 2024-05-20 NOTE — LETTER
5/20/2024         RE: Jenise Smith  [address] [facility] [address]        Dear Colleague,    Thank you for referring your patient, Jenise Smith, to the [facility] [place]. Please see a copy of my visit note below.    Diabetes Education Follow-up    Type of service:  Video Visit    If the video visit is dropped, the video visit invitation should be resent by: Text to cell phone: [phone]    Originating Location (pt. Location): Home  Distant Location (provider location): Offsite  Mode of Communication:  Video Conference via [employer] issues with video; no time stamp available -- will estimate 15 minutes time spent in video visit     How would patient like to obtain AVS? MyChart      Subjective/Objective:    Jenise Smith sent in blood glucose log for review. Last date of communication was: 12/19/23.    Diabetes is being managed with Lifestyle (diet/activity), Diabetes Medications   Diabetes Medication(s)       Diabetic Other       Glucagon (BAQSIMI TWO PACK) 3 MG/DOSE POWD Spray 1 spray (3 mg) in nostril once as needed (severe hypoglycemia reaction, dose as directed)       Insulin       insulin glargine (LANTUS SOLOSTAR) 100 UNIT/ML pen INJECT SUBCUTANEOUSLY 16  UNITS DAILY AS DIRECTED     insulin lispro (HUMALOG KWIKPEN) 100 UNIT/ML (1 unit dial) KWIKPEN INJECT 3 TO 8 UNITS  SUBCUTANEOUSLY WITH MEALS AND  CORRECTION DOSES AS DIRECTED,  TOTAL DAILY DOSE APPROXIMATELY  30 UNITS IF THE PUMP FAILS     Patient not taking: Reported on 5/15/2024     insulin lispro (HUMALOG VIAL) 100 UNIT/ML vial Use with insulin pump, total daily dose approx 70U, Lispro vial            BG/Food Log:           Assessment:    Jenise is doing exceptionally well with blood sugar control on her OP5 AID system. She is bolusing appropriately. We discussed adjusting active insulin time for even tighter control. Overall, commended her on her hard work and discussed relatively aggressive  settings currently in pump, no major changes needed.     Plan/Response:  Adjust active insulin time 2.5 hrs --> 2 hrs  No other pump settings changes today   [facility] - follow up orders placed for 12 months from now     Amy Mcguire RD, LD, [facility]     Time spent: 15 minutes     Any diabetes medication dose changes were made via the CDE Protocol and Collaborative Practice Agreement with the patient's endocrinology provider. A copy of this encounter was shared with the provider.

## 2024-05-21 NOTE — PROGRESS NOTES
Diabetes Education Follow-up    Type of service:  Video Visit    If the video visit is dropped, the video visit invitation should be resent by: Text to cell phone: [phone]    Originating Location (pt. Location): Home  Distant Location (provider location): Offsite  Mode of Communication:  Video Conference via [employer]    IT issues with video; no time stamp available -- will estimate 15 minutes time spent in video visit     How would patient like to obtain AVS? [employer]      Subjective/Objective:    Jenise Smith sent in blood glucose log for review. Last date of communication was: 12/19/23.    Diabetes is being managed with Lifestyle (diet/activity), Diabetes Medications   Diabetes Medication(s)       Diabetic Other       Glucagon (BAQSIMI TWO PACK) 3 MG/DOSE POWD Spray 1 spray (3 mg) in nostril once as needed (severe hypoglycemia reaction, dose as directed)       Insulin       insulin glargine (LANTUS SOLOSTAR) 100 UNIT/ML pen INJECT SUBCUTANEOUSLY 16  UNITS DAILY AS DIRECTED     insulin lispro (HUMALOG KWIKPEN) 100 UNIT/ML (1 unit dial) KWIKPEN INJECT 3 TO 8 UNITS  SUBCUTANEOUSLY WITH MEALS AND  CORRECTION DOSES AS DIRECTED,  TOTAL DAILY DOSE APPROXIMATELY  30 UNITS IF THE PUMP FAILS     Patient not taking: Reported on 5/15/2024     insulin lispro (HUMALOG VIAL) 100 UNIT/ML vial Use with insulin pump, total daily dose approx 70U, Lispro vial            BG/Food Log:           Assessment:    Jenise is doing exceptionally well with blood sugar control on her OP5 AID system. She is bolusing appropriately. We discussed adjusting active insulin time for even tighter control. Overall, commended her on her hard work and discussed relatively aggressive settings currently in pump, no major changes needed.     Plan/Response:  Adjust active insulin time 2.5 hrs --> 2 hrs  No other pump settings changes today   Follow up as needed via [employer] - follow up orders placed for 12 months from now     Amy Mcguire RD,  [facility]NA[facility]ONSALE[facility]     Time spent: 15 minutes     Any diabetes medication dose changes were made via the CDE Protocol and Collaborative Practice Agreement with the patient's endocrinology provider. A copy of this encounter was shared with the provider.

## 2024-05-23 ENCOUNTER — HOSPITAL ENCOUNTER (OUTPATIENT)
Dept: MAMMOGRAPHY | Facility: CLINIC | Age: 49
Discharge: HOME | End: 2024-05-23
Attending: CLINICAL NURSE SPECIALIST | Admitting: CLINICAL NURSE SPECIALIST
Payer: COMMERCIAL

## 2024-05-23 DIAGNOSIS — Z12.39 BREAST CANCER SCREENING, HIGH RISK PATIENT: ICD-10-CM

## 2024-05-23 DIAGNOSIS — R92.30 DENSE BREAST: ICD-10-CM

## 2024-05-23 DIAGNOSIS — Z80.3 FAMILY HISTORY OF MALIGNANT NEOPLASM OF BREAST: ICD-10-CM

## 2024-05-23 PROCEDURE — 77063 BREAST TOMOSYNTHESIS BI: CPT

## 2024-06-01 DIAGNOSIS — E13.9 DIABETES MELLITUS TYPE 1.5, MANAGED AS TYPE 2 (H): ICD-10-CM

## 2024-06-03 RX ORDER — GLUCAGON 3 MG/1
POWDER NASAL
Qty: 2 EACH | Refills: 1 | Status: SHIPPED | OUTPATIENT
Start: 2024-06-03

## 2024-06-03 NOTE — TELEPHONE ENCOUNTER
Last Written Prescription Date:  4/25/23  Last Fill Quantity: 1,  # refills: 3   Last office visit: 5/15/2024 with prescribing provider:  Dr. Gaming   Future Office Visit: 9/4/24        Requested Prescriptions   Pending Prescriptions Disp Refills    BAQSIMI TWO PACK 3 MG/DOSE nasal powder [Pharmacy Med Name: BAQSIMI POW 3MG/DOSE 2PK]       Sig: INSERT DEVICE TIP INTO 1 NOSTRIL PUSH DEVICE PLUNGER UNTIL GREEN  LINE DISAPPEARS. AS NEEDED  (SEVERE HYPOGLYCEMIA REACTION,  DOSE AS DIRECTED)       Glucagon Protocol Passed - 6/1/2024  2:09 PM        Passed - Medication is active on med list        Passed - Patient is 18 years of age or older           Refills sent  Kae Anthony RN

## 2024-06-04 ENCOUNTER — TELEPHONE (OUTPATIENT)
Dept: ENDOCRINOLOGY | Facility: CLINIC | Age: 49
End: 2024-06-04
Payer: COMMERCIAL

## 2024-06-04 NOTE — TELEPHONE ENCOUNTER
Prior Authorization Approval    Medication: DEXCOM G6 SENSOR MISC  Authorization Effective Date: 6/4/2024  Authorization Expiration Date: 6/4/2025  Approved Dose/Quantity: 1 month  Reference #: [id number] [id number]   Insurance Company: [employer] ([facility]) - Phone [phone] Fax [phone]  Expected CoPay: $    CoPay Card Available:      Financial Assistance Needed: N/A  Which Pharmacy is filling the prescription: [facility]  Pharmacy Notified: pa renewal  Patient Notified: PA renewal

## 2024-06-04 NOTE — TELEPHONE ENCOUNTER
PA Initiation    Medication: DEXCOM G6 SENSOR MISC  Insurance Company: [employer] ([facility]) - Phone [phone] Fax [phone]  Pharmacy Filling the Rx: [facility]  Filling Pharmacy Phone: [phone]  Filling Pharmacy Fax: [phone]  Start Date: 6/4/2024

## 2024-06-10 ENCOUNTER — MYC MEDICAL ADVICE (OUTPATIENT)
Dept: ENDOCRINOLOGY | Facility: CLINIC | Age: 49
End: 2024-06-10
Payer: COMMERCIAL

## 2024-06-15 ENCOUNTER — HEALTH MAINTENANCE LETTER (OUTPATIENT)
Age: 49
End: 2024-06-15

## 2024-08-13 ENCOUNTER — MYC MEDICAL ADVICE (OUTPATIENT)
Dept: ENDOCRINOLOGY | Facility: CLINIC | Age: 49
End: 2024-08-13
Payer: COMMERCIAL

## 2024-08-14 ASSESSMENT — SLEEP AND FATIGUE QUESTIONNAIRES
HOW LIKELY ARE YOU TO NOD OFF OR FALL ASLEEP WHEN YOU ARE A PASSENGER IN A CAR FOR AN HOUR WITHOUT A BREAK: WOULD NEVER DOZE
HOW LIKELY ARE YOU TO NOD OFF OR FALL ASLEEP WHILE SITTING QUIETLY AFTER LUNCH WITHOUT ALCOHOL: WOULD NEVER DOZE
HOW LIKELY ARE YOU TO NOD OFF OR FALL ASLEEP WHILE SITTING AND TALKING TO SOMEONE: WOULD NEVER DOZE
HOW LIKELY ARE YOU TO NOD OFF OR FALL ASLEEP WHILE SITTING INACTIVE IN A PUBLIC PLACE: WOULD NEVER DOZE
HOW LIKELY ARE YOU TO NOD OFF OR FALL ASLEEP WHILE LYING DOWN TO REST IN THE AFTERNOON WHEN CIRCUMSTANCES PERMIT: WOULD NEVER DOZE
HOW LIKELY ARE YOU TO NOD OFF OR FALL ASLEEP IN A CAR, WHILE STOPPED FOR A FEW MINUTES IN TRAFFIC: WOULD NEVER DOZE
HOW LIKELY ARE YOU TO NOD OFF OR FALL ASLEEP WHILE SITTING AND READING: WOULD NEVER DOZE
HOW LIKELY ARE YOU TO NOD OFF OR FALL ASLEEP WHILE WATCHING TV: WOULD NEVER DOZE

## 2024-08-18 NOTE — PROGRESS NOTES
Outpatient Sleep Medicine Consultation:      Name: Jenise Smith MRN# [id number]   Age: 49 year old YOB: 1975     Date of Consultation: August 18, 2024  Consultation is requested by: Lor Manzo MD  No address on file Referred Self  Primary care provider: Amanda Yeboah       Reason for Sleep Consult:     Jenise Smith is sent by Referred Self for a sleep consultation regarding snoring.    Patient s Reason for visit  Jenise Smith main reason for visit:    Patient states problem(s) started: 2010  Jenise Smith's goals for this visit: Snoring help           Assessment and Plan:     Summary Sleep Diagnoses and Recommendations:  (R06.83) Snoring  (primary encounter diagnosis)  Comment: Jenise presents with concerns of snoring which has bothered her  for years.  She had been managing with mouthguard made by her dentist. Her  has also been wearing ear plugs.  She recently got new Invisalign retainers and wanted to verify there is nothing else concerning before pursuing a replacement dental appliance.  Her risk for UVALDO is low, STOP-BANG 1.  She is not observed to have pauses in breathing, gasps or snorts.  She denies daytime sleepiness, ESS 0/24.  She does not have HTN.  Her BMI is 21.  Her age is <50 (49), and gender is female.  She does have a family history of apnea in her mother, but her mother is overweight.  She does have a somewhat crowded airway.  Plan: At this point, her risk does not seem to warrant further evaluation.  I have recommended that she use a Weekend-a-gogo Dani to gather more observation of her snoring and breathing.  If she observes gasps, snorts or pauses in breathing, she will let me know and I will place the order for home study. She can continue to work with her dentist on obtaining a replacement mandibular advancement device.  We also reviewed other management strategies for snoring including mouth exercises. Consider white noise in the  "room.    (F51.04) Chronic insomnia  Comment: Jenise has difficulty falling asleep a couple of times per week but also has nightly prolonged awakenings at about an hour.  Her mind races, \"solving the world's problems.\"  She is in bed about 8 hours per night.  She denies napping.  She does have a history of anxiety.  Plan: We reviewed concepts of circadian and homeostatic sleep drives. We also discussed stimulus control and sleep compression. S/He was advised to start with a sleep schedule of 10:45 PM to 6:15 AM. S/He was advised to avoid sleeping in or napping. We discussed dealing with stress by setting aside a designated \"worry time\" in the early evening and s/he was given resources for guided imagery/relaxation.     (F51.5) Nightmares  Comment: She has nightmares a couple of times per week, which she does attribute to previous traumatic experience.  Plan: We reviewed image rehearsal therapy.         Comorbid Diagnoses:  Patient Active Problem List   Diagnosis    Hyperlipidemia with target LDL less than 100    Type 1 diabetes mellitus without complication (H)    Other specified hypothyroidism    Anxiety    Irritable bowel syndrome        Summary Counseling:    Sleep Testing Reviewed  Obstructive Sleep Apnea Reviewed  Complications of Untreated Sleep Apnea Reviewed      Patient will follow up in the future as needed.  Bennett Goltz, PA-C      Total time spent reviewing medical records, history and physical examination, review of previous testing and interpretation as well as documentation on this date:59 min    CC: Referred Self          History of Present Illness:     Jenise Smith presents with concerns of snoring bothering her  for years. She is not observed to have gasps, snorts, pauses in breathing. She denies sleep problems with her daytime energy. She has had prolonged awakenings nightly for years, which she attributes to a racing mind. She was on eszopiclone for a long time, but got off of it. " She has tried a dental appliance from her dentist at [facility] in [place] but got new Invisalign retainers. She wonders if there are any other concerns she should be considering.       Past Sleep Evaluations: none    SLEEP-WAKE SCHEDULE:     Work/School Days: Patient goes to school/work: Yes   Usually gets into bed at 10:00pm  Takes patient about 10 mins to fall asleep  Has trouble falling asleep 2 nights per week, related to mind racing  Wakes up in the middle of the night 2 times.  Wakes up due to Snorting self awake;Use the bathroom;Nightmares  She has trouble falling back asleep 7 times a week.   It usually takes 60 mins to get back to sleep, again mind races (solving the world's problems). She has tried various mental imagery with variable effect.   Patient is usually up at 6:15am  Uses alarm: Yes    Weekends/Non-work Days/All Other Days:  Usually gets into bed at 10pm   Takes patient about 10 to fall asleep  Patient is usually up at 6:30  Uses alarm: No  Her sleep is the same on weekends. She does have a couple of glasses of wine on weekends.     Sleep Need  Patient gets  6 sleep on average   Patient thinks she needs about 8 sleep    Jenise Smith prefers to sleep in this position(s): Side, may wake on her back.   Patient states they do the following activities in bed: Read    Naps  Patient takes a purposeful nap 0 times a week and naps are usually   in duration. She denies feeling tired in the day.   She feels better after a nap:    She dozes off unintentionally 0 days per week  Patient has had a driving accident or near-miss due to sleepiness/drowsiness: No      SLEEP DISRUPTIONS:    Breathing/Snoring  Patient snores:Yes  Other people complain about her snoring: Yes  Patient has been told she stops breathing in her sleep:No  She has issues with the following:  . No morning headaches. No nocturnal heartburn or reflux. No nasal congestion at night.    Movement:  Patient gets pain, discomfort, with  an urge to move:  Yes shoulder can get numb if she lays on one side for too long. She will then roll to the other side. No restless legs symptoms.   It happens when she is resting:  No  It happens more at night:  Yes  Patient has been told she kicks her legs at night:  No     Behaviors in Sleep:  Jenise Smith has experienced the following behaviors while sleeping: Recurring Nightmares, couple of time per week. They relate to previous traumatic experiences.  Pt denies bruxism, sleep talking, sleep walking, and dream enactment behavior. Pt denies sleep paralysis, hypnagogue and cataplexy.       Is there anything else you would like your sleep provider to know:        CAFFEINE AND OTHER SUBSTANCES:    Patient consumes caffeinated beverages per day:  2 coffee  Last caffeine use is usually: 9am  List of any prescribed or over the counter stimulants that patient takes:    List of any prescribed or over the counter sleep medication patient takes:    List of previous sleep medications that patient has tried:   eszopiclone, melatonin, one other over the counter sleep aid (hit or miss how well they work)  Patient drinks alcohol to help them sleep: No  Patient drinks alcohol near bedtime: No  Has 2 glasses of wine on Fri and Sat.    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 Her kids are grown and out of the house.         SCALES:    EPWORTH SLEEPINESS SCALE         8/14/2024     9:04 AM    [place] Sleepiness Scale ( GERALDINE Brice  [id number]<br>ESS - [place]/English - Final version - 21 Nov 07 - [facility] [place].)   Sitting and reading Would never doze   Watching TV Would never doze   Sitting, inactive in a public place (e.g. a theatre or a meeting) Would never doze   As a passenger in a car for an  hour without a break Would never doze   Lying down to rest in the afternoon when circumstances permit Would never doze   Sitting and talking to someone Would never doze   Sitting quietly after a lunch without alcohol Would never doze   In a car, while stopped for a few minutes in traffic Would never doze   [place] Score (MC) 0   [place] Score (Sleep) 0         INSOMNIA SEVERITY INDEX (BIJAN)          8/14/2024     8:58 AM   Insomnia Severity Index (BIJAN)   Difficulty falling asleep 2   Difficulty staying asleep 2   Problems waking up too early 2   How SATISFIED/DISSATISFIED are you with your CURRENT sleep pattern? 2   How NOTICEABLE to others do you think your sleep problem is in terms of impairing the quality of your life? 0   How WORRIED/DISTRESSED are you about your current sleep problem? 1   To what extent do you consider your sleep problem to INTERFERE with your daily functioning (e.g. daytime fatigue, mood, ability to function at work/daily chores, concentration, memory, mood, etc.) CURRENTLY? 1   BIJAN Total Score 10       Guidelines for Scoring/Interpretation:  Total score categories:  0-7 = No clinically significant insomnia   8-14 = Subthreshold insomnia   15-21 = Clinical insomnia (moderate severity)  22-28 = Clinical insomnia (severe)  Used via courtesy of www.OpenSesameth.va.gov with permission from Anuel Emery PhD., [facility]      STOP BANG         8/14/2024     9:04 AM   STOP BANG Questionnaire (  2008, the American Society of Anesthesiologists, Inc. [employer]orent[employer]   1. Snoring - Do you snore loudly (louder than talking or loud enough to be heard through closed doors)? Yes   2. Tired - Do you often feel tired, fatigued, or sleepy during daytime? No   3. Observed - Has anyone observed you stop breathing during your sleep? No   4. Blood pressure - Do you have or are you being treated for high blood pressure? No   5. BMI - BMI more than 35 kg/m2? No   6. Age - Age over 50 yr old?  "No   7. Neck circumference - Neck circumference greater than 40 cm? No   8. Gender - Gender male? No   STOP BANG Score (MC): 2 (Low risk of UVALDO)         GAD7        1/10/2020     2:58 PM   NEFTALY-7    1. Feeling nervous, anxious, or on edge 1   2. Not being able to stop or control worrying 0   3. Worrying too much about different things 0   4. Trouble relaxing 0   5. Being so restless that it is hard to sit still 0   6. Becoming easily annoyed or irritable 0   7. Feeling afraid, as if something awful might happen 0   NEFTALY-7 Total Score 1   If you checked any problems, how difficult have they made it for you to do your work, take care of things at home, or get along with other people? Not difficult at all         CAGE-AID         No data to display                CAGE-AID reprinted with permission from the [place] [employer] Journal, MARIAN Bermudez. and GILA Bueno, \"Conjoint screening questionnaires for alcohol and drug abuse\" [place] Medical Journal 94: 135-140, 1995.      PATIENT HEALTH QUESTIONNAIRE-9 (PHQ - 9)        1/10/2020     2:58 PM   PHQ-9 ([employer])   1.  Little interest or pleasure in doing things 0   2.  Feeling down, depressed, or hopeless 0   3.  Trouble falling or staying asleep, or sleeping too much 1   4.  Feeling tired or having little energy 0   5.  Poor appetite or overeating 0   6.  Feeling bad about yourself - or that you are a failure or have let yourself or your family down 0   7.  Trouble concentrating on things, such as reading the newspaper or watching television 0   8.  Moving or speaking so slowly that other people could have noticed. Or the opposite - being so fidgety or restless that you have been moving around a lot more than usual 0   9.  Thoughts that you would be better off dead, or of hurting yourself in some way 0   PHQ-9 Total Score 1   If you checked off any problems, how difficult have these problems made it for you to do your work, take care of things at home, or get along with other " people? Not difficult at all   6.  Feeling bad about yourself 0   7.  Trouble concentrating 0   8.  Moving slowly or restless 0   9.  Suicidal or self-harm thoughts 0   Difficulty at work, home, or with people Not difficult at all       Developed by Brett Sinclair, Shamika Good, Raulito Elizabeth and colleagues, with an educational ehsan from [employer]. No permission required to reproduce, translate, display or distribute.        Allergies:    Allergies   Allergen Reactions    Keflex [Cephalexin]      nausea    Latex Rash     Facial redness       Medications:    Current Outpatient Medications   Medication Sig Dispense Refill    blood glucose (ACCU-CHEK FASTCLIX) lancing device 1 Dose once daily.      cefadroxil (DURICEF) 500 MG capsule Take 1 capsule (500 mg) by mouth 2 times daily 14 capsule 0    Continuous Blood Gluc  (DEXCOM G6 ) FRANCISCO Use to read blood sugars as per 's instructions. 1 each 0    Continuous Glucose Sensor (DEXCOM G6 SENSOR) MISC Change every 10 days. 9 each 3    Continuous Glucose Transmitter (DEXCOM G6 TRANSMITTER) MISC Change every 3 months. 1 each 3    CONTOUR NEXT TEST test strip USE 1 STRIP 6 TIMES DAILY 600 strip 3    FLUoxetine (PROZAC) 20 MG capsule TAKE 1 CAPSULE BY MOUTH DAILY 30 capsule 0    Glucagon (BAQSIMI TWO PACK) 3 MG/DOSE nasal powder INSERT DEVICE TIP INTO 1 NOSTRIL PUSH DEVICE PLUNGER UNTIL GREEN  LINE DISAPPEARS. AS NEEDED  (SEVERE HYPOGLYCEMIA REACTION,  DOSE AS DIRECTED) 2 each 1    Insulin Disposable Pump (OMNIPOD 5 G6 INTRO, GEN 5,) KIT 1 Device continuous prn (Use as Controller (PDM) device for Omnipod 5 pods) 1 kit 0    Insulin Disposable Pump (OMNIPOD 5 G6 POD, GEN 5,) MISC 1 Device continuous prn (Use for insulin delivery with Omnipod 5 Controller (PDM)) Changes pods every 2.5 days, needs 8 boxes of 5 pods every 90 days 8 each 3    insulin glargine (LANTUS SOLOSTAR) 100 UNIT/ML pen INJECT SUBCUTANEOUSLY 16  UNITS DAILY AS DIRECTED  15 mL 3    insulin lispro (HUMALOG KWIKPEN) 100 UNIT/ML (1 unit dial) KWIKPEN INJECT 3 TO 8 UNITS  SUBCUTANEOUSLY WITH MEALS AND  CORRECTION DOSES AS DIRECTED,  TOTAL DAILY DOSE APPROXIMATELY  30 UNITS IF THE PUMP FAILS (Patient not taking: Reported on 5/15/2024) 30 mL 1    insulin lispro (HUMALOG VIAL) 100 UNIT/ML vial Use with insulin pump, total daily dose approx 70U, Lispro vial 70 mL 3    INSULIN PUMP - OUTPATIENT Date Last Updated: 12-3-21  Omnipod  BASAL RATES and times:  12   AM (midnight): 0.65 units/hour    3     AM: 0.60 units/hour   4     AM: 0.65 units/hour   6     AM: 0.70 units/hour   10   AM: 0.45 units/hour   1:30 PM: 0.65 units/hour     CARB RATIO and times:  12   AM (midnight): 12  11   AM: 9  5    PM:  8.5  Corection Factor (Sensitivity) and times:  12   AM (midnight): 60 mg/dL  8     AM: 55 mg/dL  9    PM: 60 mg/dL  BLOOD GLUCOSE TARGET/Correct Above and times:  12   AM (midnight): 100 - 130  6     AM:  90 - 120  9    PM:  100 - 140  Active Insulin Time: 3 hours    Sensor:  Yes: DexCom  Pump Website Username: Connected to [facility]  Pump Website Password: Connected to [facility]      levothyroxine (SYNTHROID/LEVOTHROID) 50 MCG tablet Take 1 tablet (50 mcg) by mouth daily 90 tablet 3    Multiple Vitamins-Minerals (MULTI FOR HER PO)       VITAMIN D, CHOLECALCIFEROL, PO Take 1,000 Units by mouth         Problem List:  Patient Active Problem List    Diagnosis Date Noted    Onychomycosis 08/24/2022     Priority: Medium    Benign neoplasm of ascending colon 06/28/2022     Priority: Medium    Polyp of colon 06/24/2022     Priority: Medium    Mastalgia in female 10/05/2018     Priority: Medium    Lump or mass in breast 10/05/2018     Priority: Medium    Anxiety 09/28/2017     Priority: Medium    Other specified hypothyroidism 01/25/2016     Priority: Medium    Axillary hyperhidrosis 01/25/2016     Priority: Medium    Type 1 diabetes mellitus without complication (H) 10/23/2015     Priority:  Medium    Constipation 07/17/2015     Priority: Medium    Hyperlipidemia with target LDL less than 100 07/21/2014     Priority: Medium     Diagnosis updated by automated process. Provider to review and confirm.      Menorrhagia 01/23/2014     Priority: Medium    Endometrial polyp 01/23/2014     Priority: Medium    Dysmenorrhea 08/23/2012     Priority: Medium    Irritable bowel syndrome 08/19/2008     Priority: Medium        Past Medical/Surgical History:  Past Medical History:   Diagnosis Date    Diabetes mellitus type 1 (H)     Dysmenorrhea     Excessive or frequent menstruation     Female stress incontinence     Hypothyroid     Insulin pump in place     Lumbar back pain     Other and unspecified hyperlipidemia     PONV (postoperative nausea and vomiting)      Past Surgical History:   Procedure Laterality Date    DILATION AND CURETTAGE, HYSTEROSCOPY, ABLATE ENDOMETRIUM NOVASURE, COMBINED  1/23/2014    Procedure: COMBINED DILATION AND CURETTAGE, HYSTEROSCOPY, ABLATE ENDOMETRIUM NOVASURE;  HYSTEROSCOPY, DILATION, CURETTAGE, WITH NOVASURE ABLATION, MYOSURE MORCELLATOR;  Surgeon: Swetha Queen MD;  Location: [facility]    LAPAROSCOPY      LEEP TX, CERVICAL      OPERATIVE HYSTEROSCOPY WITH MORCELLATOR  1/23/2014    Procedure: OPERATIVE HYSTEROSCOPY WITH MORCELLATOR;;  Surgeon: Swetha Queen MD;  Location: [facility]    wisdom teeth[         Social History:  Social History     Socioeconomic History    Marital status:      Spouse name: Conrad    Number of children: 1    Years of education: Not on file    Highest education level: Not on file   Occupational History     Employer: [employer]   Tobacco Use    Smoking status: Never    Smokeless tobacco: Never   Substance and Sexual Activity    Alcohol use: Yes     Alcohol/week: 0.0 standard drinks of alcohol     Comment: 3 per week    Drug use: No    Sexual activity: Yes     Partners: Male     Birth control/protection: Male Surgical    Other Topics Concern    Parent/sibling w/ CABG, MI or angioplasty before 65F 55M? No   Social History Narrative     in May 2014    2 step children (20 and 18) and one biol dtr age 15.    Works for [employer]--works from home     Social Determinants of Health     Financial Resource Strain: Not on file   Food Insecurity: Not on file   Transportation Needs: Not on file   Physical Activity: Not on file   Stress: Not on file   Social Connections: Not on file   Interpersonal Safety: Not on file   Housing Stability: Not on file       Family History:  Family History   Problem Relation Age of Onset    Diabetes Mother         type 2    Obesity Mother     Diabetes Father         type 1    Diabetes Sister         type 1    Thyroid Disease Sister     Diabetes Sister     Prostate Cancer Maternal Grandfather 80    Breast Cancer Paternal Grandmother 70         in 80s    Prostate Cancer Paternal Grandfather     Brain Cancer Paternal Grandfather 80    Cancer Maternal Uncle 60        small bowel    Cancer Maternal Uncle 60        tonsil    Cancer - colorectal Paternal Uncle 60    Breast Cancer Paternal Cousin 38       Review of Systems:  A complete review of systems reviewed by me is negative with the exeption of what has been mentioned in the history of present illness.  In the last TWO WEEKS have you experienced any of the following symptoms?  Fevers: No  Night Sweats: No  Weight Gain: No  Pain at Night: No  Double Vision: No  Changes in Vision: No  Difficulty Breathing through Nose: No  Sore Throat in Morning: No  Dry Mouth in the Morning: No  Shortness of Breath Lying Flat: No  Shortness of Breath With Activity: No  Awakening with Shortness of Breath: No  Increased Cough: No  Heart Racing at Night: No  Swelling in Feet or Legs: No  Diarrhea at Night: No  Heartburn at Night: No  Urinating More than Once at Night: No  Losing Control of Urine at Night: No  Joint Pains at Night: No  Headaches in Morning:  "No  Weakness in Arms or Legs: No  Depressed Mood: No  Anxiety: No     Physical Examination:  Vitals: Ht 1.702 m (5' 7\")   Wt 63.5 kg (140 lb)   BMI 21.93 kg/m               GENERAL APPEARANCE: healthy, alert, no distress, and cooperative  EYES: Eyes grossly normal to inspection  HENT: oropharynx crowded, soft palate dependent, and tongue base enlarged  NECK: no asymmetry, masses, or scars  RESP: no apparent respiratory distress (retractions or difficulty speaking in full sentences), no audible wheeze or cough   Mallampati Class: IV.  Tonsillar Stage: 1  hidden by pillars.         Data: All pertinent previous laboratory data reviewed     Recent Labs   Lab Test 05/10/24  0813 10/12/23  0732   * 136   POTASSIUM 4.4 4.5   CHLORIDE 101 103   CO2 25 23   ANIONGAP 8 10   * 174*   BUN 19.5 28.7*   CR 0.74 0.92   VIJI 8.9 9.0       Recent Labs   Lab Test 11/04/22  0933   WBC 5.1   RBC 4.11   HGB 13.2   HCT 39.3   MCV 96   MCH 32.1   MCHC 33.6   RDW 12.3          Recent Labs   Lab Test 12/30/19  1511 12/05/17  0000 02/01/17  0855   PROTTOTAL  --   --  7.4   ALBUMIN  --   --  4.2   BILITOTAL  --   --  1.2   ALKPHOS  --   --  48   AST  --   --  18   ALT 27   < > 30    < > = values in this interval not displayed.       TSH   Date Value   05/10/2024 1.30 uIU/mL   10/12/2023 2.59 uIU/mL   09/23/2022 1.96 mU/L   03/01/2022 1.59 mU/L   05/17/2021 1.72 mU/L   02/02/2021 2.43 mU/L       No results found for: \"UAMP\", \"UBARB\", \"BENZODIAZEUR\", \"UCANN\", \"UCOC\", \"OPIT\", \"UPCP\"    No results found for: \"IRONSAT\", \"[id number]\", \"YAMEL\"    No results found for: \"PH\", \"PHARTERIAL\", \"PO2\", \"WE9OQZACNOB\", \"SAT\", \"PCO2\", \"HCO3\", \"BASEEXCESS\", \"GEOFFREY\", \"BEB\"    @LABRCNTIPR(phv:4,pco2v:4,po2v:4,hco3v:4,salo:4,o2per:4)@    Echocardiology: No results found for this or any previous visit (from the past 4320 hour(s)).    Chest x-ray: No results found for this or any previous visit from the past 365 days.      Chest CT: No results found for " "this or any previous visit from the past 365 days.      PFT: Most Recent [facility] Pulmonary Function Testing    No results found for: \"20001\"        Bennett Ezra Goltz, PA-C, [facility] 8/18/2024          "

## 2024-08-19 ENCOUNTER — VIRTUAL VISIT (OUTPATIENT)
Dept: SLEEP MEDICINE | Facility: CLINIC | Age: 49
End: 2024-08-19
Payer: COMMERCIAL

## 2024-08-19 VITALS — HEIGHT: 67 IN | BODY MASS INDEX: 21.97 KG/M2 | WEIGHT: 140 LBS

## 2024-08-19 DIAGNOSIS — R06.83 SNORING: Primary | ICD-10-CM

## 2024-08-19 DIAGNOSIS — F51.5 NIGHTMARES: ICD-10-CM

## 2024-08-19 DIAGNOSIS — F51.04 CHRONIC INSOMNIA: ICD-10-CM

## 2024-08-19 PROCEDURE — 99204 OFFICE O/P NEW MOD 45 MIN: CPT | Mod: 95 | Performed by: PHYSICIAN ASSISTANT

## 2024-08-19 ASSESSMENT — PAIN SCALES - GENERAL: PAINLEVEL: NO PAIN (0)

## 2024-08-19 NOTE — PATIENT INSTRUCTIONS
"Dream Rehearsal:  Write down an average nightmare in as much detail as you can remember (this step is actually optional if it is anxiety provoking to do this). Then, rewrite the dream changing the negative aspects into positive aspects. Alternatively, you can come up with an entirely new dream (it can be literally anything as long as the content is pleasant).  Spend 1-2 minutes, several times per day, imagining the pleasant dream.  Walk yourself through the entire dream in your mind as if you were dreaming it. Do this before bed as well as other times of the day. Create new dreams no more than twice per week.  Sometimes practicing more pleasant content like that can change the emotion and content of your dreams.                MY TREATMENT INFORMATION FOR SLEEP APNEA-  Jenise Smith    DOCTOR : Bennett Goltz, PA-C    Am I having a sleep study at a sleep center?  --->Due to normal delays, you will be contacted within 2-4 weeks to schedule    Am I having a home sleep study?  --->Watch the video for the device you are using:    -/drop off device-   https://www.SNAPCARD.com/watch?v=yGGFBdELGhk    Frequently asked questions:  1. What is Obstructive Sleep Apnea (UVALDO)? UVALDO is the most common type of sleep apnea. Apnea means, \"without breath.\"  Apnea is most often caused by narrowing or collapse of the upper airway as muscles relax during sleep.   Almost everyone has occasional apneas. Most people with sleep apnea have had brief interruptions at night frequently for many years.  The severity of sleep apnea is related to how frequent and severe the events are.   2. What are the consequences of UVALDO? Symptoms include: feeling sleepy during the day, snoring loudly, gasping or stopping of breathing, trouble sleeping, and occasionally morning headaches or heartburn at night.  Sleepiness can be serious and even increase the risk of falling asleep while driving. Other health consequences may include development of high " blood pressure and other cardiovascular disease in persons who are susceptible. Untreated UVALDO  can contribute to heart disease, stroke and diabetes.   3. What are the treatment options? In most situations, sleep apnea is a lifelong disease that must be managed with daily therapy. Medications are not effective for sleep apnea and surgery is generally not considered until other therapies have been tried. Your treatment is your choice. Continuous Positive Airway (CPAP) works right away and is the therapy that is effective in nearly everyone. An oral device to hold your jaw forward is usually the next most reliable option. Other options include postioning devices (to keep you off your back), weight loss, and surgery including a tongue pacing device. There is more detail about some of these options below.  4. Are my sleep studies covered by insurance? Although we will request verification of coverage, we advise you also check in advance of the study to ensure there is coverage.    Important tips for those choosing CPAP and similar devices  REMEMBER-IF YOU RECEIVE A CALL FROM  [phone]-->IT IS TO SETUP A DEVICE  For new devices, sign up for device ERINN to monitor your device for your followup visits  We encourage you to utilize the [employer] erinn or website ( https://myA+ Network.[employer]/ ) to monitor your therapy progress and share the data with your healthcare team when you discuss your sleep apnea.                                                    Know your equipment:  CPAP is continuous positive airway pressure that prevents obstructive sleep apnea by keeping the throat from collapsing while you are sleeping. In most cases, the device is  smart  and can slowly self-adjusts if your throat collapses and keeps a record every day of how well you are treated-this information is available to you and your care team.  BPAP is bilevel positive airway pressure that keeps your throat open and also assists each breath with a  pressure boost to maintain adequate breathing.  Special kinds of BPAP are used in patients who have inadequate breathing from lung or heart disease. In most cases, the device is  smart  and can slowly self-adjusts to assist breathing. Like CPAP, the device keeps a record of how well you are treated.  Your mask is your connection to the device. You get to choose what feels most comfortable and the staff will help to make sure if fits. Here: are some examples of the different masks that are available: Magnetic mask aids may assist with use but there are safety issues that should be addressed when considering with magnets* ( see end of discussion).       Key points to remember on your journey with sleep apnea:  Sleep study.  PAP devices often need to be adjusted during a sleep study to show that they are effective and adjusted right.  Good tips to remember: Try wearing just the mask during a quiet time during the day so your body adapts to wearing it. A humidifier is recommended for comfort in most cases to prevent drying of your nose and throat. Allergy medication from your provider may help you if you are having nasal congestion.  Getting settled-in. It takes more than one night for most of us to get used to wearing a mask. Try wearing just the mask during a quiet time during the day so your body adapts to wearing it. A humidifier is recommended for comfort in most cases. Our team will work with you carefully on the first day and will be in contact within 4 days and again at 2 and 4 weeks for advice and remote device adjustments. Your therapy is evaluated by the device each day.   Use it every night. The more you are able to sleep naturally for 7-8 hours, the more likely you will have good sleep and to prevent health risks or symptoms from sleep apnea. Even if you use it 4 hours it helps. Occasionally all of us are unable to use a medical therapy, in sleep apnea, it is not dangerous to miss one night.   Communicate.  Call our skilled team on the number provided on the first day if your visit for problems that make it difficult to wear the device. Over 2 out of 3 patients can learn to wear the device long-term with help from our team. Remember to call our team or your sleep providers if you are unable to wear the device as we may have other solutions for those who cannot adapt to mask CPAP therapy. It is recommended that you sleep your sleep provider within the first 3 months and yearly after that if you are not having problems.   Use it for your health. We encourage use of CPAP masks during daytime quiet periods to allow your face and brain to adapt to the sensation of CPAP so that it will be a more natural sensation to awaken to at night or during naps. This can be very useful during the first few weeks or months of adapting to CPAP though it does not help medically to wear CPAP during wakefulness and  should not be used as a strategy just to meet guidelines.  Take care of your equipment. Make sure you clean your mask and tubing using directions every day and that your filter and mask are replaced as recommended or if they are not working.     *Masks with magnets:  Updated Contraindications  Masks with magnetic components are contraindicated for use by patients where they, or anyone in close physical contact while using the mask, have the following:   Active medical implants that interact with magnets (i.e., pacemakers, implantable cardioverter defibrillators (ICD), neurostimulators, cerebrospinal fluid (CSF) shunts, insulin/infusion pumps)   Metallic implants/objects containing ferromagnetic material (i.e., aneurysm clips/flow disruption devices, embolic coils, stents, valves, electrodes, implants to restore hearing or balance with implanted magnets, ocular implants, metallic splinters in the eye)  Updated Warning  Keep the mask magnets at a safe distance of at least 6 inches (150 mm) away from implants or medical devices that  may be adversely affected by magnetic interference. This warning applies to you or anyone in close physical contact with your mask. The magnets are in the frame and lower headgear clips, with a magnetic field strength of up to 400mT. When worn, they connect to secure the mask but may inadvertently detach while asleep.  Implants/medical devices, including those listed within contraindications, may be adversely affected if they change function under external magnetic fields or contain ferromagnetic materials that attract/repel to magnetic fields (some metallic implants, e.g., contact lenses with metal, dental implants, metallic cranial plates, screws, jayshree hole covers, and bone substitute devices). Consult your physician and  of your implant / other medical device for information on the potential adverse effects of magnetic fields.    BESIDES CPAP, WHAT OTHER THERAPIES ARE THERE?    Positioning Device  Positioning devices are generally used when sleep apnea is mild and only occurs on your back.This example shows a pillow that straps around the waist. It may be appropriate for those whose sleep study shows milder sleep apnea that occurs primarily when lying flat on one's back. Preliminary studies have shown benefit but effectiveness at home may need to be verified by a home sleep test. These devices are generally not covered by medical insurance.  Examples of devices that maintain sleeping on the back to prevent snoring and mild sleep apnea.    Belt type body positioner  http://Mediameeting.[employer]/    Electronic reminder  http://nightshifttherapy.com/            Oral Appliance  What is oral appliance therapy?  An oral appliance device fits on your teeth at night like a retainer used after having braces. The device is made by a specialized dentist and requires several visits over 1-2 months before a manufactured device is made to fit your teeth and is adjusted to prevent your sleep apnea. Once an oral device is  working properly, snoring should be improved. A home sleep test may be recommended at that time if to determine whether the sleep apnea is adequately treated.       Some things to remember:  -Oral devices are often, but not always, covered by your medical insurance. Be sure to check with your insurance provider.   -If you are referred for oral therapy, you will be given a list of specialized dentists to consider or you may choose to visit the Web site of the American Academy of Dental Sleep Medicine  -Oral devices are less likely to work if you have severe sleep apnea or are extremely overweight.     More detailed information  An oral appliance is a small acrylic device that fits over the upper and lower teeth  (similar to a retainer or a mouth guard). This device slightly moves jaw forward, which moves the base of the tongue forward, opens the airway, improves breathing for effective treat snoring and obstructive sleep apnea in perhaps 7 out of 10 people .  The best working devices are custom-made by a dental device  after a mold is made of the teeth 1, 2, 3.  When is an oral appliance indicated?  Oral appliance therapy is recommended as a first-line treatment for patients with primary snoring, mild sleep apnea, and for patients with moderate sleep apnea who prefer appliance therapy to use of CPAP4, 5. Severity of sleep apnea is determined by sleep testing and is based on the number of respiratory events per hour of sleep.   How successful is oral appliance therapy?  The success rate of oral appliance therapy in patients with mild sleep apnea is 75-80% while in patients with moderate sleep apnea it is 50-70%. The chance of success in patients with severe sleep apnea is 40-50%. The research also shows that oral appliances have a beneficial effect on the cardiovascular health of UVALDO patients at the same magnitude as CPAP therapy7.  Oral appliances should be a second-line treatment in cases of severe sleep  apnea, but if not completely successful then a combination therapy utilizing CPAP plus oral appliance therapy may be effective. Oral appliances tend to be effective in a broad range of patients although studies show that the patients who have the highest success are females, younger patients, those with milder disease, and less severe obesity. 3, 6.   Finding a dentist that practices dental sleep medicine  Specific training is available through the American Academy of Dental Sleep Medicine for dentists interested in working in the field of sleep. To find a dentist who is educated in the field of sleep and the use of oral appliances, near you, visit the Web site of the American Academy of Dental Sleep Medicine.    References  1. Zoraida, et al. Objectively measured vs self-reported compliance during oral appliance therapy for sleep-disordered breathing. Chest 2013; 144(5): 6679-0742.  2. Johana et al. Objective measurement of compliance during oral appliance therapy for sleep-disordered breathing. Thorax 2013; 68(1): 91-96.  3. Che, et al. Mandibular advancement devices in 620 men and women with UVALDO and snoring: tolerability and predictors of treatment success. Chest 2004; 125: 8233-7390.  4. Pat, et al. Oral appliances for snoring and UVALDO: a review. Sleep 2006; 29: 244-262.  5. Linda et al. Oral appliance treatment for UVALDO: an update. J Clin Sleep Med 2014; 10(2): 215-227.  6. Vik et al. Predictors of OSAH treatment outcome. J Dent Res 2007; 86: 8888-0341.      Weight Loss:   Your Body mass index is 21.93 kg/m .    Being overweight does not necessarily mean you will have health consequences.  Those who have BMI over 35 or over 27 with existing medical conditions carries greater risk.   Weight loss decreases severity of sleep apnea in most people with obesity. For those with mild obesity who have developed snoring with weight gain, even 15-30 pound weight loss can improve and  occasionally milder eliminate sleep apnea.  Structured and life-long dietary and health habits are necessary to lose weight and keep healthier weight levels.     The Comprehensive Weight loss program offers all aspects of weight loss strategies including two Non-Surgical Weight Loss Programs: Medical Weight Management and our 24 Week Healthy Lifestyle Program:    Medical Weight Management: You will meet with a Medical Weight Management Provider, as well as a Registered Dietician. The program may include medication therapy, dietary education, recommended exercise and physical therapy programs, monthly support group meetings, and possible psychological counseling. Follow up visits with the provider or dietician are scheduled based on your progress and needs.    24 Week Healthy Lifestyle Program: This unique program is designed to give you the support of weekly appointments and activities thru a 24-week period. It may include all of the components of the basic program (above), with the addition of 11 individual Health  Visits, 24-week access to the [employer] website for over 700 online classes, and monthly support group meetings. This program has an out-of-pocket expense of $499 to cover the items that can not be billed to insurance (health coaches and [employer] access), and is non-refundable/non-transferable (you may be able to use a Health Savings Account; ask your HSA provider). There may be an optional meal replacement plan prescribed as well.   Surgical management achieves meaningful long-term weight loss and improvement in health risks in most patients with more severe obesity.      Sleep Apnea Surgery:    Surgery for obstructive sleep apnea is considered generally only when other therapies fail to work. Surgery may be discussed with you if you are having a difficult time tolerating CPAP and or when there is an abnormal structure that requires surgical correction.  Nose and throat surgeries often enlarge the  airway to prevent collapse.  Most of these surgeries create pain for 1-2 weeks and up to half of the most common surgeries are not effective throughout life.  You should carefully discuss the benefits and drawbacks to surgery with your sleep provider and surgeon to determine if it is the best solution for you.   More information  Surgery for UVALDO is directed at areas that are responsible for narrowing or complete obstruction of the airway during sleep.  There are a wide range of procedures available to enlarge and/or stabilize the airway to prevent blockage of breathing in the three major areas where it can occur: the palate, tongue, and nasal regions.  Successful surgical treatment depends on the accurate identification of the factors responsible for obstructive sleep apnea in each person.  A personalized approach is required because there is no single treatment that works well for everyone.  Because of anatomic variation, consultation with an examination by a sleep surgeon is a critical first step in determining what surgical options are best for each patient.  In some cases, examination during sedation may be recommended in order to guide the selection of procedures.  Patients will be counseled about risks and benefits as well as the typical recovery course after surgery. Surgery is typically not a cure for a person s UVALDO.  However, surgery will often significantly improve one s UVALDO severity (termed  success rate ).  Even in the absence of a cure, surgery will decrease the cardiovascular risk associated with OSA7; improve overall quality of life8 (sleepiness, functionality, sleep quality, etc).  Palate Procedures:  Patients with UVALDO often have narrowing of their airway in the region of their tonsils and uvula.  The goals of palate procedures are to widen the airway in this region as well as to help the tissues resist collapse.  Modern palate procedure techniques focus on tissue conservation and soft tissue  rearrangement, rather than tissue removal.  Often the uvula is preserved in this procedure. Residual sleep apnea is common in patient after pharyngoplasty with an average reduction in sleep apnea events of 33%2.    Tongue Procedures:  ExamWhile patients are awake, the muscles that surround the throat are active and keep this region open for breathing. These muscles relax during sleep, allowing the tongue and other structures to collapse and block breathing.  There are several different tongue procedures available.  Selection of a tongue base procedure depends on characteristics seen on physical exam.  Generally, procedures are aimed at removing bulky tissues in this area or preventing the back of the tongue from falling back during sleep.  Success rates for tongue surgery range from 50-62%3.  Hypoglossal Nerve Stimulation:  Hypoglossal nerve stimulation has recently received approval from the [place] Food and Drug Administration for the treatment of obstructive sleep apnea.  This is based on research showing that the system was safe and effective in treating sleep apnea6.  Results showed that the median AHI score decreased 68%, from 29.3 to 9.0. This therapy uses an implant system that senses breathing patterns and delivers mild stimulation to airway muscles, which keeps the airway open during sleep.  The system consists of three fully implanted components: a small generator (similar in size to a pacemaker), a breathing sensor, and a stimulation lead.  Using a small handheld remote, a patient turns the therapy on before bed and off upon awakening.    Candidates for this device must be greater than 18 years of age, have moderate to severe obstructive sleep apnea with less than 25% central events  (AHI between 15-65), BMI less than 35, have tried CPAP/oral appliance for at least 8 weeks without success, and have appropriate upper airway anatomy (determined by a sleep endoscopy performed by Dr. Keith Farmer or   Arnulfo Deng.  Nasal Procedures:  Nasal obstruction can interfere with nasal breathing during the day and night.  Studies have shown that relief of nasal obstruction can improve the ability of some patients to tolerate positive airway pressure therapy for obstructive sleep apnea1.  Treatment options include medications such as nasal saline, topical corticosteroid and antihistamine sprays, and oral medications such as antihistamines or decongestants. Non-surgical treatments can include external nasal dilators for selected patients. If these are not successful by themselves, surgery can improve the nasal airway either alone or in combination with these other options.        Combination Procedures:  Combination of surgical procedures and other treatments may be recommended, particularly if patients have more than one area of narrowing or persistent positional disease.  The success rate of combination surgery ranges from 66-80%2,3.    References  Laya ROBLERO. The Role of the Nose in Snoring and Obstructive Sleep Apnoea: An Update.  Eur Arch Otorhinolaryngol. 2011; 268: 1365-73.   Valencia SM; Sita JA; Johnathan JR; Pallanch JF; Jo-Ann MB; Geovanna SG; Ryan CARRILLO. Surgical modifications of the upper airway for obstructive sleep apnea in adults: a systematic review and meta-analysis. SLEEP 2010;33(10):1882-3492. [address] WAY. Hypopharyngeal surgery in obstructive sleep apnea: an evidence-based medicine review.  Arch Otolaryngol Head Neck Surg. 2006 Feb;132(2):206-13.  Toby YH1, Erika Y, Carson VALERIE. The efficacy of anatomically based multilevel surgery for obstructive sleep apnea. Otolaryngol Head Neck Surg. 2003 Oct;129(4):327-35.  Tony WAY, Goldberg A. Hypopharyngeal Surgery in Obstructive Sleep Apnea: An Evidence-Based Medicine Review. Arch Otolaryngol Head Neck Surg. 2006 Feb;132(2):206-13.  Darrius TOLBERT et al. Upper-Airway Stimulation for Obstructive Sleep Apnea.  N Engl J Med. 2014 Jan 9;370(2):139-49.  Britt Graham al.  Increased Incidence of Cardiovascular Disease in Middle-aged Men with Obstructive Sleep Apnea. Am J Respir Crit Care Med; 2002 166: 159-165  Suárez EM et al. Studying Life Effects and Effectiveness of Palatopharyngoplasty (SLEEP) study: Subjective Outcomes of Isolated Uvulopalatopharyngoplasty. Otolaryngol Head Neck Surg. 2011; 144: 623-631.  MANAGEMENT OF SNORING  Mandibular advancement devices-these work by holding your jaw forward, which reduces the ability of your tongue to collapse back into the airway. These can be over the counter (OTC) or made by a special sleep dentist. OTC devices are probably best for short term use. They are less likely to fit well and more likely to put stress on your teeth or jaw. (SnoreRX or Vital Sleep are a couple of options). Professionally made devices are more effective and better tolerated, but also very expensive. Let me know if you would like a list of dentists who make these devices.    lateral sleep positioning (Sleep Noodle or Slumberbump are devices that help prevent your from rolling to your back).   long-term weight loss.  treatment of nasal allergies (fluticasone nasal spray, nasal strips or nasal dilators).  Avoidance of alcohol.  Mouth exercises for snoring: https://www.sleepfoundation.org/snoring/mouth-exercises-to-stop-snoring  Exercising tongue muscles with a game (https://[employer] Command.expressor software/us/erinn/soundly-reduce-snoring/dl5038606646) or stimulating the tongue during the day with a device (https://doi.org/10.3390/rpo62952214) have improved snoring in some individuals.  https://www.[employer]/  https://www.sleepfoundation.org/best-anti-snoring-mouthpieces-and-mouthguards  You may consider ear plugs and white noise machines for your bed partner as well.    Consider using the SnoreLab erinn to record your snoring and breathing at night. Look for pauses, gasps, snorts or variable intensity to the snoring.    Remember to Drive Safe... Drive Alive     Sleep health  profoundly affects your health, mood, and your safety.  Thirty three percent of the population (one in three of us) is not getting enough sleep and many have a sleep disorder. Not getting enough sleep or having an untreated / undertreated sleep condition may make us sleepy without even knowing it. In fact, our driving could be dramatically impaired due to our sleep health. As your provider, here are some things I would like you to know about driving:     Here are some warning signs for impairment and dangerous drowsy driving:              -Having been awake more than 16 hours               -Looking tired               -Eyelid drooping              -Head nodding (it could be too late at this point)              -Driving for more than 30 minutes     Some things you could do to make the driving safer if you are experiencing some drowsiness:              -Stop driving and rest              -Call for transportation              -Make sure your sleep disorder is adequately treated     Some things that have been shown NOT to work when experiencing drowsiness while driving:              -Turning on the radio              -Opening windows              -Eating any  distracting  /  entertaining  foods (e.g., sunflower seeds, candy, or any other)              -Talking on the phone      Your decision may not only impact your life, but also the life of others. Please, remember to drive safe for yourself and all of us.      General recommendations for sleep problems (Insomnia)  Allow 2-4 weeks to see results     Establish a regular sleep schedule    Most people only need 7-8 hours of sleep.  Don't be in bed longer than you need     to sleep or you will end up spending more time awake in bed. This trains your    brain to think of the bed as a place to not sleep.  Go to bed at same time each night   Get up at same time each day - Set an alarm everyday (even weekends). This is one of    the most important tips. It prevents you from  relying on your insomnia to get you    up on time for your day. That actually reinforces insomnia. It also will help your    body get into a pattern where you start feeling tired at a consistent time each    night.  The body functions best when you keep a consistent routine.  Avoid sleeping-in and napping. Anytime you sleep during the day, you will be less tired at    night. You may be tired enough to fall asleep, but you will wake more in the    middle of the night because you will have met your sleep need before the night is    done.   Cut down time in bed (if not asleep, get up)- Use your bed only for sleep and sex    Anytime you spend time in bed doing activities other than sleep (reading,    watching TV, working, playing on the computer or phone, or even just laying in    bed trying to sleep), you are training  your brain to think of the bed as a place to    do activities other than sleep. If you are not falling asleep within 20-30 minutes,    get out of bed. While out of bed, avoid bright lights. Avoid work or chores. Being    productive in the middle of the night reinforces waking up at night. Find relaxing,    not particularly entertaining activities like reading, listening to music, or relaxation    exercises. Go back to bed if you start feeling groggy, or after about 30 minutes,    even if not feeling very tired. Sometimes, just getting out of bed stops the pattern    of getting frustrated about laying in bed not sleeping, and that can help you fall    asleep.   Avoid trying to force yourself to sleep- sleep is not like everything else. The harder you    work at most things, the more you can accomplish. The harder you work at    sleep, the less you will sleep.     Make the bedroom comfortable - quiet, dark and cool are better. Consider ear plugs    (silicon). Use dark blinds or wear an eye mask if needed     Make a relaxing routine prior to bedtime  Relaxation exercises:   Progressive muscle relaxation:  Relax each muscle group individually    Begin with your feet, flex, then relax. Try to imagine your feet feeling heavy and sinking into the bed. Move to your calves, do the same thing. Work through each muscle group toward your head.    Relaxing Mental Imagery: Try to imagine a trip that you took and found relaxing, or imagine a day at the beach. Try to walk yourself through the day in your mind as if you were dreaming it. Try to imagine sensing the different experiences, such as feeling sand between your toes, the heat of the sun on your skin, seeing the waves crashing the shore, the smell of the salt water, etc.     Deal with your worries before bedtime    Set aside a worry time around dinner time for 10-15 minutes. Write down the    things that are on your mind. Plan time in the coming days to address those    issues. Brainstorm ideas on how you will deal with them. Try to identify issues    that are out of your control, and try to let those issues go.  Listen to relaxation tapes   Classical Music or Nature sounds   Back Massage   Get regular exercise each day (at least 1-2 hours before bedtime)   Take medications only as directed   Eat a light bedtime snack or warm drink   Warm milk   Warm herbal tea (non-caffeinated)       Things to avoid   No overstimulating activities just before bed   No competitive games before bedtime   No exciting television programs before bedtime   Avoid caffeine after lunchtime   Avoid chocolate   Do not use alcohol to induce sleep (worsens Insomnia)   Do not take someone else's sleeping pills   Do not look at the clock when awakening   Do not turn on light when getting up to use bathroom, use a nightlight     Online Programs   www.SHUTi.me (pronounced shut eye). There is a fee for this program. Enter the code  [place]  if you decide to enroll in this program.    www.sleepIO.com (pronounced sleep ee oh). There is a fee for this program. Enter the code  [place]  if you decide to  enroll in this program.     Suggested Resources  Insomnia Treatment Books   Overcoming Insomnia by Brandon Kumari and Radha Morris (2008)  No More Sleepless Nights by Milan Cordero and Catarina Teixeira (1996)  Say Chris to Insomnia by Tian Quezada (2009)  The Insomnia Workbook by Maria D Lobo and Anuel Emery (2009)  The Insomnia Answer by Aung Robertson and Aleksandr Ng (2006)      Stress Management and Relaxation Books  The Relaxation and Stress Reduction Workbook by Ashley Nickerson, Marlys Berger and Eugenio Cervantes (2008)  Stress Management Workbook: Techniques and Self-Assessment Procedures by Shantel Orantes and Lauro Garcia (1997)  A Mindfulness-Based Stress Reduction Workbook by Robert Wolfe and Sheron Montano (2010)  The Complete Stress Management Workbook by Janes Melton, Bobo Vázquez and Eliceo Bonilla (1996)  Assert Yourself by Hedy Sanders and Quentin Sanders (1977)    Relaxation Resources for Computer Download   These websites offer resources to help you relax. This list is for information only. [facility] is not responsible for the quality of services or the actions of any person or organization.  Progressive Muscle Relaxation (PMR):   http://www.[employer]/progressive-muscle-relaxation-exercise.html   http://studentsupport.[facility]/counseling/resources/self-help/relaxation-and-stress-management/   Deep Breathing Exercises:  http://www.[employer]/breathing-awareness.html     Meditation:   www.[employer]  www.[employer]guidedBrightQubemeditation-site.[employer] You may have to pay for some of these resources.    Guided Imagery:  http://www.[employer]/guided-imagery-scripts.html   http://[employer]/library/fxdbvbismh-pcqplf-fnijwje/   Consider phone apps such as: Calm, Headspace or Insight Timer.    Counseling / Behavioral Health  [facility]  Visit www.[place].org or call [phone] to find a  "clinic close to you.  Or call [phone] for [facility].     [facility] Brief CBT-I  Lesson 2: Sleep Scheduling and Stimulus Control Techniques    Core Sleep Training    Now that you understand a bit more about how sleep works and what causes insomnia, you are ready to begin the most important and intensive part of brief CBT-I we call  Core Sleep Training, sometimes called \"sleep boot camp.\".  This process involves practicing five key habits that will:    Strengthen your sleep drive  Strengthen your biological sleep rhythm  Strengthen the link between your bed and sleep    You will need to have information from your sleep diary in order begin this critical step of your program.     Studies show that learning and practicing these basic sleep habits are key to achieving stronger, healthier sleep. The focus of treatment is improving the quality of sleep (not quantity) and how you feel during the day.     How long will it take to work?    Research shows that making significant changes in sleep habits improves sleep quality and how you feel. Improvement takes time and is not always steady. You  may see improvement within 2-3 weeks but will need to maintain these new habits over time for the best results.Don't lose heart if you experience minor setbacks along the way. While sleep medications may work faster, they often come with considerable side effects and are less effective over time requiring  increasing the dose or switching to a different medication.   The evidence is clear that CBT-I is the safest and most effective long-term solution for treatment of insomnia.     Are there any side effects?    CBT-I has been shown to be a safe and effective treatment with few side effects.  The most common early side effect you may experience is a short term increase in daytime sleepiness.  It will be important for you to manage you sleepiness by avoiding driving or operating equipment if sleepy or " drowsy.     Core Sleep Training: How to do it? Five Steps    Reduce Your Time in Bed    Spending extra time in bed trying to sleep does not work and harms your sleep.  It increases sleep disruption and weakens your sleep drive.     Less time in bed means more time up and awake leading to quicker, deeper sleep.    This technique does not reduce the amount of sleep you get, just the time you are awake in bed.                                             2. Don't go to bed until you feel sleepy (not tired or fatigued)     This helps increase your sleep drive by keeping you awake longer.  If you go to bed when you're not sleepy, it gives your brain the wrong message and can lead to frustration.     If you feel sleepy before your prescribed bedtime, find activities that can help you stay awake until it is time for you to go to bed. Even brief naps in the evening can be very disruptive of sleep at night.     3, Don't stay in bed unless you are sleeping      If you are unable to fall asleep or return to sleep after about 30 minutes, get out of bed. This helps train your brain that the bed is for sleep. It is harmful to your sleep when you are worried or frustrated in bed. Do not read, eat, worry, think about sleep, use mobile phones or tablets, or watch TV in bed. Do not watch the clock during the night.     Go to another room and do something relaxing. Plan things you can do when you get out of bed. Avoid use of mobile devices or computers when out of bed.    Return to bed only when you feel sleepy again.  Repeat as often as needed throughout the night.     4. Get out of bed at the same time every day    Getting up at the same time helps set your biological clock. It is important to stick to your wake time no matter how much sleep you got the night before or how you feel in the morning.    Varying your wake can have the same effect as jet lag.  It disrupts your biological clock and makes you feel more tired and exhausted.   Trying to  catch up  on sleep on the weekends only makes things worse and sets you up for a cycle of poor sleep the following weekdays.      Make sure you set an alarm and keep it away from the bed to prevent looking at the clock during the night.      5. Avoid daytime napping    Avoid daytime napping if possible. Napping partially fulfills your need for sleep and weakens your sleep drive at night. And the later you nap in the day the more effect it has on your sleep at night.    However, if you find yourself sleepy (not just tired) you can take a brief 15-30 minute nap during the day if needed.  Naps within 7-8 hours of your prescribed wake time are less likely to affect your sleep the coming night.  Sleepy people make more mistakes and may hurt themselves or others. Therefore, safety trumps all other sleep prescriptions.  Never drive or operate equipment if drowsy or sleepy.     Core Sleep Training:  Getting Started    Use your sleep diary to estimate your:    Time in Bed:  Average Time to Bed:  _______  Average Final Rise Time:  _______  Time in Bed (Hours from bedtime to final rise time): ______    Time Awake:   Time it Takes to Fall Asleep: _______  Time Awake During the Night:  ______    Average TOTAL Sleep (Time in Bed - Time Awake): _______    Setting Your Sleep Schedule    The most important part of Core Sleep Training is setting  your initial sleep schedule.  To do this, calculate you average total sleep time over the past week and add 30 minutes. For example, if you estimate you are getting about six hours of sleep, add 30 minutes to recommended total time in bed of 6.5 hours.     Use your sleep diary to determine your best scheduled final rise time.  Lets say you determine your best rise time for the day is 6 AM, count back on the clock by 6.5 hours to determine your earliest time to bed.  In this example, the recommended earliest bedtime would be 11:30 PM.               Total Time in Bed:  7.5 hours        Bedtime:  No earlier than 10:45 PM       Final Rise Time:  Out of bed every day by 6:15 AM with alarm.    Changing Your Sleep Schedule Prescription    After 2 weeks, you can adjust your sleep schedule prescription based on how well you are sleeping. Use the following guidelines to change your prescribed time in bed based on information from your sleep diary:         Increase Time in Bed by 15 Minutes IF:    you are falling asleep in less than 30 minutes AND awake less than 30 minutes during the night AND you feel sleepy during the day.         Decrease Time in Bed by 15 Minutes IF:    it is taking longer than 30 minutes to fall asleep OR you are awake more than 30 minutes during the night.      DO NOT decrease your sleep schedule below 6  hours     Behavioral Training and Sleeping Pills:    It is important to avoid driving or using equipment within 8 hours of taking sleeping pills or longer if you notice side effects such as grogginess when you get up for the day.  Sleeping pills can cause side effects that affect balance and cognitive functioning, even if you don't feel sleepy.  This in turn increase your risk for accidents and falls in the morning. Talk with your prescribing health care provider if you are experiencing morning side effects that affect daytime functioning.  In Lesson 3 you will learn about different strategies to taper or discontinue sleeping pills in consultation with your health care provider.

## 2024-08-19 NOTE — NURSING NOTE
Current patient location: [address] [place] [id number]    Is the patient currently in the state of [place]? YES    Visit mode:VIDEO    If the visit is dropped, the patient can be reconnected by: VIDEO VISIT: Send to e-mail at: [email or web address]    Will anyone else be joining the visit? NO  (If patient encounters technical issues they should call [phone]    How would you like to obtain your AVS? MyChart    Are changes needed to the allergy or medication list? No    Are refills needed on medications prescribed by this physician? NO    Rooming Documentation:  Questionnaire(s) completed      Reason for visit: Consult    Martínez ARIAS

## 2024-08-19 NOTE — PROGRESS NOTES
Virtual Visit Details    Type of service:  Video Visit   Start Time: 8:33 AM  End Time: 9:27 AM    Originating Location (pt. Location): Home    Distant Location (provider location):  On-site  Platform used for Video Visit: [employer]

## 2024-09-04 DIAGNOSIS — E13.9 DIABETES MELLITUS TYPE 1.5, MANAGED AS TYPE 2 (H): ICD-10-CM

## 2024-09-04 DIAGNOSIS — Z96.41 INSULIN PUMP STATUS: ICD-10-CM

## 2024-09-04 RX ORDER — INSULIN PMP CART,AUT,G6/7,CNTR
EACH SUBCUTANEOUS
Qty: 30 EACH | Refills: 0 | Status: SHIPPED | OUTPATIENT
Start: 2024-09-04

## 2024-09-04 NOTE — TELEPHONE ENCOUNTER
Last Written Prescription Date:  9/7/23  Last Fill Quantity: 8,  # refills: 3   Last office visit: 1/30/2024 ; last virtual visit: 5/15/2024 with prescribing provider:  Dr. Gaming   Future Office Visit: 9/18/24    Requested Prescriptions   Pending Prescriptions Disp Refills    Insulin Disposable Pump (OMNIPOD 5 PODS (GEN 5)) MISC [Pharmacy Med Name: OMNIPOD_5_G6 PODS]       Sig: USE 1 POD CONTINUOUSLY AS NEEDED CHANGE POD EVERY 2 AND 1/2 DAYS       There is no refill protocol information for this order        Refill sent  Kae Anthony RN

## 2024-09-05 NOTE — PROGRESS NOTES
Oncology Risk Management Consultation:  Date on this visit: 2024    Jenise Smith requires high risk screening and surveillance to reduce her risk of cancer secondary to having a family history of breast cancer in her paternal grandmother in her 70s and paternal cousin at 38. She is considered to be at high risk for breast cancer and has a 24.6%  lifetime risk for breast cancer by the [employer] model. She is here today with her service dog, Jose.      Primary Physician:  Amanda Yeboah MD    History Of Present Illness:  Ms. Smith is a very pleasant 49 year old female who presents with a family history of breast and prostate cancer.     Pertinent history:  Menarche at age 13  First child at age 30  Premenopausal.   No hx of breastfeeding  Breast density: extremely dense  Ovaries, fallopian tubes and uterus in place  No hx of  hormone replacement therapy.    2022- Left breast biopsy, benign breast tissue     Hx of  Colonoscopies since the age of 47.   colonoscopy in 2022 found two 5-10 mm polyps, the larger of which was identified as a sessile serrated adenoma and the other was a hyperplastic polyp. Follow-up was recommended in 3 years, 2025.     Genetic testin22 -- Genetic Testing Result: NEGATIVE  Jenise is negative for mutations in APC, NICK, AXIN2, BARD1, BMPR1A, BRCA1, BRCA2, BRIP1, CDH1, CDK4, CDKN2A, CHEK2, CTNNA1, DICER1, EPCAM, GREM1, HOXB13, KIT, MEN1, MLH1, MSH2, MSH3, MSH6, MUTYH, NBN, NF1, NTHL1, PALB2, PDGFRA, PMS2, POLD1, POLE, PTEN, RAD50, RAD51C, RAD51D, SDHA, SDHB, SDHC, SDHD, SMAD4, SMARCA4, STK11, TP53, TSC1, TSC2, and VHL. No mutations were found in any of the 47 genes analyzed. This test involved sequencing and deletion/duplication analysis of all genes with the exceptions of EPCAM and GREM1 (deletions/duplications only) and SDHA (sequencing only) identified using the Common Hereditary Cancers panel was ordered from [employer]. This testing was done because of  Jenise's family history of breast, colon, prostate, and small bowel cancer.     Pertinent screening history:  4/27/2022- Screening tomosynthesis mammogram, BiRads1  7/20/2022- Breast MRI, BiRads0 for Clumped non mass enhancement in the upper inner left breast. While this may represent normal background parenchymal enhancement, it should be further characterized with ultrasound. If a suspicious ultrasound detected abnormality is found, biopsy could be   performed. Right Breast: BI-RADS CATEGORY: 1 -  NEGATIVE. Left Breast: BI-RADS CATEGORY: 0  Need Additional Imaging Evaluation and/or Prior Mammograms for Comparison.   7/28/2022- Left breast US, In the left breast at 11:00 12 cm from nipple there is an island of fibroglandular tissue corresponding to the MRI finding. There is no sonographic evidence of suspicious mass or shadowing. However, given the increased enhancement on recent MRI, ultrasound-guided biopsy of this finding is recommended to ensure benignity. This was discussed with patient, who understands and agrees with the plan. BI-RADS CATEGORY: 4 A- Suspicious Abnormality-Biopsy Should Be Considered RECOMMENDED FOLLOW-UP: Biopsy. PATHOLOGY: Left breast, 11:00, 12 cm from nipple, ultrasound-guided needle biopsy- Benign breast parenchyma  4/12/2023: Diagnostic mammogram and right breast US for palpable lump, BiRads2  10/31/2023: Breast MRI, BiRads2  5/23/2024: Bilateral tomosynthesis mammogram, BiRads1      At this visit, she denies new fatigue, breast pain, asymmetry, masses, thickening, nipple discharge and skin changes in her breasts.     Past Medical/Surgical History:  Past Medical History:   Diagnosis Date    Diabetes mellitus type 1 (H)     Dysmenorrhea     Excessive or frequent menstruation     Female stress incontinence     Hypothyroid     Insulin pump in place     Lumbar back pain     Other and unspecified hyperlipidemia     PONV (postoperative nausea and vomiting)      Past Surgical History:    Procedure Laterality Date    DILATION AND CURETTAGE, HYSTEROSCOPY, ABLATE ENDOMETRIUM NOVASURE, COMBINED  1/23/2014    Procedure: COMBINED DILATION AND CURETTAGE, HYSTEROSCOPY, ABLATE ENDOMETRIUM NOVASURE;  HYSTEROSCOPY, DILATION, CURETTAGE, WITH NOVASURE ABLATION, MYOSURE MORCELLATOR;  Surgeon: Swetha Queen MD;  Location: [facility]    LAPAROSCOPY      LEEP TX, CERVICAL      OPERATIVE HYSTEROSCOPY WITH MORCELLATOR  1/23/2014    Procedure: OPERATIVE HYSTEROSCOPY WITH MORCELLATOR;;  Surgeon: Swetha Queen MD;  Location: [facility]    wisdom teeth[         Allergies:  Allergies as of 09/09/2024 - Reviewed 09/09/2024   Allergen Reaction Noted    Keflex [cephalexin]  03/26/2020    Latex Rash 08/18/2008       Current Medications:  Current Outpatient Medications   Medication Sig Dispense Refill    blood glucose (ACCU-CHEK FASTCLIX) lancing device 1 Dose once daily.      Continuous Blood Gluc  (DEXCOM G6 ) FRANCISCO Use to read blood sugars as per 's instructions. 1 each 0    Continuous Glucose Sensor (DEXCOM G6 SENSOR) MISC Change every 10 days. 9 each 3    Continuous Glucose Transmitter (DEXCOM G6 TRANSMITTER) MISC Change every 3 months. 1 each 3    CONTOUR NEXT TEST test strip USE 1 STRIP 6 TIMES DAILY 600 strip 3    FLUoxetine (PROZAC) 20 MG capsule TAKE 1 CAPSULE BY MOUTH DAILY 30 capsule 0    Glucagon (BAQSIMI TWO PACK) 3 MG/DOSE nasal powder INSERT DEVICE TIP INTO 1 NOSTRIL PUSH DEVICE PLUNGER UNTIL GREEN  LINE DISAPPEARS. AS NEEDED  (SEVERE HYPOGLYCEMIA REACTION,  DOSE AS DIRECTED) 2 each 1    Insulin Disposable Pump (OMNIPOD 5 G6 INTRO, GEN 5,) KIT 1 Device continuous prn (Use as Controller (PDM) device for Omnipod 5 pods) 1 kit 0    Insulin Disposable Pump (OMNIPOD 5 PODS, GEN 5,) MISC USE 1 POD CONTINUOUSLY AS NEEDED CHANGE POD EVERY 2 AND 1/2 DAYS 30 each 0    insulin glargine (LANTUS SOLOSTAR) 100 UNIT/ML pen INJECT SUBCUTANEOUSLY 16  UNITS DAILY AS  DIRECTED 15 mL 3    insulin lispro (HUMALOG KWIKPEN) 100 UNIT/ML (1 unit dial) KWIKPEN INJECT 3 TO 8 UNITS  SUBCUTANEOUSLY WITH MEALS AND  CORRECTION DOSES AS DIRECTED,  TOTAL DAILY DOSE APPROXIMATELY  30 UNITS IF THE PUMP FAILS (Patient taking differently: INJECT 3 TO 8 UNITS  SUBCUTANEOUSLY WITH MEALS AND  CORRECTION DOSES AS DIRECTED,  TOTAL DAILY DOSE APPROXIMATELY  30 UNITS IF THE PUMP FAILS) 30 mL 1    insulin lispro (HUMALOG VIAL) 100 UNIT/ML vial Use with insulin pump, total daily dose approx 70U, Lispro vial 70 mL 3    INSULIN PUMP - OUTPATIENT Date Last Updated: 12-3-21  Omnipod  BASAL RATES and times:  12   AM (midnight): 0.65 units/hour    3     AM: 0.60 units/hour   4     AM: 0.65 units/hour   6     AM: 0.70 units/hour   10   AM: 0.45 units/hour   1:30 PM: 0.65 units/hour     CARB RATIO and times:  12   AM (midnight): 12  11   AM: 9  5    PM:  8.5  Corection Factor (Sensitivity) and times:  12   AM (midnight): 60 mg/dL  8     AM: 55 mg/dL  9    PM: 60 mg/dL  BLOOD GLUCOSE TARGET/Correct Above and times:  12   AM (midnight): 100 - 130  6     AM:  90 - 120  9    PM:  100 - 140  Active Insulin Time: 3 hours    Sensor:  Yes: DexCom  Pump Website Username: [facility]  Pump Website Password: [facility]      levothyroxine (SYNTHROID/LEVOTHROID) 50 MCG tablet Take 1 tablet (50 mcg) by mouth daily 90 tablet 3    Multiple Vitamins-Minerals (MULTI FOR HER PO)       VITAMIN D, CHOLECALCIFEROL, PO Take 1,000 Units by mouth          Family History:  Family History   Problem Relation Age of Onset    Diabetes Mother         type 2    Obesity Mother     Sleep Apnea Mother     Diabetes Father         type 1    Diabetes Sister         type 1    Thyroid Disease Sister     Prostate Cancer Maternal Grandfather 80    Breast Cancer Paternal Grandmother 70    Prostate Cancer Paternal Grandfather     Brain Cancer Paternal Grandfather 80    Cancer Maternal Uncle 60        small bowel    Cancer  "Maternal Uncle 60        tonsil    Colorectal Cancer Paternal Uncle 60    Breast Cancer Paternal Cousin 38       Social History:  Social History     Socioeconomic History    Marital status:      Spouse name: Conrad    Number of children: 1    Years of education: Not on file    Highest education level: Not on file   Occupational History     Employer: [employer]   Tobacco Use    Smoking status: Never    Smokeless tobacco: Never   Substance and Sexual Activity    Alcohol use: Yes     Comment: 2 glasses of wine on fri and sat    Drug use: No    Sexual activity: Yes     Partners: Male     Birth control/protection: Male Surgical   Other Topics Concern    Parent/sibling w/ CABG, MI or angioplasty before 65F 55M? No   Social History Narrative     in May 2014    2 step children (20 and 18) and one biol dtr age 15.    Works for [employer]--works from home     Social Determinants of Health     Financial Resource Strain: Not on file   Food Insecurity: Not on file   Transportation Needs: Not on file   Physical Activity: Not on file   Stress: Not on file   Social Connections: Not on file   Interpersonal Safety: Not on file   Housing Stability: Not on file       Physical Exam:  /77 (BP Location: Left arm)   Pulse 66   Temp 97.4  F (36.3  C) (Oral)   Ht 1.702 m (5' 7\")   LMP 08/22/2024   SpO2 97%   BMI 21.93 kg/m    GENERAL APPEARANCE: healthy, alert and no apparent distress  BREAST: A multipositional, bilateral breast exam was performed.  Fairly symmetrical. Nipples everted bilaterally. Right breast: no palpable dominant masses, no nipple discharge, no skin changes. Dense tissue.  Right axilla: no palpable adenopathy. Left breast: Mobile, palpable lump, 2:00 position. No nipple discharge, no skin changes. Left axilla: no palpable adenopathy. Dense tissue.    LYMPHATICS: No cervical, supraclavicular, or axillary lymphadenopathy  SKIN: no suspicious lesions or rashes on examined " skin    Laboratory/Imaging Studies  No results found for any visits on 09/09/24.    ASSESSMENT    Jenise reports that she is doing well at this visit. She has no concerns with her breast tissue. There are no new cancers in the family, but she did share that her sister was recently diagnosed with rheumatoid arthritis.     Today her exam is positive for a palpable lump in her left breast, 2:00 position. The lump is tender and mobile. I have ordered a diagnostic mammogram and US to further assess this.     We discussed her last screening tests and reviewed results, which have been negative. We discussed concerns and symptoms to be watchful for between visits, including breast lumps, bumps, nipple inversion, nipple discharge and any changes in skin including crinkled or puckered skin. We reviewed the recommendation for breast self awareness.         Individualized Surveillance Plan for women  With 20% or greater lifetime risk of breast cancer   Per NCCN Breast Cancer Screening and Diagnosis Guidelines Version 2.2024   Recommended screening Test or procedure Last done Next Scheduled    Clinical encounter Clinical exam every 6-12 months.   Refer to genetic counseling if not already done.  Consider risk reduction strategies.   September 2024   May 2025   However, some family histories with breast cancers at a very young age, may warrant screening starting earlier.    *May begin at age 40 if breast cancers in the family occur at later ages.    Annual mammogram beginning 10 years younger than the earliest breast cancer in the family but not prior to age 30.    Recommend annual breast MRI to begin 10 years younger than the earliest breast cancer in the family but not prior to age 25.    Breast MRIs are preferably done on day 7-15 of the menstrual cycle in premenopausal women.   10/31/2023: Breast MRI, BiRads2    5/23/2024: Bilateral tomosynthesis mammogram, BiRads1   Breast MRI in November    Return to clinic in May, 2025  with a mammogram following our visit   Breast screening for patients at high risk due to thoracic radiation between the ages of 10-30   Annual clinical exam beginning 8 years after radiation therapy.    Annual screening mammogram beginning at age 30 or 8 years after radiation therapy    Annual breast MRI, beginning at age 25 or 8 years after radiation therapy.     NA   NA   Women who have a lifetime risk of >20% based on history of LCIS or ADH/ALH Annual screening mammogram beginning at age of LCIS or ADH/ALH but not prior to age 30.    Consider annual MRI to begin at age of diagnosis of LCIS or ADH/ALH but not prior to age 25.    Consider risk reducing strategies.   NA   NA    Recommend risk reducing strategies for women with 1.7% 5 year risk of breast cancer.         I spent a total of 30 minutes on the day of the visit. Please see the note for further information on patient assessment and treatment.     Michelle Jenkins, MANOJ, APRN, AGCNS-BC  Clinical Nurse Specialist  Cancer Risk Management Program  [facility]    Cc:  Amanda Yeboah MD

## 2024-09-09 ENCOUNTER — ONCOLOGY VISIT (OUTPATIENT)
Dept: ONCOLOGY | Facility: CLINIC | Age: 49
End: 2024-09-09
Payer: COMMERCIAL

## 2024-09-09 VITALS
DIASTOLIC BLOOD PRESSURE: 77 MMHG | TEMPERATURE: 97.4 F | HEIGHT: 67 IN | BODY MASS INDEX: 21.93 KG/M2 | HEART RATE: 66 BPM | OXYGEN SATURATION: 97 % | SYSTOLIC BLOOD PRESSURE: 128 MMHG

## 2024-09-09 DIAGNOSIS — N63.21 MASS OF UPPER OUTER QUADRANT OF LEFT BREAST: ICD-10-CM

## 2024-09-09 DIAGNOSIS — Z80.3 FAMILY HISTORY OF MALIGNANT NEOPLASM OF BREAST: ICD-10-CM

## 2024-09-09 DIAGNOSIS — Z12.39 BREAST CANCER SCREENING, HIGH RISK PATIENT: Primary | ICD-10-CM

## 2024-09-09 DIAGNOSIS — R92.30 DENSE BREAST: ICD-10-CM

## 2024-09-09 PROCEDURE — 99213 OFFICE O/P EST LOW 20 MIN: CPT

## 2024-09-09 ASSESSMENT — PAIN SCALES - GENERAL: PAINLEVEL: NO PAIN (0)

## 2024-09-09 NOTE — LETTER
2024      Jenise Smith  [address] [facility] [address]      Dear Colleague,    Thank you for referring your patient, Jenise Smith, to the [facility]. Please see a copy of my visit note below.    Oncology Risk Management Consultation:  Date on this visit: 2024    Jenise Smiht requires high risk screening and surveillance to reduce her risk of cancer secondary to having a family history of breast cancer in her paternal grandmother in her 70s and paternal cousin at 38. She is considered to be at high risk for breast cancer and has a 24.6%  lifetime risk for breast cancer by the [employer] model. She is here today with her service dog, Jose.      Primary Physician:  Amanda Yeboah MD    History Of Present Illness:  Ms. Smith is a very pleasant 49 year old female who presents with a family history of breast and prostate cancer.     Pertinent history:  Menarche at age 13  First child at age 30  Premenopausal.   No hx of breastfeeding  Breast density: extremely dense  Ovaries, fallopian tubes and uterus in place  No hx of  hormone replacement therapy.    2022- Left breast biopsy, benign breast tissue     Hx of  Colonoscopies since the age of 47.   colonoscopy in 2022 found two 5-10 mm polyps, the larger of which was identified as a sessile serrated adenoma and the other was a hyperplastic polyp. Follow-up was recommended in 3 years, 2025.     Genetic testin22 -- Genetic Testing Result: NEGATIVE  Jenise is negative for mutations in APC, NICK, AXIN2, BARD1, BMPR1A, BRCA1, BRCA2, BRIP1, CDH1, CDK4, CDKN2A, CHEK2, CTNNA1, DICER1, EPCAM, GREM1, HOXB13, KIT, MEN1, MLH1, MSH2, MSH3, MSH6, MUTYH, NBN, NF1, NTHL1, PALB2, PDGFRA, PMS2, POLD1, POLE, PTEN, RAD50, RAD51C, RAD51D, SDHA, SDHB, SDHC, SDHD, SMAD4, SMARCA4, STK11, TP53, TSC1, TSC2, and VHL. No mutations were found in any of the 47 genes analyzed. This test involved sequencing and  deletion/duplication analysis of all genes with the exceptions of EPCAM and GREM1 (deletions/duplications only) and SDHA (sequencing only) identified using the Common Hereditary Cancers panel was ordered from [employer]. This testing was done because of Jenise's family history of breast, colon, prostate, and small bowel cancer.     Pertinent screening history:  4/27/2022- Screening tomosynthesis mammogram, BiRads1  7/20/2022- Breast MRI, BiRads0 for Clumped non mass enhancement in the upper inner left breast. While this may represent normal background parenchymal enhancement, it should be further characterized with ultrasound. If a suspicious ultrasound detected abnormality is found, biopsy could be   performed. Right Breast: BI-RADS CATEGORY: 1 -  NEGATIVE. Left Breast: BI-RADS CATEGORY: 0  Need Additional Imaging Evaluation and/or Prior Mammograms for Comparison.   7/28/2022- Left breast US, In the left breast at 11:00 12 cm from nipple there is an island of fibroglandular tissue corresponding to the MRI finding. There is no sonographic evidence of suspicious mass or shadowing. However, given the increased enhancement on recent MRI, ultrasound-guided biopsy of this finding is recommended to ensure benignity. This was discussed with patient, who understands and agrees with the plan. BI-RADS CATEGORY: 4 A- Suspicious Abnormality-Biopsy Should Be Considered RECOMMENDED FOLLOW-UP: Biopsy. PATHOLOGY: Left breast, 11:00, 12 cm from nipple, ultrasound-guided needle biopsy- Benign breast parenchyma  4/12/2023: Diagnostic mammogram and right breast US for palpable lump, BiRads2  10/31/2023: Breast MRI, BiRads2  5/23/2024: Bilateral tomosynthesis mammogram, BiRads1      At this visit, she denies new fatigue, breast pain, asymmetry, masses, thickening, nipple discharge and skin changes in her breasts.     Past Medical/Surgical History:  Past Medical History:   Diagnosis Date     Diabetes mellitus type 1 (H)      Dysmenorrhea       Excessive or frequent menstruation      Female stress incontinence      Hypothyroid      Insulin pump in place      Lumbar back pain      Other and unspecified hyperlipidemia      PONV (postoperative nausea and vomiting)      Past Surgical History:   Procedure Laterality Date     DILATION AND CURETTAGE, HYSTEROSCOPY, ABLATE ENDOMETRIUM NOVASURE, COMBINED  1/23/2014    Procedure: COMBINED DILATION AND CURETTAGE, HYSTEROSCOPY, ABLATE ENDOMETRIUM NOVASURE;  HYSTEROSCOPY, DILATION, CURETTAGE, WITH NOVASURE ABLATION, MYOSURE MORCELLATOR;  Surgeon: Swetha Queen MD;  Location: [facility]     LAPAROSCOPY       LEEP TX, CERVICAL       OPERATIVE HYSTEROSCOPY WITH MORCELLATOR  1/23/2014    Procedure: OPERATIVE HYSTEROSCOPY WITH MORCELLATOR;;  Surgeon: Swetha Queen MD;  Location: [facility]     wisdom teeth[         Allergies:  Allergies as of 09/09/2024 - Reviewed 09/09/2024   Allergen Reaction Noted     Keflex [cephalexin]  03/26/2020     Latex Rash 08/18/2008       Current Medications:  Current Outpatient Medications   Medication Sig Dispense Refill     blood glucose (ACCU-CHEK FASTCLIX) lancing device 1 Dose once daily.       Continuous Blood Gluc  (DEXCOM G6 ) FRANCISCO Use to read blood sugars as per 's instructions. 1 each 0     Continuous Glucose Sensor (DEXCOM G6 SENSOR) MISC Change every 10 days. 9 each 3     Continuous Glucose Transmitter (DEXCOM G6 TRANSMITTER) MISC Change every 3 months. 1 each 3     CONTOUR NEXT TEST test strip USE 1 STRIP 6 TIMES DAILY 600 strip 3     FLUoxetine (PROZAC) 20 MG capsule TAKE 1 CAPSULE BY MOUTH DAILY 30 capsule 0     Glucagon (BAQSIMI TWO PACK) 3 MG/DOSE nasal powder INSERT DEVICE TIP INTO 1 NOSTRIL PUSH DEVICE PLUNGER UNTIL GREEN  LINE DISAPPEARS. AS NEEDED  (SEVERE HYPOGLYCEMIA REACTION,  DOSE AS DIRECTED) 2 each 1     Insulin Disposable Pump (OMNIPOD 5 G6 INTRO, GEN 5,) KIT 1 Device continuous prn (Use as Controller (PDM)  device for Omnipod 5 pods) 1 kit 0     Insulin Disposable Pump (OMNIPOD 5 PODS, GEN 5,) MISC USE 1 POD CONTINUOUSLY AS NEEDED CHANGE POD EVERY 2 AND 1/2 DAYS 30 each 0     insulin glargine (LANTUS SOLOSTAR) 100 UNIT/ML pen INJECT SUBCUTANEOUSLY 16  UNITS DAILY AS DIRECTED 15 mL 3     insulin lispro (HUMALOG KWIKPEN) 100 UNIT/ML (1 unit dial) KWIKPEN INJECT 3 TO 8 UNITS  SUBCUTANEOUSLY WITH MEALS AND  CORRECTION DOSES AS DIRECTED,  TOTAL DAILY DOSE APPROXIMATELY  30 UNITS IF THE PUMP FAILS (Patient taking differently: INJECT 3 TO 8 UNITS  SUBCUTANEOUSLY WITH MEALS AND  CORRECTION DOSES AS DIRECTED,  TOTAL DAILY DOSE APPROXIMATELY  30 UNITS IF THE PUMP FAILS) 30 mL 1     insulin lispro (HUMALOG VIAL) 100 UNIT/ML vial Use with insulin pump, total daily dose approx 70U, Lispro vial 70 mL 3     INSULIN PUMP - OUTPATIENT Date Last Updated: 12-3-21  Omnipod  BASAL RATES and times:  12   AM (midnight): 0.65 units/hour    3     AM: 0.60 units/hour   4     AM: 0.65 units/hour   6     AM: 0.70 units/hour   10   AM: 0.45 units/hour   1:30 PM: 0.65 units/hour     CARB RATIO and times:  12   AM (midnight): 12  11   AM: 9  5    PM:  8.5  Corection Factor (Sensitivity) and times:  12   AM (midnight): 60 mg/dL  8     AM: 55 mg/dL  9    PM: 60 mg/dL  BLOOD GLUCOSE TARGET/Correct Above and times:  12   AM (midnight): 100 - 130  6     AM:  90 - 120  9    PM:  100 - 140  Active Insulin Time: 3 hours    Sensor:  Yes: DexCom  Pump Website Username: Connected to [facility]  Pump Website Password: [facility]       levothyroxine (SYNTHROID/LEVOTHROID) 50 MCG tablet Take 1 tablet (50 mcg) by mouth daily 90 tablet 3     Multiple Vitamins-Minerals (MULTI FOR HER PO)        VITAMIN D, CHOLECALCIFEROL, PO Take 1,000 Units by mouth          Family History:  Family History   Problem Relation Age of Onset     Diabetes Mother         type 2     Obesity Mother      Sleep Apnea Mother      Diabetes Father         type 1     Diabetes  "Sister         type 1     Thyroid Disease Sister      Prostate Cancer Maternal Grandfather 80     Breast Cancer Paternal Grandmother 70     Prostate Cancer Paternal Grandfather      Brain Cancer Paternal Grandfather 80     Cancer Maternal Uncle 60        small bowel     Cancer Maternal Uncle 60        tonsil     Colorectal Cancer Paternal Uncle 60     Breast Cancer Paternal Cousin 38       Social History:  Social History     Socioeconomic History     Marital status:      Spouse name: Conrad     Number of children: 1     Years of education: Not on file     Highest education level: Not on file   Occupational History     Employer: [employer]   Tobacco Use     Smoking status: Never     Smokeless tobacco: Never   Substance and Sexual Activity     Alcohol use: Yes     Comment: 2 glasses of wine on fri and sat     Drug use: No     Sexual activity: Yes     Partners: Male     Birth control/protection: Male Surgical   Other Topics Concern     Parent/sibling w/ CABG, MI or angioplasty before 65F 55M? No   Social History Narrative     in May 2014    2 step children (20 and 18) and one biol dtr age 15.    Works for [employer]--works from home     Social Determinants of Health     Financial Resource Strain: Not on file   Food Insecurity: Not on file   Transportation Needs: Not on file   Physical Activity: Not on file   Stress: Not on file   Social Connections: Not on file   Interpersonal Safety: Not on file   Housing Stability: Not on file       Physical Exam:  /77 (BP Location: Left arm)   Pulse 66   Temp 97.4  F (36.3  C) (Oral)   Ht 1.702 m (5' 7\")   LMP 08/22/2024   SpO2 97%   BMI 21.93 kg/m    GENERAL APPEARANCE: healthy, alert and no apparent distress  BREAST: A multipositional, bilateral breast exam was performed.  Fairly symmetrical. Nipples everted bilaterally. Right breast: no palpable dominant masses, no nipple discharge, no skin changes. Dense tissue.  Right axilla: no palpable " adenopathy. Left breast: Mobile, palpable lump, 2:00 position. No nipple discharge, no skin changes. Left axilla: no palpable adenopathy. Dense tissue.    LYMPHATICS: No cervical, supraclavicular, or axillary lymphadenopathy  SKIN: no suspicious lesions or rashes on examined skin    Laboratory/Imaging Studies  No results found for any visits on 09/09/24.    ASSESSMENT    Jenise reports that she is doing well at this visit. She has no concerns with her breast tissue. There are no new cancers in the family, but she did share that her sister was recently diagnosed with rheumatoid arthritis.     Today her exam is positive for a palpable lump in her left breast, 2:00 position. The lump is tender and mobile. I have ordered a diagnostic mammogram and US to further assess this.     We discussed her last screening tests and reviewed results, which have been negative. We discussed concerns and symptoms to be watchful for between visits, including breast lumps, bumps, nipple inversion, nipple discharge and any changes in skin including crinkled or puckered skin. We reviewed the recommendation for breast self awareness.         Individualized Surveillance Plan for women  With 20% or greater lifetime risk of breast cancer   Per NCCN Breast Cancer Screening and Diagnosis Guidelines Version 2.2024   Recommended screening Test or procedure Last done Next Scheduled    Clinical encounter Clinical exam every 6-12 months.   Refer to genetic counseling if not already done.  Consider risk reduction strategies.   September 2024   May 2025   However, some family histories with breast cancers at a very young age, may warrant screening starting earlier.    *May begin at age 40 if breast cancers in the family occur at later ages.    Annual mammogram beginning 10 years younger than the earliest breast cancer in the family but not prior to age 30.    Recommend annual breast MRI to begin 10 years younger than the earliest breast cancer in the  family but not prior to age 25.    Breast MRIs are preferably done on day 7-15 of the menstrual cycle in premenopausal women.   10/31/2023: Breast MRI, BiRads2    5/23/2024: Bilateral tomosynthesis mammogram, BiRads1   Breast MRI in November    Return to clinic in May, 2025 with a mammogram following our visit   Breast screening for patients at high risk due to thoracic radiation between the ages of 10-30   Annual clinical exam beginning 8 years after radiation therapy.    Annual screening mammogram beginning at age 30 or 8 years after radiation therapy    Annual breast MRI, beginning at age 25 or 8 years after radiation therapy.     NA   NA   Women who have a lifetime risk of >20% based on history of LCIS or ADH/ALH Annual screening mammogram beginning at age of LCIS or ADH/ALH but not prior to age 30.    Consider annual MRI to begin at age of diagnosis of LCIS or ADH/ALH but not prior to age 25.    Consider risk reducing strategies.   NA   NA    Recommend risk reducing strategies for women with 1.7% 5 year risk of breast cancer.         I spent a total of 30 minutes on the day of the visit. Please see the note for further information on patient assessment and treatment.     Michelle Jenkins DNP, NANCY, [facility]  Clinical Nurse Specialist  Cancer Risk Management Program  [facility] [place]    Cc:  Amanda Yeboah MD              Again, thank you for allowing me to participate in the care of your patient.        Sincerely,        NANCY Knott CNP

## 2024-09-09 NOTE — PATIENT INSTRUCTIONS
Individualized Surveillance Plan for women  With 20% or greater lifetime risk of breast cancer   Per NCCN Breast Cancer Screening and Diagnosis Guidelines Version 2.2024   Recommended screening Test or procedure Last done Next Scheduled    Clinical encounter Clinical exam every 6-12 months.   Refer to genetic counseling if not already done.  Consider risk reduction strategies.   September 2024   May 2025   However, some family histories with breast cancers at a very young age, may warrant screening starting earlier.    *May begin at age 40 if breast cancers in the family occur at later ages.    Annual mammogram beginning 10 years younger than the earliest breast cancer in the family but not prior to age 30.    Recommend annual breast MRI to begin 10 years younger than the earliest breast cancer in the family but not prior to age 25.    Breast MRIs are preferably done on day 7-15 of the menstrual cycle in premenopausal women.   10/31/2023: Breast MRI, BiRads2    5/23/2024: Bilateral tomosynthesis mammogram, BiRads1   Breast MRI in November    Return to clinic in May, 2025 with a mammogram following our visit   Breast screening for patients at high risk due to thoracic radiation between the ages of 10-30   Annual clinical exam beginning 8 years after radiation therapy.    Annual screening mammogram beginning at age 30 or 8 years after radiation therapy    Annual breast MRI, beginning at age 25 or 8 years after radiation therapy.     NA   NA   Women who have a lifetime risk of >20% based on history of LCIS or ADH/ALH Annual screening mammogram beginning at age of LCIS or ADH/ALH but not prior to age 30.    Consider annual MRI to begin at age of diagnosis of LCIS or ADH/ALH but not prior to age 25.    Consider risk reducing strategies.   NA   NA    Recommend risk reducing strategies for women with 1.7% 5 year risk of breast cancer.

## 2024-09-11 ENCOUNTER — HOSPITAL ENCOUNTER (OUTPATIENT)
Dept: MAMMOGRAPHY | Facility: CLINIC | Age: 49
Discharge: HOME | End: 2024-09-11
Payer: COMMERCIAL

## 2024-09-11 DIAGNOSIS — N63.21 MASS OF UPPER OUTER QUADRANT OF LEFT BREAST: ICD-10-CM

## 2024-09-11 PROCEDURE — 76642 ULTRASOUND BREAST LIMITED: CPT | Mod: LT

## 2024-09-11 PROCEDURE — 77061 BREAST TOMOSYNTHESIS UNI: CPT | Mod: LT

## 2024-09-18 ENCOUNTER — VIRTUAL VISIT (OUTPATIENT)
Dept: ENDOCRINOLOGY | Facility: CLINIC | Age: 49
End: 2024-09-18
Payer: COMMERCIAL

## 2024-09-18 ENCOUNTER — MYC MEDICAL ADVICE (OUTPATIENT)
Dept: ENDOCRINOLOGY | Facility: CLINIC | Age: 49
End: 2024-09-18

## 2024-09-18 DIAGNOSIS — Z96.41 INSULIN PUMP STATUS: ICD-10-CM

## 2024-09-18 DIAGNOSIS — E06.3 HYPOTHYROIDISM DUE TO HASHIMOTO'S THYROIDITIS: ICD-10-CM

## 2024-09-18 DIAGNOSIS — E13.9 TYPE 1.5 DIABETES, MANAGED AS TYPE 1 (H): Primary | ICD-10-CM

## 2024-09-18 PROCEDURE — 95251 CONT GLUC MNTR ANALYSIS I&R: CPT | Performed by: INTERNAL MEDICINE

## 2024-09-18 PROCEDURE — G2211 COMPLEX E/M VISIT ADD ON: HCPCS | Mod: 95 | Performed by: INTERNAL MEDICINE

## 2024-09-18 PROCEDURE — 99214 OFFICE O/P EST MOD 30 MIN: CPT | Mod: 95 | Performed by: INTERNAL MEDICINE

## 2024-09-18 NOTE — PROGRESS NOTES
Virtual Visit Details    Type of service:  Video Visit     Originating Location (pt. 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 Developed acute hyperglycemia and diagnosis of T1DM  Treatment with insulin medication, MDI plan  2/2013. Changed to OmniPod pump with Novolog insulin  Used DexcomG5 CGMS system  5/2017. Changed to Medtronic 630G pump  9/2017. Upgraded to Medtronic 670G pump and Guardian3 sensor, Humalog insulin  8/2018. Discontinued Medtronic pump and sensor, changed to MDI plan              Had taken Basaglar 17U at bedtime and mealtime Humalog  Had taken Basaglar 13U at bedtime, Humalog Luxura pen 1:7 at mealtime, ISF 60, goal target  mg/dl  12/2018. Restarted OmniPod pump  ~Early 11/2021. Stopped the Jardiance medication  Using OmniPod pump with Dash PDM, with DexcomG6 CGM    12/24/21. Switched to Tandem TSlim pump, Humalog insulin  6/2022. Switched to Omnipod 5 with DexcomG6              Lispro in pump    Current Omnipod 5 pump settings:`          Recent Omnipod 5 pump data:        Recent DexcomG6 CGM data:          Has reduced hypoglycemia awareness              Previously had service dog (Juliet), trained to recognize hypoglycemia smell/symptoms  Using Contour Next Link BG meter, variable testing up to 6x/day  Exercises with home Pellaton, also classes at health club- yoga, strength              Uses Activity Mode for exercise  Previous [facility] hgbA1c trends include:  Lab Results   Component Value Date    A1C 6.2 (H) 05/10/2024    A1C 5.8 (H)  10/12/2023    A1C 6.2 (H) 06/02/2023    A1C 6.1 (H) 01/25/2023    A1C 5.9 (H) 09/23/2022        Recent [facility] labs include:  Lab Results   Component Value Date    A1C 6.2 (H) 05/10/2024     (L) 05/10/2024    POTASSIUM 4.4 05/10/2024    CHLORIDE 101 05/10/2024    CO2 25 05/10/2024    ANIONGAP 8 05/10/2024     (H) 05/10/2024    BUN 19.5 05/10/2024    CR 0.74 05/10/2024    GFRESTIMATED >90 05/10/2024    GFRESTBLACK 73 07/07/2021    VIJI 8.9 05/10/2024    CHOL 156 10/12/2023    TRIG 61 10/12/2023    HDL 81 10/12/2023    LDL 63 10/12/2023    NHDL 75 10/12/2023    UCRR 86.2 10/12/2023    MICROL <12.0 10/12/2023    UMALCR  10/12/2023      Comment:      Unable to calculate, urine albumin and/or urine creatinine is outside detectable limits.  Microalbuminuria is defined as an albumin:creatinine ratio of 17 to 299 for males and 25 to 299 for females. A ratio of albumin:creatinine of 300 or higher is indicative of overt proteinuria.  Due to biologic variability, positive results should be confirmed by a second, first-morning random or 24-hour timed urine specimen. If there is discrepancy, a third specimen is recommended. When 2 out of 3 results are in the microalbuminuria range, this is evidence for incipient nephropathy and warrants increased efforts at glucose control, blood pressure control, and institution of therapy with an angiotensin-converting-enzyme (ACE) inhibitor (if the patient can tolerate it).       Lab Results   Component Value Date     (L) 05/10/2024    POTASSIUM 4.4 05/10/2024    CHLORIDE 101 05/10/2024    CO2 25 05/10/2024    ANIONGAP 8 05/10/2024     (H) 05/10/2024    BUN 19.5 05/10/2024    CR 0.74 05/10/2024    GFRESTIMATED >90 05/10/2024    GFRESTBLACK 73 07/07/2021    VIJI 8.9 05/10/2024    TSH 1.30 05/10/2024    VITDT 75 01/25/2023     Last eye exam 2/2024 with Dr. Eduar Yeboah/[facility], no DR reported  DM Complications: none known          Thyroid:  2011. History of  hypothyroidism  Takes levothyroxine medication  Eary 10/2018. Dose incease to levothyroxine 0.05 mg daily per GYN physician  Recent [facility] labs include:  Lab Results   Component Value Date    TSH 1.30 05/10/2024    T4 0.85 05/17/2021     Current dose:  Levothyroxine 0.05 mg daily          , lives in [place],  Conrad, daughter Myra and stepchildren Jag (formerly Tisha) + Anoop, dog Veena   New job with [employer], working with [employer]- Medicare  Plans to see Dr. Anali Liu/[facility] for general medicine evaluations.  Also sees Dr. Ashford/[facility], Dr. Brand/Urogynecology       PMH/PSH:  Past Medical History:   Diagnosis Date    Diabetes mellitus type 1 (H)     Dysmenorrhea     Excessive or frequent menstruation     Female stress incontinence     Hypothyroid     Insulin pump in place     Lumbar back pain     Other and unspecified hyperlipidemia     PONV (postoperative nausea and vomiting)      Past Surgical History:   Procedure Laterality Date    DILATION AND CURETTAGE, HYSTEROSCOPY, ABLATE ENDOMETRIUM NOVASURE, COMBINED  1/23/2014    Procedure: COMBINED DILATION AND CURETTAGE, HYSTEROSCOPY, ABLATE ENDOMETRIUM NOVASURE;  HYSTEROSCOPY, DILATION, CURETTAGE, WITH NOVASURE ABLATION, MYOSURE MORCELLATOR;  Surgeon: Swetha Queen MD;  Location: [facility]    LAPAROSCOPY      LEEP TX, CERVICAL      OPERATIVE HYSTEROSCOPY WITH MORCELLATOR  1/23/2014    Procedure: OPERATIVE HYSTEROSCOPY WITH MORCELLATOR;;  Surgeon: Swetha Queen MD;  Location: [facility]    wisdom teeth[         Family Hx:  Family History   Problem Relation Age of Onset    Diabetes Mother         type 2    Obesity Mother     Sleep Apnea Mother     Diabetes Father         type 1    Diabetes Sister         type 1    Thyroid Disease Sister     Rheumatoid Arthritis Sister     Prostate Cancer Maternal Grandfather 80    Breast Cancer Paternal Grandmother 70    Prostate Cancer Paternal Grandfather      Brain Cancer Paternal Grandfather 80    Cancer Maternal Uncle 60        small bowel    Cancer Maternal Uncle 60        tonsil    Colorectal Cancer Paternal Uncle 60    Breast Cancer Paternal Cousin 38         Social Hx:  Social History     Socioeconomic History    Marital status:      Spouse name: Conrad    Number of children: 1    Years of education: Not on file    Highest education level: Not on file   Occupational History     Employer: [employer]   Tobacco Use    Smoking status: Never    Smokeless tobacco: Never   Substance and Sexual Activity    Alcohol use: Yes     Comment: 2 glasses of wine on fri and sat    Drug use: No    Sexual activity: Yes     Partners: Male     Birth control/protection: Male Surgical   Other Topics Concern    Parent/sibling w/ CABG, MI or angioplasty before 65F 55M? No   Social History Narrative     in May 2014    2 step children (20 and 18) and one biol dtr age 15.    Works for [employer]--works from home     Social Determinants of Health     Financial Resource Strain: Not on file   Food Insecurity: Not on file   Transportation Needs: Not on file   Physical Activity: Not on file   Stress: Not on file   Social Connections: Not on file   Interpersonal Safety: Not on file   Housing Stability: Not on file          MEDICATIONS:  has a current medication list which includes the following prescription(s): blood glucose, dexcom g6 , dexcom g6 sensor, dexcom g6 transmitter, contour next test, fluoxetine, baqsimi two pack, omnipod 5 intro (gen 5), omnipod 5 pods (gen 5), lantus solostar, insulin lispro, insulin lispro, insulin pump, levothyroxine, multiple vitamins-minerals, and cholecalciferol.       ROS: 10 point ROS neg other than the symptoms noted above in the HPI.     GENERAL: energy good, wt stable; denies fevers, chills, night sweats.   HEENT: no dysphagia, odonophagia, diplopia, neck pain  THYROID:  no apparent hyper or hypothyroid symptoms  CV: no  chest pain, pressure, palpitations  LUNGS: no SOB, CLIFFORD, cough, wheezing   ABDOMEN: denies nausea, diarrhea, constipation, abdominal pain  EXTREMITIES: no rashes, ulcers, edema  NEUROLOGY: no headaches, denies changes in vision, tingling, extremitiy numbness   MSK: some back pain; denies muscle weakness  SKIN: no rashes or lesions  : some urinary incontinence; regular menstrual cycles  PSYCH:  stable mood, no significant anxiety or depression  ENDOCRINE: no heat or cold intolerance      Physical Exam (visual exam)  VS:  no vital signs taken for video visit  CONSTITUTIONAL: healthy, alert and NAD, well dressed, answering questions appropriately  ENT: no nose swelling or nasal discharge, mouth redness or gum changes.  EYES: eyes grossly normal to inspection, conjunctivae and sclerae normal, no exophthalmos or proptosis  THYROID:  no apparent nodules or goiter  LUNGS: no audible wheeze, cough or visible cyanosis, no visible retractions or increased work of breathing  ABDOMEN: abdomen not evaluated  EXTREMITIES: no hand tremors, limited exam  NEUROLOGY: CN grossly intact, mentation intact and speech normal   SKIN:  no apparent skin lesions, rash, or edema with visualized skin appearance  PSYCH: mentation appears normal, affect normal/bright, judgement and insight intact,   normal speech and appearance well groomed    LABS:     All pertinent notes, labs, and images personally reviewed by me.       A/P:  Encounter Diagnoses   Name Primary?    Type 1.5 diabetes, managed as type 1 (H) Yes    Insulin pump status     Hypothyroidism due to Hashimoto's thyroiditis        Comments:  Reviewed health history, diabetes and thyroid issues.  Recent glucose control excellent  Reviewed and interpreted tests that I previously ordered.   Ordered appropriate tests for the endocrinology disease management.    Management options discussed and implemented after shared medical decision making with the patient.  T1DM and hypothyroidism  problems are chronic-stable    Plan:  Discussed general issues with the diabetes diagnosis and management  We discussed the hgbA1c test which reflects previous overall glucose levels or control  Discussed the importance of blood glucose (BG) testing to assess glucose trends  Reviewed the recent Omnipod 5  pump report and pump settings  Reviewed recent DexcomG6 CGM glucose trends, in detail     Recommend:  Continue the Omnipod 5 insulin pump and diabetes management    No pump setting changes at this time.    Would not resume Jardiance med at this time  Advise using the pump Activity Mode for aerobic exercise:  to raise the sensor glucose target from the preset setting to 150 mg/dl    and lowers the adaptive basal rate by 50% potency. Can preset from 1-24 hrs duration.  Continue use of DexcomG6 CGM glucose sensor, upgrade to G7 sensor when new pods available  Monitor for symptom changes  Consider follow-up [facility]  Continue current levothyroxine, vitamin D daily dose  Plan repeat fasting labs in fall 2024   Testing at [facility]  Discussed (black lab) dog training for hypoglycemia detection  Advise having fasting lipid panel testing and dilated eye examination, at least annually    Keep regular f/u PCP and GYN evaluations  Addressed patient questions today    The longitudinal plan of care for the endocrine problem(s) were addressed during this visit.  Due to added complexity of care,   we will continue to support the patient and the subsequent management of this condition with ongoing continuity of care.    There are no Patient Instructions on file for this visit.    Future labs ordered today:   Orders Placed This Encounter   Procedures    GLUCOSE MONITOR, 72 HOUR, PHYS INTERP    Hemoglobin A1c    Basic metabolic panel    Lipid panel reflex to direct LDL Fasting    TSH with free T4 reflex    Albumin Random Urine Quantitative with Creat Ratio     Radiology/Consults ordered today: None    Total time  spent on day of encounter:  36 min    Follow-up:  12/5/24 at 8:30am, Return    DARI Gaming MD, MS  [facility]

## 2024-11-11 DIAGNOSIS — Z96.41 INSULIN PUMP STATUS: ICD-10-CM

## 2024-11-11 DIAGNOSIS — E13.9 DIABETES MELLITUS TYPE 1.5, MANAGED AS TYPE 2 (H): ICD-10-CM

## 2024-11-11 RX ORDER — INSULIN PMP CART,AUT,G6/7,CNTR
EACH SUBCUTANEOUS
Qty: 30 EACH | Refills: 0 | Status: SHIPPED | OUTPATIENT
Start: 2024-11-11

## 2024-11-11 NOTE — TELEPHONE ENCOUNTER
Last Written Prescription Date:  9/4/24  Last Fill Quantity: 30,  # refills: 0   Last office visit: 1/30/2024 ; last virtual visit: 9/18/2024 with prescribing provider:  Dr. Gaming   Future Office Visit: 12/5/24  Next 5 appointments (look out 90 days)      Nov 18, 2024 7:45 AM  Lab visit with CS [facility]  [facility] ([facility] ) [address] [address]  [phone]     Jan 30, 2025 1:00 PM  (Arrive by 12:40 PM)  Adult Preventative Visit with Mirna Sheehan MD  [facility] ([facility] ) [address]  [facility] [address]  [phone]           Requested Prescriptions   Pending Prescriptions Disp Refills    Insulin Disposable Pump (OMNIPOD 5 PODS (GEN 5)) MISC [Pharmacy Med Name: OMNIPOD_5 PODS]       Sig: USE 1 POD CONTINUOUSLY AS NEEDED CHANGE POD EVERY 2 AND 1/2 DAYS       There is no refill protocol information for this order

## 2024-11-17 ENCOUNTER — MYC REFILL (OUTPATIENT)
Dept: ENDOCRINOLOGY | Facility: CLINIC | Age: 49
End: 2024-11-17
Payer: COMMERCIAL

## 2024-11-17 DIAGNOSIS — E06.3 HYPOTHYROIDISM DUE TO HASHIMOTO'S THYROIDITIS: ICD-10-CM

## 2024-11-18 RX ORDER — LEVOTHYROXINE SODIUM 50 UG/1
50 TABLET ORAL DAILY
Qty: 90 TABLET | Refills: 0 | Status: SHIPPED | OUTPATIENT
Start: 2024-11-18

## 2024-11-25 DIAGNOSIS — E06.3 HYPOTHYROIDISM DUE TO HASHIMOTO'S THYROIDITIS: ICD-10-CM

## 2024-11-26 RX ORDER — LEVOTHYROXINE SODIUM 50 UG/1
50 TABLET ORAL DAILY
Qty: 90 TABLET | Refills: 3 | OUTPATIENT
Start: 2024-11-26

## 2024-12-03 DIAGNOSIS — E06.3 HYPOTHYROIDISM DUE TO HASHIMOTO'S THYROIDITIS: ICD-10-CM

## 2024-12-03 RX ORDER — LEVOTHYROXINE SODIUM 50 UG/1
50 TABLET ORAL DAILY
Qty: 90 TABLET | Refills: 1 | Status: SHIPPED | OUTPATIENT
Start: 2024-12-03

## 2024-12-03 NOTE — TELEPHONE ENCOUNTER
Last Written Prescription Date:  11/18/24  Last Fill Quantity: 90,  # refills: 0   Last office visit: 1/30/2024 ; last virtual visit: 9/18/2024 with prescribing provider:  Dr. Gaming   Future Office Visit: 12/5/24    Next 5 appointments (look out 90 days)      Dec 10, 2024 7:30 AM  Lab visit with CS LAB  [facility] ([facility] ) [address] [address]  [phone]     Jan 30, 2025 1:00 PM  (Arrive by 12:40 PM)  Adult Preventative Visit with Mirna Sheehan MD  [facility] ([facility] ) [address], [address]  [facility] [address]  [phone]           Requested Prescriptions   Pending Prescriptions Disp Refills    levothyroxine (SYNTHROID/LEVOTHROID) 50 MCG tablet [Pharmacy Med Name: Levothyroxine Sodium 50 MCG Oral Tablet] 90 tablet 3     Sig: TAKE 1 TABLET BY MOUTH DAILY       Thyroid Protocol Passed - 12/3/2024  2:52 PM        Passed - Patient is 12 years or older        Passed - Medication is active on med list        Passed - Recent (12 mo) or future (90 days) visit within the authorizing provider's specialty     The patient must have completed an in-person or virtual visit within the past 12 months or has a future visit scheduled within the next 90 days with the authorizing provider s specialty.  Urgent care and e-visits do not qualify as an office visit for this protocol.          Passed - Medication indicated for associated diagnosis     Medication is associated with one or more of the following diagnoses:  Hypothyroidism  Thyroid stimulating hormone suppression therapy  Thyroid cancer  Acquired atrophy of thyroid          Passed - Normal TSH on file in past 12 months     Recent Labs   Lab Test 05/10/24  0813   TSH 1.30              Passed - No active pregnancy on record     If patient is pregnant or has had a positive pregnancy test, please check TSH.          Passed - No  positive pregnancy test in past 12 months     If patient is pregnant or has had a positive pregnancy test, please check TSH.             3 mo refill sent last month.  Mail order requesting refill as there are no refills left.  Will send 1 refill. Kae Anthony RN

## 2024-12-05 ENCOUNTER — VIRTUAL VISIT (OUTPATIENT)
Dept: ENDOCRINOLOGY | Facility: CLINIC | Age: 49
End: 2024-12-05
Payer: COMMERCIAL

## 2024-12-05 DIAGNOSIS — E06.3 HYPOTHYROIDISM DUE TO HASHIMOTO'S THYROIDITIS: ICD-10-CM

## 2024-12-05 DIAGNOSIS — Z96.41 INSULIN PUMP STATUS: ICD-10-CM

## 2024-12-05 DIAGNOSIS — E10.9 TYPE 1 DIABETES MELLITUS WITHOUT COMPLICATION (H): Primary | ICD-10-CM

## 2024-12-05 RX ORDER — INSULIN LISPRO 100 [IU]/ML
INJECTION, SOLUTION INTRAVENOUS; SUBCUTANEOUS
Qty: 70 ML | Refills: 3 | Status: SHIPPED | OUTPATIENT
Start: 2024-12-05

## 2024-12-05 NOTE — PROGRESS NOTES
Virtual Visit Details    Type of service:  Video Visit     Originating Location (pt. Location): Home  Distant Location (provider location):  On-site  Platform used for Video Visit: Katerine      Recent issues:  Diabetes and thyroid follow-up evaluation  Continues to use the Omnipod 5 pump with DexcomG6 since 6/2022, now using OP5 iPhone erinn  Recently took the Health and Wellness Coaching exam in 11/2024, results pending.  Feeling well overall, no new health issues reported         Diabetes:  Previous diagnosis of IFG with high GAD65 Ab level  6/2011. Developed acute hyperglycemia and diagnosis of T1DM  Treatment with insulin medication, MDI plan  2/2013. Changed to OmniPod pump with Novolog insulin  Used DexcomG5 CGMS system  5/2017. Changed to Medtronic 630G pump  9/2017. Upgraded to Medtronic 670G pump and Guardian3 sensor, Humalog insulin  8/2018. Discontinued Medtronic pump and sensor, changed to MDI plan              Had taken Basaglar 17U at bedtime and mealtime Humalog  Had taken Basaglar 13U at bedtime, Humalog Luxura pen 1:7 at mealtime, ISF 60, goal target  mg/dl  12/2018. Restarted OmniPod pump  ~Early 11/2021. Stopped the Jardiance medication  Using OmniPod pump with Dash PDM, with DexcomG6 CGM  12/24/21. Switched to Tandem TSlim pump, Humalog insulin  6/2022. Switched to Omnipod 5 with DexcomG6              Lispro in pump    Current Omnipod 5 pump settings:`          Recent Omnipod 5 pump data:          Recent DexcomG6 CGM data:          Has reduced hypoglycemia awareness              Previously had service dog (Juliet), trained to recognize hypoglycemia smell/symptoms  Using Contour Next Link BG meter, variable testing up to 6x/day  Exercises with home Pellaton, also classes at health club- yoga, strength              Uses Activity Mode for exercise  Previous [facility] hgbA1c trends include:  Lab Results   Component Value Date    A1C 6.2 (H) 05/10/2024    A1C 5.8 (H) 10/12/2023    A1C 6.2 (H) 06/02/2023     A1C 6.1 (H) 01/25/2023    A1C 5.9 (H) 09/23/2022        Recent [facility] labs include:  Lab Results   Component Value Date    A1C 6.2 (H) 05/10/2024     (L) 05/10/2024    POTASSIUM 4.4 05/10/2024    CHLORIDE 101 05/10/2024    CO2 25 05/10/2024    ANIONGAP 8 05/10/2024     (H) 05/10/2024    BUN 19.5 05/10/2024    CR 0.74 05/10/2024    GFRESTIMATED >90 05/10/2024    GFRESTBLACK 73 07/07/2021    VIJI 8.9 05/10/2024    CHOL 156 10/12/2023    TRIG 61 10/12/2023    HDL 81 10/12/2023    LDL 63 10/12/2023    NHDL 75 10/12/2023    UCRR 86.2 10/12/2023    MICROL <12.0 10/12/2023    UMALCR  10/12/2023      Comment:      Unable to calculate, urine albumin and/or urine creatinine is outside detectable limits.  Microalbuminuria is defined as an albumin:creatinine ratio of 17 to 299 for males and 25 to 299 for females. A ratio of albumin:creatinine of 300 or higher is indicative of overt proteinuria.  Due to biologic variability, positive results should be confirmed by a second, first-morning random or 24-hour timed urine specimen. If there is discrepancy, a third specimen is recommended. When 2 out of 3 results are in the microalbuminuria range, this is evidence for incipient nephropathy and warrants increased efforts at glucose control, blood pressure control, and institution of therapy with an angiotensin-converting-enzyme (ACE) inhibitor (if the patient can tolerate it).       Lab Results   Component Value Date     (L) 05/10/2024    POTASSIUM 4.4 05/10/2024    CHLORIDE 101 05/10/2024    CO2 25 05/10/2024    ANIONGAP 8 05/10/2024     (H) 05/10/2024    BUN 19.5 05/10/2024    CR 0.74 05/10/2024    GFRESTIMATED >90 05/10/2024    GFRESTBLACK 73 07/07/2021    VIJI 8.9 05/10/2024    TSH 1.30 05/10/2024    VITDT 75 01/25/2023     Last eye exam 2/2024 with Dr. Eduar Yeboah/[facility], no DR reported  DM Complications: none known          Thyroid:  2011. History of hypothyroidism  Takes levothyroxine medication  Eary  10/2018. Dose incease to levothyroxine 0.05 mg daily per GYN physician  Recent [facility] labs include:  Lab Results   Component Value Date    TSH 1.30 05/10/2024    T4 0.85 05/17/2021     Current dose:  Levothyroxine 0.05 mg daily          , lives in [place],  Conrad, daughter Myra and stepchildren Jag (formerly Tisha) + Anoop, dog Veena   New job with [employer], working with  [email or web address] [facility]- Medicare  Plans to see Dr. Anali Liu/[facility] for general medicine evaluations.  Also sees Dr. Ashford/[facility], Dr. Brand/Urogynecology       PMH/PSH:  Past Medical History:   Diagnosis Date    Diabetes mellitus type 1 (H)     Dysmenorrhea     Excessive or frequent menstruation     Female stress incontinence     Hypothyroid     Insulin pump in place     Lumbar back pain     Other and unspecified hyperlipidemia     PONV (postoperative nausea and vomiting)      Past Surgical History:   Procedure Laterality Date    DILATION AND CURETTAGE, HYSTEROSCOPY, ABLATE ENDOMETRIUM NOVASURE, COMBINED  1/23/2014    Procedure: COMBINED DILATION AND CURETTAGE, HYSTEROSCOPY, ABLATE ENDOMETRIUM NOVASURE;  HYSTEROSCOPY, DILATION, CURETTAGE, WITH NOVASURE ABLATION, MYOSURE MORCELLATOR;  Surgeon: Swetha Queen MD;  Location: [facility]    LAPAROSCOPY      LEEP TX, CERVICAL      OPERATIVE HYSTEROSCOPY WITH MORCELLATOR  1/23/2014    Procedure: OPERATIVE HYSTEROSCOPY WITH MORCELLATOR;;  Surgeon: Swetha Queen MD;  Location: [facility]    wisdom teeth[         Family Hx:  Family History   Problem Relation Age of Onset    Diabetes Mother         type 2    Obesity Mother     Sleep Apnea Mother     Diabetes Father         type 1    Diabetes Sister         type 1    Thyroid Disease Sister     Rheumatoid Arthritis Sister     Prostate Cancer Maternal Grandfather 80    Breast Cancer Paternal Grandmother 70    Prostate Cancer Paternal Grandfather     Brain Cancer Paternal Grandfather 80    Cancer Maternal  Uncle 60        small bowel    Cancer Maternal Uncle 60        tonsil    Colorectal Cancer Paternal Uncle 60    Breast Cancer Paternal Cousin 38         Social Hx:  Social History     Socioeconomic History    Marital status:      Spouse name: Conrad    Number of children: 1    Years of education: Not on file    Highest education level: Not on file   Occupational History     Employer: [employer]   Tobacco Use    Smoking status: Never    Smokeless tobacco: Never   Substance and Sexual Activity    Alcohol use: Yes     Comment: 2 glasses of wine on fri and sat    Drug use: No    Sexual activity: Yes     Partners: Male     Birth control/protection: Male Surgical   Other Topics Concern    Parent/sibling w/ CABG, MI or angioplasty before 65F 55M? No   Social History Narrative     in May 2014    2 step children (20 and 18) and one biol dtr age 15.    Works for [employer]--works from home     Social Drivers of Health     Financial Resource Strain: Not on file   Food Insecurity: Not on file   Transportation Needs: Not on file   Physical Activity: Not on file   Stress: Not on file   Social Connections: Not on file   Interpersonal Safety: Not on file   Housing Stability: Not on file          MEDICATIONS:  has a current medication list which includes the following prescription(s): dexcom g6 sensor, dexcom g6 transmitter, fluoxetine, omnipod 5 intro (gen 5), omnipod 5 pods (gen 5), insulin lispro, levothyroxine, multiple vitamins-minerals, cholecalciferol, blood glucose, dexcom g6 , contour next test, baqsimi two pack, lantus solostar, insulin lispro, and insulin pump.       ROS: 10 point ROS neg other than the symptoms noted above in the HPI.     GENERAL: energy good, wt stable; denies fevers, chills, night sweats.   HEENT: no dysphagia, odonophagia, diplopia, neck pain  THYROID:  no apparent hyper or hypothyroid symptoms  CV: no chest pain, pressure, palpitations  LUNGS: no SOB, CLIFFORD, cough,  wheezing   ABDOMEN: denies nausea, diarrhea, constipation, abdominal pain  EXTREMITIES: no rashes, ulcers, edema  NEUROLOGY: no headaches, denies changes in vision, tingling, extremitiy numbness   MSK: some back pain; denies muscle weakness  SKIN: no rashes or lesions  : some urinary incontinence; regular menstrual cycles  PSYCH:  stable mood, no significant anxiety or depression  ENDOCRINE: no heat or cold intolerance    Physical Exam (visual exam)  VS:  no vital signs taken for video visit  CONSTITUTIONAL: healthy, alert and NAD, well dressed, answering questions appropriately  ENT: no nose swelling or nasal discharge, mouth redness or gum changes.  EYES: eyes grossly normal to inspection, conjunctivae and sclerae normal, no exophthalmos or proptosis  THYROID:  no apparent nodules or goiter  LUNGS: no audible wheeze, cough or visible cyanosis, no visible retractions or increased work of breathing  ABDOMEN: abdomen not evaluated  EXTREMITIES: no hand tremors, limited exam  NEUROLOGY: CN grossly intact, mentation intact and speech normal   SKIN:  no apparent skin lesions, rash, or edema with visualized skin appearance  PSYCH: mentation appears normal, affect normal/bright, judgement and insight intact,   normal speech and appearance well groomed    LABS:     All pertinent notes, labs, and images personally reviewed by me.       A/P:  Encounter Diagnoses   Name Primary?    Type 1 diabetes mellitus without complication (H) Yes    Insulin pump status     Hypothyroidism due to Hashimoto's thyroiditis          Comments:  Reviewed health history, diabetes and thyroid issues.  Recent glucose control excellent  Reviewed and interpreted tests that I previously ordered.   Ordered appropriate tests for the endocrinology disease management.    Management options discussed and implemented after shared medical decision making with the patient.  T1DM and hypothyroidism problems are chronic-stable    Plan:  Discussed general  issues with the diabetes diagnosis and management  We discussed the hgbA1c test which reflects previous overall glucose levels or control  Discussed the importance of blood glucose (BG) testing to assess glucose trends  Reviewed the recent Omnipod 5  pump report and pump settings  Reviewed recent DexcomG6 CGM glucose trends, in detail     Recommend:  Continue the Omnipod 5 insulin pump and diabetes management    Pump setting changes:   Bolus ICR:  add 5p and change 7 to 8    Would not resume Jardiance med at this time  Advise using the pump Activity Mode for aerobic exercise:  to raise the sensor glucose target from the preset setting to 150 mg/dl    and lowers the adaptive basal rate by 50% potency. Can preset from 1-24 hrs duration.  Monitor for symptom changes  Consider future follow-up [facility]  Continue current levothyroxine, vitamin D daily dose  Keep scheduled fasting lab appt soon   Testing at [facility], orders available  We have discussed (black lab) dog training for hypoglycemia detection  Advise having fasting lipid panel testing and dilated eye examination, at least annually    Keep regular f/u PCP and GYN evaluations  Addressed patient questions today    The longitudinal plan of care for the endocrine problem(s) were addressed during this visit.  Due to added complexity of care,   we will continue to support the patient and the subsequent management of this condition with ongoing continuity of care.    There are no Patient Instructions on file for this visit.    Future labs ordered today:   Orders Placed This Encounter   Procedures    GLUCOSE MONITOR, 72 HOUR, PHYS INTERP     Radiology/Consults ordered today: None    Total time spent on day of encounter:  26 min    Follow-up:  3/24/25 at 8:30 am, Return    DARI Gaming MD, MS  Endocrinology  [facility]    CC:  WILLOW Sheehan

## 2024-12-11 ENCOUNTER — ANCILLARY PROCEDURE (OUTPATIENT)
Dept: MRI IMAGING | Facility: CLINIC | Age: 49
End: 2024-12-11
Payer: COMMERCIAL

## 2024-12-11 DIAGNOSIS — Z12.39 BREAST CANCER SCREENING, HIGH RISK PATIENT: ICD-10-CM

## 2024-12-11 DIAGNOSIS — Z80.3 FAMILY HISTORY OF MALIGNANT NEOPLASM OF BREAST: ICD-10-CM

## 2024-12-11 DIAGNOSIS — R92.30 DENSE BREAST: ICD-10-CM

## 2024-12-11 PROCEDURE — A9585 GADOBUTROL INJECTION: HCPCS

## 2024-12-11 PROCEDURE — 77049 MRI BREAST C-+ W/CAD BI: CPT

## 2024-12-11 PROCEDURE — 255N000002 HC RX 255 OP 636

## 2024-12-11 RX ORDER — GADOBUTROL 604.72 MG/ML
6 INJECTION INTRAVENOUS ONCE
Status: COMPLETED | OUTPATIENT
Start: 2024-12-11 | End: 2024-12-11

## 2024-12-11 RX ADMIN — GADOBUTROL 6 ML: 604.72 INJECTION INTRAVENOUS at 10:08

## 2024-12-29 ENCOUNTER — HEALTH MAINTENANCE LETTER (OUTPATIENT)
Age: 49
End: 2024-12-29

## 2025-01-01 ENCOUNTER — MYC MEDICAL ADVICE (OUTPATIENT)
Dept: ENDOCRINOLOGY | Facility: CLINIC | Age: 50
End: 2025-01-01
Payer: COMMERCIAL

## 2025-01-02 DIAGNOSIS — E10.9 TYPE 1 DIABETES MELLITUS WITHOUT COMPLICATION (H): Primary | ICD-10-CM

## 2025-01-02 DIAGNOSIS — K30 INDIGESTION: ICD-10-CM

## 2025-01-22 ENCOUNTER — PATIENT OUTREACH (OUTPATIENT)
Dept: CARE COORDINATION | Facility: CLINIC | Age: 50
End: 2025-01-22
Payer: COMMERCIAL

## 2025-01-22 SDOH — HEALTH STABILITY: PHYSICAL HEALTH: ON AVERAGE, HOW MANY MINUTES DO YOU ENGAGE IN EXERCISE AT THIS LEVEL?: 150+ MIN

## 2025-01-22 SDOH — HEALTH STABILITY: PHYSICAL HEALTH: ON AVERAGE, HOW MANY DAYS PER WEEK DO YOU ENGAGE IN MODERATE TO STRENUOUS EXERCISE (LIKE A BRISK WALK)?: 7 DAYS

## 2025-01-22 ASSESSMENT — SOCIAL DETERMINANTS OF HEALTH (SDOH): HOW OFTEN DO YOU GET TOGETHER WITH FRIENDS OR RELATIVES?: ONCE A WEEK

## 2025-01-27 ENCOUNTER — OFFICE VISIT (OUTPATIENT)
Dept: FAMILY MEDICINE | Facility: CLINIC | Age: 50
End: 2025-01-27
Payer: COMMERCIAL

## 2025-01-27 VITALS
TEMPERATURE: 98.3 F | RESPIRATION RATE: 16 BRPM | WEIGHT: 158.7 LBS | DIASTOLIC BLOOD PRESSURE: 64 MMHG | HEIGHT: 67 IN | SYSTOLIC BLOOD PRESSURE: 112 MMHG | BODY MASS INDEX: 24.91 KG/M2 | HEART RATE: 61 BPM | OXYGEN SATURATION: 98 %

## 2025-01-27 DIAGNOSIS — E13.9 DIABETES MELLITUS TYPE 1.5, MANAGED AS TYPE 2 (H): ICD-10-CM

## 2025-01-27 DIAGNOSIS — Z23 NEED FOR VACCINATION: ICD-10-CM

## 2025-01-27 DIAGNOSIS — E03.8 OTHER SPECIFIED HYPOTHYROIDISM: ICD-10-CM

## 2025-01-27 DIAGNOSIS — E10.9 TYPE 1 DIABETES MELLITUS WITHOUT COMPLICATION (H): ICD-10-CM

## 2025-01-27 DIAGNOSIS — F41.9 ANXIETY: ICD-10-CM

## 2025-01-27 DIAGNOSIS — Z00.00 ANNUAL PHYSICAL EXAM: Primary | ICD-10-CM

## 2025-01-27 DIAGNOSIS — D12.2 BENIGN NEOPLASM OF ASCENDING COLON: ICD-10-CM

## 2025-01-27 DIAGNOSIS — E78.5 HYPERLIPIDEMIA WITH TARGET LDL LESS THAN 100: ICD-10-CM

## 2025-01-27 PROBLEM — N64.4 MASTALGIA IN FEMALE: Status: RESOLVED | Noted: 2018-10-05 | Resolved: 2025-01-27

## 2025-01-27 PROCEDURE — 90677 PCV20 VACCINE IM: CPT | Performed by: INTERNAL MEDICINE

## 2025-01-27 PROCEDURE — 90471 IMMUNIZATION ADMIN: CPT | Performed by: INTERNAL MEDICINE

## 2025-01-27 PROCEDURE — 99213 OFFICE O/P EST LOW 20 MIN: CPT | Mod: 25 | Performed by: INTERNAL MEDICINE

## 2025-01-27 PROCEDURE — 99396 PREV VISIT EST AGE 40-64: CPT | Mod: 25 | Performed by: INTERNAL MEDICINE

## 2025-01-27 NOTE — PROGRESS NOTES
Preventive Care Visit  [facility] ANUJA Sheehan MD, Internal Medicine  Jan 27, 2025      Assessment & Plan     Annual physical exam  Labs reviewed. Due for colon cancer screening this year    Type 1 diabetes mellitus without complication (H)  Managed by endocrinology.   Foot exam done today    Anxiety  She is on prozac 20 mg daily, continue medication.     Other specified hypothyroidism  Stable. Continue medication.    Hyperlipidemia with target LDL less than 100  Per patient, endocrinologist did not recommend statin even though     Benign neoplasm of ascending colon  Due for colonoscopy    Need for vaccination  pcv20    Patient has been advised of split billing requirements and indicates understanding: Yes        Counseling  Appropriate preventive services were addressed with this patient via screening, questionnaire, or discussion as appropriate for fall prevention, nutrition, physical activity, Tobacco-use cessation, social engagement, weight loss and cognition.  Checklist reviewing preventive services available has been given to the patient.  Reviewed patient's diet, addressing concerns and/or questions.   She is at risk for psychosocial distress and has been provided with information to reduce risk.       See Patient Instructions    Kylie Burden is a 49 year old, presenting for the following:  Physical       HPI  Jenise LOMAX Dianaflaco is here for APE  She has DM1, HLD, anxiety, colon polyps, hypothyroidism.   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   Colonoscopy in 2022 which showed polyps, recommended 3 year follow up  UTD with mammogram (MRI and mammogram every 6 months)  Hx of endometrial polyps removed by obgyn in 2014.  Eye exam UTD  Foot exam done today.   Per patient, her endocrinologist did not recommend statin although her LDL is >70.    Health Care Directive  Patient has a Health Care Directive on file  Advance care planning document is  on file and is current.      1/22/2025   General Health   How would you rate your overall physical health? Excellent   Feel stress (tense, anxious, or unable to sleep) To some extent   (!) STRESS CONCERN      1/22/2025   Nutrition   Three or more servings of calcium each day? Yes   Diet: Diabetic    Carbohydrate counting   How many servings of fruit and vegetables per day? (!) 2-3   How many sweetened beverages each day? 0-1       Multiple values from one day are sorted in reverse-chronological order         1/22/2025   Exercise   Days per week of moderate/strenous exercise 7 days   Average minutes spent exercising at this level 150+ min         1/22/2025   Social Factors   Frequency of gathering with friends or relatives Once a week   Worry food won't last until get money to buy more No   Food not last or not have enough money for food? No   Do you have housing? (Housing is defined as stable permanent housing and does not include staying ouside in a car, in a tent, in an abandoned building, in an overnight shelter, or couch-surfing.) Yes   Are you worried about losing your housing? No   Lack of transportation? No   Unable to get utilities (heat,electricity)? No         1/22/2025   Dental   Dentist two times every year? Yes         1/22/2025   TB Screening   Were you born outside of the [place]? No         Today's PHQ-2 Score:       1/27/2025     8:18 AM   PHQ-2 ( 1999 Pfizer)   Q1: Little interest or pleasure in doing things 0   Q2: Feeling down, depressed or hopeless 0   PHQ-2 Score 0    Q1: Little interest or pleasure in doing things Not at all   Q2: Feeling down, depressed or hopeless Not at all   PHQ-2 Score 0       Patient-reported           1/22/2025   Substance Use   Alcohol more than 3/day or more than 7/wk No   Do you use any other substances recreationally? No     Social History     Tobacco Use    Smoking status: Never    Smokeless tobacco: Never   Substance Use Topics    Alcohol use: Yes     Comment: 2  "glasses of wine on fri and sat    Drug use: No         5/23/2024   LAST [facility]-7 RESULTS   1st degree relative breast or ovarian cancer No   Any relative bilateral breast cancer No   Any male have breast cancer No   Any ONE woman have BOTH breast AND ovarian cancer No   Any woman with breast cancer before 50yrs No   2 or more relatives with breast AND/OR ovarian cancer Yes   2 or more relatives with breast AND/OR bowel cancer Yes     Mammogram Screening - Mammogram every 1-2 years updated in Health Maintenance based on mutual decision making        1/22/2025   STI Screening   New sexual partner(s) since last STI/HIV test? No     History of abnormal Pap smear: No - age 30- 64 PAP with HPV every 5 years recommended        Latest Ref Rng & Units 4/6/2022    10:23 AM 1/22/2019     9:31 AM 1/22/2019     9:30 AM   PAP / HPV   PAP  Negative for Intraepithelial Lesion or Malignancy (NILM)      PAP (Historical)   NIL     HPV 16 DNA Negative Negative   Negative    HPV 18 DNA Negative Negative   Negative    Other HR HPV Negative Negative   Negative      ASCVD Risk   The ASCVD Risk score (Olena ARENAS, et al., 2019) failed to calculate for the following reasons:    The valid HDL cholesterol range is 20 to 100 mg/dL        1/22/2025   Contraception/Family Planning   Questions about contraception or family planning No        Reviewed and updated as needed this visit by Provider                       Objective    Exam  /64 (BP Location: Right arm, Patient Position: Sitting, Cuff Size: Adult Regular)   Pulse 61   Temp 98.3  F (36.8  C)   Resp 16   Ht 1.702 m (5' 7\")   Wt 72 kg (158 lb 11.2 oz)   SpO2 98%   BMI 24.86 kg/m     Estimated body mass index is 24.86 kg/m  as calculated from the following:    Height as of this encounter: 1.702 m (5' 7\").    Weight as of this encounter: 72 kg (158 lb 11.2 oz).    Physical Exam  Cardiovascular:      Pulses:           Dorsalis pedis pulses are 2+ on the right side and 2+ on " the left side.   Feet:      Right foot:      Protective Sensation: 10 sites tested.  10 sites sensed.      Skin integrity: Skin integrity normal.      Toenail Condition: Right toenails are normal.      Left foot:      Protective Sensation: 10 sites tested.  10 sites sensed.      Skin integrity: Skin integrity normal.      Toenail Condition: Left toenails are normal.               Signed Electronically by: Mirna Sheehan MD

## 2025-03-23 NOTE — PROGRESS NOTES
Recent issues:  Diabetes and thyroid follow-up evaluation, with her assist dog Veena  Continues to use the Omnipod 5 pump with DexcomG6 since 6/2022, using OP5 iPhone erinn  Feeling well overall, no new health issues reported         Diabetes:  Previous diagnosis of IFG with high GAD65 Ab level  6/2011. Developed acute hyperglycemia and diagnosis of T1DM  Treatment with insulin medication, MDI plan  2/2013. Changed to OmniPod pump with Novolog insulin  Used DexcomG5 CGMS system  5/2017. Changed to Medtronic 630G pump  9/2017. Upgraded to Medtronic 670G pump and Guardian3 sensor, Humalog insulin  8/2018. Discontinued Medtronic pump and sensor, changed to MDI plan              Had taken Basaglar 17U at bedtime and mealtime Humalog  Had taken Basaglar 13U at bedtime, Humalog Luxura pen 1:7 at mealtime, ISF 60, goal target  mg/dl  12/2018. Restarted OmniPod pump  ~Early 11/2021. Stopped the Jardiance medication  Using OmniPod pump with Dash PDM, with DexcomG6 CGM  12/24/21. Switched to Tandem TSlim pump, Humalog insulin  6/2022. Switched to Omnipod 5 with DexcomG6              Lispro in pump    Current Omnipod 5 pump settings:`          Recent Omnipod 5 pump data:        Recent DexcomG6 CGM data:            Has reduced hypoglycemia awareness              Previously had service dog (Juliet), trained to recognize hypoglycemia smell/symptoms  Using Contour Next Link BG meter, variable testing up to 6x/day  Exercises with home Yanelis, also classes at health club- yoga, strength              Uses Activity Mode for exercise  Previous [facility] hgbA1c trends include:  Lab Results   Component Value Date    A1C 6.2 (H) 01/03/2025    A1C 6.2 (H) 05/10/2024    A1C 5.8 (H) 10/12/2023    A1C 6.2 (H) 06/02/2023    A1C 6.1 (H) 01/25/2023        Recent [facility] labs include:  Lab Results   Component Value Date    A1C 6.2 (H) 01/03/2025     01/03/2025    POTASSIUM 4.0 01/03/2025    CHLORIDE 100 01/03/2025    CO2 27 01/03/2025    ANIONGAP  11 01/03/2025     (H) 01/03/2025    BUN 22.6 (H) 01/03/2025    CR 0.92 01/03/2025    GFRESTIMATED 77 01/03/2025    GFRESTBLACK 73 07/07/2021    VIJI 9.2 01/03/2025    CHOL 213 (H) 01/03/2025    TRIG 66 01/03/2025     01/03/2025    LDL 99 01/03/2025    NHDL 112 01/03/2025    UCRR 58.8 01/03/2025    MICROL <12.0 01/03/2025    UMALCR  01/03/2025      Comment:      Unable to calculate, urine albumin and/or urine creatinine is outside detectable limits.  Microalbuminuria is defined as an albumin:creatinine ratio of 17 to 299 for males and 25 to 299 for females. A ratio of albumin:creatinine of 300 or higher is indicative of overt proteinuria.  Due to biologic variability, positive results should be confirmed by a second, first-morning random or 24-hour timed urine specimen. If there is discrepancy, a third specimen is recommended. When 2 out of 3 results are in the microalbuminuria range, this is evidence for incipient nephropathy and warrants increased efforts at glucose control, blood pressure control, and institution of therapy with an angiotensin-converting-enzyme (ACE) inhibitor (if the patient can tolerate it).       Lab Results   Component Value Date     01/03/2025    POTASSIUM 4.0 01/03/2025    CHLORIDE 100 01/03/2025    CO2 27 01/03/2025    ANIONGAP 11 01/03/2025     (H) 01/03/2025    BUN 22.6 (H) 01/03/2025    CR 0.92 01/03/2025    GFRESTIMATED 77 01/03/2025    GFRESTBLACK 73 07/07/2021    VIJI 9.2 01/03/2025    TSH 1.59 01/03/2025    VITDT 75 01/25/2023     Last eye exam 2/2024 with Dr. Eduar Yeboah/[facility], no DR reported  DM Complications: none known        Thyroid:  2011. History of hypothyroidism  Takes levothyroxine medication  Eary 10/2018. Dose incease to levothyroxine 0.05 mg daily per GYN physician  Recent [facility] labs include:  Lab Results   Component Value Date    TSH 1.59 01/03/2025    T4 0.85 05/17/2021     Current dose:  Levothyroxine 0.05 mg daily          , lives in  [place],  Con[place], daughter Myra ([facility]) and stepchildren Jag (formerly Tisha, Yolie) + Anoop ([facility]), dog Veena   New job with [employer], working with [employer] program- Medicare  Took the Health and Wellness Coaching exam in 11/2024  Plans to see Dr. Anali Liu/[facility] for general medicine evaluations.  Also sees Dr. Ashford/[facility], Dr. Brand/Urogynecology       PMH/PSH:  Past Medical History:   Diagnosis Date    Diabetes mellitus type 1 (H)     Dysmenorrhea     Excessive or frequent menstruation     Female stress incontinence     Hypothyroid     Insulin pump in place     Lumbar back pain     Other and unspecified hyperlipidemia     PONV (postoperative nausea and vomiting)      Past Surgical History:   Procedure Laterality Date    DILATION AND CURETTAGE, HYSTEROSCOPY, ABLATE ENDOMETRIUM NOVASURE, COMBINED  1/23/2014    Procedure: COMBINED DILATION AND CURETTAGE, HYSTEROSCOPY, ABLATE ENDOMETRIUM NOVASURE;  HYSTEROSCOPY, DILATION, CURETTAGE, WITH NOVASURE ABLATION, MYOSURE MORCELLATOR;  Surgeon: Swetha Queen MD;  Location: [facility]    LAPAROSCOPY      LEEP TX, CERVICAL      OPERATIVE HYSTEROSCOPY WITH MORCELLATOR  1/23/2014    Procedure: OPERATIVE HYSTEROSCOPY WITH MORCELLATOR;;  Surgeon: Swetha Queen MD;  Location: [facility]    wisdom teeth[         Family Hx:  Family History   Problem Relation Age of Onset    Diabetes Mother         type 2    Obesity Mother     Sleep Apnea Mother     Diabetes Father         type 1    Diabetes Sister         type 1    Thyroid Disease Sister     Rheumatoid Arthritis Sister     Prostate Cancer Maternal Grandfather 80    Breast Cancer Paternal Grandmother 70    Prostate Cancer Paternal Grandfather     Brain Cancer Paternal Grandfather 80    Cancer Maternal Uncle 60        small bowel    Cancer Maternal Uncle 60        tonsil    Colorectal Cancer Paternal Uncle 60    Breast Cancer Paternal Cousin 38    Colon Cancer  Other          Social Hx:  Social History     Socioeconomic History    Marital status:      Spouse name: Conrad    Number of children: 1    Years of education: Not on file    Highest education level: Not on file   Occupational History     Employer: [employer]   Tobacco Use    Smoking status: Never    Smokeless tobacco: Never   Substance and Sexual Activity    Alcohol use: Yes     Comment: 2 glasses of wine on fri and sat    Drug use: No    Sexual activity: Yes     Partners: Male     Birth control/protection: Male Surgical   Other Topics Concern    Parent/sibling w/ CABG, MI or angioplasty before 65F 55M? No   Social History Narrative     in May 2014    2 step children (20 and 18) and one biol dtr age 15.    Works for [employer]--works from home     Social Drivers of Health     Financial Resource Strain: Low Risk  (1/22/2025)    Financial Resource Strain     Within the past 12 months, have you or your family members you live with been unable to get utilities (heat, electricity) when it was really needed?: No   Food Insecurity: Low Risk  (1/22/2025)    Food Insecurity     Within the past 12 months, did you worry that your food would run out before you got money to buy more?: No     Within the past 12 months, did the food you bought just not last and you didn t have money to get more?: No   Transportation Needs: Low Risk  (1/22/2025)    Transportation Needs     Within the past 12 months, has lack of transportation kept you from medical appointments, getting your medicines, non-medical meetings or appointments, work, or from getting things that you need?: No   Physical Activity: Sufficiently Active (1/22/2025)    Exercise Vital Sign     Days of Exercise per Week: 7 days     Minutes of Exercise per Session: 150+ min   Stress: Stress Concern Present (1/22/2025)    [employer] [employer] - Occupational Stress Questionnaire     Feeling of Stress : To some extent   Social  Connections: Unknown (1/22/2025)    Social Connection and Isolation Panel [NHANES]     Frequency of Communication with Friends and Family: Not on file     Frequency of Social Gatherings with Friends and Family: Once a week     Attends Anabaptist Services: Not on file     Active Member of Clubs or Organizations: Not on file     Attends Club or Organization Meetings: Not on file     Marital Status: Not on file   Interpersonal Safety: Not on file   Housing Stability: Low Risk  (1/22/2025)    Housing Stability     Do you have housing? : Yes     Are you worried about losing your housing?: No          MEDICATIONS:  has a current medication list which includes the following prescription(s): dexcom g6 sensor, dexcom g6 transmitter, omnipod 5 intro (gen 5), insulin lispro, levothyroxine, multiple vitamins-minerals, cholecalciferol, blood glucose, dexcom g6 , contour next test, fluoxetine, baqsimi two pack, omnipod 5 pods (gen 5), lantus solostar, insulin lispro, and insulin pump.       ROS: 10 point ROS neg other than the symptoms noted above in the HPI.     GENERAL: energy good, wt stable; denies fevers, chills, night sweats.   HEENT: no dysphagia, odonophagia, diplopia, neck pain  THYROID:  no apparent hyper or hypothyroid symptoms  CV: no chest pain, pressure, palpitations  LUNGS: no SOB, CLIFFORD, cough, wheezing   ABDOMEN: denies nausea, diarrhea, constipation, abdominal pain  EXTREMITIES: no rashes, ulcers, edema  NEUROLOGY: no headaches, denies changes in vision, tingling, extremitiy numbness   MSK: some back pain; denies muscle weakness  SKIN: no rashes or lesions  : some urinary incontinence; regular menstrual cycles  PSYCH:  stable mood, no significant anxiety or depression  ENDOCRINE: no heat or cold intolerance      Physical Exam   VS: /73   Pulse 70   Wt 71.2 kg (157 lb)   BMI 24.59 kg/m    GENERAL: AXOX3, NAD, slender, well dressed, answering questions appropriately, appears stated age.  ENT: no  nose swelling or nasal discharge, mouth redness or gum changes.  EYES: eyes grossly normal to inspection, conjunctivae and sclerae normal, no exophthalmos or proptosis  THYROID:  no apparent nodules or goiter  LUNGS: no audible wheeze, cough or visible cyanosis, or increased work of breathing  ABDOMEN: abdomen normal size  EXTREMITIES: no edema noted  NEUROLOGY: CN grossly intact, no tremors  MSK: grossly intact  SKIN:  no apparent skin lesions, rash, or edema with visualized skin appearance  PSYCH: mentation appears normal, affect normal/bright, judgement and insight intact,   normal speech and appearance well groomed    LABS:     All pertinent notes, labs, and images personally reviewed by me.       A/P:  Encounter Diagnoses   Name Primary?    Type 1.5 diabetes, managed as type 1 (H) Yes    Insulin pump status     Hypothyroidism due to Hashimoto's thyroiditis        Comments:  Reviewed health history, diabetes and thyroid issues.  Recent glucose control excellent  Reviewed and interpreted tests that I previously ordered.   Ordered appropriate tests for the endocrinology disease management.    Management options discussed and implemented after shared medical decision making with the patient.  T1DM and hypothyroidism problems are chronic-stable    Plan:  Discussed general issues with the diabetes diagnosis and management  We discussed the hgbA1c test which reflects previous overall glucose levels or control  Discussed the importance of blood glucose (BG) testing to assess glucose trends  Reviewed the recent Omnipod 5  pump report and pump settings  Reviewed recent DexcomG6 CGM glucose trends, in detail     Recommend:  Continue the Omnipod 5 insulin pump and diabetes management    Pump setting changes:   Bolus ICR:  5p 8 to 8.5    Would not resume Jardiance med at this time  Advise using the pump Activity Mode for aerobic exercise:  to raise the sensor glucose target from the preset setting to 150 mg/dl    and lowers  the adaptive basal rate by 50% potency. Can preset from 1-24 hrs duration.  Monitor for symptom changes  Consider future follow-up [facility] Diab Educators  Continue current levothyroxine, vitamin D daily dose  Keep scheduled fasting lab appt soon   Testing at [facility], orders available  We have discussed (black lab) dog training for hypoglycemia detection  Advise having fasting lipid panel testing and dilated eye examination, at least annually    Keep regular f/u PCP and GYN evaluations  Addressed patient questions today    The longitudinal plan of care for the endocrine problem(s) were addressed during this visit.  Due to added complexity of care,   we will continue to support the patient and the subsequent management of this condition with ongoing continuity of care.    There are no Patient Instructions on file for this visit.    Future labs ordered today:   Orders Placed This Encounter   Procedures    GLUCOSE MONITOR, 72 HOUR, PHYS INTERP    Hemoglobin A1c    Basic metabolic panel    Vitamin D Deficiency     Radiology/Consults ordered today: None    Total time spent on day of encounter:  38 min    Follow-up:  7/16/25 VVAm,     10/16/25 Return    DARI Gaming MD, MS  Endocrinology  [facility]    CC:  WILLOW Sheehan

## 2025-03-24 ENCOUNTER — OFFICE VISIT (OUTPATIENT)
Dept: ENDOCRINOLOGY | Facility: CLINIC | Age: 50
End: 2025-03-24
Payer: COMMERCIAL

## 2025-03-24 VITALS
WEIGHT: 157 LBS | HEART RATE: 70 BPM | SYSTOLIC BLOOD PRESSURE: 117 MMHG | BODY MASS INDEX: 24.59 KG/M2 | DIASTOLIC BLOOD PRESSURE: 73 MMHG

## 2025-03-24 DIAGNOSIS — E13.9 TYPE 1.5 DIABETES, MANAGED AS TYPE 1 (H): Primary | ICD-10-CM

## 2025-03-24 DIAGNOSIS — Z96.41 INSULIN PUMP STATUS: ICD-10-CM

## 2025-03-24 DIAGNOSIS — E06.3 HYPOTHYROIDISM DUE TO HASHIMOTO'S THYROIDITIS: ICD-10-CM

## 2025-03-24 PROCEDURE — 95251 CONT GLUC MNTR ANALYSIS I&R: CPT | Performed by: INTERNAL MEDICINE

## 2025-03-24 PROCEDURE — 3078F DIAST BP <80 MM HG: CPT | Performed by: INTERNAL MEDICINE

## 2025-03-24 PROCEDURE — 3074F SYST BP LT 130 MM HG: CPT | Performed by: INTERNAL MEDICINE

## 2025-03-24 PROCEDURE — G2211 COMPLEX E/M VISIT ADD ON: HCPCS | Performed by: INTERNAL MEDICINE

## 2025-03-24 PROCEDURE — 99214 OFFICE O/P EST MOD 30 MIN: CPT | Performed by: INTERNAL MEDICINE

## 2025-04-29 ENCOUNTER — MYC MEDICAL ADVICE (OUTPATIENT)
Dept: ENDOCRINOLOGY | Facility: CLINIC | Age: 50
End: 2025-04-29
Payer: COMMERCIAL

## 2025-05-11 ENCOUNTER — MYC MEDICAL ADVICE (OUTPATIENT)
Dept: FAMILY MEDICINE | Facility: CLINIC | Age: 50
End: 2025-05-11
Payer: COMMERCIAL

## 2025-05-11 DIAGNOSIS — F41.9 ANXIETY: ICD-10-CM

## 2025-05-12 NOTE — TELEPHONE ENCOUNTER
Per OV on 01/27/2025:    Anxiety  She is on prozac 20 mg daily, continue medication.     Routing refill request to PCP to review.    Halie BELLE,  Osito RN  [facility] Internal Medicine